# Patient Record
Sex: FEMALE | Race: OTHER | HISPANIC OR LATINO | Employment: FULL TIME | URBAN - METROPOLITAN AREA
[De-identification: names, ages, dates, MRNs, and addresses within clinical notes are randomized per-mention and may not be internally consistent; named-entity substitution may affect disease eponyms.]

---

## 2017-06-05 ENCOUNTER — ALLSCRIPTS OFFICE VISIT (OUTPATIENT)
Dept: OTHER | Facility: OTHER | Age: 37
End: 2017-06-05

## 2017-06-05 ENCOUNTER — LAB REQUISITION (OUTPATIENT)
Dept: LAB | Facility: HOSPITAL | Age: 37
End: 2017-06-05
Payer: COMMERCIAL

## 2017-06-05 DIAGNOSIS — Z01.419 ENCOUNTER FOR GYNECOLOGICAL EXAMINATION WITHOUT ABNORMAL FINDING: ICD-10-CM

## 2017-06-05 DIAGNOSIS — N94.9 UNSPECIFIED CONDITION ASSOCIATED WITH FEMALE GENITAL ORGANS AND MENSTRUAL CYCLE: ICD-10-CM

## 2017-06-05 PROCEDURE — 87255 GENET VIRUS ISOLATE HSV: CPT | Performed by: NURSE PRACTITIONER

## 2017-06-08 LAB — HSV SPEC CULT: NORMAL

## 2018-01-03 ENCOUNTER — ALLSCRIPTS OFFICE VISIT (OUTPATIENT)
Dept: OTHER | Facility: OTHER | Age: 38
End: 2018-01-03

## 2018-01-13 VITALS
HEART RATE: 89 BPM | DIASTOLIC BLOOD PRESSURE: 77 MMHG | WEIGHT: 130 LBS | HEIGHT: 59 IN | BODY MASS INDEX: 26.21 KG/M2 | SYSTOLIC BLOOD PRESSURE: 119 MMHG

## 2018-01-23 NOTE — MISCELLANEOUS
Reason For Visit  Reason For Visit Free Text Note Form: SW MET WITH PATIENT AND PROVIDER TO ASSIST WITH INTERPRETATION DURING PROBLEM VISIT ,      Active Problems    1  Encounter for routine gynecological examination (V72 31) (Z01 419)   2  Fatty liver (571 8) (K76 0)   3  Genital lesion, female (629 89) (N94 9)   4  PCOS (polycystic ovarian syndrome) (256 4) (E28 2)    Current Meds   1  Sprintec 28 0 25-35 MG-MCG Oral Tablet; TAKE 1 TABLET DAILY AS DIRECTED; Therapy: 05YCZ4159 to (Last [id number])  Requested for: [id number] Ordered    Allergies    1   No Known Drug Allergies    Signatures   Electronically signed by : ESTRELLITA Barry; Joaquín  3 2018  4:53PM EST                       (Author)

## 2018-01-24 VITALS
SYSTOLIC BLOOD PRESSURE: 131 MMHG | WEIGHT: 126 LBS | HEIGHT: 59 IN | BODY MASS INDEX: 25.4 KG/M2 | DIASTOLIC BLOOD PRESSURE: 85 MMHG

## 2018-04-23 LAB
ALBUMIN SERPL BCP-MCNC: 4.7 G/DL (ref 3–5.2)
ALP SERPL-CCNC: 63 U/L (ref 43–122)
ALT SERPL W P-5'-P-CCNC: 32 U/L (ref 9–52)
ANION GAP SERPL CALCULATED.3IONS-SCNC: 14 MMOL/L (ref 5–14)
AST SERPL W P-5'-P-CCNC: 27 U/L (ref 14–36)
BILIRUB SERPL-MCNC: 0.3 MG/DL
BUN SERPL-MCNC: 9 MG/DL (ref 5–25)
CALCIUM SERPL-MCNC: 9.9 MG/DL (ref 8.4–10.2)
CHLORIDE SERPL-SCNC: 103 MEQ/L (ref 97–108)
CHOLEST SERPL-MCNC: 183 MG/DL
CHOLEST/HDLC SERPL: 5.7 {RATIO}
CO2 SERPL-SCNC: 24 MMOL/L (ref 22–30)
CREATINE, SERUM (HISTORICAL): 0.6 MG/DL (ref 0.6–1.2)
DEPRECATED RDW RBC AUTO: 13.7 %
EGFR (HISTORICAL): >60 ML/MIN/1.73 M2
GLUCOSE SERPL-MCNC: 79 MG/DL (ref 70–99)
HCT VFR BLD AUTO: 37.2 % (ref 36–46)
HDLC SERPL-MCNC: 32 MG/DL
HEPATITIS A IGM ANTIBODY (HISTORICAL): NORMAL
HEPATITIS B CORE IGM ANTIBODY (HISTORICAL): NORMAL
HEPATITIS B SURFACE AG CONFIRMATION (HISTORICAL): NORMAL
HEPATITIS C ANTIBODY (HISTORICAL): NORMAL
HGB BLD-MCNC: 12.1 G/DL (ref 12–16)
LDL/HDL RATIO (HISTORICAL): 2.3
LDLC SERPL CALC-MCNC: 73 MG/DL
MCH RBC QN AUTO: 26.7 PG (ref 26–34)
MCHC RBC AUTO-ENTMCNC: 32.4 % (ref 31–36)
MCV RBC AUTO: 82 FL (ref 80–100)
PLATELET # BLD AUTO: 252 K/MCL (ref 150–450)
POTASSIUM SERPL-SCNC: 4.3 MEQ/L (ref 3.6–5)
RBC # BLD AUTO: 4.51 M/MCL (ref 4–5.2)
SODIUM SERPL-SCNC: 140 MEQ/L (ref 137–147)
TOTAL PROTEIN (HISTORICAL): 8.1 G/DL (ref 5.9–8.4)
TRIGL SERPL-MCNC: 392 MG/DL
TSH SERPL DL<=0.05 MIU/L-ACNC: 1.3 UIU/ML (ref 0.47–4.68)
VITAMIN D25-HYDROXY (HISTORICAL): 21.2 NG/ML (ref 30–100)
VLDLC SERPL CALC-MCNC: 78 MG/DL (ref 0–40)
WBC # BLD AUTO: 3.5 K/MCL (ref 4.5–11)

## 2018-04-24 LAB
EST. AVERAGE GLUCOSE BLD GHB EST-MCNC: 111 MG/DL
HBA1C MFR BLD HPLC: 5.5 %

## 2018-06-16 ENCOUNTER — APPOINTMENT (OUTPATIENT)
Dept: LAB | Facility: HOSPITAL | Age: 38
End: 2018-06-16
Payer: COMMERCIAL

## 2018-06-16 ENCOUNTER — TRANSCRIBE ORDERS (OUTPATIENT)
Dept: LAB | Facility: HOSPITAL | Age: 38
End: 2018-06-16

## 2018-06-16 DIAGNOSIS — E04.9 ENLARGEMENT OF THYROID: Primary | ICD-10-CM

## 2018-06-16 DIAGNOSIS — E04.9 ENLARGEMENT OF THYROID: ICD-10-CM

## 2018-06-16 LAB
T3FREE SERPL-MCNC: 2.41 PG/ML (ref 2.3–4.2)
T4 FREE SERPL-MCNC: 1 NG/DL (ref 0.76–1.46)
TSH SERPL DL<=0.05 MIU/L-ACNC: 1.17 UIU/ML (ref 0.36–3.74)

## 2018-06-16 PROCEDURE — 84479 ASSAY OF THYROID (T3 OR T4): CPT

## 2018-06-16 PROCEDURE — 84481 FREE ASSAY (FT-3): CPT

## 2018-06-16 PROCEDURE — 36415 COLL VENOUS BLD VENIPUNCTURE: CPT

## 2018-06-16 PROCEDURE — 84432 ASSAY OF THYROGLOBULIN: CPT

## 2018-06-16 PROCEDURE — 84439 ASSAY OF FREE THYROXINE: CPT

## 2018-06-16 PROCEDURE — 84443 ASSAY THYROID STIM HORMONE: CPT

## 2018-06-16 PROCEDURE — 86800 THYROGLOBULIN ANTIBODY: CPT

## 2018-06-17 LAB — T3RU NFR SERPL: 28 % (ref 24–39)

## 2018-06-18 ENCOUNTER — TRANSCRIBE ORDERS (OUTPATIENT)
Dept: ADMINISTRATIVE | Facility: HOSPITAL | Age: 38
End: 2018-06-18

## 2018-06-18 DIAGNOSIS — K46.9 ABDOMINAL HERNIA WITHOUT OBSTRUCTION AND WITHOUT GANGRENE, RECURRENCE NOT SPECIFIED, UNSPECIFIED HERNIA TYPE: Primary | ICD-10-CM

## 2018-06-19 LAB
THYROGLOB AB SERPL-ACNC: <1 IU/ML (ref 0–0.9)
THYROGLOB SERPL-MCNC: 5 NG/ML (ref 1.5–38.5)

## 2018-06-24 ENCOUNTER — HOSPITAL ENCOUNTER (OUTPATIENT)
Dept: RADIOLOGY | Facility: HOSPITAL | Age: 38
Discharge: HOME | End: 2018-06-24
Payer: COMMERCIAL

## 2018-06-24 DIAGNOSIS — K46.9 ABDOMINAL HERNIA WITHOUT OBSTRUCTION AND WITHOUT GANGRENE, RECURRENCE NOT SPECIFIED, UNSPECIFIED HERNIA TYPE: ICD-10-CM

## 2018-06-24 PROCEDURE — 76705 ECHO EXAM OF ABDOMEN: CPT

## 2018-06-28 ENCOUNTER — DOCUMENTATION (OUTPATIENT)
Dept: FAMILY MEDICINE CLINIC | Facility: CLINIC | Age: 38
End: 2018-06-28

## 2018-07-09 DIAGNOSIS — B00.9 HERPES: Primary | ICD-10-CM

## 2018-07-09 RX ORDER — VALACYCLOVIR HYDROCHLORIDE 1 G/1
1000 TABLET, FILM COATED ORAL EVERY 24 HOURS
Qty: 30 TABLET | Refills: 2 | Status: SHIPPED | OUTPATIENT
Start: 2018-07-09 | End: 2018-11-06 | Stop reason: SDUPTHER

## 2018-07-09 RX ORDER — VALACYCLOVIR HYDROCHLORIDE 1 G/1
TABLET, FILM COATED ORAL EVERY 24 HOURS
COMMUNITY
Start: 2017-09-05 | End: 2018-07-09 | Stop reason: SDUPTHER

## 2018-08-17 ENCOUNTER — OFFICE VISIT (OUTPATIENT)
Dept: FAMILY MEDICINE CLINIC | Facility: CLINIC | Age: 38
End: 2018-08-17
Payer: COMMERCIAL

## 2018-08-17 VITALS
SYSTOLIC BLOOD PRESSURE: 138 MMHG | HEIGHT: 59 IN | OXYGEN SATURATION: 99 % | BODY MASS INDEX: 24.39 KG/M2 | TEMPERATURE: 98 F | HEART RATE: 91 BPM | WEIGHT: 121 LBS | DIASTOLIC BLOOD PRESSURE: 82 MMHG

## 2018-08-17 DIAGNOSIS — R10.84 GENERALIZED ABDOMINAL PAIN: ICD-10-CM

## 2018-08-17 DIAGNOSIS — E78.5 HYPERLIPIDEMIA, UNSPECIFIED HYPERLIPIDEMIA TYPE: Primary | ICD-10-CM

## 2018-08-17 DIAGNOSIS — R19.7 DIARRHEA, UNSPECIFIED TYPE: ICD-10-CM

## 2018-08-17 DIAGNOSIS — E28.2 PCOS (POLYCYSTIC OVARIAN SYNDROME): ICD-10-CM

## 2018-08-17 DIAGNOSIS — D64.9 ANEMIA, UNSPECIFIED TYPE: ICD-10-CM

## 2018-08-17 DIAGNOSIS — K21.9 GASTROESOPHAGEAL REFLUX DISEASE, ESOPHAGITIS PRESENCE NOT SPECIFIED: ICD-10-CM

## 2018-08-17 DIAGNOSIS — K76.0 FATTY LIVER: ICD-10-CM

## 2018-08-17 PROBLEM — N94.9 GENITAL LESION, FEMALE: Status: ACTIVE | Noted: 2017-06-05

## 2018-08-17 PROCEDURE — 99204 OFFICE O/P NEW MOD 45 MIN: CPT | Performed by: FAMILY MEDICINE

## 2018-08-17 RX ORDER — NIACIN 1000 MG/1
1 TABLET, EXTENDED RELEASE ORAL EVERY 24 HOURS
COMMUNITY
Start: 2018-04-23 | End: 2019-02-06

## 2018-08-17 RX ORDER — ERGOCALCIFEROL 1.25 MG/1
50000 CAPSULE ORAL WEEKLY
Refills: 5 | COMMUNITY
Start: 2018-06-05 | End: 2018-09-10 | Stop reason: SDUPTHER

## 2018-08-17 NOTE — PROGRESS NOTES
Assessment/Plan:    No problem-specific Assessment & Plan notes found for this encounter  Diagnoses and all orders for this visit:    Hyperlipidemia, unspecified hyperlipidemia type  -     Lipid Panel with Direct LDL reflex; Future  -     Comprehensive metabolic panel; Future  -     HEMOGLOBIN A1C W/ EAG ESTIMATION; Future  -     CBC and differential; Future    Anemia, unspecified type  -     Lipid Panel with Direct LDL reflex; Future  -     Comprehensive metabolic panel; Future  -     HEMOGLOBIN A1C W/ EAG ESTIMATION; Future  -     CBC and differential; Future    PCOS (polycystic ovarian syndrome)  -     Lipid Panel with Direct LDL reflex; Future  -     Comprehensive metabolic panel; Future  -     HEMOGLOBIN A1C W/ EAG ESTIMATION; Future  -     CBC and differential; Future    Fatty liver  -     Lipid Panel with Direct LDL reflex; Future  -     Comprehensive metabolic panel; Future  -     HEMOGLOBIN A1C W/ EAG ESTIMATION; Future  -     CBC and differential; Future    Generalized abdominal pain  -     Ambulatory referral to Gastroenterology; Future  -     lansoprazole (PREVACID) 15 mg capsule; Take 1 capsule (15 mg total) by mouth daily    Diarrhea, unspecified type  -     Ambulatory referral to Gastroenterology; Future    Gastroesophageal reflux disease, esophagitis presence not specified  -     lansoprazole (PREVACID) 15 mg capsule; Take 1 capsule (15 mg total) by mouth daily    Other orders  -     ergocalciferol (VITAMIN D2) 50,000 units; Take 50,000 Units by mouth once a week  -     niacin (NIASPAN) 1000 MG CR tablet; Take 1 tablet by mouth every 24 hours      fu 3 months    Subjective:   Pt here for an acute visit  Pt having an allergic reaction to something at work  Pt complaining of abdominal pain for about a month very often  Pt complaining of irregular menses, more than her normal       Patient ID: Juan Scott is a 30 y o  female  HPI  New pt, presents to establish care       Pt has history of irregular menses she is benign followed by GYN for this, she was started on OCP which was helping regulate her menses but per pt she was put on it only for 6 months so she finished 6 months and stopped them, now menses irregular again  She also has not made follow up apt with her GYN about this  She is Wolof speaking  She also states occassions at work where she gets a rash on her chest and throat, with burning  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  She denies respiratory sxs  She also is requesting refill of reflux medicine  She has been getting increase reflux sxs and abd pain for a bout a month  Previous PCP had her on "something for this"  Reviewed recent thyroid labs in June, normal     The following portions of the patient's history were reviewed and updated as appropriate: allergies, current medications, past family history, past medical history, past social history, past surgical history and problem list     Review of Systems   Constitutional: Negative for activity change and appetite change  HENT: Negative  Respiratory: Negative  Cardiovascular: Negative  Gastrointestinal: Positive for abdominal pain  Genitourinary: Negative  Musculoskeletal: Negative for arthralgias  Skin: Positive for rash  Psychiatric/Behavioral: Negative  Objective:      /82   Pulse 91   Temp 98 °F (36 7 °C)   Ht 4' 11 06" (1 5 m)   Wt 54 9 kg (121 lb)   LMP 08/08/2018   SpO2 99%   BMI 24 39 kg/m²          Physical Exam   Constitutional: She is oriented to person, place, and time  She appears well-developed and well-nourished  No distress  HENT:   Head: Normocephalic  Eyes: Conjunctivae are normal    Cardiovascular: Normal rate, regular rhythm and normal heart sounds  Pulmonary/Chest: Effort normal and breath sounds normal  No respiratory distress  She has no wheezes  She has no rales  Abdominal: Soft  Bowel sounds are normal  She exhibits no distension   There is no tenderness  Neurological: She is alert and oriented to person, place, and time  Psychiatric: She has a normal mood and affect   Her behavior is normal

## 2018-08-20 RX ORDER — LANSOPRAZOLE 15 MG/1
15 CAPSULE, DELAYED RELEASE ORAL DAILY
Qty: 30 CAPSULE | Refills: 0 | Status: SHIPPED | OUTPATIENT
Start: 2018-08-20 | End: 2018-09-21 | Stop reason: SDUPTHER

## 2018-08-27 ENCOUNTER — OFFICE VISIT (OUTPATIENT)
Dept: GASTROENTEROLOGY | Facility: CLINIC | Age: 38
End: 2018-08-27
Payer: COMMERCIAL

## 2018-08-27 VITALS
DIASTOLIC BLOOD PRESSURE: 87 MMHG | HEIGHT: 59 IN | WEIGHT: 121.2 LBS | SYSTOLIC BLOOD PRESSURE: 138 MMHG | TEMPERATURE: 98.3 F | BODY MASS INDEX: 24.44 KG/M2 | HEART RATE: 90 BPM

## 2018-08-27 DIAGNOSIS — K21.9 GASTROESOPHAGEAL REFLUX DISEASE WITHOUT ESOPHAGITIS: ICD-10-CM

## 2018-08-27 DIAGNOSIS — R19.7 DIARRHEA, UNSPECIFIED TYPE: ICD-10-CM

## 2018-08-27 DIAGNOSIS — R10.84 GENERALIZED ABDOMINAL PAIN: Primary | ICD-10-CM

## 2018-08-27 PROCEDURE — 99244 OFF/OP CNSLTJ NEW/EST MOD 40: CPT | Performed by: INTERNAL MEDICINE

## 2018-08-27 NOTE — LETTER
August 27, 2018     Elizabeth High MD  [address]  [address]  [address] [address]    Patient: Suzan Spencer   YOB: 1980   Date of Visit: 8/27/2018       Dear Dr Desmond Patterson: Thank you for referring Suzan Spencer to me for evaluation  Below are my notes for this consultation  If you have questions, please do not hesitate to call me  I look forward to following your patient along with you  Sincerely,        Lottie Riddle MD        CC: No Recipients  Lottie Riddle MD  8/27/2018  2:07 PM  Sign at close encounter  [facility] - Outpatient Consultation  Suzan Spencer 30 y o  female MRN: [id number]  Encounter: [id number]          ASSESSMENT AND PLAN:      1  Generalized abdominal pain  2  Diarrhea, unspecified type  Given the acute onset of symptoms, she most likely has an acute infectious gastroenteritis  I will check her stool studies and have her follow up in the office in about one month  If her symptoms persist and her stool studies are negative then we will proceed to a colonoscopy and possibly also an upper endoscopy  - Ambulatory referral to Gastroenterology  - White Blood Cells, Stool by Gram Stain; Future  - Stool Enteric Bacterial Panel by PCR; Future  - Clostridium difficile toxin by PCR; Future  - Ova and parasite examination; Future    3  Gastroesophageal reflux disease without esophagitis  Her reflux symptoms are not fully controlled on the lansoprazole  I have asked her to continue the lansoprazole and if her symptoms persist we may consider an upper endoscopy as the next step     ______________________________________________________________________    HPI:  She presents for evaluation because of acute onset of generalized abdominal pain and diarrhea  She has been having about four soft to loose bowel movements per day for the past few days associated with this generalized abdominal pain  She has not had any bleeding or weight loss  She also denies nausea, vomiting, and dysphagia  She denies any sick contacts, recent travel history, and recent antibiotic use  She has a history of reflux and has been taking lansoprazole for this but feels it is not completely taking care of her symptoms  REVIEW OF SYSTEMS:    CONSTITUTIONAL: Denies any fever, chills, rigors, and weight loss  HEENT: No earache or tinnitus  Denies hearing loss or visual disturbances  CARDIOVASCULAR: No chest pain or palpitations  RESPIRATORY: Denies any cough, hemoptysis, shortness of breath or dyspnea on exertion  GASTROINTESTINAL: As noted in the History of Present Illness  GENITOURINARY: No problems with urination  Denies any hematuria or dysuria  NEUROLOGIC: No dizziness or vertigo, denies headaches  MUSCULOSKELETAL: Denies any muscle pain but has joint pains  SKIN: Denies skin rashes or itching  ENDOCRINE: Denies excessive thirst  Denies intolerance to heat or cold  PSYCHOSOCIAL: Denies depression or anxiety  Denies any recent memory loss         Historical Information   Past Medical History:   Diagnosis Date    Hyperlipidemia      Past Surgical History:   Procedure Laterality Date     SECTION      3X--2003,2005, 2008    TUBAL LIGATION       Social History   History   Alcohol Use No     History   Drug Use No     History   Smoking Status    Never Smoker   Smokeless Tobacco    Never Used     Family History   Problem Relation Age of Onset    Uterine cancer Mother     Heart disease Mother    Jackie Benton Hypertension Mother     Stomach cancer Father     Diabetes Sister     Hyperlipidemia Sister     Diabetes Brother     Hyperlipidemia Brother     Asthma Son     Asthma Daughter        Meds/Allergies       Current Outpatient Prescriptions:     ergocalciferol (VITAMIN D2) 50,000 units    lansoprazole (PREVACID) 15 mg capsule    niacin (NIASPAN) 1000 MG CR tablet    valACYclovir (VALTREX) 1,000 mg tablet    No Known Allergies        Objective Blood pressure 138/87, pulse 90, temperature 98 3 °F (36 8 °C), temperature source Tympanic, height 4' 11 06" (1 5 m), weight 55 kg (121 lb 3 2 oz), last menstrual period 08/08/2018  Body mass index is 24 43 kg/m²  PHYSICAL EXAM:      General Appearance:   Alert, cooperative, no distress   HEENT:   Normocephalic, atraumatic, anicteric      Neck:  Supple, symmetrical, trachea midline   Lungs:   Clear to auscultation bilaterally; no rales, rhonchi or wheezing; respirations unlabored    Heart[de-identified]   Regular rate and rhythm; no murmur, rub, or gallop  Abdomen:   Soft, periumbilical tenderness, non-distended; normal bowel sounds; no masses, no organomegaly    Genitalia:   Deferred    Rectal:   Deferred    Extremities:  No cyanosis, clubbing or edema    Pulses:  2+ and symmetric    Skin:  No jaundice, rashes, or lesions    Lymph nodes:  No palpable cervical lymphadenopathy        Lab Results:   No visits with results within 1 Day(s) from this visit  Latest known visit with results is:   Appointment on 06/16/2018   Component Date Value    T3 Uptake Ratio 06/16/2018 28     T3, Free 06/16/2018 2 41     Free T4 06/16/2018 1 00     TSH 3RD GENERATON 06/16/2018 1 170     Thyroglobulin Ab 06/16/2018 <1 0     Thyroglobulin-ИВАН 06/16/2018 5 0          Radiology Results:   No results found

## 2018-08-27 NOTE — PROGRESS NOTES
[facility] - Outpatient Consultation  Suzan Spencer 30 y o  female MRN: [id number]  Encounter: [id number]          ASSESSMENT AND PLAN:      1  Generalized abdominal pain  2  Diarrhea, unspecified type  Given the acute onset of symptoms, she most likely has an acute infectious gastroenteritis  I will check her stool studies and have her follow up in the office in about one month  If her symptoms persist and her stool studies are negative then we will proceed to a colonoscopy and possibly also an upper endoscopy  - Ambulatory referral to Gastroenterology  - White Blood Cells, Stool by Gram Stain; Future  - Stool Enteric Bacterial Panel by PCR; Future  - Clostridium difficile toxin by PCR; Future  - Ova and parasite examination; Future    3  Gastroesophageal reflux disease without esophagitis  Her reflux symptoms are not fully controlled on the lansoprazole  I have asked her to continue the lansoprazole and if her symptoms persist we may consider an upper endoscopy as the next step     ______________________________________________________________________    HPI:  She presents for evaluation because of acute onset of generalized abdominal pain and diarrhea  She has been having about four soft to loose bowel movements per day for the past few days associated with this generalized abdominal pain  She has not had any bleeding or weight loss  She also denies nausea, vomiting, and dysphagia  She denies any sick contacts, recent travel history, and recent antibiotic use  She has a history of reflux and has been taking lansoprazole for this but feels it is not completely taking care of her symptoms  REVIEW OF SYSTEMS:    CONSTITUTIONAL: Denies any fever, chills, rigors, and weight loss  HEENT: No earache or tinnitus  Denies hearing loss or visual disturbances  CARDIOVASCULAR: No chest pain or palpitations     RESPIRATORY: Denies any cough, hemoptysis, shortness of breath or dyspnea on exertion  GASTROINTESTINAL: As noted in the History of Present Illness  GENITOURINARY: No problems with urination  Denies any hematuria or dysuria  NEUROLOGIC: No dizziness or vertigo, denies headaches  MUSCULOSKELETAL: Denies any muscle pain but has joint pains  SKIN: Denies skin rashes or itching  ENDOCRINE: Denies excessive thirst  Denies intolerance to heat or cold  PSYCHOSOCIAL: Denies depression or anxiety  Denies any recent memory loss  Historical Information   Past Medical History:   Diagnosis Date    Hyperlipidemia      Past Surgical History:   Procedure Laterality Date     SECTION      3X--2003,2005, 2008    TUBAL LIGATION       Social History   History   Alcohol Use No     History   Drug Use No     History   Smoking Status    Never Smoker   Smokeless Tobacco    Never Used     Family History   Problem Relation Age of Onset    Uterine cancer Mother     Heart disease Mother     Hypertension Mother     Stomach cancer Father     Diabetes Sister     Hyperlipidemia Sister     Diabetes Brother     Hyperlipidemia Brother     Asthma Son     Asthma Daughter        Meds/Allergies       Current Outpatient Prescriptions:     ergocalciferol (VITAMIN D2) 50,000 units    lansoprazole (PREVACID) 15 mg capsule    niacin (NIASPAN) 1000 MG CR tablet    valACYclovir (VALTREX) 1,000 mg tablet    No Known Allergies        Objective     Blood pressure 138/87, pulse 90, temperature 98 3 °F (36 8 °C), temperature source Tympanic, height 4' 11 06" (1 5 m), weight 55 kg (121 lb 3 2 oz), last menstrual period 2018  Body mass index is 24 43 kg/m²          PHYSICAL EXAM:      General Appearance:   Alert, cooperative, no distress   HEENT:   Normocephalic, atraumatic, anicteric      Neck:  Supple, symmetrical, trachea midline   Lungs:   Clear to auscultation bilaterally; no rales, rhonchi or wheezing; respirations unlabored    Heart[de-identified]   Regular rate and rhythm; no murmur, rub, or gallop  Abdomen:   Soft, periumbilical tenderness, non-distended; normal bowel sounds; no masses, no organomegaly    Genitalia:   Deferred    Rectal:   Deferred    Extremities:  No cyanosis, clubbing or edema    Pulses:  2+ and symmetric    Skin:  No jaundice, rashes, or lesions    Lymph nodes:  No palpable cervical lymphadenopathy        Lab Results:   No visits with results within 1 Day(s) from this visit  Latest known visit with results is:   Appointment on 06/16/2018   Component Date Value    T3 Uptake Ratio 06/16/2018 28     T3, Free 06/16/2018 2 41     Free T4 06/16/2018 1 00     TSH 3RD GENERATON 06/16/2018 1 170     Thyroglobulin Ab 06/16/2018 <1 0     Thyroglobulin-ИВАН 06/16/2018 5 0          Radiology Results:   No results found

## 2018-08-31 ENCOUNTER — OFFICE VISIT (OUTPATIENT)
Dept: OBGYN CLINIC | Facility: HOSPITAL | Age: 38
End: 2018-08-31
Payer: COMMERCIAL

## 2018-08-31 VITALS
HEIGHT: 61 IN | HEART RATE: 80 BPM | WEIGHT: 122.2 LBS | DIASTOLIC BLOOD PRESSURE: 79 MMHG | BODY MASS INDEX: 23.07 KG/M2 | SYSTOLIC BLOOD PRESSURE: 128 MMHG

## 2018-08-31 DIAGNOSIS — E28.2 PCOS (POLYCYSTIC OVARIAN SYNDROME): ICD-10-CM

## 2018-08-31 DIAGNOSIS — Z01.419 WOMEN'S ANNUAL ROUTINE GYNECOLOGICAL EXAMINATION: Primary | ICD-10-CM

## 2018-08-31 DIAGNOSIS — B37.3 VAGINAL YEAST INFECTION: ICD-10-CM

## 2018-08-31 PROBLEM — N94.9 GENITAL LESION, FEMALE: Status: RESOLVED | Noted: 2017-06-05 | Resolved: 2018-08-31

## 2018-08-31 PROCEDURE — 99395 PREV VISIT EST AGE 18-39: CPT | Performed by: NURSE PRACTITIONER

## 2018-08-31 PROCEDURE — 87210 SMEAR WET MOUNT SALINE/INK: CPT | Performed by: NURSE PRACTITIONER

## 2018-08-31 RX ORDER — NORGESTIMATE AND ETHINYL ESTRADIOL 0.25-0.035
1 KIT ORAL DAILY
Qty: 28 TABLET | Refills: 12 | Status: SHIPPED | OUTPATIENT
Start: 2018-08-31 | End: 2019-05-03 | Stop reason: ALTCHOICE

## 2018-08-31 NOTE — PATIENT INSTRUCTIONS
Hemorragia uterina disfuncional   LO QUE NECESITA SABER:   ¿Qué es la hemorragia uterina disfuncional?  La hemorragia uterina disfuncional es un sangrado uterino anormal causado por un problema hormonal  Es posible que dunaway útero marc en un momento que no sea dunaway período menstrual regular  Colton menstruaciones pueden durar más o ser mas cortos, y podría sangrar dawson mayor o daria cantidad que de costumbre  ¿Cuáles son las causas de la hemorragia uterina disfuncional?  La hemorragia puede ser el resultado de dawson cantidad demasiado leilani o demasiado baja de estrógeno  Es posible que marc más o menos de lo acostumbrado si kelvin de colton ovarios no libera un óvulo giuliano la ovulación  Algunas condiciones de [place], alberto el síndrome del ovario Arnel, pueden aumentar dunaway riesgo de tener hemorragia uterina disfuncional   ¿Cuáles son los signos y síntomas de la hemorragia uterina disfuncional?   · Sangrado o manchas de marc entre los períodos menstruales    · Hemorragia que comienza 12 meses o más tarde después de rajeev pasado la menopausia    · Sangrado más o menos abundante que de costumbre giuliano colton períodos menstruales    · Sangra más de lo usual o más de 7 días giuliano dunaway período menstrual regular    · Sangra menos que de costumbre o menos de 2 días    · Intervalo de tiempo más corto o más alicia que de costumbre entre colton períodos menstruales  ¿Cómo se diagnostica la hemorragia uterina disfuncional (HUD)? · Los análisis de marc:  se pueden hacer para encontrar la causa de dunaway hemorragia uterina disfuncional y los problemas que causa, alberto la anemia  · Un examen pélvico  para encontrar la causa de dunaway hemorragia  · Dawson histeroscopia  es un procedimiento para examinar dunaway endometrio  El endometrio es el revestimiento por dentro del [place]  Dunaway médico insertará en dunaway útero un tubo pequeño con dawson cámara en la punta  · Dawson biopsia  es un procedimiento para extraer Mardene Phy pequeña muestra de tejido del endometrio  El tejido se envía a un laboratorio para hacerle exámenes  · Dawson ecografía  [place] ondas sonoras para mostrar imágenes de dunaway [place], ovarios, trompas de Falopio y vagina en dawson pantalla  · Rufus Alex prueba de [place]  podría ser Wolfe Pinks  Dunaway médico tomará [place] de tejido de dunaway deacon uterino y la enviará a un laboratorio para analizarla  ¿Cómo se trata la hemorragia uterina disfuncional?   · Medicamentos:      ¨ Hormonas  hacen que colton períodos menstruales bora más regulares y contribuyen a disminuir la hemorragia  A veces meme medicamento se administra en forma de pastillas anticonceptivas  ¨ AINEs (Analgésicos antiinflamatorios no esteroides)  pueden disminuir la inflamación y el dolor o la fiebre  Mmee medicamento esta disponible con o sin dawson receta médica  Los AINEs pueden causar sangrado estomacal o problemas renales en ciertas personas  Si usted selwyn un medicamento anticoagulante, siempre pregúntele a dunaway médico si los ERROL son seguros para usted  Siempre amalia la etiqueta de meme medicamento y [place] instrucciones  ¨ Pueden administrarle suplementos de jo  podrían administrarse si dunaway nivel de jo en la marc disminuye debido a la hemorragia  El jo puede causar estreñimiento  Consulte con dunaway médico de qué manera puede evitar o tratar el estreñimiento  El jo Yanira Cheng hacer que colton evacuaciones intestinales bora de color oscuro o negras  · Eileen Slocumb y los procedimientos  pueden ser necesarios si no se pueden usar medicamentos o los medicamentos no dan resultado  Podría ser necesario hacerle dawson ablación endometrial o dawson dilatación y curetaje para controlar dunaway hemorragia  Es posible que necesite dawson histerectomía abdominal o vaginal  Rufus Alex histerectomía es dawson cirugía para extraer el [place]  ¿Cómo me cuido en mi hogar? · La aplicación de calor  en la parte inferior del abdomen de 20 a 30 minutos cada 2 horas giuliano los días que le indiquen   El calor ayuda a disminuir el dolor y los espasmos musculares  · Consuma alimentos con un alto contenido de jo  si lo necesita  Las verduras de [employer], la carne de res, BOONOO BOONOO, [place], los huevos y los panes y cereales integrales son algunos ejemplos de alimentos con un alto contenido de jo  · Lleve un diario de colton períodos menstruales  Anote la cantidad de tampones o toallas higiénicas que [place] a diario  · Consulte con ly médico antes de comenzar un programa para bajar de [place]  Es posible que deba esperar a que la hemorragia anormal se haya detenido para tratar de bajar de [place]  La cantidad de jo en ly marc debería ser normal antes de [employer]  ¿Cuándo gamaliel comunicarme con mi médico?   · Necesita cambiar ly toalla higiénica o tampón más de dawson vez por hora  · Ly medicamento le da náuseas, vómitos o diarrea  · Usted tiene preguntas o inquietudes acerca de ly condición o cuidado  ¿Cuándo gamaliel buscar atención inmediata o llamar al 911? · Continúa sangrando mucho o se siente mareado  ACUERDOS SOBRE LY CUIDADO:   Usted tiene el derecho de ayudar a planear ly cuidado  Aprenda todo lo que pueda sobre ly condición y alberto darle tratamiento  Discuta colton opciones de tratamiento con colton médicos para decidir el cuidado que usted desea recibir  Usted siempre tiene el derecho de rechazar el tratamiento  Esta información es sólo para uso en educación  Ly intención no es darle un consejo médico sobre enfermedades o tratamientos  Colsulte con ly Mardell Oliver farmacéutico antes de seguir cualquier régimen médico para saber si es seguro y efectivo para usted  © 2017 [address] Information is for End User's use only and may not be sold, redistributed or otherwise used for commercial purposes  All illustrations and images included in CareNotes® are the copyrighted property of A D A M , Inc  or Randy Joshi    Etinilestradiol/norgestimato (Por la boca)   Se [place] para evitar el embarazo y tratar el acné  Meme medicamento comúnmente se conoce alberto dawson píldora anticonceptiva  Sarah(s) : Christopher Yanira, Femynor, Mono-Linyah, MonoNessa, Ortho Tri-Cyclen, Ortho Tri-Cyclen Lo, Ortho-Cyclen, Previfem, Sprintec, Tri-Estarylla, Tri-Linyah, Tri-Lo-Estarylla, Tri-Lo-Azucena, Tri-Lo-Sprintec, Tri-Previfem   Existen muchas otras marcas de Mark  Meme medicamento no debe ser usado cuando:   Meme medicamento no es adecuado para todas las personas  No lo use si usted tuvo dawson reacción alérgica al etinil estradiol o al norgestimato, o si usted está embarazada o tiene sangrado vaginal inusual que no ha sido revisado por dunaway médico  No lo use si usted tiene enfermedad del hígado, cáncer del seno, o problemas con coágulos de Jenny  No lo use si usted tiene presión arterial leilani, ciertos problemas del corazón, o diabetes con daños en el riñón, los ojos, nervios o vasos sanguíneos  Forma de usar meme medicamento:   [place]ak Bar  · Dunaway médico le indicara cuanto medicamento necesita usar  No use más medicamento de lo indicado  · Cada sarah de anticonceptivos orales tiene instrucciones específicas  Tiffanie y siga las instrucciones para el paciente que vienen con dunaway sarah prescrita  Hable con dunaway médico o farmacéutico si tiene alguna pregunta  · Pregunte a dunaway médico si es necesario que use un gretta método anticonceptivo [employer] primeros 7 días del primer ciclo de píldoras  · [place] dunaway píldora a la misma hora cada día  Las píldoras anticonceptivas funcionan mejor cuando no hay más de 24 horas entre dosis  Mantenga las píldoras en el envase original  [address] en el orden en que aparecen en el empaque  · Siga las instrucciones que vienen en el panfleto para el paciente o llame a dunaway médico, si usted vomita o si en lapso de 3 a 4 horas usted tiene diarrea después de lanny Mark  · Si olvida dawson dosis: Tiffanie y siga las instrucciones del paciente cuidadosamente si Orlie Likens dosis   Es posible que necesite usar un gretta método anticonceptivo para varios días  Consulte con dunaway médico o farmacéutico si tiene cualquier pregunta  · Guarde el medicamento en el envase original a temperatura ambiente, alejado del calor, la humedad, y la chayito directa  Medicamentos y [employer] que debe evitar:   Consulte con dunaway médico o farmacéutico antes de usar cualquier medicamento, incluyendo los que compra sin receta médica, las vitaminas y los productos herbales  · Algunos alimentos y [employer] pueden afectar el funcionamiento de las píldoras anticonceptivas  Informe a dunaway médico si usted [employer] siguientes medicamentos:   ¨ Acetaminofén, aprepitant, ácido ascórbico, ácido acetilsalicílico, atorvastatina, boceprevir, bosentan, clofibrato, colesevelam, ciclosporinamorfina, morfina, prednisolona, rifabutina, rifampicina, rosuvastatina, hierba de [address], telaprevir, temazepam, teofilina, tizanidina  ¨ Medicamento para las convulsiones, incluyendo Cegdel, De queen, lamotrigina, oxcarbazepina, [place], topiramato  ¨ Medicamento de reemplazo de la tiroides  ¨ Medicamento para tratar dawson infección, incluyendo fluconazol, ketoconazol, voriconazol  ¨ Un inhibidor de la proteasa para tratar el VIH/SIDA  Precauciones giuliano el uso de leland medicamento:   · Informe a dunaway médico de inmediato si usted tevin que quedó [place]  Si pasan 2 meses seguidos sin que usted tenga un período menstrual, llame a dunaway médico para que le realicen dawson prueba de embarazo antes de lanny más píldoras  · Infórmele a dunaway médico si está dando de lactar, o si wayne a chayito en las últimas 4 semanas antes de rajeev empezado a usar [place]  Informe a dunaway médico si usted tiene cáncer cervical, diabetes, epilepsia, problemas con la vesícula biliar, migrañas, enfermedad cardíaca o vascular, colesterol alto o un historial familiar de cáncer de seno o depresión   Informe a dunaway médico si usted fuma o si existen antecedentes de cloasma (decoloración de la piel de la shakir), especialmente giuliano el embarazo  · Meme medicamento puede causar los siguientes problemas:  ¨ Coágulos de Vern ibarra, los cuáles pueden llevar a un derrame cerebral o un ataque al corazón (el riesgo aumenta debido al hábito de fumar cigarros)  ¨ Un posible aumento en el riesgo de cáncer de seno o cervical  ¨ Cáncer de hígado o tumores  ¨ Enfermedad de la vesícula  ¨ Presión arterial leilani  · Es posible que usted presente manchas o sangrado irregular cuando empiece a usar meme medicamento  Usted puede tener sangrado imprevisto si olvida lanny dawson dosis o si selwyn dawson dosis tarde  Llame a dunaway médico si tevin que hay un problema, por ejemplo, si tiene sangrado abundante  · Es posible que usted necesite dejar de usar meme medicamento giuliano unas semanas antes y después de Krunal Johnston cirugía debido al riesgo de desarrollar coágulos de Vern ibarra  · Meme medicamento no la protegerá contra el VIH / [place] u otras enfermedades de transmisión sexual   · Dígale a todo médico o dentista encargado de atenderle que usted está usando meme medicamento  Puede que meme medicamento afecte algunos resultados de [employer]  · Guarde todos los medicamentos fuera del alcance de los niños  Nunca comparta colton medicamentos con [employer]    Efectos secundarios que pueden presentarse giuliano el uso de meme medicamento:   Consulte inmediatamente con el médico si nota cualquiera de estos efectos secundarios:  · Reacción alérgica: Comezón o ronchas, hinchazón del corky o las epifanio, hinchazón u hormigueo en la boca o garganta, opresión en el pecho, dificultad para respirar  · Dolor en el pecho, dificultad para respirar, o toser marc  · Náusea, sudoración inusual, W  KATHRYN Shukla  · Adormecimiento o debilidad en un lado del cuerpo, dolor de je repentino o severo, problemas con el habla, la visión o para caminar  · Dolor en la parte inferior de la pierna (pantorrilla)  · Sangrado vaginal o sangrado profuso inusual e inesperado  · Piel u ojos amarillos  · Pérdida de la visión, visión doble  Consulte con el médico si nota los siguientes efectos secundarios menos graves:   · Depresión, cambios de humor  · Shaniqua de je  · Sangrado ligero o sangrado entre períodos  Consulte con el médico si nota otros efectos secundarios que tevin son causados por leland medicamento  Llame a carlson médico para consultarle Lexie Luevano puede notificar colton efectos secundarios al FDA al [id number]  © 2017 [address] Information is for End User's use only and may not be sold, redistributed or otherwise used for commercial purposes  Esta información es sólo para uso en educación  Carlson intención no es darle un consejo médico sobre enfermedades o tratamientos  Colsulte con carlson Benuel Matos farmacéutico antes de seguir cualquier régimen médico para saber si es seguro y efectivo para usted  Miconazol (Dentro de la vagina)   Trata las infecciones vaginales fúngicas (causadas por hongos)  [place] medicamento pertenece a la clase de antifúngicos  Sarah(s) : Equate Miconazole 1, Equate Miconazole 7, [facility] Miconazole 1, [facility] Miconazole 3, [facility] Miconazole 7, Good Sense Miconazole 3, Good Sense Miconazole 7, Leader Miconazole 3 Combination Pack, Miconazole 3 Combination Pack, Miconazole 7, Quality Choice 3 Day Vaginal, Quality Choice Miconazole 7, Rite Aid Miconazole 3, Sunmark Miconazole 7, Top Care Miconazole 3   Existen muchas otras marcas de [place]  [place] medicamento no debe ser usado cuando:   No use leland medicamento si alguna vez ha tenido [facility] reacción alérgica al [employer] u otros antifúngicos, tales alberto Gyne-Lotrimin®, Mycelex®, Micatin®, Spectazole®, [place], o Femstat®  Forma de usar leland medicamento:   Crema, Supositorio, Tampón  · Carlson médico le dirá la cantidad de medicamento que usted debe lanny y la frecuencia con que debe hacerlo   No use más cantidad de medicamento ni lo use con más frecuencia de la indicada por el médico   · Siga las indicaciones de la etiqueta del medicamento si usted está usando meme medicamento sin prescripción médica  · Meme medicamento viene con instrucciones para el paciente  [place] y siga las instrucciones cuidadosamente  Consulte con dunaway médico o farmacéutico si tiene cualquier pregunta  · [employer] las epifanio con agua y jabón antes y después de usar meme medicamento  · Meme medicamento es para uso en la vagina únicamente  Úsela a la hora de The   S[employer] a no ser [employer] doctor diga otra cosa  · Use solamente ropa interior limpia de algodón (calzones) en vez de ropa interior de nylon o rayón  · Para facilitar la aplicación del supositorio vaginal, humedézcalo con un poco de agua tibia  [address] usar un lubricante alberto el gel K-Y®, silverio no use vaselina (Vaseline®)  · Inserte el tampón medicado en dunaway vagina al acostarse  Déjelo insertado toda la noche  Karen Citizen se levanta por la mañana  · Lave el aplicador con agua tibia y [place] después de haberlo usado  Si dawson dosis es [place]:   · Si olvida aplicar dawson dosis de dunaway medicamento, aplíquelo lo más pronto posible  Si es deepa hora de dunaway próxima dosis, omita la [place] y espere hasta entonces para aplicar el medicamento  · No use medicina adicional para compensar dawson dosis [place]  Mylinda Steph guardar y botar meme medicamento:   · Si el medicamento viene con un aplicador desechable, use cada aplicador solo dawson vez y después deséchelo  · Guarde el medicamento a temperatura ambiente, alejado del calor, la humedad y la chayito directa  No lo congele  · Guarde todas las medicinas fuera del alcance de los niños y no las comparta con otra persona  Medicamentos y alimentos que debe evitar:   Consulte con dunaway médico o farmacéutico antes de usar cualquier medicamento, incluyendo los que compra sin receta médica, las vitaminas y los productos herbales    · Antes de ASHELY Huang medicamento infórmele al médico si también está usando un anticoagulante alberto Coumadin®  · Evite las duchas vaginales o usar otros productos vaginales a menos que carlson médico se lo indique  Precauciones giuliano el uso de leland medicamento:   · Consulte con el médico antes de usar leland medicamento si usted está embarazada o dando de lactar [place])  · [place] puede salir de carlson vagina giuliano el día  Usted puede usar toallas higiénicas para proteger carlson ropa, silverio no use tampones  · Continúe usando leland medicamento por la totalidad del tratamiento aunque colton síntomas mejoren después de rajeev usado las primeras dosis  No suspenda el uso del medicamento si carlson menstruación comienza giuliano el tiempo del [address]  Use toallas higiénicas en lugar de tampones  · Si colton síntomas no mejoran, o si por el contrario empeoran, llame al médico   Efectos secundarios que pueden presentarse giuliano el uso de leland medicamento:   Consulte inmediatamente con el médico si nota cualquiera de estos efectos secundarios:  · Sangrado o moretones inusuales  Consulte con el médico si nota los siguientes efectos secundarios menos graves:   · [place]  · Dolor de je  · Aumento en el flujo vaginal  · Sarpullido en la piel  · Malestar estomacal  · Janet Iniguez o ardor vaginal  Consulte con el médico si nota otros efectos secundarios que tevin son causados por leland medicamento  Llame a carlson médico para consultarle Lexie Salmeronted puede notificar colton efectos secundarios al FDA al [id number]  © 2017 [address] Information is for End User's use only and may not be sold, redistributed or otherwise used for commercial purposes  Esta información es sólo para uso en educación  Carlson intención no es darle un consejo médico sobre enfermedades o tratamientos  Colsulte con carlson Benuel Matos farmacéutico antes de seguir cualquier régimen médico para saber si es seguro y efectivo para usted    Ethinyl Estradiol/Norgestimate (By mouth)   Ethinyl Estradiol (ETH-i-nil es-tra-DYE-ol), Norgestimate (bgr-FXR-fc-mate)  Prevents pregnancy and treats acne  This medicine is commonly called a birth control pill  Brand Name(s): Estarylla, Femynor, Mono-Linyah, MonoNessa, Ortho Tri-Cyclen, Ortho Tri-Cyclen Lo, Ortho-Cyclen, Previfem, Sprintec, Tri-Estarylla, Tri-Linyah, Tri-Lo-Estarylla, Tri-Lo-Azucena, Tri-Lo-Sprintec, Tri-Previfem   There may be other brand names for this medicine  When This Medicine Should Not Be Used: This medicine is not right for everyone  Do not use it if you had an allergic reaction to ethinyl estradiol or norgestimate, or if you are pregnant or have unusual vaginal bleeding that has not been checked by your doctor  Do not use it if you have liver disease, breast cancer, or problems with blood clots  Do not use it if you have high blood pressure, certain heart problems, or diabetes with kidney, eye, nerve, or blood vessel damage  How to Use This Medicine:   Tablet  · Your doctor will tell you how much medicine to use  Do not use more than directed  · Each brand of birth control pills has specific directions  Read and follow the patient instructions for your prescribed brand  Talk to your doctor or pharmacist if you have any questions  · Ask your doctor if you should use a second form of birth control for the first 7 days of your first cycle of pills  · Take your pill at the same time every day  Birth control pills work best when there is no more than 24 hours between doses  Keep the pills in the original container  Take the pills in the order they appear in the container  · Follow the instructions in the patient leaflet or call your doctor if you vomit or have diarrhea within 3 to 4 hours of taking this medicine  · Missed dose: Read and carefully follow the patient instructions if you miss a dose  You may need to use a second form of birth control for several days   Ask your doctor or pharmacist if you have any questions  · Store the medicine in the original package at room temperature, away from heat, moisture, and direct light  Drugs and Foods to Avoid:   Ask your doctor or pharmacist before using any other medicine, including over-the-counter medicines, vitamins, and herbal products  · Some foods and medicines can affect how birth control pills work  Tell your doctor if you are also using the following:   ¨ Acetaminophen, aprepitant, ascorbic acid, aspirin, atorvastatin, boceprevir, bosentan, clofibrate, colesevelam, cyclosporine, morphine, prednisolone, rifabutin, rifampicin, rosuvastatin, Marlin's wort, telaprevir, temazepam, theophylline, tizanidine  ¨ Seizure medicine, including carbamazepine, felbamate, lamotrigine, oxcarbazepine, phenytoin, topiramate  ¨ Thyroid replacement medicine  ¨ Medicine to treat an infection, including fluconazole, itraconazole, ketoconazole, voriconazole  ¨ Protease inhibitor to treat HIV/AIDS  Warnings While Using This Medicine:   · Tell your doctor right away if you think you have become pregnant  If you miss 2 monthly periods in a row, call your doctor for a pregnancy test before you take any more pills  · Tell your doctor if you are breastfeeding, or if you had a baby within 4 weeks before you start using this medicine  Tell your doctor if you have cervical cancer, diabetes, epilepsy, gallbladder problems, migraine headaches, heart or blood vessel disease, high cholesterol, or a family history of breast cancer or depression  Tell your doctor if you smoke or have a history of chloasma (skin discoloration of the face), especially during pregnancy    · This medicine may cause the following problems:  ¨ Blood clots, which may lead to stroke or heart attack (risk is increased by cigarette smoking)  ¨ Possible increased risk of breast or cervical cancer  ¨ Liver cancer or tumors  ¨ Gallbladder disease  ¨ High blood pressure  · You might have spotting or irregular bleeding when you first start to use this medicine  You might have unplanned bleeding if you miss a dose or are late taking it  Call your doctor if you think there is a problem, such as if you have heavy bleeding  · You may need to stop using this medicine for a few weeks before and after you have surgery because of the risk of blood clots  · This medicine will not protect you from HIV/AIDS or other sexually transmitted diseases  · Tell any doctor or dentist who treats you that you are using this medicine  This medicine may affect certain medical test results  · Keep all medicine out of the reach of children  Never share your medicine with anyone  Possible Side Effects While Using This Medicine:   Call your doctor right away if you notice any of these side effects:  · Allergic reaction: Itching or hives, swelling in your face or hands, swelling or tingling in your mouth or throat, chest tightness, trouble breathing  · Chest pain, trouble breathing, or coughing up blood  · Nausea, unusual sweating, fainting  · Numbness or weakness on one side of your body, sudden or severe headache, problems with vision, speech, or walking  · Pain in your lower leg (calf)  · Unusual or unexpected vaginal bleeding or heavy bleeding  · Yellow skin or eyes  · Vision loss, double vision  If you notice these less serious side effects, talk with your doctor:   · Depression, mood changes  · Headaches  · Light spotting or bleeding between periods  If you notice other side effects that you think are caused by this medicine, tell your doctor  Call your doctor for medical advice about side effects  You may report side effects to FDA at 6-537-FDA-1099  © 2017 [facility] Information is for End User's use only and may not be sold, redistributed or otherwise used for commercial purposes  The above information is an  only  It is not intended as medical advice for individual conditions or treatments  Talk to your doctor, nurse or pharmacist before following any medical regimen to see if it is safe and effective for you

## 2018-09-01 ENCOUNTER — APPOINTMENT (OUTPATIENT)
Dept: LAB | Facility: HOSPITAL | Age: 38
End: 2018-09-01
Attending: INTERNAL MEDICINE
Payer: COMMERCIAL

## 2018-09-01 DIAGNOSIS — K76.0 FATTY LIVER: ICD-10-CM

## 2018-09-01 DIAGNOSIS — E28.2 PCOS (POLYCYSTIC OVARIAN SYNDROME): ICD-10-CM

## 2018-09-01 DIAGNOSIS — E78.5 HYPERLIPIDEMIA, UNSPECIFIED HYPERLIPIDEMIA TYPE: ICD-10-CM

## 2018-09-01 DIAGNOSIS — D64.9 ANEMIA, UNSPECIFIED TYPE: ICD-10-CM

## 2018-09-01 DIAGNOSIS — R10.84 GENERALIZED ABDOMINAL PAIN: ICD-10-CM

## 2018-09-01 DIAGNOSIS — R19.7 DIARRHEA, UNSPECIFIED TYPE: ICD-10-CM

## 2018-09-01 LAB
ALBUMIN SERPL BCP-MCNC: 3.8 G/DL (ref 3.5–5)
ALP SERPL-CCNC: 63 U/L (ref 46–116)
ALT SERPL W P-5'-P-CCNC: 21 U/L (ref 12–78)
ANION GAP SERPL CALCULATED.3IONS-SCNC: 6 MMOL/L (ref 4–13)
AST SERPL W P-5'-P-CCNC: 13 U/L (ref 5–45)
BASOPHILS # BLD AUTO: 0.02 THOUSANDS/ΜL (ref 0–0.1)
BASOPHILS NFR BLD AUTO: 1 % (ref 0–1)
BILIRUB SERPL-MCNC: 0.3 MG/DL (ref 0.2–1)
BUN SERPL-MCNC: 5 MG/DL (ref 5–25)
CALCIUM SERPL-MCNC: 9 MG/DL (ref 8.3–10.1)
CHLORIDE SERPL-SCNC: 103 MMOL/L (ref 100–108)
CHOLEST SERPL-MCNC: 176 MG/DL (ref 50–200)
CO2 SERPL-SCNC: 27 MMOL/L (ref 21–32)
CREAT SERPL-MCNC: 0.52 MG/DL (ref 0.6–1.3)
EOSINOPHIL # BLD AUTO: 0.03 THOUSAND/ΜL (ref 0–0.61)
EOSINOPHIL NFR BLD AUTO: 1 % (ref 0–6)
ERYTHROCYTE [DISTWIDTH] IN BLOOD BY AUTOMATED COUNT: 13.6 % (ref 11.6–15.1)
EST. AVERAGE GLUCOSE BLD GHB EST-MCNC: 97 MG/DL
GFR SERPL CREATININE-BSD FRML MDRD: 124 ML/MIN/1.73SQ M
GLUCOSE SERPL-MCNC: 86 MG/DL (ref 65–140)
HBA1C MFR BLD: 5 % (ref 4.2–6.3)
HCT VFR BLD AUTO: 35.5 % (ref 34.8–46.1)
HDLC SERPL-MCNC: 28 MG/DL (ref 40–60)
HGB BLD-MCNC: 11.1 G/DL (ref 11.5–15.4)
IMM GRANULOCYTES # BLD AUTO: 0.01 THOUSAND/UL (ref 0–0.2)
IMM GRANULOCYTES NFR BLD AUTO: 0 % (ref 0–2)
LDLC SERPL DIRECT ASSAY-MCNC: 46 MG/DL (ref 0–100)
LYMPHOCYTES # BLD AUTO: 1.09 THOUSANDS/ΜL (ref 0.6–4.47)
LYMPHOCYTES NFR BLD AUTO: 30 % (ref 14–44)
MCH RBC QN AUTO: 25.5 PG (ref 26.8–34.3)
MCHC RBC AUTO-ENTMCNC: 31.3 G/DL (ref 31.4–37.4)
MCV RBC AUTO: 81 FL (ref 82–98)
MONOCYTES # BLD AUTO: 0.38 THOUSAND/ΜL (ref 0.17–1.22)
MONOCYTES NFR BLD AUTO: 10 % (ref 4–12)
NEUTROPHILS # BLD AUTO: 2.14 THOUSANDS/ΜL (ref 1.85–7.62)
NEUTS SEG NFR BLD AUTO: 58 % (ref 43–75)
NRBC BLD AUTO-RTO: 0 /100 WBCS
PLATELET # BLD AUTO: 213 THOUSANDS/UL (ref 149–390)
PMV BLD AUTO: 11 FL (ref 8.9–12.7)
POTASSIUM SERPL-SCNC: 4.1 MMOL/L (ref 3.5–5.3)
PROT SERPL-MCNC: 8.2 G/DL (ref 6.4–8.2)
RBC # BLD AUTO: 4.36 MILLION/UL (ref 3.81–5.12)
SODIUM SERPL-SCNC: 136 MMOL/L (ref 136–145)
TRIGL SERPL-MCNC: 488 MG/DL
WBC # BLD AUTO: 3.67 THOUSAND/UL (ref 4.31–10.16)
WBC STL QL MICRO: NORMAL

## 2018-09-01 PROCEDURE — 87177 OVA AND PARASITES SMEARS: CPT

## 2018-09-01 PROCEDURE — 80061 LIPID PANEL: CPT

## 2018-09-01 PROCEDURE — 80053 COMPREHEN METABOLIC PANEL: CPT

## 2018-09-01 PROCEDURE — 36415 COLL VENOUS BLD VENIPUNCTURE: CPT

## 2018-09-01 PROCEDURE — 87209 SMEAR COMPLEX STAIN: CPT

## 2018-09-01 PROCEDURE — 87205 SMEAR GRAM STAIN: CPT

## 2018-09-01 PROCEDURE — 87505 NFCT AGENT DETECTION GI: CPT

## 2018-09-01 PROCEDURE — 85025 COMPLETE CBC W/AUTO DIFF WBC: CPT

## 2018-09-01 PROCEDURE — 83036 HEMOGLOBIN GLYCOSYLATED A1C: CPT

## 2018-09-01 PROCEDURE — 83721 ASSAY OF BLOOD LIPOPROTEIN: CPT

## 2018-09-02 ENCOUNTER — TRANSCRIBE ORDERS (OUTPATIENT)
Dept: LAB | Facility: HOSPITAL | Age: 38
End: 2018-09-02

## 2018-09-02 ENCOUNTER — LAB (OUTPATIENT)
Dept: LAB | Facility: HOSPITAL | Age: 38
End: 2018-09-02
Attending: INTERNAL MEDICINE
Payer: COMMERCIAL

## 2018-09-02 DIAGNOSIS — R10.84 ABDOMINAL PAIN, GENERALIZED: Primary | ICD-10-CM

## 2018-09-02 DIAGNOSIS — R10.84 ABDOMINAL PAIN, GENERALIZED: ICD-10-CM

## 2018-09-02 LAB
C DIFF TOX GENS STL QL NAA+PROBE: NORMAL
CAMPYLOBACTER DNA SPEC NAA+PROBE: NORMAL
SALMONELLA DNA SPEC QL NAA+PROBE: NORMAL
SHIGA TOXIN STX GENE SPEC NAA+PROBE: NORMAL
SHIGELLA DNA SPEC QL NAA+PROBE: NORMAL

## 2018-09-02 PROCEDURE — 87493 C DIFF AMPLIFIED PROBE: CPT

## 2018-09-04 LAB — O+P STL CONC: NORMAL

## 2018-09-07 ENCOUNTER — TELEPHONE (OUTPATIENT)
Dept: GASTROENTEROLOGY | Facility: CLINIC | Age: 38
End: 2018-09-07

## 2018-09-10 DIAGNOSIS — E55.9 VITAMIN D DEFICIENCY: Primary | ICD-10-CM

## 2018-09-10 RX ORDER — ERGOCALCIFEROL 1.25 MG/1
50000 CAPSULE ORAL WEEKLY
Qty: 8 CAPSULE | Refills: 0 | Status: SHIPPED | OUTPATIENT
Start: 2018-09-10 | End: 2019-01-30

## 2018-09-21 DIAGNOSIS — R10.84 GENERALIZED ABDOMINAL PAIN: ICD-10-CM

## 2018-09-21 DIAGNOSIS — K21.9 GASTROESOPHAGEAL REFLUX DISEASE, ESOPHAGITIS PRESENCE NOT SPECIFIED: ICD-10-CM

## 2018-09-21 RX ORDER — LANSOPRAZOLE 15 MG/1
15 CAPSULE, DELAYED RELEASE ORAL DAILY
Qty: 30 CAPSULE | Refills: 0 | Status: SHIPPED | OUTPATIENT
Start: 2018-09-21 | End: 2018-10-22 | Stop reason: SDUPTHER

## 2018-10-22 DIAGNOSIS — K21.9 GASTROESOPHAGEAL REFLUX DISEASE, ESOPHAGITIS PRESENCE NOT SPECIFIED: ICD-10-CM

## 2018-10-22 DIAGNOSIS — R10.84 GENERALIZED ABDOMINAL PAIN: ICD-10-CM

## 2018-10-22 RX ORDER — LANSOPRAZOLE 15 MG/1
15 CAPSULE, DELAYED RELEASE ORAL DAILY
Qty: 30 CAPSULE | Refills: 3 | Status: SHIPPED | OUTPATIENT
Start: 2018-10-22 | End: 2019-02-01 | Stop reason: ALTCHOICE

## 2018-10-23 DIAGNOSIS — B00.9 HERPES: ICD-10-CM

## 2018-10-23 RX ORDER — VALACYCLOVIR HYDROCHLORIDE 1 G/1
1000 TABLET, FILM COATED ORAL EVERY 24 HOURS
Qty: 30 TABLET | Refills: 2 | Status: CANCELLED | OUTPATIENT
Start: 2018-10-23 | End: 2018-11-22

## 2018-11-06 DIAGNOSIS — B00.9 HERPES: ICD-10-CM

## 2018-11-06 RX ORDER — VALACYCLOVIR HYDROCHLORIDE 1 G/1
1000 TABLET, FILM COATED ORAL DAILY
Qty: 30 TABLET | Refills: 5 | Status: SHIPPED | OUTPATIENT
Start: 2018-11-06 | End: 2019-01-30

## 2018-11-09 ENCOUNTER — OFFICE VISIT (OUTPATIENT)
Dept: GASTROENTEROLOGY | Facility: CLINIC | Age: 38
End: 2018-11-09
Payer: COMMERCIAL

## 2018-11-09 VITALS
WEIGHT: 126 LBS | TEMPERATURE: 98.7 F | DIASTOLIC BLOOD PRESSURE: 95 MMHG | SYSTOLIC BLOOD PRESSURE: 153 MMHG | BODY MASS INDEX: 23.79 KG/M2 | HEIGHT: 61 IN | HEART RATE: 80 BPM

## 2018-11-09 DIAGNOSIS — K21.9 GASTROESOPHAGEAL REFLUX DISEASE WITHOUT ESOPHAGITIS: ICD-10-CM

## 2018-11-09 DIAGNOSIS — R19.7 DIARRHEA, UNSPECIFIED TYPE: ICD-10-CM

## 2018-11-09 DIAGNOSIS — R10.13 EPIGASTRIC PAIN: Primary | ICD-10-CM

## 2018-11-09 PROCEDURE — 99214 OFFICE O/P EST MOD 30 MIN: CPT | Performed by: PHYSICIAN ASSISTANT

## 2018-11-09 RX ORDER — DICYCLOMINE HYDROCHLORIDE 10 MG/1
10 CAPSULE ORAL
Qty: 60 CAPSULE | Refills: 2 | Status: SHIPPED | OUTPATIENT
Start: 2018-11-09 | End: 2019-02-06

## 2018-11-09 NOTE — PATIENT INSTRUCTIONS
Colon/EGD scheduled 12/31/18 at [address] with Abilio Turcios instructions given in office

## 2018-11-09 NOTE — LETTER
November 9, 2018     Leonid Tolliver MD  [address]    Patient: Ally Vallejo   YOB: 1980   Date of Visit: 11/9/2018       Dear Dr Regis Bennett: Thank you for referring Ally Vallejo to me for evaluation  Below are my notes for this consultation  If you have questions, please do not hesitate to call me  I look forward to following your patient along with you  Sincerely,        Sulaiman Lopez PA-C        CC: No Recipients  Sulaiman Lopez PA-C  11/9/2018  3:33 PM  Sign at close encounter  [facility] - Outpatient Follow-up Note  Ally Vallejo 30 y o  female MRN: [id number]  Encounter: [id number]          ASSESSMENT AND PLAN:      1  Epigastric pain  2  Gastroesophageal reflux disease without esophagitis  She has persistent epigastric pain, nausea, and heartburn symptoms  Her symptoms may be secondary to GERD, esophagitis, gastritis, peptic ulcer disease, H pylori infection, irritable bowel syndrome, celiac disease  Recommend EGD for further evaluation  Check celiac serologies  She may continue lansoprazole daily  Recommend Bentyl 4 times daily as needed for abdominal pain  Discussed potential side effect of dizziness     - Case request operating room: EGD AND COLONOSCOPY; Standing  - Case request operating room: EGD AND COLONOSCOPY  - dicyclomine (BENTYL) 10 mg capsule; Take 1 capsule (10 mg total) by mouth 4 (four) times a day (before meals and at bedtime)  Dispense: 60 capsule; Refill: 2  - Tissue transglutaminase, IgA; Future  - IgA; Future    3  Diarrhea, unspecified type  She reports persistent watery diarrhea and abdominal pain for the past 2 months  Stool studies including C diff have been negative  She may have postinfectious irritable bowel syndrome however cannot rule out inflammatory bowel disease, microscopic colitis, celiac disease, etc  Check inflammatory markers and celiac serologies   Recommend colonoscopy with random colon biopsies  Discussed risks and benefits of endoscopic evaluation  Risks include but are not limited to infection, bleeding, perforation  Gave instructions for Suprep  She may continue Pepto-Bismol and take Bentyl as needed for abdominal pain  - Case request operating room: EGD AND COLONOSCOPY; Standing  - Case request operating room: EGD AND COLONOSCOPY  - Tissue transglutaminase, IgA; Future  - C-reactive protein; Future  - Calprotectin,Fecal  - IgA; Future  - Na Sulfate-K Sulfate-Mg Sulf (SUPREP BOWEL PREP KIT) 17 5-3 13-1 6 GM/180ML SOLN; Take as directed by the office  Dispense: 2 Bottle; Refill: 0    Follow-up after EGD/colonoscopy  _____________________________________________________________________    SUBJECTIVE:  47-year-old female presenting for follow-up of abdominal pain and diarrhea  Patient is Pakistani-speaking only so  # [id number] was used to communicate  She was last seen in August 2018 for acute onset abdominal pain and diarrhea felt secondary to acute gastroenteritis  Stool studies were negative including C diff toxin  She complains of persistent, worsening epigastric pain  The pain is constant but does wax and wane in intensity  The pain is aggravated by eating  The pain starts within 30 minutes of eating and can last several hours  She has associated heartburn and nausea  She does state that lansoprazole helps her symptoms somewhat  She denies NSAID use  She denies dysphagia, odynophagia, abnormal weight loss  She has gained about 5 pounds since August 2018  She has frequent diarrhea up to 7-8 times per day  The stool is watery in consistency and sometimes contains mucous  She denies any red blood in the stool  Occasionally the stool is black in color, but she attributes this to Pepto-Bismol  She does occasionally wake up in the middle of the night to have a bowel movement  She denies past EGD and colonoscopy      REVIEW OF SYSTEMS IS OTHERWISE NEGATIVE  Historical Information   Past Medical History:   Diagnosis Date    High triglycerides     Hyperlipidemia      Past Surgical History:   Procedure Laterality Date     SECTION      3X--2003,2005, 2008    TUBAL LIGATION       Social History   History   Alcohol Use No     History   Drug Use No     History   Smoking Status    Never Smoker   Smokeless Tobacco    Never Used     Family History   Problem Relation Age of Onset    Uterine cancer Mother     Heart disease Mother     Hypertension Mother     Cervical cancer Mother     Stomach cancer Father     Diabetes Brother     Hyperlipidemia Brother     Asthma Son        Meds/Allergies       Current Outpatient Prescriptions:     ergocalciferol (VITAMIN D2) 50,000 units    lansoprazole (PREVACID) 15 mg capsule    miconazole (MONISTAT 7) 100 mg vaginal suppository    niacin (NIASPAN) 1000 MG CR tablet    norgestimate-ethinyl estradiol (ORTHO-CYCLEN) 0 25-35 MG-MCG per tablet    valACYclovir (VALTREX) 1,000 mg tablet    dicyclomine (BENTYL) 10 mg capsule    Na Sulfate-K Sulfate-Mg Sulf (SUPREP BOWEL PREP KIT) 17 5-3 13-1 6 GM/180ML SOLN    No Known Allergies        Objective     Blood pressure 153/95, pulse 80, temperature 98 7 °F (37 1 °C), temperature source Tympanic, height 5' 1" (1 549 m), weight 57 2 kg (126 lb)  Body mass index is 23 81 kg/m²  PHYSICAL EXAM:      General Appearance:   Alert, cooperative, no distress   HEENT:   Normocephalic, atraumatic, anicteric      Neck:  Supple, symmetrical, trachea midline   Lungs:   Clear to auscultation bilaterally; no rales, rhonchi or wheezing; respirations unlabored    Heart[de-identified]   Regular rate and rhythm; no murmur, rub, or gallop  Abdomen:   Non-distended  Normal bowel sounds  Soft  Mild left lower quadrant tenderness to palpation   No rebound or guarding       Genitalia:   Deferred    Rectal:   Deferred    Extremities:  No cyanosis, clubbing or edema    Pulses:  2+ and symmetric  Skin:  No jaundice, rashes, or lesions    Lymph nodes:  No palpable cervical lymphadenopathy        Lab Results:   No visits with results within 1 Day(s) from this visit  Latest known visit with results is:   Lab on 09/02/2018   Component Date Value    C difficile toxin by PCR 09/02/2018 NEGATIVE for C difficle toxin by PCR  Radiology Results:   No results found

## 2018-11-09 NOTE — PROGRESS NOTES
[facility] - Outpatient Follow-up Note  Vicki Shin 30 y o  female MRN: [id number]  Encounter: [id number]          ASSESSMENT AND PLAN:      1  Epigastric pain  2  Gastroesophageal reflux disease without esophagitis  She has persistent epigastric pain, nausea, and heartburn symptoms  Her symptoms may be secondary to GERD, esophagitis, gastritis, peptic ulcer disease, H pylori infection, irritable bowel syndrome, celiac disease  Recommend EGD for further evaluation  Check celiac serologies  She may continue lansoprazole daily  Recommend Bentyl 4 times daily as needed for abdominal pain  Discussed potential side effect of dizziness     - Case request operating room: EGD AND COLONOSCOPY; Standing  - Case request operating room: EGD AND COLONOSCOPY  - dicyclomine (BENTYL) 10 mg capsule; Take 1 capsule (10 mg total) by mouth 4 (four) times a day (before meals and at bedtime)  Dispense: 60 capsule; Refill: 2  - Tissue transglutaminase, IgA; Future  - IgA; Future    3  Diarrhea, unspecified type  She reports persistent watery diarrhea and abdominal pain for the past 2 months  Stool studies including C diff have been negative  She may have postinfectious irritable bowel syndrome however cannot rule out inflammatory bowel disease, microscopic colitis, celiac disease, etc  Check inflammatory markers and celiac serologies  Recommend colonoscopy with random colon biopsies  Discussed risks and benefits of endoscopic evaluation  Risks include but are not limited to infection, bleeding, perforation  Gave instructions for Suprep  She may continue Pepto-Bismol and take Bentyl as needed for abdominal pain  - Case request operating room: EGD AND COLONOSCOPY; Standing  - Case request operating room: EGD AND COLONOSCOPY  - Tissue transglutaminase, IgA; Future  - C-reactive protein; Future  - Calprotectin,Fecal  - IgA;  Future  - Na Sulfate-K Sulfate-Mg Sulf (SUPREP BOWEL PREP KIT) 17 5-3 13-1 6 GM/180ML SOLN; Take as directed by the office  Dispense: 2 Bottle; Refill: 0    Follow-up after EGD/colonoscopy  _____________________________________________________________________    SUBJECTIVE:  43-year-old female presenting for follow-up of abdominal pain and diarrhea  Patient is Syrian-speaking only so  # [id number] was used to communicate  She was last seen in 2018 for acute onset abdominal pain and diarrhea felt secondary to acute gastroenteritis  Stool studies were negative including C diff toxin  She complains of persistent, worsening epigastric pain  The pain is constant but does wax and wane in intensity  The pain is aggravated by eating  The pain starts within 30 minutes of eating and can last several hours  She has associated heartburn and nausea  She does state that lansoprazole helps her symptoms somewhat  She denies NSAID use  She denies dysphagia, odynophagia, abnormal weight loss  She has gained about 5 pounds since 2018  She has frequent diarrhea up to 7-8 times per day  The stool is watery in consistency and sometimes contains mucous  She denies any red blood in the stool  Occasionally the stool is black in color, but she attributes this to Pepto-Bismol  She does occasionally wake up in the middle of the night to have a bowel movement  She denies past EGD and colonoscopy  REVIEW OF SYSTEMS IS OTHERWISE NEGATIVE        Historical Information   Past Medical History:   Diagnosis Date    High triglycerides     Hyperlipidemia      Past Surgical History:   Procedure Laterality Date     SECTION      3X--2003,, 2008    TUBAL LIGATION       Social History   History   Alcohol Use No     History   Drug Use No     History   Smoking Status    Never Smoker   Smokeless Tobacco    Never Used     Family History   Problem Relation Age of Onset    Uterine cancer Mother     Heart disease Mother     Hypertension Mother     Cervical cancer Mother    [facility] Stomach cancer Father     Diabetes Brother     Hyperlipidemia Brother     Asthma Son        Meds/Allergies       Current Outpatient Prescriptions:     ergocalciferol (VITAMIN D2) 50,000 units    lansoprazole (PREVACID) 15 mg capsule    miconazole (MONISTAT 7) 100 mg vaginal suppository    niacin (NIASPAN) 1000 MG CR tablet    norgestimate-ethinyl estradiol (ORTHO-CYCLEN) 0 25-35 MG-MCG per tablet    valACYclovir (VALTREX) 1,000 mg tablet    dicyclomine (BENTYL) 10 mg capsule    Na Sulfate-K Sulfate-Mg Sulf (SUPREP BOWEL PREP KIT) 17 5-3 13-1 6 GM/180ML SOLN    No Known Allergies        Objective     Blood pressure 153/95, pulse 80, temperature 98 7 °F (37 1 °C), temperature source Tympanic, height 5' 1" (1 549 m), weight 57 2 kg (126 lb)  Body mass index is 23 81 kg/m²  PHYSICAL EXAM:      General Appearance:   Alert, cooperative, no distress   HEENT:   Normocephalic, atraumatic, anicteric      Neck:  Supple, symmetrical, trachea midline   Lungs:   Clear to auscultation bilaterally; no rales, rhonchi or wheezing; respirations unlabored    Heart[de-identified]   Regular rate and rhythm; no murmur, rub, or gallop  Abdomen:   Non-distended  Normal bowel sounds  Soft  Mild left lower quadrant tenderness to palpation  No rebound or guarding       Genitalia:   Deferred    Rectal:   Deferred    Extremities:  No cyanosis, clubbing or edema    Pulses:  2+ and symmetric    Skin:  No jaundice, rashes, or lesions    Lymph nodes:  No palpable cervical lymphadenopathy        Lab Results:   No visits with results within 1 Day(s) from this visit  Latest known visit with results is:   Lab on 09/02/2018   Component Date Value    C difficile toxin by PCR 09/02/2018 NEGATIVE for C difficle toxin by PCR  Radiology Results:   No results found

## 2018-11-10 ENCOUNTER — APPOINTMENT (OUTPATIENT)
Dept: LAB | Facility: HOSPITAL | Age: 38
End: 2018-11-10
Payer: COMMERCIAL

## 2018-11-10 DIAGNOSIS — R10.13 EPIGASTRIC PAIN: ICD-10-CM

## 2018-11-10 DIAGNOSIS — R19.7 DIARRHEA, UNSPECIFIED TYPE: ICD-10-CM

## 2018-11-10 LAB
CRP SERPL QL: 3.9 MG/L
IGA SERPL-MCNC: 323 MG/DL (ref 70–400)

## 2018-11-10 PROCEDURE — 82784 ASSAY IGA/IGD/IGG/IGM EACH: CPT

## 2018-11-10 PROCEDURE — 83516 IMMUNOASSAY NONANTIBODY: CPT

## 2018-11-10 PROCEDURE — 86140 C-REACTIVE PROTEIN: CPT

## 2018-11-10 PROCEDURE — 36415 COLL VENOUS BLD VENIPUNCTURE: CPT

## 2018-11-11 ENCOUNTER — APPOINTMENT (OUTPATIENT)
Dept: LAB | Facility: HOSPITAL | Age: 38
End: 2018-11-11
Payer: COMMERCIAL

## 2018-11-11 PROCEDURE — 83993 ASSAY FOR CALPROTECTIN FECAL: CPT | Performed by: PHYSICIAN ASSISTANT

## 2018-11-12 LAB — TTG IGA SER-ACNC: <2 U/ML (ref 0–3)

## 2018-11-20 LAB — CALPROTECTIN STL-MCNT: 75 UG/G (ref 0–120)

## 2018-11-21 ENCOUNTER — TELEPHONE (OUTPATIENT)
Dept: GASTROENTEROLOGY | Facility: AMBULARY SURGERY CENTER | Age: 38
End: 2018-11-21

## 2018-12-21 ENCOUNTER — OFFICE VISIT (OUTPATIENT)
Dept: OBGYN CLINIC | Facility: HOSPITAL | Age: 38
End: 2018-12-21
Payer: COMMERCIAL

## 2018-12-21 VITALS
HEIGHT: 60 IN | SYSTOLIC BLOOD PRESSURE: 123 MMHG | WEIGHT: 124 LBS | DIASTOLIC BLOOD PRESSURE: 86 MMHG | BODY MASS INDEX: 24.35 KG/M2 | HEART RATE: 94 BPM

## 2018-12-21 DIAGNOSIS — N64.4 BREAST PAIN, RIGHT: Primary | ICD-10-CM

## 2018-12-21 PROCEDURE — 99213 OFFICE O/P EST LOW 20 MIN: CPT | Performed by: OBSTETRICS & GYNECOLOGY

## 2018-12-21 NOTE — PROGRESS NOTES
Assessment/Plan:    No problem-specific Assessment & Plan notes found for this encounter   # [id number]     Diagnoses and all orders for this visit:    Breast pain, right  - US breast right limited (diagnostic); Future  - Breast exam within normal limits  No evidence of a palpable mass or cords, erythema, discharge, or dimpling of the skin  No appreciable differences noted between right and left breast         - Encouraged the use of warm compresses to the affected area as well as NSAIDs as needed for pain and inflammation  Subjective: Mathew Bucio is a 40 yo P3 with LMP 12/18/18 here with c/o pain in her right breast that started last Monday  She is unable to characterize the pain but states that it is "strong"  She states that the pain is there all the time and is worsened by movement of her arm  She has taken Aleve to help with her symptoms with minimal improvement  She also notes that the area is itchy  She denies any trauma to the breast   She states that she went to bed fine on Sunday and woke up on Monday with this breast pain  She states that she has had pain in her right breast before and had an US performed, however she doesn't remember when this was  She states that the pain went away and she did not require any further treatment following the US  She reports that she breastfeed her first child and had pain similar to what she is experiencing now  She denies any nipple discharge  Patient ID: Mathew Bucio is a 41 y o  female  HPI    The following portions of the patient's history were reviewed and updated as appropriate: allergies, current medications, past family history, past medical history, past social history, past surgical history and problem list     Review of Systems   Constitutional: Negative  Cardiovascular: Negative  Gastrointestinal: Negative  Genitourinary: Negative            Objective:      /86 (BP Location: Left arm, Patient Position: Sitting, Cuff Size: Standard)   Pulse 94   Ht 5' (1 524 m)   Wt 56 2 kg (124 lb)   BMI 24 22 kg/m²          Physical Exam   Constitutional: She is oriented to person, place, and time  She appears well-developed and well-nourished  No distress  HENT:   Head: Normocephalic and atraumatic  Cardiovascular: Normal rate  Pulmonary/Chest:       Abdominal: Soft  She exhibits no distension  There is no tenderness  There is no rebound and no guarding  Musculoskeletal:        Right shoulder: She exhibits no effusion and no crepitus  Arms:  Neurological: She is alert and oriented to person, place, and time  Skin: She is not diaphoretic  Vitals reviewed

## 2018-12-21 NOTE — PATIENT INSTRUCTIONS
Please complete your breast ultrasound and follow up in 3 weeks  Apply warm compresses to the breast and take NSAIDs like Motrin, Ibuprophen or Aleve as needed for discomfort

## 2018-12-28 ENCOUNTER — ANESTHESIA EVENT (OUTPATIENT)
Dept: GASTROENTEROLOGY | Facility: MEDICAL CENTER | Age: 38
End: 2018-12-28
Payer: COMMERCIAL

## 2018-12-31 ENCOUNTER — ANESTHESIA (OUTPATIENT)
Dept: GASTROENTEROLOGY | Facility: MEDICAL CENTER | Age: 38
End: 2018-12-31
Payer: COMMERCIAL

## 2018-12-31 ENCOUNTER — HOSPITAL ENCOUNTER (OUTPATIENT)
Facility: MEDICAL CENTER | Age: 38
Setting detail: OUTPATIENT SURGERY
Discharge: HOME | End: 2018-12-31
Attending: INTERNAL MEDICINE | Admitting: INTERNAL MEDICINE
Payer: COMMERCIAL

## 2018-12-31 VITALS
HEIGHT: 60 IN | HEART RATE: 87 BPM | DIASTOLIC BLOOD PRESSURE: 77 MMHG | RESPIRATION RATE: 16 BRPM | SYSTOLIC BLOOD PRESSURE: 128 MMHG | BODY MASS INDEX: 24.35 KG/M2 | WEIGHT: 124 LBS | TEMPERATURE: 99.3 F | OXYGEN SATURATION: 100 %

## 2018-12-31 DIAGNOSIS — R10.13 EPIGASTRIC PAIN: ICD-10-CM

## 2018-12-31 DIAGNOSIS — R19.7 DIARRHEA, UNSPECIFIED TYPE: ICD-10-CM

## 2018-12-31 LAB — EXT PREGNANCY TEST URINE: NEGATIVE

## 2018-12-31 PROCEDURE — 88305 TISSUE EXAM BY PATHOLOGIST: CPT | Performed by: PATHOLOGY

## 2018-12-31 PROCEDURE — 43239 EGD BIOPSY SINGLE/MULTIPLE: CPT | Performed by: INTERNAL MEDICINE

## 2018-12-31 PROCEDURE — 88342 IMHCHEM/IMCYTCHM 1ST ANTB: CPT | Performed by: PATHOLOGY

## 2018-12-31 PROCEDURE — 45380 COLONOSCOPY AND BIOPSY: CPT | Performed by: INTERNAL MEDICINE

## 2018-12-31 PROCEDURE — 81025 URINE PREGNANCY TEST: CPT | Performed by: ANESTHESIOLOGY

## 2018-12-31 RX ORDER — PROPOFOL 10 MG/ML
INJECTION, EMULSION INTRAVENOUS AS NEEDED
Status: DISCONTINUED | OUTPATIENT
Start: 2018-12-31 | End: 2018-12-31 | Stop reason: SURG

## 2018-12-31 RX ORDER — SODIUM CHLORIDE 9 MG/ML
125 INJECTION, SOLUTION INTRAVENOUS CONTINUOUS
Status: DISCONTINUED | OUTPATIENT
Start: 2018-12-31 | End: 2018-12-31 | Stop reason: HOSPADM

## 2018-12-31 RX ADMIN — PROPOFOL 100 MG: 10 INJECTION, EMULSION INTRAVENOUS at 10:36

## 2018-12-31 RX ADMIN — PROPOFOL 50 MG: 10 INJECTION, EMULSION INTRAVENOUS at 10:50

## 2018-12-31 RX ADMIN — PROPOFOL 50 MG: 10 INJECTION, EMULSION INTRAVENOUS at 10:45

## 2018-12-31 RX ADMIN — SODIUM CHLORIDE 125 ML/HR: 0.9 INJECTION, SOLUTION INTRAVENOUS at 10:21

## 2018-12-31 RX ADMIN — PROPOFOL 50 MG: 10 INJECTION, EMULSION INTRAVENOUS at 10:41

## 2018-12-31 RX ADMIN — PROPOFOL 50 MG: 10 INJECTION, EMULSION INTRAVENOUS at 10:38

## 2018-12-31 NOTE — H&P
History and Physical -  [facility]  Vicki Shin 41 y o  female MRN: [id number]                  HPI: Vicki Shin is a 41y o  year old female who presents for reflux, epigastric pain, and diarrhea  REVIEW OF SYSTEMS: Per the HPI, and otherwise unremarkable  Historical Information   Past Medical History:   Diagnosis Date    High triglycerides     Hyperlipidemia      Past Surgical History:   Procedure Laterality Date     SECTION      3X--2003,2005, 2008    TUBAL LIGATION       Social History   History   Alcohol Use No     History   Drug Use No     History   Smoking Status    Never Smoker   Smokeless Tobacco    Never Used     Family History   Problem Relation Age of Onset    Uterine cancer Mother     Heart disease Mother     Hypertension Mother     Cervical cancer Mother     Stomach cancer Father     Diabetes Brother     Hyperlipidemia Brother     Asthma Son        Meds/Allergies     Prescriptions Prior to Admission   Medication    dicyclomine (BENTYL) 10 mg capsule    ergocalciferol (VITAMIN D2) 50,000 units    lansoprazole (PREVACID) 15 mg capsule    miconazole (MONISTAT 7) 100 mg vaginal suppository    Na Sulfate-K Sulfate-Mg Sulf (SUPREP BOWEL PREP KIT) 17 5-3 13-1 6 GM/180ML SOLN    niacin (NIASPAN) 1000 MG CR tablet    norgestimate-ethinyl estradiol (ORTHO-CYCLEN) 0 25-35 MG-MCG per tablet    valACYclovir (VALTREX) 1,000 mg tablet       No Known Allergies    Objective     There were no vitals taken for this visit  PHYSICAL EXAM    Gen: NAD  CV: RRR  CHEST: Clear  ABD: soft, NT/ND  EXT: no edema      ASSESSMENT/PLAN:  This is a 41y o  year old female here for upper endoscopy and colonoscopy, and she is stable and optimized for her procedure

## 2018-12-31 NOTE — OP NOTE
ESOPHAGOGASTRODUODENOSCOPY(EGD) and COLONOSCOPY    SEDATION: Monitored anesthesia care, check anesthesia records    ASA Class: 2    INDICATIONS:  Reflux, abdominal pain, and diarrhea    CONSENT:  Informed consent was obtained for the procedure, including sedation after explaining the risks and benefits of the procedure  Risks including but not limited to bleeding, perforation, infection, and missed lesion  PREPARATION:   Telemetry, pulse oximetry, blood pressure were monitored throughout the procedure  Patient was identified by myself both verbally and by visual inspection of ID band  PROCEDURE: EGD    DESCRIPTION:   Patient was placed in the left lateral decubitus position and was sedated with the above medication  The gastroscope was introduced in to the oropharynx and the esophagus was intubated under direct visualization  Scope was passed down the esophagus up to 2nd part of the duodenum  A careful inspection was made as the gastroscope was withdrawn, including a retroflexed view of the stomach; findings and interventions are described below  FINDINGS:    #1  Esophagus- LA class A erosive reflux esophagitis  Subtle rings were noted in the esophagus  Random biopsies were taken of proximal esophagus to rule out eosinophilic esophagitis  #2  Stomach- medium-sized hiatal hernia  Random biopsies were taken of the antrum and body to rule out Helicobacter pylori infection  #3  Duodenum- normal   Random biopsies were taken to rule out celiac sprue  PROCEDURE: COLONOSCOPY    DESCRIPTION:     Prior colonoscopy: No prior colonoscopy  Informed consent was obtained for the procedure, including sedation  Risks of perforation, hemorrhage, adverse drug reaction and aspiration were discussed  The patient was placed in the left lateral decubitus position    Based on the pre-procedure assessment, including review of the patient's medical history, medications, allergies, and review of systems, she had been deemed to be an appropriate candidate for conscious sedation; she was therefore sedated with the medications listed below  The patient was monitored continuously with telemetry, pulse oximetry, blood pressure monitoring, and direct observations  A rectal examination was performed  The colonoscope was inserted into the rectum and advanced under direct vision to the cecum, which was identified by the ileocecal valve and appendiceal orifice  The quality of the colonic preparation was good  A careful inspection was made as the colonoscope was withdrawn, including a retroflexed view of the rectum; findings and interventions are described below  FINDINGS:  Internal hemorrhoids were seen on the retroflexed view of the rectum  The colon was otherwise unremarkable  Random biopsies were taken of the colon to rule out microscopic colitis  COMPLICATIONS:   None; patient tolerated the procedure well      IMPRESSION:    LA class A erosive reflux esophagitis  Medium-sized hiatal hernia  Internal hemorrhoids    RECOMMENDATIONS:  Await pathology results  Repeat colonoscopy at age 50 or sooner if clinically indicated  Follow-up with endoscopist    SPECIMENS:    ID Type Source Tests Collected by Time Destination   1 : duodenum biopsy r/o celiac  Tissue Duodenum TISSUE EXAM Marcelino Anderson MD 12/31/2018 1040    2 : gastric biopsy r/o hpylori  Tissue Stomach TISSUE EXAM Marcelino Anderson MD 12/31/2018 1042    3 : proximal esophagus r/o EOE Tissue Esophagus TISSUE EXAM Marcelino Anderson MD 12/31/2018 1042    4 : Random colon biopsy r/o microscopic colitis  Tissue Colon TISSUE EXAM Marcelino Anderson MD 12/31/2018 1058        ESTIMATED BLOOD LOSS:  Minimal

## 2018-12-31 NOTE — NURSING NOTE
All pre procedure instructions intrepretted in [place]ic by family member, [place]  All verbalize understanding

## 2018-12-31 NOTE — DISCHARGE INSTRUCTIONS
Endoscopia superior   LO QUE NECESITA SABER:   Luiza endoscopia superior también se conoce alberto endoscopia gastrointestinal (GI) superior o esofagogastroduodenoscopia (EGD)  Usted podría sentirse inflamado, con gases o sentir molestia abdominal después de dunaway procedimiento  Dunaway garganta podría estar adolorida por 24 a 36 horas  Usted podría eructar o expulsar gas debido al aire que todavía está en dunaway cuerpo  INSTRUCCIONES SOBRE EL SUSANNE HOSPITALARIA:   Llame al 911 en tarik de presentar lo siguiente:   · Tiene dolor en el pecho o dificultad para respirar de forma repentina  Busque atención médica de inmediato si:   · Está mareado o siente que se va a desmayar  · Usted tiene dificultad para tragar  · [place] evacuaciones intestinales son Hildegarde Stagers o negras  · Dunaway abdomen está kenia y firme y usted siente dolor intenso  · Usted vomita marc  Pregúntele a dunaway Jovany Katayama vitaminas y minerales son adecuados para usted  · Usted se siente lleno o hinchado y no puede eructar o expulsar gas  · Usted no ha tenido uliza evacuación intestinal después de 3 días de dunaway procedimiento  · Usted tiene dolor de deacon  · Usted tiene fiebre o escalofríos  · Usted tiene náuseas o está vomitando  · Usted tiene salpullido o urticaria  · Usted tiene preguntas o inquietudes acerca de dunaway endoscopia  Alivie el dolor de garganta:  Chupe pastillas para la garganta o hielo triturado  Nik gárgaras con luiza pequeña cantidad de agua tibia con sal  Mezcle 1 cucharadita de sal y 1 taza de agua tibia para hacer agua salada  Alivie el gas y la molestia de la inflamación:  Acuéstese sobre dunaway costado derecho con luiza almohada térmica sobre dunaway abdomen  Camine un poco para ayudar a expulsar el gas  Coma comidas pequeñas hasta que se alivie de la inflamación  Descanse después de dunaway procedimiento:  A usted le childers administrado medicamento para relajarse   No  maneje o tome decisiones importantes hasta el día siguiente de dunaway procedimiento  Regrese a colton actividades normales según le indiquen  Usted generalmente puede regresar al [place] al día siguiente de dunaway procedimiento  Acuda a colton consultas de control con dunaway médico según le indicaron  Anote colton preguntas para que se acuerde de hacerlas giuliano colton visitas  © 2017 marisa Weathers  Information is for End User's use only and may not be sold, redistributed or otherwise used for commercial purposes  All illustrations and images included in CareNotes® are the copyrighted property of A D A M , Inc  or Randy Joshi  Esta información es sólo para uso en educación  Dunaway intención no es darle un consejo médico sobre enfermedades o tratamientos  Colsulte con dunaway Gina Seals farmacéutico antes de seguir cualquier régimen médico para saber si es seguro y efectivo para usted  Esofagitis   LO QUE NECESITA SABER:   La esofagitis es la inflamación o irritación del revestimiento del esófago  INSTRUCCIONES SOBRE EL SUSANNE HOSPITALARIA:   Llame al 911 en tarik de presentar lo siguiente:   · Usted tiene dolor de pecho que no se juarez en cuestión de unos minutos o incluso empeora  Busque atención médica de inmediato si:   · Usted siente alberto si tuviera comida atorada en la garganta y no puede expulsarla cuando tose  Pregúntele a dunaway Jane Gault vitaminas y minerales son adecuados para usted  · Usted tiene síntomas nuevos o estos empeoran, aun después del [place]  · Usted tiene preguntas o inquietudes acerca de dunaway condición o cuidado  Medicamentos:   · Medicamentos,  se podrían administrar para combatir dawson infección o controlar el ácido estomacal     · [place] colton medicamentos alberto se le haya indicado  Consulte con dunaway médico si usted tevin que dunaway medicamento no le está ayudando o si presenta efectos secundarios  Infórmele si es alérgico a cualquier medicamento   Mantenga dawson lista actualizada de los [place], las vitaminas y METHLICK productos herbales que [place]  Incluya los siguientes datos de los medicamentos: cantidad, frecuencia y motivo de administración  Traiga con usted la lista o los envases de la píldoras a colton citas de seguimiento  Lleve la lista de los medicamentos con usted en tarik de dawson emergencia  Acuda a colton consultas de control con dunaway médico según le indicaron  Usted puede necesitar exámenes o tratamientos de forma continua  Anote colton preguntas para que se acuerde de hacerlas giuliano colton visitas  No fume:  La nicotina y otros químicos contenidos en los cigarrillos y puros pueden causar daños en los vasos sanguíneos y pulmones  Pida información a dunaway médico si usted actualmente fuma y necesita ayuda para dejar de fumar  Los cigarrillos electrónicos o tabaco sin humo todavía contienen nicotina  Consulte con dunaway médico antes de [employer]  No tome alcohol:  El alcohol puede irritar dunaway esófago  Consulte con dunaway médico si usted necesita ayuda para dejar de lanny alcohol  [address] baterías y objetos similares fuera del alcance de los niños:  Los bebés por lo general se llevan todo a la boca para explorar  Glenice Speller botón son fácil de tragar y pueden causar daños graves  Cierre con [employer] las tapas de los compartimientos de las baterías de aparatos electrónicos alberto controles remoto  [address] baterías y los materiales tóxicos fuera del alcance de los niños  Use chapas o cierres de seguridad para que los niños no puedan [employer]  Controle o evite la esofagitis:   · Limite o no consuma alimentos que pueden causar esofagitis  [address] naranjas y la salsa pueden irritar dunaway esófago  Stephanie Kiang y el chocolate pueden provocar reflujo ácido  Los alimentos altos en grasas y fritos hacen que dunaway estómago digiera los alimentos más lentamente  Cando aumenta la cantidad de ácido estomacal para dunaway esófago  Coma comidas pequeñas y tome agua junto con dunaway comida   Los alimentos blandos alberto el yogur y el puré de [place] debra podrían ayudarlo a aliviar carlson garganta  No coma nada al menos por 3 horas antes de acostarse  · Evite el reflujo ácido  No se incline a menos que sea necesario  Puede que el ácido suba hacia carlson esófago cuando usted se inclina  Si es posible, eleve la cabecera de carlson cama mientras duerme  [place] ayudará a evitar que el ácido suba  Controle el estrés  El estrés puede empeorar colton síntomas o provocar que el reflujo ácido suba  · [employer] líquido cuando se tome colton píldoras  Tómese un vaso lleno de agua cuando se tome colton píldoras  Pregúntele a carlson médico si usted puede tomarse colton píldoras por lo menos 1 hora antes de irse a dormir  © 2017 [facility] Information is for End User's use only and may not be sold, redistributed or otherwise used for commercial purposes  All illustrations and images included in CareNotes® are the copyrighted property of A D A M , Inc  or Randy Joshi  Esta información es sólo para uso en educación  Carlson intención no es darle un consejo médico sobre enfermedades o tratamientos  Colsulte con carlson Gaylyn Harsh farmacéutico antes de seguir cualquier régimen médico para saber si es seguro y efectivo para usted  Hernia hiatal   LO QUE NECESITA SABER:   ¿Qué es dawson hernia hiatal?  Dawson hernia hiatal es dawson condición que provoca que parte de carlson estómago se abulte a través del hiato (apertura pequeña) en carlson diafragma  La parte del estómago podría moverse hacia arriba y [place], o podría quedarse atrapado en el diafragma  ¿Qué aumenta mi riesgo de tener dawson hernia hiatal?  La causa exacta de dawson hernia hiatal es desconocida  Usted pudo rajeev nacido con un hiato crow   Lo siguiente podría aumentar carlson riesgo de dawson hernia hiatal:  · Obesidad    · Edad avanzada    · Condiciones médicas alberto diverticulosis o esofagitis    · Cirugías del esófago o del estómago previas o trauma alberto de un accidente automovilístico  ¿Cuáles son los tipos de hernia hiatal?   · Tipo I (hernia hiatal por deslizamiento): Luiza porción del estómago se desliza dentro y fuera del hiato  Meme tipo es el más común y generalmente provoca la enfermedad por reflujo gastroesofágico (Doloris Mendez)  El ERGE ocurre cuando el esfínter esofágico no claritza apropiadamente y provoca reflujo de ácido  El esfínter esofágico es el músculo más bajo del esófago  · Tipo II (hernia hiatal paraesofágica):  El tipo II de hernia hiatal se forma cuando luiza parte del estómago pasa por el hiato y se coloca junto al esófago  · Tipo III (combinada):  El tipo III de hernia hiatal es luiza combinación de luiza hernia hiatal por deslizamiento y [place] paraesofágica  · Tipo IV (hernia hiatal compleja paraesofágica):  El [address], los intestinos santoro y grueso, el bazo, el páncreas o el hígado son empujados hacia arriba dentro de dunaway pecho  ¿Cuáles son los signos y síntomas de luiza hernia hiatal?  La acidez es el síntoma más común de luiza hernia hiatal  [place] ocurre generalmente después de las comidas y se extiende al deacon, a la mandíbula o al hombro  Es posible que no presente ningún signo ni síntoma o que tenga alguno de los siguientes:  · Dolor abdominal, especialmente en el área zoë arriba del ombligo    · Sabor amargo o ácido en dunaway boca    · Dificultad para tragar    · Davin Hero o ronquera    · Dolor en el pecho o falta de aliento que ocurre después de comer    · Eructar o tener hipo con frecuencia    · Sensación de incomodidad o de estar lleno después de comer  ¿Cómo se diagnostica luiza hernia hiatal?   · Examen de las series gastrointestinales (GI) superiores  incluye radiografías de dunaway esófago, estómago y de colton intestinos delgados  También se conoce alberto examen de deglución de bario  A usted le darán bario (líquido calcáreo) para lanny antes de que le tomen las imágenes  Meme líquido [place] a que dunaway estómago e intestinos se vean mejor en las radiografías   Un examen de las series GI superiores puede mostrar si usted tiene Romie, obstrucción en un intestinos u otros problemas  · [place] endoscopia  o endoscopia esofagogastroduodenal utiliza un endoscopio para observar en el interior de dunaway tracto digestivo  Un endoscopio es un tubo alicia y flexible con dawson chayito en el extremo  Es posible que se conecte dawson cámara al endoscopio para lanny imágenes  ¿Cómo se trata dawson hernia hiatal?  El tratamiento depende del tipo de hernia hiatal que usted tenga y de colton síntomas  Es posible que no necesite ningún tratamiento  Es posible que usted necesite alguno de los siguientes:  · Medicamentos,  podrían administrase para aliviar los síntomas de la Monia Kimble  Estos medicamentos ayudan a disminuir u obstruir el ácido estomacal  Es posible que también le den medicamentos ayudan a hacer más estrecho el esfínter esofágico     · Cirugía  podría realizarse cuando los medicamentos no puedan controlar colton síntomas u otros problemas están presentes  Dunaway médico también podría sugerir la cirugía dependiendo del tipo de hernia que usted tenga  Dunaway médico puede poner dunaway estómago en la posición normal  Él podría hacer el hiato (agujero) más pequeño y atar dunaway estómago a dunaway abdomen  La funduplicatura es dawson cirugía en donde la parte superior del estómago se enrolla alrededor del esfínter esofágico para reforzarlo  ¿Cómo puedo controlar mis síntomas? La siguiente nutrición y cambios en el estilo de alden podrían se recomendados para aliviar colton síntomas de Monia Kimble  · Evite los alimentos que empeoran colton síntomas  Estos podrían [employer], jugos de fruta, alcohol, cafeína, chocolate y [place]  · Coma porciones pequeñas giuliano el día  Las porciones [employer] dan a dunaway estómago menos alimentos que digerir  · Evite acostarse e inclinarse después de comer  No consuma alimentos entre 2 y 3 horas antes de [facility]  [place] disminuye dunaway riesgo de reflujo      · Genie Xenia un peso saludable  Si usted tiene sobrepeso, la pérdida de peso podría ayudarle a [employer]  · Duerma con ly je elevada  al menos 6 pulgadas  · No fume  El fumar puede aumentar [place] síntomas de [place]  ¿Cuándo gamaliel buscar atención inmediata? · Usted tiene dolor abdominal intenso  · Usted trata de vomitar silverio no sale nada  · Usted siente dolor brent en el pecho y dificultad repentina para respirar  · [place] heces son negras o tienen marc  · Ly vómito parece alberto café molido o contiene marc  ¿Cuándo gamaliel comunicarme con mi médico?   · [place] síntomas están empeorando  · Usted tiene náusea y está vomitando  · Usted pierde peso sin proponérselo  · Usted tiene preguntas o inquietudes acerca de ly condición o cuidado  ACUERDOS SOBRE LY CUIDADO:   Usted tiene el derecho de ayudar a planear ly cuidado  Aprenda todo lo que pueda sobre ly condición y alberto darle tratamiento  Discuta [place] opciones de tratamiento con [place] médicos para decidir el cuidado que usted desea recibir  Usted siempre tiene el derecho de rechazar el tratamiento  Esta información es sólo para uso en educación  Ly intención no es darle un consejo médico sobre enfermedades o tratamientos  Colsulte con ly [place] farmacéutico antes de seguir cualquier régimen médico para saber si es seguro y efectivo para usted  © 2017 [facility] Information is for End User's use only and may not be sold, redistributed or otherwise used for commercial purposes  All illustrations and images included in CareNotes® are the copyrighted property of A D A M , Inc  or Randy Joshi  Las hemorroides   LO QUE NECESITA SABER:   ¿Qué son las hemorroides? Las hemorroides son vasos sanguíneos inflamados dentro del recto (hemorroides internas) o en el ano (hemorroides externas)  A veces, dawson hemorroide puede hacer un prolapso   [place] significa que se extiende fuera del ano         ¿Qué aumenta mi riesgo de tener hemorroides? · Embarazo u obesidad    · Hacer fuerza o estar sentado mucho tiempo giuliano la evacuación intestinal    · Enfermedad hepática    · Debilidad de los músculos alrededor del ano causada por edad avanzada, cirugía rectal o coito anal    · Falta de actividad física    · Diarrea o estreñimiento crónicos    · Natha Dingwall baja en fibra  ¿Cuáles son los signos y síntomas de las hemorroides? · Dolor o picor alrededor del ano o dentro del recto    · Cyndra Rode o protuberancias alrededor del ano    · Haris de color coles brillante en las deposiciones, en el papel higiénico o en el inodoro    · Tejido que sobresale del ano (prolapso de hemorroides)    · Incontinencia (dificultad para controlar el flujo de orina o las evacuaciones intestinales)  ¿Cómo se diagnostican las hemorroides? Dunaway médico le preguntará sobre colton síntomas, los alimentos que come y las evacuaciones intestinales  Nuzhat Gambler el ano para miki si tiene hemorroides externas  Usted podría necesitar lo siguiente:  · Un tacto rectal  es un examen para determinar si tiene hemorroides  Dunaway médico se colocará un guante para insertar el dedo dentro de dunaway ano  · Dawson anoscopia  es un examen que [place] un endoscopio (dawson sonda plástica con Hattie Sai chayito y cámara en el extremo) para observar las hemorroides  ¿Cuál es el tratamiento para las hemorroides? El tratamiento dependerá de los síntomas que tenga  Es posible que usted necesite alguno de los siguientes:  · Medicamentos,  pueden aliviar el dolor y la inflamación, y ayudar a ablandar las deposiciones intestinales  El medicamento viene en forma de pastillas, toallitas, cremas o ungüentos    · Procedimientos  podrían ser necesarios para reducir el tamaño o para extirpar la hemorroide  Los ejemplos incluyen fotocoagulación, escleroterapia y ligadura con bernstein elástica  Estos procedimientos pueden realizarse en el consultorio del Sunita Junes a dunaway médico más información sobre estos procedimientos  · Cirugía  podría ser necesaria para reducir el [address] hemorroides  ¿Cómo puedo controlar los síntomas? · Aplíquese hielo en el ano de 15 a 20 minutos [address] indicaciones  Use un paquete de hielo o ponga hielo molido dentro de [employer]  Cúbralo con dawson toalla antes de aplicar sobre el ano  El hielo ayuda a evitar daño al tejido y a disminuir la inflamación y el dolor  · [place] un baño de asiento  Llene dawson nicki de baño con 4 a 6 pulgadas de [place]  [address] usar un recipiente para baño de asiento que quepa en el inodoro  Siéntese en el baño de asiento giuliano 15 minutos  Nik esto 3 veces al día y después de cada evacuación intestinal  El agua cálida va a ayudara reducir el dolor y la inflamación  · Mantenga limpia el área del ano  Lávese el área con agua tibia todos los días  El jabón podría causar irritación  Límpiese con [employer] o papel higiénico húmedo después de tener dawson deposición intestinal  El papel higiénico seco podría irritar el área  ¿Cómo puedo ayudar a evitar las hemorroides? · No se force para tener un movimiento intestinal   No se siente en el inodoro giuliano mucho tiempo  Estas acciones pueden aumentar la presión [employer] tejidos del recto y el ano  · [place] suficiente líquidos  Los líquidos pueden ayudar a prevenir el estreñimiento  Pregunte cuánto líquido debe lanny cada día y cuáles líquidos son los más adecuados para usted  · Consuma dawson variedad de alimentos ricos en fibra  One [facility], se incluyen frutas, vegetales y granos enteros  Pregunte a dunaway médico cuál es la cantidad de fibra que necesita al día  Es posible que deba lanny un suplemento de Bivalve  · Ejercítese según indicaciones  El hacer ejercicio, alberto caminar, puede servir para que tenga dawson evacuación intestinal  Pida a dunaway médico que lo ayude a crear un programa de ejercicio       · No tenga contacto sexual anal   El contacto sexual anal podría debilitar la piel alrededor del recto y el ano  · Evite levantar cosas pesadas  [place] puede causar esfuerzo y aumentar el riesgo de tener otra hemorroide  ¿Cuándo gamaliel buscar atención inmediata? · Usted siente mucho dolor en el recto o alrededor del ano  · Tiene dolor intenso en el abdomen y está vomitando  · Tiene sangrado por el ano que le empapa la ropa interior  ¿Cuándo gamaliel comunicarme con mi médico?   · Usted tiene deposiciones intestinales frecuentes y dolorosas  · La hemorroide se ve o se siente más hinchada que de costumbre  · Usted no tiene evacuaciones intestinales por 2 días o más  · Ve o siente que Jazmín Tire un tejido del ano  · Usted tiene preguntas o inquietudes acerca de ly condición o cuidado  ACUERDOS SOBRE LY CUIDADO:   Usted tiene el derecho de ayudar a planear ly cuidado  Aprenda todo lo que pueda sobre ly condición y alberto darle tratamiento  Discuta colton opciones de tratamiento con colton médicos para decidir el cuidado que usted desea recibir  Usted siempre tiene el derecho de rechazar el tratamiento  Esta información es sólo para uso en educación  Ly intención no es darle un consejo médico sobre enfermedades o tratamientos  Colsulte con ly Gala Primer farmacéutico antes de seguir cualquier régimen médico para saber si es seguro y efectivo para usted  © 2017 [address] Information is for End User's use only and may not be sold, redistributed or otherwise used for commercial purposes  All illustrations and images included in CareNotes® are the copyrighted property of A D A M , Inc  or Randy Joshi

## 2018-12-31 NOTE — ANESTHESIA PREPROCEDURE EVALUATION
Review of Systems/Medical History          Cardiovascular  Hyperlipidemia,    Pulmonary  Negative pulmonary ROS        GI/Hepatic    GERD ,        Negative  ROS        Endo/Other  Negative endo/other ROS      GYN  , Prior pregnancy/OB history : 3 Parity: 3,          Hematology  Negative hematology ROS      Musculoskeletal  Negative musculoskeletal ROS        Neurology  Negative neurology ROS      Psychology   Negative psychology ROS              Physical Exam    Airway    Mallampati score: II  TM Distance: <3 FB  Neck ROM: full     Dental   No notable dental hx     Cardiovascular  Cardiovascular exam normal    Pulmonary  Pulmonary exam normal     Other Findings        Anesthesia Plan  ASA Score- 2     Anesthesia Type- IV sedation with anesthesia with ASA Monitors  Additional Monitors:   Airway Plan:         Plan Factors-    Induction- intravenous  Postoperative Plan-     Informed Consent- Anesthetic plan and risks discussed with patient

## 2018-12-31 NOTE — DISCHARGE INSTR - AVS FIRST PAGE
ESOPHAGOGASTRODUODENOSCOPY(EGD) and COLONOSCOPY    SEDATION: Monitored anesthesia care, check anesthesia records    ASA Class: 2    INDICATIONS:  Reflux, abdominal pain, and diarrhea    CONSENT:  Informed consent was obtained for the procedure, including sedation after explaining the risks and benefits of the procedure  Risks including but not limited to bleeding, perforation, infection, and missed lesion  PREPARATION:   Telemetry, pulse oximetry, blood pressure were monitored throughout the procedure  Patient was identified by myself both verbally and by visual inspection of ID band  PROCEDURE: EGD    DESCRIPTION:   Patient was placed in the left lateral decubitus position and was sedated with the above medication  The gastroscope was introduced in to the oropharynx and the esophagus was intubated under direct visualization  Scope was passed down the esophagus up to 2nd part of the duodenum  A careful inspection was made as the gastroscope was withdrawn, including a retroflexed view of the stomach; findings and interventions are described below  FINDINGS:    #1  Esophagus- LA class A erosive reflux esophagitis  Subtle rings were noted in the esophagus  Random biopsies were taken of proximal esophagus to rule out eosinophilic esophagitis  #2  Stomach- medium-sized hiatal hernia  Random biopsies were taken of the antrum and body to rule out Helicobacter pylori infection  #3  Duodenum- normal   Random biopsies were taken to rule out celiac sprue  PROCEDURE: COLONOSCOPY    DESCRIPTION:     Prior colonoscopy: No prior colonoscopy  Informed consent was obtained for the procedure, including sedation  Risks of perforation, hemorrhage, adverse drug reaction and aspiration were discussed  The patient was placed in the left lateral decubitus position    Based on the pre-procedure assessment, including review of the patient's medical history, medications, allergies, and review of systems, she had been deemed to be an appropriate candidate for conscious sedation; she was therefore sedated with the medications listed below  The patient was monitored continuously with telemetry, pulse oximetry, blood pressure monitoring, and direct observations  A rectal examination was performed  The colonoscope was inserted into the rectum and advanced under direct vision to the cecum, which was identified by the ileocecal valve and appendiceal orifice  The quality of the colonic preparation was good  A careful inspection was made as the colonoscope was withdrawn, including a retroflexed view of the rectum; findings and interventions are described below  FINDINGS:  Internal hemorrhoids were seen on the retroflexed view of the rectum  The colon was otherwise unremarkable  Random biopsies were taken of the colon to rule out microscopic colitis  COMPLICATIONS:   None; patient tolerated the procedure well      IMPRESSION:    LA class A erosive reflux esophagitis  Medium-sized hiatal hernia  Internal hemorrhoids    RECOMMENDATIONS:  Await pathology results  Repeat colonoscopy at age 50 or sooner if clinically indicated  Follow-up with endoscopist    SPECIMENS:    ID Type Source Tests Collected by Time Destination   1 : duodenum biopsy r/o celiac  Tissue Duodenum TISSUE EXAM Oz Alba MD 12/31/2018 1040    2 : gastric biopsy r/o hpylori  Tissue Stomach TISSUE EXAM Oz Alba MD 12/31/2018 1042    3 : proximal esophagus r/o EOE Tissue Esophagus TISSUE EXAM Oz Alba MD 12/31/2018 1042    4 : Random colon biopsy r/o microscopic colitis  Tissue Colon TISSUE EXAM Oz Alba MD 12/31/2018 1058        ESTIMATED BLOOD LOSS:  Minimal

## 2019-01-04 ENCOUNTER — HOSPITAL ENCOUNTER (OUTPATIENT)
Dept: ULTRASOUND IMAGING | Facility: CLINIC | Age: 39
Discharge: HOME | End: 2019-01-04
Payer: COMMERCIAL

## 2019-01-04 ENCOUNTER — HOSPITAL ENCOUNTER (OUTPATIENT)
Dept: MAMMOGRAPHY | Facility: CLINIC | Age: 39
Discharge: HOME | End: 2019-01-04
Payer: COMMERCIAL

## 2019-01-04 VITALS — BODY MASS INDEX: 24.35 KG/M2 | HEIGHT: 60 IN | WEIGHT: 124 LBS

## 2019-01-04 DIAGNOSIS — N64.4 MASTODYNIA: ICD-10-CM

## 2019-01-04 DIAGNOSIS — N64.4 BREAST PAIN, RIGHT: ICD-10-CM

## 2019-01-04 PROCEDURE — 76642 ULTRASOUND BREAST LIMITED: CPT

## 2019-01-04 PROCEDURE — 77066 DX MAMMO INCL CAD BI: CPT

## 2019-01-04 PROCEDURE — G0279 TOMOSYNTHESIS, MAMMO: HCPCS

## 2019-01-11 ENCOUNTER — OFFICE VISIT (OUTPATIENT)
Dept: GASTROENTEROLOGY | Facility: CLINIC | Age: 39
End: 2019-01-11
Payer: COMMERCIAL

## 2019-01-11 VITALS
HEART RATE: 85 BPM | BODY MASS INDEX: 24.77 KG/M2 | WEIGHT: 126.2 LBS | HEIGHT: 60 IN | TEMPERATURE: 98 F | SYSTOLIC BLOOD PRESSURE: 137 MMHG | DIASTOLIC BLOOD PRESSURE: 83 MMHG

## 2019-01-11 DIAGNOSIS — A04.8 H. PYLORI INFECTION: Primary | ICD-10-CM

## 2019-01-11 DIAGNOSIS — K21.9 GASTROESOPHAGEAL REFLUX DISEASE WITHOUT ESOPHAGITIS: ICD-10-CM

## 2019-01-11 DIAGNOSIS — R10.13 EPIGASTRIC PAIN: ICD-10-CM

## 2019-01-11 DIAGNOSIS — R19.7 DIARRHEA OF PRESUMED INFECTIOUS ORIGIN: ICD-10-CM

## 2019-01-11 PROCEDURE — 99214 OFFICE O/P EST MOD 30 MIN: CPT | Performed by: PHYSICIAN ASSISTANT

## 2019-01-11 RX ORDER — VALACYCLOVIR HYDROCHLORIDE 1 G/1
1000 TABLET, FILM COATED ORAL DAILY
COMMUNITY
End: 2019-02-06

## 2019-01-11 RX ORDER — CLARITHROMYCIN 500 MG/1
500 TABLET, COATED ORAL EVERY 12 HOURS SCHEDULED
Qty: 28 TABLET | Refills: 0 | Status: SHIPPED | OUTPATIENT
Start: 2019-01-11 | End: 2019-01-25

## 2019-01-11 RX ORDER — OMEPRAZOLE 40 MG/1
40 CAPSULE, DELAYED RELEASE ORAL 2 TIMES DAILY
Qty: 28 CAPSULE | Refills: 0 | Status: SHIPPED | OUTPATIENT
Start: 2019-01-11 | End: 2019-01-30

## 2019-01-11 RX ORDER — AMOXICILLIN 500 MG/1
1000 CAPSULE ORAL EVERY 12 HOURS SCHEDULED
Qty: 56 CAPSULE | Refills: 0 | Status: SHIPPED | OUTPATIENT
Start: 2019-01-11 | End: 2019-01-25

## 2019-01-11 NOTE — PROGRESS NOTES
[facility] - Outpatient Follow-up Note  Aniket Florez 41 y o  female MRN: [id number]  Encounter: [id number]          ASSESSMENT AND PLAN:      1  Epigastric pain  2  Gastroesophageal reflux disease without esophagitis   -She has persistent epigastric pain, nausea, and heartburn symptoms     -Recent stomach biopsy showed h  Pylori which may be the cause of her symptoms    -Recommend triple therapy, she was switched from Prevacid to omeprazole for this for BID dosing    -Stool test for h  Pylori 4 weeks after completing antibiotics  Hold PPI two weeks prior to stool test   -Follow up in the office after treatment      3  Diarrhea, unspecified type   -She reports persistent watery diarrhea and abdominal pain for the past 2 months     -Random colon biopsies were negative for microscopic colitis   -Duodenal biopsies negative for celiac disease   -C diff and stool enteric bacterial panel were negative, ova and parasite test negative  -CRP mildly elevated but fecal calprotectin negative   -colonoscopy showed internal hemorrhoids was otherwise unremarkable, random colon biopsies were negative for microscopic colitis   -her diarrhea may be secondary to H pylori, treatment for this as above   -if her symptoms persist after treatment, consider further workup  ____________________________________________________________    SUBJECTIVE:    61-year-old female past medical history of PCOS, psoriasis and leukemia presents to the office for follow-up  She reports that she does continue to have epigastric pain and diarrhea  She states that her symptoms are unchanged from previously  She does continue to have heartburn and nausea  She denies any vomiting, change in appetite, unintended weight loss, hematochezia, melena or jaundice  She is Zambian-speaking only and an MA in the office, Jina Johns, provided translation      She had a recent EGD and colonoscopy, internal hemorrhoids were seen but the colon was otherwise unremarkable, LA class a erosive esophagitis and a medium sized hiatal hernia were noted  Duodenal biopsies were negative for celiac disease, stomach biopsies were positive for H pylori  Esophageal biopsies were negative for eosinophilic esophagitis  Random colon biopsies were negative for microscopic colitis  REVIEW OF SYSTEMS IS OTHERWISE NEGATIVE  Historical Information   Past Medical History:   Diagnosis Date    High triglycerides     Hyperlipidemia      Past Surgical History:   Procedure Laterality Date     SECTION      3X--2003,, 2008    COLONOSCOPY N/A 2018    Procedure: COLONOSCOPY;  Surgeon: Oz Alba MD;  Location: [facility]; Service: Gastroenterology    ESOPHAGOGASTRODUODENOSCOPY N/A 2018    Procedure: ESOPHAGOGASTRODUODENOSCOPY (EGD); Surgeon: Oz Alba MD;  Location: [facility];   Service: Gastroenterology    TUBAL LIGATION       Social History   History   Alcohol Use No     History   Drug Use No     History   Smoking Status    Never Smoker   Smokeless Tobacco    Never Used     Family History   Problem Relation Age of Onset    Uterine cancer Mother     Heart disease Mother     Hypertension Mother     Cervical cancer Mother     Stomach cancer Father     Diabetes Brother     Hyperlipidemia Brother     Asthma Son        Meds/Allergies       Current Outpatient Prescriptions:     dicyclomine (BENTYL) 10 mg capsule    lansoprazole (PREVACID) 15 mg capsule    niacin (NIASPAN) 1000 MG CR tablet    norgestimate-ethinyl estradiol (ORTHO-CYCLEN) 0 25-35 MG-MCG per tablet    valACYclovir (VALTREX) 1,000 mg tablet    ergocalciferol (VITAMIN D2) 50,000 units    miconazole (MONISTAT 7) 100 mg vaginal suppository    valACYclovir (VALTREX) 1,000 mg tablet    No Known Allergies        Objective     Blood pressure 137/83, pulse 85, temperature 98 °F (36 7 °C), temperature source Tympanic, height 5' (1 524 m), weight 57 2 kg (126 lb 3 2 oz)       PHYSICAL EXAM:      Physical Exam   Constitutional: She is oriented to person, place, and time  She appears well-developed and well-nourished  No distress  HENT:   Head: Normocephalic and atraumatic  Eyes: Right eye exhibits no discharge  Left eye exhibits no discharge  No scleral icterus  Neck: Neck supple  No tracheal deviation present  Cardiovascular: Normal rate, regular rhythm and normal heart sounds  Exam reveals no gallop and no friction rub  No murmur heard  Pulmonary/Chest: Effort normal  No respiratory distress  She has no wheezes  She has no rales  Abdominal: Soft  Bowel sounds are normal  She exhibits no distension  There is tenderness (Epigastric)  There is no rebound and no guarding  Neurological: She is alert and oriented to person, place, and time  Skin: Skin is warm and dry  Psychiatric: She has a normal mood and affect  Lab Results:   No visits with results within 1 Day(s) from this visit     Latest known visit with results is:   Admission on 12/31/2018, Discharged on 12/31/2018   Component Date Value    EXT Preg Test, Ur 12/31/2018 Negative     Case Report 12/31/2018                      Value:Surgical Pathology Report                         Case: [id number]                                   Authorizing Provider:  Esme You MD           Collected:           12/31/2018 1040              Ordering Location:     [address] [place] [address] End        Received:            12/31/2018 [facility]                                                     Pathologist:           Jania Mccormick MD                                                                 Specimens:   A) - Duodenum, duodenum biopsy r/o celiac                                                           B) - Stomach, gastric biopsy r/o hpylori                                                            C) - Esophagus, proximal esophagus r/o EOE D) - Colon, Random colon biopsy r/o microscopic colitis                                    Final Diagnosis 12/31/2018                      Value: This result contains rich text formatting which cannot be displayed here   Additional Information 12/31/2018                      Value: This result contains rich text formatting which cannot be displayed here  Wash Caller Gross Description 12/31/2018                      Value: This result contains rich text formatting which cannot be displayed here  Radiology Results:   Us Breast Right Limited (diagnostic), Mammo Diagnostic Bilateral W 3d & Cad    Result Date: 1/4/2019  Narrative: DIAGNOSIS: Mastodynia RELEVANT HISTORY: Family Breast Cancer History: No known family history of breast cancer  Family Medical history: No relevant family history has been documented for this patient  Personal History: Hormone history includes birth control  No relevant surgical history has been documented for this patient  No relevant medical history has been documented for this patient  COMPARISONS: N/A INDICATION: Adam Valencia is a 41 y o  female presenting for breast pain right side  TECHNOLOGIST: Evette Bruce VIEWS: 2D views: CC, XCCL views of left, right breast(s) were taken  2D synth views: MLO views of left, right breast(s) were taken  3D views: MLO views of left, right breast(s) were taken  TECHNIQUE: The current study was evaluated with Computer Aided Detection  TISSUE DENSITY: The breasts are extremely dense, which lowers the sensitivity of mammography  RISK ASSESSMENT: 5 Year Tyrer-Cuzick: 0 27 % 10 Year Tyrer-Cuzick: 0 77 % Lifetime Tyrer-Cuzick: 9 39 % FINDINGS: Bilateral Prior to imaging, a radiopaque marker was applied to the skin of the outer right breast in the area of pain, as indicated by the patient  There are no suspicious masses, grouped microcalcifications or areas of architectural distortion   The skin and nipple areolar complex are unremarkable  Following diagnostic mammography, the patient was taken to the ultrasound suite  Right breast ultrasound: Grayscale sonography of the outer right breast in the area of pain reveals no focal cystic or solid nodules  Impression: 1  Negative bilateral mammogram  2  Unremarkable right breast ultrasound in the area of pain  3  Management of any clinical symptoms and findings must be based on clinical grounds  ASSESSMENT/BI-RADS CATEGORY: Right: 1 - Negative Overall: 1 - Negative RECOMMENDATION:      - Routine Screening Mammogram at Age 38 for both breasts        - Clinical Management for the right breast  Workstation ID: [id number]

## 2019-01-15 ENCOUNTER — TELEPHONE (OUTPATIENT)
Dept: GASTROENTEROLOGY | Facility: CLINIC | Age: 39
End: 2019-01-15

## 2019-01-30 ENCOUNTER — APPOINTMENT (EMERGENCY)
Dept: RADIOLOGY | Facility: HOSPITAL | Age: 39
End: 2019-01-30
Payer: COMMERCIAL

## 2019-01-30 ENCOUNTER — HOSPITAL ENCOUNTER (EMERGENCY)
Facility: HOSPITAL | Age: 39
Discharge: HOME | End: 2019-01-30
Attending: EMERGENCY MEDICINE | Admitting: EMERGENCY MEDICINE
Payer: COMMERCIAL

## 2019-01-30 VITALS
DIASTOLIC BLOOD PRESSURE: 99 MMHG | RESPIRATION RATE: 18 BRPM | TEMPERATURE: 98.4 F | HEART RATE: 111 BPM | SYSTOLIC BLOOD PRESSURE: 147 MMHG | OXYGEN SATURATION: 98 %

## 2019-01-30 DIAGNOSIS — R00.0 TACHYCARDIA: ICD-10-CM

## 2019-01-30 DIAGNOSIS — R79.89 ELEVATED D-DIMER: ICD-10-CM

## 2019-01-30 DIAGNOSIS — R07.9 CHEST PAIN WITH LOW RISK FOR CARDIAC ETIOLOGY: Primary | ICD-10-CM

## 2019-01-30 DIAGNOSIS — R20.0 PERIORAL NUMBNESS: ICD-10-CM

## 2019-01-30 DIAGNOSIS — R06.02 SHORTNESS OF BREATH: ICD-10-CM

## 2019-01-30 DIAGNOSIS — R06.4 HYPERVENTILATION: ICD-10-CM

## 2019-01-30 LAB
ALBUMIN SERPL BCP-MCNC: 4 G/DL (ref 3.5–5)
ALP SERPL-CCNC: 58 U/L (ref 46–116)
ALT SERPL W P-5'-P-CCNC: 25 U/L (ref 12–78)
ANION GAP SERPL CALCULATED.3IONS-SCNC: 6 MMOL/L (ref 4–13)
AST SERPL W P-5'-P-CCNC: 16 U/L (ref 5–45)
ATRIAL RATE: 116 BPM
BASOPHILS # BLD AUTO: 0.04 THOUSANDS/ΜL (ref 0–0.1)
BASOPHILS NFR BLD AUTO: 1 % (ref 0–1)
BILIRUB SERPL-MCNC: 0.57 MG/DL (ref 0.2–1)
BUN SERPL-MCNC: 5 MG/DL (ref 5–25)
CALCIUM SERPL-MCNC: 9 MG/DL (ref 8.3–10.1)
CHLORIDE SERPL-SCNC: 106 MMOL/L (ref 100–108)
CO2 SERPL-SCNC: 23 MMOL/L (ref 21–32)
CREAT SERPL-MCNC: 0.61 MG/DL (ref 0.6–1.3)
DEPRECATED D DIMER PPP: 568 NG/ML (FEU)
EOSINOPHIL # BLD AUTO: 0.01 THOUSAND/ΜL (ref 0–0.61)
EOSINOPHIL NFR BLD AUTO: 0 % (ref 0–6)
ERYTHROCYTE [DISTWIDTH] IN BLOOD BY AUTOMATED COUNT: 13.2 % (ref 11.6–15.1)
EXT PREG TEST URINE: NEGATIVE
GFR SERPL CREATININE-BSD FRML MDRD: 117 ML/MIN/1.73SQ M
GLUCOSE SERPL-MCNC: 96 MG/DL (ref 65–140)
HCT VFR BLD AUTO: 34.8 % (ref 34.8–46.1)
HGB BLD-MCNC: 11 G/DL (ref 11.5–15.4)
IMM GRANULOCYTES # BLD AUTO: 0 THOUSAND/UL (ref 0–0.2)
IMM GRANULOCYTES NFR BLD AUTO: 0 % (ref 0–2)
LYMPHOCYTES # BLD AUTO: 0.87 THOUSANDS/ΜL (ref 0.6–4.47)
LYMPHOCYTES NFR BLD AUTO: 22 % (ref 14–44)
MCH RBC QN AUTO: 24.8 PG (ref 26.8–34.3)
MCHC RBC AUTO-ENTMCNC: 31.6 G/DL (ref 31.4–37.4)
MCV RBC AUTO: 78 FL (ref 82–98)
MONOCYTES # BLD AUTO: 0.24 THOUSAND/ΜL (ref 0.17–1.22)
MONOCYTES NFR BLD AUTO: 6 % (ref 4–12)
NEUTROPHILS # BLD AUTO: 2.73 THOUSANDS/ΜL (ref 1.85–7.62)
NEUTS SEG NFR BLD AUTO: 71 % (ref 43–75)
NRBC BLD AUTO-RTO: 0 /100 WBCS
P AXIS: 51 DEGREES
PLATELET # BLD AUTO: 247 THOUSANDS/UL (ref 149–390)
PMV BLD AUTO: 10.9 FL (ref 8.9–12.7)
POTASSIUM SERPL-SCNC: 3.8 MMOL/L (ref 3.5–5.3)
PR INTERVAL: 128 MS
PROT SERPL-MCNC: 8.5 G/DL (ref 6.4–8.2)
QRS AXIS: 22 DEGREES
QRSD INTERVAL: 74 MS
QT INTERVAL: 334 MS
QTC INTERVAL: 464 MS
RBC # BLD AUTO: 4.44 MILLION/UL (ref 3.81–5.12)
SODIUM SERPL-SCNC: 135 MMOL/L (ref 136–145)
T WAVE AXIS: 38 DEGREES
TROPONIN I SERPL-MCNC: <0.02 NG/ML
TROPONIN I SERPL-MCNC: <0.02 NG/ML
TSH SERPL DL<=0.05 MIU/L-ACNC: 1.1 UIU/ML (ref 0.36–3.74)
VENTRICULAR RATE: 116 BPM
WBC # BLD AUTO: 3.89 THOUSAND/UL (ref 4.31–10.16)

## 2019-01-30 PROCEDURE — 85025 COMPLETE CBC W/AUTO DIFF WBC: CPT | Performed by: EMERGENCY MEDICINE

## 2019-01-30 PROCEDURE — 84484 ASSAY OF TROPONIN QUANT: CPT | Performed by: EMERGENCY MEDICINE

## 2019-01-30 PROCEDURE — 93005 ELECTROCARDIOGRAM TRACING: CPT

## 2019-01-30 PROCEDURE — 36415 COLL VENOUS BLD VENIPUNCTURE: CPT | Performed by: EMERGENCY MEDICINE

## 2019-01-30 PROCEDURE — 84443 ASSAY THYROID STIM HORMONE: CPT

## 2019-01-30 PROCEDURE — 81025 URINE PREGNANCY TEST: CPT | Performed by: EMERGENCY MEDICINE

## 2019-01-30 PROCEDURE — 96361 HYDRATE IV INFUSION ADD-ON: CPT

## 2019-01-30 PROCEDURE — 85379 FIBRIN DEGRADATION QUANT: CPT | Performed by: EMERGENCY MEDICINE

## 2019-01-30 PROCEDURE — 71046 X-RAY EXAM CHEST 2 VIEWS: CPT

## 2019-01-30 PROCEDURE — 96374 THER/PROPH/DIAG INJ IV PUSH: CPT

## 2019-01-30 PROCEDURE — 93010 ELECTROCARDIOGRAM REPORT: CPT | Performed by: INTERNAL MEDICINE

## 2019-01-30 PROCEDURE — 99284 EMERGENCY DEPT VISIT MOD MDM: CPT

## 2019-01-30 PROCEDURE — 71275 CT ANGIOGRAPHY CHEST: CPT

## 2019-01-30 PROCEDURE — 80053 COMPREHEN METABOLIC PANEL: CPT | Performed by: EMERGENCY MEDICINE

## 2019-01-30 RX ORDER — LORAZEPAM 2 MG/ML
1 INJECTION INTRAMUSCULAR ONCE
Status: COMPLETED | OUTPATIENT
Start: 2019-01-30 | End: 2019-01-30

## 2019-01-30 RX ORDER — LORAZEPAM 0.5 MG/1
1 TABLET ORAL ONCE
Status: DISCONTINUED | OUTPATIENT
Start: 2019-01-30 | End: 2019-01-30

## 2019-01-30 RX ADMIN — SODIUM CHLORIDE 1000 ML: 0.9 INJECTION, SOLUTION INTRAVENOUS at 16:19

## 2019-01-30 RX ADMIN — SODIUM CHLORIDE 1000 ML: 0.9 INJECTION, SOLUTION INTRAVENOUS at 12:20

## 2019-01-30 RX ADMIN — IOHEXOL 65 ML: 350 INJECTION, SOLUTION INTRAVENOUS at 16:44

## 2019-01-30 RX ADMIN — LORAZEPAM 1 MG: 2 INJECTION, SOLUTION INTRAMUSCULAR; INTRAVENOUS at 12:20

## 2019-01-30 NOTE — ED ATTENDING ATTESTATION
Rupinder Coe MD, saw and evaluated the patient  I have discussed the patient with the resident/non-physician practitioner and agree with the resident's/non-physician practitioner's findings, Plan of Care, and MDM as documented in the resident's/non-physician practitioner's note, except where noted  All available labs and Radiology studies were reviewed  At this point I agree with the current assessment done in the Emergency Department    I have conducted an independent evaluation of this patient a history and physical is as follows:    Here with cp and anxiety     No fever no chills    No cough  No leg pain or swelling    Now the CP is better    Crying and dizzy festus oral numbness and hand tingling    Anxious  hyperventilaitng   Exam hyperventilating    Neck no jvd  Lungs clear heart tachy no m abd soft nt  nd pos bs  extremities normal      IMP  Hyperventilation  anixety   ekg  Ativan     Critical Care Time  Procedures

## 2019-01-30 NOTE — DISCHARGE INSTRUCTIONS
Dolor de pecho   LO QUE NECESITA SABER:   El dolor en el pecho puede ser provocado por dawson variedad de condiciones, algunas no tan serias y otras que son de peligro mortal  Sherel Gallon ser un síntoma de un problema digestivo, alberto la acidez o dawson úlcera estomacal  Un ataque de ansiedad o dawson emoción brent, alberto el enojo, también pueden provocar dolor de [place]  Dawson infección, inflamación o fractura en un hueso o cartílago en el pecho podría provocar dolor o molestia  En ocasiones el dolor torácico o la presión en el pecho pueden ser el resultado de [place] circulación de la marc al corazón (angina)  El dolor de pecho también puede ser causado por trastornos potencialmente mortales alberto un ataque al corazón o un coágulo de [employer]  INSTRUCCIONES SOBRE EL [facility]AN[facility] HOSPITALARIA:   Llame al 911 si presenta:   · Usted tiene alguno de los siguientes signos de un ataque cardíaco:      ¨ Estornudos, presión, o dolor en dunaway pecho que dura mas de 5 minutos o regresa  ¨ Malestar o dolor en dunaway espalda, deacon, mandíbula, abdomen, o brazo     ¨ Dificultad para respirar    ¨ Náuseas o vómito    ¨ Siente un desvanecimiento o tiene sudores fríos especialmente en el pecho o dificultad para respirar  Regrese a la kelley de emergencias si:   · La inflamación en dunaway pecho empeora, aun con tratamiento  · Usted tose o vomita marc  · [place] heces son negras o tienen marc  · Usted no puede dejar de vomitar o le duele al tragar  Pregúntele a dunaway Elsa Miller vitaminas y minerales son adecuados para usted  · Usted tiene preguntas o inquietudes acerca de dunaway condición o cuidado  Medicamentos:   · Medicamentos,  pueden administrarse para tratar la causa del dolor de pecho  Por ejemplo, analgésicos, medicamentos para la ansiedad o medicamentos para aumentar el flujo de marc al corazón       · No tome ciertos medicamentos sin antes preguntarle a dunaway médico   Estos incluyen ERROL, suplementos vitamínicos o a base de hierbas u hormonas (estrógeno o progestágeno)  · Hunt colton medicamentos alberto se le haya indicado  Consulte con dunaway médico si usted tevin que dunaway medicamento no le está ayudando o si presenta efectos secundarios  Infórmele si es alérgico a cualquier medicamento  Mantenga dawson lista actualizada de los [place], las vitaminas y los productos herbales que selwyn  Incluya los siguientes datos de los medicamentos: cantidad, frecuencia y motivo de administración  Traiga con usted la lista o los envases de la píldoras a colton citas de seguimiento  Lleve la lista de los medicamentos con usted en tarik de dawson emergencia  Programe dawson mehnaz con dunaway médico dentro de 67 horas o alberto se le indique:  Es posible que deba regresar para hacerse más pruebas para encontrar la causa del dolor de [place]  Es probable que lo refieran a un especialista, alberto un cardiólogo o un gastroenterólogo  Anote colton preguntas para que se acuerde de hacerlas giuliano colton visitas  Consejos para vivir saludable:  Los siguientes son consejos generales de ginny  Si dunaway dolor de pecho es causado por un problema cardíaco, dunaway médico le dará consejos específicos a seguir  · No fume  La nicotina y otros químicos contenidos en los cigarrillos y cigarros pueden causar daño a colton pulmones y el corazón  Pida información a dunaway médico si usted actualmente fuma y necesita ayuda para dejar de fumar  Los cigarrillos electrónicos o tabaco sin humo todavía contienen nicotina  Consulte con dunaway médico antes de [employer]  · Consuma dawson variedad de alimentos saludables y bajos en grasas  Los alimentos saludables incluyen frutas, verduras, pan integral, productos lácteos bajos en grasa, frijoles, carmela magras y pescado  Pida más información acerca de dawson dieta saludable para el corazón  · Pregunte acerca de la [place]  Dunaway médico le dirá cuáles actividades limitar y cuáles evitar   Pregunte cuándo [employer], regresar a dunaway trabajo y [employer]  Pida más información acerca de un plan de ejercicio adecuado para usted  · Mantenga un peso saludable  Consulte con carlson médico cuánto debería pesar  [place] que lo ayude a crear un plan para bajar de peso si tiene sobrepeso  · Póngase la vacuna de la gripe y la neumonía  Todos los adultos deberían recibir la vacuna de la influenza (gripe)  Vacúnese cada año scott pronto alberto la vacuna esté disponible  La vacuna neumocócica se le aplica a adultos de 72 o más años de Eloncio  La vacuna se aplica cada 5 años para prevenir enfermedades neumocócicas, alberto la neumonía  © 2017 [address] Information is for End User's use only and may not be sold, redistributed or otherwise used for commercial purposes  All illustrations and images included in CareNotes® are the copyrighted property of A D A M , Inc  or Randy Joshi  Esta información es sólo para uso en educación  Carlson intención no es darle un consejo médico sobre enfermedades o tratamientos  Colsulte con carlson Dimple Bourdon farmacéutico antes de seguir cualquier régimen médico para saber si es seguro y efectivo para usted  Hiperventilación   LO QUE NECESITA SABER:   La hiperventilación es cuando la respiración es mas rápida de lo normal  Esta respiración rápida también podría provocarle otros síntomas  La hiperventilación podría deberse a la ansiedad, estrés o pánico  Otras causas incluyen medicamentos, desequilibrio en los químicos corporales y consumir demasiada cafeína  INSTRUCCIONES SOBRE EL SUSANNE HOSPITALARIA:   Medicamentos:   · Medicamento contra la ansiedad:  Estos medicamentos pueden ser administrados para reducir ansiedad y ayudar a que usted se sienta más calmado y relajado  · [place] colton medicamentos alberto se le haya indicado  Consulte con carlson médico si usted tevin que carlson medicamento no le está ayudando o si presenta efectos secundarios  Infórmele si es alérgico a algún medicamento  Mantenga dawson lista actualizada de los [place], las vitaminas y los productos herbales que selwyn  Incluya los siguientes datos de los medicamentos: cantidad, frecuencia y motivo de administración  Traiga con usted la lista o los envases de la píldoras a [place] citas de seguimiento  Lleve la lista de los medicamentos con usted en tarik de dawson emergencia  Acuda a [place] consultas de control con dunaway médico según le indicaron  Anote [place] preguntas para que se acuerde de hacerlas giuliano [place] visitas  Controle la hiperventilación:   · Control del estrés:  Aprenda acerca de los factores de estrés que le provocan la hiperventilación  Busque otras maneras de [employer] respuesta al estrés  La respiración profunda, relajación muscular o la meditación podrían ayudarlo  No  use el remedio casero de [employer] bolsa de papel  [place] puede ser peligroso, porque podría provocar la falta de oxígeno  · Asesoramiento psicológico:  Es posible que usted necesite ayuda para controlar el estrés o la ansiedad que le está provocando la hiperventilación  Dunaway médico podría recomendarle que ray a un consejero para hablar acerca de cómo se siente  Pregúntele a dunaway Jane Gault vitaminas y minerales son adecuados para usted  · [place] síntomas no mejoran o [place]  · Usted tiene preguntas o inquietudes acerca de dunaway condición o cuidado  Regrese a la kelley de emergencias si:   · Usted sufre dawson convulsión  · Usted siente que se va a desmayar  · Usted tiene dolor en el pecho  © 2017 [address] Information is for End User's use only and may not be sold, redistributed or otherwise used for commercial purposes  All illustrations and images included in CareNotes® are the copyrighted property of A D A M , Inc  or Randy Joshi  Esta información es sólo para uso en educación  Dunaway intención no es darle un consejo médico sobre enfermedades o tratamientos   Colsulte con dunaway médico, enfermera o farmacéutico antes de seguir cualquier régimen médico para saber si es seguro y efectivo para usted

## 2019-01-30 NOTE — ED PROVIDER NOTES
History  Chief Complaint   Patient presents with    Anxiety     per EMS, patient at work felt "heat in her head and face" and was sent to [facility] where she was given claritin and cortisone cream  per  at [facility] patient had chest pain, given 324 asa and called EMS  HPI   Patient is a 61-year-old woman with a history of hyperlipidemia who presents to the emergency department with chest pain  Patient was at work around 21  in the morning moving boxes when she started having pain in her chest   She was evaluated in the [facility] of her work place, where she was complaining that her face felt warm and flushed  She was apparently given some Claritin and hydrocortisone cream   [facility] nurse then gave aspirin and called EMS because patient was continuing to complain of chest pain  Pain is currently improved from earlier but is still present  She feels like her heart is racing  She is hyperventilating and feels she is getting short of breath  She also feels dizzy and has perioral tingling  No history of similar symptoms in the past   She is a nonsmoker  No recent travel or immobilization  No calf swelling  No other major medical problems  Prior to Admission Medications   Prescriptions Last Dose Informant Patient Reported?  Taking?   dicyclomine (BENTYL) 10 mg capsule  Self No Yes   Sig: Take 1 capsule (10 mg total) by mouth 4 (four) times a day (before meals and at bedtime)   lansoprazole (PREVACID) 15 mg capsule  Self No Yes   Sig: TAKE 1 CAPSULE (15 MG TOTAL) BY MOUTH DAILY   niacin (NIASPAN) 1000 MG CR tablet  Self Yes Yes   Sig: Take 1 tablet by mouth every 24 hours   norgestimate-ethinyl estradiol (ORTHO-CYCLEN) 0 25-35 MG-MCG per tablet  Self No Yes   Sig: Take 1 tablet by mouth daily   valACYclovir (VALTREX) 1,000 mg tablet  Self Yes No   Sig: Take 1,000 mg by mouth daily      Facility-Administered Medications: None       Past Medical History:   Diagnosis Date    High triglycerides  Hyperlipidemia        Past Surgical History:   Procedure Laterality Date     SECTION      3X--,, 2008    COLONOSCOPY N/A 2018    Procedure: COLONOSCOPY;  Surgeon: Ally Cardona MD;  Location: [facility] GI LAB; Service: Gastroenterology    ESOPHAGOGASTRODUODENOSCOPY N/A 2018    Procedure: ESOPHAGOGASTRODUODENOSCOPY (EGD); Surgeon: Ally Cardona MD;  Location: [facility] GI LAB; Service: Gastroenterology    TUBAL LIGATION         Family History   Problem Relation Age of Onset    Uterine cancer Mother     Heart disease Mother     Hypertension Mother     Cervical cancer Mother     Stomach cancer Father     Diabetes Brother     Hyperlipidemia Brother     Asthma Son      I have reviewed and agree with the history as documented  Social History   Substance Use Topics    Smoking status: Never Smoker    Smokeless tobacco: Never Used    Alcohol use No        Review of Systems   Constitutional: Negative for chills, diaphoresis, fatigue and fever  HENT: Negative for hearing loss, nosebleeds, sinus pain and sore throat  Eyes: Negative for photophobia, pain, redness and visual disturbance  Respiratory: Negative for cough, chest tightness, shortness of breath, wheezing and stridor  Cardiovascular: Positive for chest pain  Negative for palpitations and leg swelling  Gastrointestinal: Negative for abdominal distention, abdominal pain, constipation, diarrhea, nausea and vomiting  Genitourinary: Negative for dysuria, flank pain and hematuria  Musculoskeletal: Negative for back pain, neck pain and neck stiffness  Skin: Negative for pallor and rash  Allergic/Immunologic: Negative for immunocompromised state  Neurological: Negative for syncope, weakness, numbness and headaches  Hematological: Negative for adenopathy  Psychiatric/Behavioral: Negative for agitation and confusion  All other systems reviewed and are negative        Physical Exam  ED Triage Vitals [01/30/19 1029]   Temperature Pulse Respirations Blood Pressure SpO2   98 4 °F (36 9 °C) (!) 119 18 149/90 98 %      Temp Source Heart Rate Source Patient Position - Orthostatic VS BP Location FiO2 (%)   Oral Monitor Sitting Right arm --      Pain Score       --           Orthostatic Vital Signs  Vitals:    01/30/19 1534 01/30/19 1633 01/30/19 1733 01/30/19 1757   BP: 138/95 134/98 147/99    Pulse: (!) 121 (!) 118 (!) 120 (!) 111   Patient Position - Orthostatic VS: Lying Lying Lying        Physical Exam   Constitutional: She is oriented to person, place, and time  She appears well-developed and well-nourished  No distress  She is tearful, hyperventilating  HENT:   Head: Normocephalic and atraumatic  Right Ear: External ear normal    Left Ear: External ear normal    Nose: Nose normal    Mouth/Throat: Oropharynx is clear and moist    Eyes: Pupils are equal, round, and reactive to light  Conjunctivae and EOM are normal  No scleral icterus  Neck: Normal range of motion  Neck supple  No JVD present  No tracheal deviation present  Cardiovascular: Regular rhythm  Exam reveals no gallop and no friction rub  No murmur heard  Tachycardic  Pulmonary/Chest: Effort normal and breath sounds normal  No stridor  No respiratory distress  She has no wheezes  She has no rales  She exhibits no tenderness  She is hyperventilating but lungs are clear  Abdominal: Soft  She exhibits no distension  There is no tenderness  There is no rebound and no guarding  She has very poorly localized abdominal discomfort to palpation without any focal tenderness  Musculoskeletal: Normal range of motion  She exhibits no edema or tenderness  Lymphadenopathy:     She has no cervical adenopathy  Neurological: She is alert and oriented to person, place, and time  No cranial nerve deficit or sensory deficit  She exhibits normal muscle tone  Skin: Skin is warm and dry  She is not diaphoretic  No erythema  No pallor  Psychiatric: She has a normal mood and affect  Her behavior is normal    Nursing note and vitals reviewed  ED Medications  Medications   sodium chloride 0 9 % bolus 1,000 mL (0 mL Intravenous Stopped 1/30/19 1546)   LORazepam (ATIVAN) 2 mg/mL injection 1 mg (1 mg Intravenous Given 1/30/19 1220)   sodium chloride 0 9 % bolus 1,000 mL (0 mL Intravenous Stopped 1/30/19 1734)   iohexol (OMNIPAQUE) 350 MG/ML injection (MULTI-DOSE) 65 mL (65 mL Intravenous Given 1/30/19 1644)       Diagnostic Studies  Results Reviewed     Procedure Component Value Units Date/Time    Troponin I [[id number]]  (Normal) Collected:  01/30/19 1617    Lab Status:  Final result Specimen:  Blood from Arm, Left Updated:  01/30/19 1647     Troponin I <0 02 ng/mL     D-dimer, quantitative [[id number]]  (Abnormal) Collected:  01/30/19 1546    Lab Status:  Final result Specimen:  Blood from Arm, Left Updated:  01/30/19 1624     D-Dimer, Quant 568 (H) ng/ml (FEU)     POCT pregnancy, urine [[id number]]  (Normal) Resulted:  01/30/19 1602    Lab Status:  Final result Updated:  01/30/19 1602     EXT PREG TEST UR (Ref: Negative) negative    TSH, 3rd generation with Free T4 reflex [[id number]]  (Normal) Collected:  01/30/19 1322    Lab Status:  Final result Specimen:  Blood from Arm, Left Updated:  01/30/19 1541     TSH 3RD GENERATON 1 100 uIU/mL     Narrative:         Patients undergoing fluorescein dye angiography may retain small amounts of fluorescein in the body for 48-72 hours post procedure  Samples containing fluorescein can produce falsely depressed TSH values  If the patient had this procedure,a specimen should be resubmitted post fluorescein clearance            The recommended reference ranges for TSH during pregnancy are as follows:  First trimester 0 1 to 2 5 uIU/mL  Second trimester  0 2 to 3 0 uIU/mL  Third trimester 0 3 to 3 0 uIU/m      Comprehensive metabolic panel [[id number]]  (Abnormal) Collected:  01/30/19 1322    Lab Status:  Final result Specimen:  Blood from Arm, Left Updated:  01/30/19 1348     Sodium 135 (L) mmol/L      Potassium 3 8 mmol/L      Chloride 106 mmol/L      CO2 23 mmol/L      ANION GAP 6 mmol/L      BUN 5 mg/dL      Creatinine 0 61 mg/dL      Glucose 96 mg/dL      Calcium 9 0 mg/dL      AST 16 U/L      ALT 25 U/L      Alkaline Phosphatase 58 U/L      Total Protein 8 5 (H) g/dL      Albumin 4 0 g/dL      Total Bilirubin 0 57 mg/dL      eGFR 117 ml/min/1 73sq m     Narrative:         National Kidney Disease Education Program recommendations are as follows:  GFR calculation is accurate only with a steady state creatinine  Chronic Kidney disease less than 60 ml/min/1 73 sq  meters  Kidney failure less than 15 ml/min/1 73 sq  meters  Troponin I [[id number]]  (Normal) Collected:  01/30/19 1322    Lab Status:  Final result Specimen:  Blood from Arm, Left Updated:  01/30/19 1348     Troponin I <0 02 ng/mL     CBC and differential [[id number]]  (Abnormal) Collected:  01/30/19 1322    Lab Status:  Final result Specimen:  Blood from Arm, Left Updated:  01/30/19 1330     WBC 3 89 (L) Thousand/uL      RBC 4 44 Million/uL      Hemoglobin 11 0 (L) g/dL      Hematocrit 34 8 %      MCV 78 (L) fL      MCH 24 8 (L) pg      MCHC 31 6 g/dL      RDW 13 2 %      MPV 10 9 fL      Platelets 387 Thousands/uL      nRBC 0 /100 WBCs      Neutrophils Relative 71 %      Immat GRANS % 0 %      Lymphocytes Relative 22 %      Monocytes Relative 6 %      Eosinophils Relative 0 %      Basophils Relative 1 %      Neutrophils Absolute 2 73 Thousands/µL      Immature Grans Absolute 0 00 Thousand/uL      Lymphocytes Absolute 0 87 Thousands/µL      Monocytes Absolute 0 24 Thousand/µL      Eosinophils Absolute 0 01 Thousand/µL      Basophils Absolute 0 04 Thousands/µL                  CTA ED chest PE study   Final Result by Thomas Hernández MD (01/30 1655)      No acute pathology  No pulmonary embolism  Small hiatal hernia              Workstation performed: [id number]         XR chest 2 views   ED Interpretation by Brittni Ohara MD (01/30 1331)   ED wet read:  No acute cardiopulmonary disease  Final Result by David Rodriges MD (01/30 1331)      No acute cardiopulmonary disease  Workstation performed: [id number]               Procedures  ECG 12 Lead Documentation  Date/Time: 1/30/2019 11:04 AM  Performed by: Sherley Duke by: Torsten Ambrosio     Indications / Diagnosis:  Chest pain  ECG reviewed by me, the ED Provider: yes    Patient location:  ED  Previous ECG:     Previous ECG:  Compared to current    Comparison ECG info:  Normal sinus rhythm    Similarity:  Changes noted    Comparison to cardiac monitor: Yes    Interpretation:     Interpretation: abnormal    Rate:     ECG rate:  116    ECG rate assessment: tachycardic    Rhythm:     Rhythm: sinus tachycardia    Ectopy:     Ectopy: none    QRS:     QRS axis:  Normal    QRS intervals:  Normal  ST segments:     ST segments:  Normal  T waves:     T waves: normal            Phone Consults  ED Phone Contact    ED Course  ED Course as of Jan 30 1801 Wed Jan 30, 2019   1358 Troponin I: <0 02   1622 D-DIMER QUANTITATIVE: (!) 568   1628 Repeat troponin and CT PE study pending      1700 Troponin I: <0 02       MDM  Number of Diagnoses or Management Options  Chest pain with low risk for cardiac etiology: new and requires workup  Elevated d-dimer: new and requires workup  Hyperventilation: new and requires workup  Perioral numbness: new and requires workup  Shortness of breath: new and requires workup  Tachycardia: new and requires workup     Amount and/or Complexity of Data Reviewed  Clinical lab tests: ordered and reviewed  Tests in the radiology section of CPT®: ordered and reviewed  Tests in the medicine section of CPT®: ordered and reviewed  Decide to obtain previous medical records or to obtain history from someone other than the patient: yes  Obtain history from someone other than the patient: yes  Review and summarize past medical records: yes  Independent visualization of images, tracings, or specimens: yes    Patient Progress  Patient progress: improved     53-year-old woman presented to the emergency department for chest pain, shortness of breath, sensation of rapid heart rate  Her presentation is not entirely consistent with cardiac chest pain  She is hyperventilating and complaining of poorly localized abdominal / extremity pain in addition to perioral numbness and tingling, which lead me to suspect that there is at least some component of anxiety contributing to her presentation  Patient is relatively young and has no significant cardiac risk factors besides hyperlipidemia  Will check cardiac labs, EKG, chest x-ray  Will give 1 mg IV Ativan and L of fluids for her tachycardia and reassess  EKG is sinus tachycardia  No acute findings on chest x-ray  Troponin is negative  Patient subjectively feels better but is still tachycardic to 109 after 1 L fluids  Will give a 2nd L  Given sensation breathlessness earlier and persistent tachycardia in concerned about the possibility of pulmonary embolism  Will check D-dimer and delta troponin  Delta troponin remains negative  D-dimer is very mildly elevated above upper limit of normal   CT PE study was obtained, which was negative for PE or any other acute abnormality in the chest   Patient was reassessed and tells me that her chest pain is better but she is worried she was "dying" during the episode this morning  Patient was reassured that her laboratory and imaging workup today does not reveal any obvious emergent cause for her chest pain  Patient tells me that she will calling her PCP tomorrow to schedule follow-up, which I encouraged her to do  Return to the emergency department for any repeat or worsening chest pain, shortness of breath, dizziness, syncope        Disposition  Final diagnoses:   Perioral numbness Hyperventilation   Tachycardia   Chest pain with low risk for cardiac etiology   Shortness of breath   Elevated d-dimer     Time reflects when diagnosis was documented in both MDM as applicable and the Disposition within this note     Time User Action Codes Description Comment    1/30/2019 11:07 AM Catarina Camper Add [R20 0] Perioral numbness     1/30/2019 11:07 AM Catarina Camper Add [R06 4] Hyperventilation     1/30/2019 11:08 AM Catarina Camper Add [R00 0] Tachycardia     1/30/2019  5:32 PM Catarina Camper Add [R07 9] Chest pain with low risk for cardiac etiology     1/30/2019  5:32 PM Catarina Camper Modify [R20 0] Perioral numbness     1/30/2019  5:32 PM Catarina Camper Modify [R07 9] Chest pain with low risk for cardiac etiology     1/30/2019  5:32 PM Catarina Camper Add [R06 02] Shortness of breath     1/30/2019  5:33 PM Catarina Camper Add [R79 89] Elevated d-dimer       ED Disposition     ED Disposition Condition Date/Time Comment    Discharge  Wed Jan 30, 2019  5:48 PM Savanna Mckeon discharge to home/self care  Condition at discharge: Good        Follow-up Information     Follow up With Specialties Details Why [address], MD Bariatrics Schedule an appointment as soon as possible for a visit  [phone]   [address] [place] [address] Emergency Department Emergency Medicine Go to If symptoms worsen [address]  [phone]  ED, [address] [place] [address], [id number]          Discharge Medication List as of 1/30/2019  5:49 PM      CONTINUE these medications which have NOT CHANGED    Details   dicyclomine (BENTYL) 10 mg capsule Take 1 capsule (10 mg total) by mouth 4 (four) times a day (before meals and at bedtime), Starting Fri 11/9/2018, Normal      lansoprazole (PREVACID) 15 mg capsule TAKE 1 CAPSULE (15 MG TOTAL) BY MOUTH DAILY, Starting Mon 10/22/2018, Normal      niacin (NIASPAN) 1000 MG CR tablet Take 1 tablet by mouth every 24 hours, Starting Mon 4/23/2018, Historical Med      norgestimate-ethinyl estradiol (ORTHO-CYCLEN) 0 25-35 MG-MCG per tablet Take 1 tablet by mouth daily, Starting Fri 8/31/2018, Normal      valACYclovir (VALTREX) 1,000 mg tablet Take 1,000 mg by mouth daily, Historical Med           No discharge procedures on file  ED Provider  Attending physically available and evaluated Xavi Mcadams I managed the patient along with the ED Attending      Electronically Signed by         Ro Moy MD  01/30/19 2000 [facility] Kali Esteves MD  01/30/19 0540

## 2019-01-31 LAB
ATRIAL RATE: 119 BPM
P AXIS: 79 DEGREES
PR INTERVAL: 132 MS
QRS AXIS: 38 DEGREES
QRSD INTERVAL: 70 MS
QT INTERVAL: 316 MS
QTC INTERVAL: 444 MS
T WAVE AXIS: 48 DEGREES
VENTRICULAR RATE: 119 BPM

## 2019-01-31 PROCEDURE — 93010 ELECTROCARDIOGRAM REPORT: CPT | Performed by: INTERNAL MEDICINE

## 2019-01-31 RX ORDER — OMEPRAZOLE 40 MG/1
CAPSULE, DELAYED RELEASE ORAL
Refills: 0 | COMMUNITY
Start: 2019-01-12 | End: 2019-02-06

## 2019-02-01 ENCOUNTER — OFFICE VISIT (OUTPATIENT)
Dept: FAMILY MEDICINE CLINIC | Facility: CLINIC | Age: 39
End: 2019-02-01
Payer: COMMERCIAL

## 2019-02-01 VITALS
TEMPERATURE: 98.6 F | RESPIRATION RATE: 18 BRPM | DIASTOLIC BLOOD PRESSURE: 86 MMHG | BODY MASS INDEX: 23.75 KG/M2 | SYSTOLIC BLOOD PRESSURE: 128 MMHG | OXYGEN SATURATION: 99 % | HEART RATE: 98 BPM | HEIGHT: 60 IN | WEIGHT: 121 LBS

## 2019-02-01 DIAGNOSIS — R07.9 CHEST PAIN, UNSPECIFIED TYPE: ICD-10-CM

## 2019-02-01 DIAGNOSIS — D64.9 ANEMIA, UNSPECIFIED TYPE: ICD-10-CM

## 2019-02-01 DIAGNOSIS — K21.9 GASTROESOPHAGEAL REFLUX DISEASE WITHOUT ESOPHAGITIS: ICD-10-CM

## 2019-02-01 DIAGNOSIS — E78.5 HYPERLIPIDEMIA, UNSPECIFIED HYPERLIPIDEMIA TYPE: ICD-10-CM

## 2019-02-01 DIAGNOSIS — R00.2 HEART PALPITATIONS: Primary | ICD-10-CM

## 2019-02-01 PROCEDURE — 3008F BODY MASS INDEX DOCD: CPT | Performed by: NURSE PRACTITIONER

## 2019-02-01 PROCEDURE — 99214 OFFICE O/P EST MOD 30 MIN: CPT | Performed by: NURSE PRACTITIONER

## 2019-02-01 RX ORDER — AMOXICILLIN 500 MG/1
500 CAPSULE ORAL EVERY 12 HOURS SCHEDULED
COMMUNITY
End: 2019-02-06

## 2019-02-01 RX ORDER — CLARITHROMYCIN 500 MG/1
500 TABLET, COATED ORAL EVERY 12 HOURS SCHEDULED
COMMUNITY
End: 2019-02-06

## 2019-02-01 NOTE — ASSESSMENT & PLAN NOTE
We will recheck a 24 hour Holter Monitor   Encouraged to stay well hydrated  Avoid caffeine or alcohol   ED records reviewed today and cardiac workup was negative   She adamantly denies feeling anxious although she appears mildly anxious today

## 2019-02-01 NOTE — ASSESSMENT & PLAN NOTE
Stable  Being managed by GI  Taking Prevacid and being treated for H Pylori currently   Diet modifications reviewed today

## 2019-02-01 NOTE — PROGRESS NOTES
Assessment/Plan:    No problem-specific Assessment & Plan notes found for this encounter  {Assess/PlanSmartLinks:87139}      Subjective: Patient is her for a ER follow up   Patient went to the ER complaining of chest pain   Patient says she's feeling better but feel like her heart is racing      Patient ID: Luke Reddy is a 41 y o  female  HPI    {Common ambulatory SmartLinks:52783}    Review of Systems      Objective: There were no vitals taken for this visit           Physical Exam  /

## 2019-02-01 NOTE — ASSESSMENT & PLAN NOTE
Recent RBC 4 44, Hgb 11 0  Asymptomatic   Denies heavy menstrual cycles, blood in stool  Discussed starting an OTC ferrous sulfate 325 tab daily but she declined

## 2019-02-01 NOTE — PROGRESS NOTES
Assessment/Plan:    Gastroesophageal reflux disease without esophagitis  Stable  Being managed by GI  Taking Prevacid and being treated for H Pylori currently   Diet modifications reviewed today    Anemia  Recent RBC 4 44, Hgb 11 0  Asymptomatic   Denies heavy menstrual cycles, blood in stool  Discussed starting an OTC ferrous sulfate 325 tab daily but she declined     Heart palpitations  We will recheck a 24 hour Holter Monitor   Encouraged to stay well hydrated  Avoid caffeine or alcohol   ED records reviewed today and cardiac workup was negative   She adamantly denies feeling anxious although she appears mildly anxious today    Hyperlipidemia  Lipid panel ordered  Diet modifications reviewed  Encouraged regular exercise 30 min 5 x per week    Chest pain- currently resolved  ED records reviewed today with a negative workup      Diagnoses and all orders for this visit:    Heart palpitations  -     Holter monitor - 24 hour; Future    Chest pain, unspecified type    Gastroesophageal reflux disease without esophagitis    Anemia, unspecified type    Hyperlipidemia, unspecified hyperlipidemia type  -     Lipid panel; Future    Other orders  -     clarithromycin (BIAXIN) 500 mg tablet; Take 500 mg by mouth every 12 (twelve) hours  -     amoxicillin (AMOXIL) 500 mg capsule; Take 500 mg by mouth every 12 (twelve) hours          Subjective:      Patient ID: Trini Cha is a 41 y o  female      HPI     Pt presents by herself today for an ED follow up   Evaluated at [facility] earlier this week on 1/30/19 with chest pain  Apparently the patient was at work and reported her face felt warm and flushed- was given Claritin and Cortisone cream   Symptoms persisted and EMS were called  BP upon ED arrival at 149/90,   Troponin negative  D dimer 568  HCG negative  TSH 1 100  Na 135, K 3 8  Bun 5, Cr 0 61  WBC 3 89  RBC 4 44, hgb 11 0 (known H/O anemia)  CT of the chest negative for PE or acute abnormalities   CXR negative  EKG with ST, rate 116bpm    Felt she was having an panic attack- administered 1 mg of Ativan   Had symptom improvement and patient was discharged home    Today, the patient reports having palpitations on and off   Denies having chest pain, SOB, dizziness, edema, facial flushing   Palpitations are not associated with any activity  She drinks 5-7 bottles of water per day   Has not had caffeine in several weeks  No regular alcohol use    She denies feeling anxious or having panic attacks  No increased stress in her life currently     Patient denies SOB at rest and with exertion     Looking back in the patient's chart, there is a note from 8/17/18 that the patient had a negative 48 hour Holter Monitor performed  The patient does not recall this     Pt with Hyperlipidemia  Lipid panel from 9/2018 with / / HDL 28  She is not currently taking anything for her cholesterol     Pt does follow with GI, last seen 1/11/19 for epigastric pain, reflux and diarrhea  Taking Prevacid and Bentyl prn   Currently being treated for an H pylori infection via GI    The following portions of the patient's history were reviewed and updated as appropriate: allergies, current medications, past family history, past medical history, past social history and problem list     Review of Systems   Constitutional: Negative for chills, fatigue and fever  Eyes: Negative for visual disturbance  Respiratory: Negative for cough, chest tightness, shortness of breath and wheezing  Cardiovascular: Positive for palpitations  Negative for chest pain and leg swelling  Gastrointestinal: Negative for abdominal pain, blood in stool, constipation, diarrhea and nausea  Genitourinary: Negative for dysuria  Musculoskeletal: Negative for arthralgias and myalgias  Skin: Negative for color change, pallor, rash and wound  Neurological: Negative for dizziness, weakness, numbness and headaches  Hematological: Negative for adenopathy   Does not bruise/bleed easily  Psychiatric/Behavioral: Negative for sleep disturbance and suicidal ideas  Objective:      /86 (BP Location: Left arm)   Pulse 98   Temp 98 6 °F (37 °C) (Oral)   Resp 18   Ht 5' (1 524 m)   Wt 54 9 kg (121 lb)   SpO2 99%   BMI 23 63 kg/m²          Physical Exam   Constitutional: She is oriented to person, place, and time  She appears well-developed and well-nourished  No distress  HENT:   Head: Normocephalic and atraumatic  Eyes: Pupils are equal, round, and reactive to light  Conjunctivae are normal    Neck: Normal range of motion  Neck supple  No thyromegaly present  Cardiovascular: Normal rate, regular rhythm and normal heart sounds  No murmur heard  No LE edema  No carotid bruits   Pulmonary/Chest: Effort normal and breath sounds normal  No respiratory distress  She has no wheezes  She has no rales  She exhibits no tenderness  Abdominal: Soft  Bowel sounds are normal  She exhibits no distension and no mass  There is no tenderness  There is no rebound and no guarding  Musculoskeletal: Normal range of motion  Lymphadenopathy:     She has no cervical adenopathy  Neurological: She is alert and oriented to person, place, and time  Skin: Skin is warm and dry  She is not diaphoretic  Psychiatric: She has a normal mood and affect     She appears mildly anxious to me but denies feeling anxious

## 2019-02-01 NOTE — LETTER
February 1, 2019     Patient: Ally Vallejo   YOB: 1980   Date of Visit: 2/1/2019       To Whom it May Concern:    Ally Vallejo is under my professional care  She was seen in my office on 2/1/2019  She may return to work on 2/2/19  If you have any questions or concerns, please don't hesitate to call           Sincerely,          ADAN Harris        CC: No Recipients

## 2019-02-06 ENCOUNTER — OFFICE VISIT (OUTPATIENT)
Dept: OBGYN CLINIC | Facility: CLINIC | Age: 39
End: 2019-02-06

## 2019-02-06 VITALS
BODY MASS INDEX: 23.6 KG/M2 | HEART RATE: 102 BPM | HEIGHT: 61 IN | SYSTOLIC BLOOD PRESSURE: 131 MMHG | WEIGHT: 125 LBS | DIASTOLIC BLOOD PRESSURE: 78 MMHG

## 2019-02-06 DIAGNOSIS — R92.2 DENSE BREAST TISSUE: ICD-10-CM

## 2019-02-06 DIAGNOSIS — N64.4 BREAST PAIN, RIGHT: Primary | ICD-10-CM

## 2019-02-06 PROCEDURE — 99213 OFFICE O/P EST LOW 20 MIN: CPT | Performed by: OBSTETRICS & GYNECOLOGY

## 2019-02-06 NOTE — PROGRESS NOTES
Assessment:  41 y o  [id number] who presents as a follow up for breast pain  BIRADs-1 imaging reviewed  Dense breast tissue noted; reviewed relation to breast cancer/cancer detection and how this may impact her pain  Plan:  Diagnoses and all orders for this visit:    Breast pain, right  - Negative breast imaging, no recommendation for further imaging at present  - Reviewed methods to reduce pain: NSAIDs, warm compresses, well fitted/supportive bra, limiting caffeine intake, limit salty food intake  - Reviewed breast self-awareness and encouraged to call for clinician exam with any changes   - Return in 6mo for annual exam and to reassess interval changes (or sooner if indicated)    Dense breast tissue  - Reviewed risks in regard to breast cancer detection  - When screening appropriate, recommend patient be screened with 3D mammography to increase detection rate of breast cancer        CCB#[id number]    Total time of 20min were spent with the patient, >50% of which was spent on patient counseling   __________________________________________________________________    Subjective   Laura Gutierrez is a 41 y o  [id number] who presents as a follow up for breast pain  Patient reports that the pain she had previously was not associated with her cycles, though she does get breast discomfort cyclically  She notes pain was very intense, but has since completely resolved  She has no current concerns with her breasts  Counseled on normal breast imaging  Counseled on dense reast tissue noted; reviewed that this is an independent risk factor for breast cancer, and makes detection of breast cancers more difficult  Also reviewed higher proportion of glandular tissue, which is hormonally active and can be contributing to her cyclic pain  No evidence of current breast pathology to explain pain apart from cyclic pain       Reviewed  The following portions of the patient's history were reviewed and updated as appropriate: allergies, current medications, past family history, past medical history, past social history, past surgical history and problem list           Objective  /78 (BP Location: Left arm)   Pulse 102   Ht 5' 1" (1 549 m)   Wt 56 7 kg (125 lb)   LMP 01/22/2019 (Approximate)   BMI 23 62 kg/m²      Physical Exam:  Physical Exam   Constitutional: She is oriented to person, place, and time  She appears well-developed and well-nourished  No distress  Pulmonary/Chest: Effort normal  No respiratory distress  Neurological: She is alert and oriented to person, place, and time  Right 3D diagnostic mammogram and right breast ultrasound  "IMPRESSION:  1  Negative bilateral mammogram   2  Unremarkable right breast ultrasound in the area of pain  3  Management of any clinical symptoms and findings must be based on clinical grounds        ASSESSMENT/BI-RADS CATEGORY:  Right: 1 - Negative  Overall: 1 - Negative     RECOMMENDATION:       - Routine Screening Mammogram at Age 38 for both breasts         - Clinical Management for the right breast "

## 2019-02-11 DIAGNOSIS — K21.9 GASTROESOPHAGEAL REFLUX DISEASE WITHOUT ESOPHAGITIS: Primary | ICD-10-CM

## 2019-02-11 RX ORDER — OMEPRAZOLE 40 MG/1
CAPSULE, DELAYED RELEASE ORAL
Qty: 28 CAPSULE | Refills: 0 | Status: SHIPPED | OUTPATIENT
Start: 2019-02-11 | End: 2019-03-14 | Stop reason: SDUPTHER

## 2019-02-13 ENCOUNTER — HOSPITAL ENCOUNTER (OUTPATIENT)
Dept: NON INVASIVE DIAGNOSTICS | Facility: CLINIC | Age: 39
Discharge: HOME | End: 2019-02-13
Payer: COMMERCIAL

## 2019-02-13 DIAGNOSIS — R00.2 HEART PALPITATIONS: ICD-10-CM

## 2019-02-13 PROCEDURE — 93225 XTRNL ECG REC<48 HRS REC: CPT

## 2019-02-13 PROCEDURE — 93226 XTRNL ECG REC<48 HR SCAN A/R: CPT

## 2019-02-15 PROCEDURE — 93227 XTRNL ECG REC<48 HR R&I: CPT | Performed by: INTERNAL MEDICINE

## 2019-03-12 ENCOUNTER — TELEPHONE (OUTPATIENT)
Dept: GASTROENTEROLOGY | Facility: CLINIC | Age: 39
End: 2019-03-12

## 2019-03-12 NOTE — TELEPHONE ENCOUNTER
Called patient jose about the active lab work in her chart  Reminded patient to get them done before her appointment on Thursday March 14, 2019

## 2019-03-14 DIAGNOSIS — K21.9 GASTROESOPHAGEAL REFLUX DISEASE WITHOUT ESOPHAGITIS: ICD-10-CM

## 2019-03-17 RX ORDER — OMEPRAZOLE 40 MG/1
CAPSULE, DELAYED RELEASE ORAL
Qty: 28 CAPSULE | Refills: 0 | Status: SHIPPED | OUTPATIENT
Start: 2019-03-17 | End: 2019-05-03 | Stop reason: ALTCHOICE

## 2019-03-29 ENCOUNTER — OFFICE VISIT (OUTPATIENT)
Dept: FAMILY MEDICINE CLINIC | Facility: CLINIC | Age: 39
End: 2019-03-29
Payer: COMMERCIAL

## 2019-03-29 VITALS
TEMPERATURE: 98.1 F | HEART RATE: 77 BPM | RESPIRATION RATE: 20 BRPM | SYSTOLIC BLOOD PRESSURE: 132 MMHG | DIASTOLIC BLOOD PRESSURE: 82 MMHG | HEIGHT: 61 IN | WEIGHT: 124.6 LBS | BODY MASS INDEX: 23.53 KG/M2 | OXYGEN SATURATION: 100 %

## 2019-03-29 DIAGNOSIS — G44.89 OTHER HEADACHE SYNDROME: ICD-10-CM

## 2019-03-29 DIAGNOSIS — R07.89 OTHER CHEST PAIN: Primary | ICD-10-CM

## 2019-03-29 DIAGNOSIS — R89.9 ABNORMAL LABORATORY TEST RESULT: ICD-10-CM

## 2019-03-29 DIAGNOSIS — Z13.220 SCREENING FOR CHOLESTEROL LEVEL: ICD-10-CM

## 2019-03-29 PROCEDURE — 1036F TOBACCO NON-USER: CPT | Performed by: NURSE PRACTITIONER

## 2019-03-29 PROCEDURE — 3008F BODY MASS INDEX DOCD: CPT | Performed by: NURSE PRACTITIONER

## 2019-03-29 PROCEDURE — 99214 OFFICE O/P EST MOD 30 MIN: CPT | Performed by: NURSE PRACTITIONER

## 2019-03-29 RX ORDER — GABAPENTIN 100 MG/1
100 CAPSULE ORAL
Qty: 30 CAPSULE | Refills: 0 | Status: SHIPPED | OUTPATIENT
Start: 2019-03-29 | End: 2019-08-16 | Stop reason: ALTCHOICE

## 2019-03-30 ENCOUNTER — APPOINTMENT (OUTPATIENT)
Dept: LAB | Facility: HOSPITAL | Age: 39
End: 2019-03-30
Payer: COMMERCIAL

## 2019-03-30 DIAGNOSIS — Z13.220 SCREENING FOR CHOLESTEROL LEVEL: ICD-10-CM

## 2019-03-30 DIAGNOSIS — R89.9 ABNORMAL LABORATORY TEST RESULT: ICD-10-CM

## 2019-03-30 LAB
ALBUMIN SERPL BCP-MCNC: 3.9 G/DL (ref 3.5–5)
ALP SERPL-CCNC: 68 U/L (ref 46–116)
ALT SERPL W P-5'-P-CCNC: 51 U/L (ref 12–78)
ANION GAP SERPL CALCULATED.3IONS-SCNC: 8 MMOL/L (ref 4–13)
AST SERPL W P-5'-P-CCNC: 21 U/L (ref 5–45)
BASOPHILS # BLD AUTO: 0.04 THOUSANDS/ΜL (ref 0–0.1)
BASOPHILS NFR BLD AUTO: 1 % (ref 0–1)
BILIRUB SERPL-MCNC: 0.35 MG/DL (ref 0.2–1)
BUN SERPL-MCNC: 8 MG/DL (ref 5–25)
CALCIUM SERPL-MCNC: 8.8 MG/DL (ref 8.3–10.1)
CHLORIDE SERPL-SCNC: 104 MMOL/L (ref 100–108)
CHOLEST SERPL-MCNC: 217 MG/DL (ref 50–200)
CO2 SERPL-SCNC: 23 MMOL/L (ref 21–32)
CREAT SERPL-MCNC: 0.54 MG/DL (ref 0.6–1.3)
EOSINOPHIL # BLD AUTO: 0.06 THOUSAND/ΜL (ref 0–0.61)
EOSINOPHIL NFR BLD AUTO: 2 % (ref 0–6)
ERYTHROCYTE [DISTWIDTH] IN BLOOD BY AUTOMATED COUNT: 15.5 % (ref 11.6–15.1)
GFR SERPL CREATININE-BSD FRML MDRD: 122 ML/MIN/1.73SQ M
GLUCOSE P FAST SERPL-MCNC: 101 MG/DL (ref 65–99)
HCT VFR BLD AUTO: 35.8 % (ref 34.8–46.1)
HDLC SERPL-MCNC: 22 MG/DL (ref 40–60)
HGB BLD-MCNC: 11.3 G/DL (ref 11.5–15.4)
IMM GRANULOCYTES # BLD AUTO: 0.01 THOUSAND/UL (ref 0–0.2)
IMM GRANULOCYTES NFR BLD AUTO: 0 % (ref 0–2)
LYMPHOCYTES # BLD AUTO: 1.37 THOUSANDS/ΜL (ref 0.6–4.47)
LYMPHOCYTES NFR BLD AUTO: 47 % (ref 14–44)
MCH RBC QN AUTO: 25.1 PG (ref 26.8–34.3)
MCHC RBC AUTO-ENTMCNC: 31.6 G/DL (ref 31.4–37.4)
MCV RBC AUTO: 80 FL (ref 82–98)
MONOCYTES # BLD AUTO: 0.28 THOUSAND/ΜL (ref 0.17–1.22)
MONOCYTES NFR BLD AUTO: 10 % (ref 4–12)
NEUTROPHILS # BLD AUTO: 1.19 THOUSANDS/ΜL (ref 1.85–7.62)
NEUTS SEG NFR BLD AUTO: 40 % (ref 43–75)
NONHDLC SERPL-MCNC: 195 MG/DL
NRBC BLD AUTO-RTO: 0 /100 WBCS
PLATELET # BLD AUTO: 220 THOUSANDS/UL (ref 149–390)
PMV BLD AUTO: 11.8 FL (ref 8.9–12.7)
POTASSIUM SERPL-SCNC: 3.8 MMOL/L (ref 3.5–5.3)
PROT SERPL-MCNC: 7.7 G/DL (ref 6.4–8.2)
RBC # BLD AUTO: 4.5 MILLION/UL (ref 3.81–5.12)
SODIUM SERPL-SCNC: 135 MMOL/L (ref 136–145)
TRIGL SERPL-MCNC: 1357 MG/DL
WBC # BLD AUTO: 2.95 THOUSAND/UL (ref 4.31–10.16)

## 2019-03-30 PROCEDURE — 80061 LIPID PANEL: CPT

## 2019-03-30 PROCEDURE — 85025 COMPLETE CBC W/AUTO DIFF WBC: CPT

## 2019-03-30 PROCEDURE — 80053 COMPREHEN METABOLIC PANEL: CPT

## 2019-03-30 PROCEDURE — 36415 COLL VENOUS BLD VENIPUNCTURE: CPT

## 2019-04-01 PROBLEM — Z13.220 SCREENING FOR CHOLESTEROL LEVEL: Status: ACTIVE | Noted: 2019-04-01

## 2019-04-01 PROBLEM — G44.89 OTHER HEADACHE SYNDROME: Status: ACTIVE | Noted: 2019-04-01

## 2019-04-05 ENCOUNTER — APPOINTMENT (OUTPATIENT)
Dept: LAB | Facility: HOSPITAL | Age: 39
End: 2019-04-05
Payer: COMMERCIAL

## 2019-04-05 DIAGNOSIS — A04.8 H. PYLORI INFECTION: ICD-10-CM

## 2019-04-05 PROCEDURE — 87338 HPYLORI STOOL AG IA: CPT

## 2019-04-06 LAB — H PYLORI AG STL QL IA: NEGATIVE

## 2019-04-24 ENCOUNTER — TELEPHONE (OUTPATIENT)
Dept: FAMILY MEDICINE CLINIC | Facility: CLINIC | Age: 39
End: 2019-04-24

## 2019-04-29 RX ORDER — OMEPRAZOLE 40 MG/1
CAPSULE, DELAYED RELEASE ORAL
Refills: 0 | COMMUNITY
Start: 2019-02-11 | End: 2019-05-03 | Stop reason: ALTCHOICE

## 2019-05-03 ENCOUNTER — OFFICE VISIT (OUTPATIENT)
Dept: FAMILY MEDICINE CLINIC | Facility: CLINIC | Age: 39
End: 2019-05-03
Payer: COMMERCIAL

## 2019-05-03 VITALS
BODY MASS INDEX: 24.74 KG/M2 | OXYGEN SATURATION: 98 % | TEMPERATURE: 98.1 F | RESPIRATION RATE: 16 BRPM | HEIGHT: 60 IN | SYSTOLIC BLOOD PRESSURE: 110 MMHG | HEART RATE: 72 BPM | DIASTOLIC BLOOD PRESSURE: 70 MMHG | WEIGHT: 126 LBS

## 2019-05-03 DIAGNOSIS — R10.31 RIGHT LOWER QUADRANT ABDOMINAL PAIN: ICD-10-CM

## 2019-05-03 DIAGNOSIS — L30.9 ECZEMA, UNSPECIFIED TYPE: ICD-10-CM

## 2019-05-03 DIAGNOSIS — E55.9 VITAMIN D DEFICIENCY: ICD-10-CM

## 2019-05-03 DIAGNOSIS — D50.8 OTHER IRON DEFICIENCY ANEMIA: ICD-10-CM

## 2019-05-03 DIAGNOSIS — E78.1 HYPERTRIGLYCERIDEMIA: Primary | ICD-10-CM

## 2019-05-03 DIAGNOSIS — R19.7 DIARRHEA, UNSPECIFIED TYPE: ICD-10-CM

## 2019-05-03 PROCEDURE — 99214 OFFICE O/P EST MOD 30 MIN: CPT | Performed by: FAMILY MEDICINE

## 2019-05-03 RX ORDER — TRIAMCINOLONE ACETONIDE 0.25 MG/G
CREAM TOPICAL 2 TIMES DAILY
Qty: 30 G | Refills: 5 | Status: SHIPPED | OUTPATIENT
Start: 2019-05-03 | End: 2019-08-16 | Stop reason: ALTCHOICE

## 2019-05-03 RX ORDER — NIACIN 1000 MG/1
1000 TABLET, EXTENDED RELEASE ORAL
Qty: 30 TABLET | Refills: 5 | Status: SHIPPED | OUTPATIENT
Start: 2019-05-03 | End: 2019-08-16 | Stop reason: SDUPTHER

## 2019-05-03 RX ORDER — ERGOCALCIFEROL 1.25 MG/1
CAPSULE ORAL
Qty: 4 CAPSULE | Refills: 5 | Status: SHIPPED | OUTPATIENT
Start: 2019-05-03 | End: 2019-08-16 | Stop reason: SDUPTHER

## 2019-05-03 RX ORDER — FERROUS SULFATE TAB EC 324 MG (65 MG FE EQUIVALENT) 324 (65 FE) MG
324 TABLET DELAYED RESPONSE ORAL
Qty: 60 TABLET | Refills: 5 | Status: SHIPPED | OUTPATIENT
Start: 2019-05-03 | End: 2019-08-16 | Stop reason: ALTCHOICE

## 2019-05-05 DIAGNOSIS — R10.31 RIGHT LOWER QUADRANT ABDOMINAL PAIN: Primary | ICD-10-CM

## 2019-05-05 RX ORDER — OXYCODONE HYDROCHLORIDE AND ACETAMINOPHEN 5; 325 MG/1; MG/1
1 TABLET ORAL EVERY 6 HOURS PRN
Qty: 10 TABLET | Refills: 0 | Status: SHIPPED | OUTPATIENT
Start: 2019-05-05 | End: 2019-08-16 | Stop reason: ALTCHOICE

## 2019-05-06 ENCOUNTER — HOSPITAL ENCOUNTER (OUTPATIENT)
Facility: HOSPITAL | Age: 39
Setting detail: OUTPATIENT SURGERY
Discharge: HOME | End: 2019-05-06
Attending: SURGERY | Admitting: SURGERY
Payer: COMMERCIAL

## 2019-05-06 ENCOUNTER — ANESTHESIA EVENT (OUTPATIENT)
Dept: PERIOP | Facility: HOSPITAL | Age: 39
End: 2019-05-06
Payer: COMMERCIAL

## 2019-05-06 ENCOUNTER — ANESTHESIA (OUTPATIENT)
Dept: PERIOP | Facility: HOSPITAL | Age: 39
End: 2019-05-06
Payer: COMMERCIAL

## 2019-05-06 ENCOUNTER — OFFICE VISIT (OUTPATIENT)
Dept: FAMILY MEDICINE CLINIC | Facility: CLINIC | Age: 39
End: 2019-05-06
Payer: COMMERCIAL

## 2019-05-06 VITALS
OXYGEN SATURATION: 95 % | TEMPERATURE: 97.3 F | DIASTOLIC BLOOD PRESSURE: 77 MMHG | SYSTOLIC BLOOD PRESSURE: 133 MMHG | RESPIRATION RATE: 16 BRPM | HEART RATE: 89 BPM

## 2019-05-06 VITALS
HEART RATE: 100 BPM | BODY MASS INDEX: 24.15 KG/M2 | SYSTOLIC BLOOD PRESSURE: 126 MMHG | DIASTOLIC BLOOD PRESSURE: 80 MMHG | WEIGHT: 123 LBS | HEIGHT: 60 IN | TEMPERATURE: 98.5 F | RESPIRATION RATE: 16 BRPM | OXYGEN SATURATION: 98 %

## 2019-05-06 DIAGNOSIS — K43.6: Primary | ICD-10-CM

## 2019-05-06 PROBLEM — R10.31 RIGHT LOWER QUADRANT ABDOMINAL PAIN: Status: ACTIVE | Noted: 2019-05-06

## 2019-05-06 PROBLEM — K43.9 VENTRAL HERNIA WITHOUT OBSTRUCTION OR GANGRENE: Status: ACTIVE | Noted: 2019-05-06

## 2019-05-06 PROCEDURE — 99213 OFFICE O/P EST LOW 20 MIN: CPT | Performed by: FAMILY MEDICINE

## 2019-05-06 PROCEDURE — 3008F BODY MASS INDEX DOCD: CPT | Performed by: FAMILY MEDICINE

## 2019-05-06 RX ORDER — MIDAZOLAM HYDROCHLORIDE 1 MG/ML
INJECTION INTRAMUSCULAR; INTRAVENOUS AS NEEDED
Status: DISCONTINUED | OUTPATIENT
Start: 2019-05-06 | End: 2019-05-06 | Stop reason: SURG

## 2019-05-06 RX ORDER — PROPOFOL 10 MG/ML
INJECTION, EMULSION INTRAVENOUS AS NEEDED
Status: DISCONTINUED | OUTPATIENT
Start: 2019-05-06 | End: 2019-05-06 | Stop reason: SURG

## 2019-05-06 RX ORDER — MAGNESIUM HYDROXIDE 1200 MG/15ML
LIQUID ORAL AS NEEDED
Status: DISCONTINUED | OUTPATIENT
Start: 2019-05-06 | End: 2019-05-06 | Stop reason: HOSPADM

## 2019-05-06 RX ORDER — ONDANSETRON 2 MG/ML
INJECTION INTRAMUSCULAR; INTRAVENOUS AS NEEDED
Status: DISCONTINUED | OUTPATIENT
Start: 2019-05-06 | End: 2019-05-06 | Stop reason: SURG

## 2019-05-06 RX ORDER — SODIUM CHLORIDE, SODIUM LACTATE, POTASSIUM CHLORIDE, CALCIUM CHLORIDE 600; 310; 30; 20 MG/100ML; MG/100ML; MG/100ML; MG/100ML
50 INJECTION, SOLUTION INTRAVENOUS CONTINUOUS
Status: DISCONTINUED | OUTPATIENT
Start: 2019-05-06 | End: 2019-05-06 | Stop reason: HOSPADM

## 2019-05-06 RX ORDER — PROMETHAZINE HYDROCHLORIDE 25 MG/ML
12.5 INJECTION, SOLUTION INTRAMUSCULAR; INTRAVENOUS ONCE AS NEEDED
Status: DISCONTINUED | OUTPATIENT
Start: 2019-05-06 | End: 2019-05-06 | Stop reason: HOSPADM

## 2019-05-06 RX ORDER — HYDROMORPHONE HCL/PF 1 MG/ML
0.5 SYRINGE (ML) INJECTION
Status: DISCONTINUED | OUTPATIENT
Start: 2019-05-06 | End: 2019-05-06 | Stop reason: HOSPADM

## 2019-05-06 RX ORDER — OXYCODONE HYDROCHLORIDE AND ACETAMINOPHEN 5; 325 MG/1; MG/1
1 TABLET ORAL EVERY 4 HOURS PRN
Status: DISCONTINUED | OUTPATIENT
Start: 2019-05-06 | End: 2019-05-06 | Stop reason: HOSPADM

## 2019-05-06 RX ORDER — SUCCINYLCHOLINE/SOD CL,ISO/PF 100 MG/5ML
SYRINGE (ML) INTRAVENOUS AS NEEDED
Status: DISCONTINUED | OUTPATIENT
Start: 2019-05-06 | End: 2019-05-06 | Stop reason: SURG

## 2019-05-06 RX ORDER — NEOSTIGMINE METHYLSULFATE 1 MG/ML
INJECTION INTRAVENOUS AS NEEDED
Status: DISCONTINUED | OUTPATIENT
Start: 2019-05-06 | End: 2019-05-06 | Stop reason: SURG

## 2019-05-06 RX ORDER — SODIUM CHLORIDE, SODIUM LACTATE, POTASSIUM CHLORIDE, CALCIUM CHLORIDE 600; 310; 30; 20 MG/100ML; MG/100ML; MG/100ML; MG/100ML
INJECTION, SOLUTION INTRAVENOUS CONTINUOUS PRN
Status: DISCONTINUED | OUTPATIENT
Start: 2019-05-06 | End: 2019-05-06 | Stop reason: SURG

## 2019-05-06 RX ORDER — MEPERIDINE HYDROCHLORIDE 25 MG/ML
12.5 INJECTION INTRAMUSCULAR; INTRAVENOUS; SUBCUTANEOUS
Status: DISCONTINUED | OUTPATIENT
Start: 2019-05-06 | End: 2019-05-06 | Stop reason: HOSPADM

## 2019-05-06 RX ORDER — LIDOCAINE HYDROCHLORIDE 10 MG/ML
INJECTION, SOLUTION INFILTRATION; PERINEURAL AS NEEDED
Status: DISCONTINUED | OUTPATIENT
Start: 2019-05-06 | End: 2019-05-06 | Stop reason: SURG

## 2019-05-06 RX ORDER — CEFAZOLIN SODIUM 2 G/50ML
2000 SOLUTION INTRAVENOUS EVERY 8 HOURS
Status: DISCONTINUED | OUTPATIENT
Start: 2019-05-06 | End: 2019-05-06 | Stop reason: HOSPADM

## 2019-05-06 RX ORDER — ROCURONIUM BROMIDE 10 MG/ML
INJECTION, SOLUTION INTRAVENOUS AS NEEDED
Status: DISCONTINUED | OUTPATIENT
Start: 2019-05-06 | End: 2019-05-06 | Stop reason: SURG

## 2019-05-06 RX ORDER — SODIUM CHLORIDE 9 MG/ML
INJECTION, SOLUTION INTRAVENOUS CONTINUOUS PRN
Status: DISCONTINUED | OUTPATIENT
Start: 2019-05-06 | End: 2019-05-06 | Stop reason: SURG

## 2019-05-06 RX ORDER — BUPIVACAINE HYDROCHLORIDE AND EPINEPHRINE 5; 5 MG/ML; UG/ML
INJECTION, SOLUTION PERINEURAL AS NEEDED
Status: DISCONTINUED | OUTPATIENT
Start: 2019-05-06 | End: 2019-05-06 | Stop reason: HOSPADM

## 2019-05-06 RX ORDER — CEFAZOLIN SODIUM 2 G/50ML
2000 SOLUTION INTRAVENOUS ONCE
Status: DISCONTINUED | OUTPATIENT
Start: 2019-05-06 | End: 2019-05-06 | Stop reason: HOSPADM

## 2019-05-06 RX ORDER — FENTANYL CITRATE 50 UG/ML
INJECTION, SOLUTION INTRAMUSCULAR; INTRAVENOUS AS NEEDED
Status: DISCONTINUED | OUTPATIENT
Start: 2019-05-06 | End: 2019-05-06 | Stop reason: SURG

## 2019-05-06 RX ORDER — DEXAMETHASONE SODIUM PHOSPHATE 10 MG/ML
INJECTION, SOLUTION INTRAMUSCULAR; INTRAVENOUS AS NEEDED
Status: DISCONTINUED | OUTPATIENT
Start: 2019-05-06 | End: 2019-05-06 | Stop reason: SURG

## 2019-05-06 RX ORDER — GLYCOPYRROLATE 0.2 MG/ML
INJECTION INTRAMUSCULAR; INTRAVENOUS AS NEEDED
Status: DISCONTINUED | OUTPATIENT
Start: 2019-05-06 | End: 2019-05-06 | Stop reason: SURG

## 2019-05-06 RX ORDER — ONDANSETRON 2 MG/ML
4 INJECTION INTRAMUSCULAR; INTRAVENOUS EVERY 8 HOURS PRN
Status: DISCONTINUED | OUTPATIENT
Start: 2019-05-06 | End: 2019-05-06 | Stop reason: HOSPADM

## 2019-05-06 RX ORDER — ONDANSETRON 2 MG/ML
4 INJECTION INTRAMUSCULAR; INTRAVENOUS ONCE AS NEEDED
Status: COMPLETED | OUTPATIENT
Start: 2019-05-06 | End: 2019-05-06

## 2019-05-06 RX ADMIN — SODIUM CHLORIDE: 0.9 INJECTION, SOLUTION INTRAVENOUS at 13:55

## 2019-05-06 RX ADMIN — CEFAZOLIN SODIUM 2000 MG: 2 SOLUTION INTRAVENOUS at 14:15

## 2019-05-06 RX ADMIN — HYDROMORPHONE HYDROCHLORIDE 0.5 MG: 1 INJECTION, SOLUTION INTRAMUSCULAR; INTRAVENOUS; SUBCUTANEOUS at 15:35

## 2019-05-06 RX ADMIN — FENTANYL CITRATE 100 MCG: 50 INJECTION INTRAMUSCULAR; INTRAVENOUS at 14:21

## 2019-05-06 RX ADMIN — ONDANSETRON 4 MG: 2 INJECTION, SOLUTION INTRAMUSCULAR; INTRAVENOUS at 15:27

## 2019-05-06 RX ADMIN — FENTANYL CITRATE 100 MCG: 50 INJECTION INTRAMUSCULAR; INTRAVENOUS at 14:07

## 2019-05-06 RX ADMIN — HYDROMORPHONE HYDROCHLORIDE 0.5 MG: 1 INJECTION, SOLUTION INTRAMUSCULAR; INTRAVENOUS; SUBCUTANEOUS at 15:11

## 2019-05-06 RX ADMIN — PROPOFOL 150 MG: 10 INJECTION, EMULSION INTRAVENOUS at 14:09

## 2019-05-06 RX ADMIN — ONDANSETRON HYDROCHLORIDE 4 MG: 2 INJECTION, SOLUTION INTRAMUSCULAR; INTRAVENOUS at 14:14

## 2019-05-06 RX ADMIN — SODIUM CHLORIDE, SODIUM LACTATE, POTASSIUM CHLORIDE, AND CALCIUM CHLORIDE: .6; .31; .03; .02 INJECTION, SOLUTION INTRAVENOUS at 14:30

## 2019-05-06 RX ADMIN — ROCURONIUM BROMIDE 30 MG: 10 INJECTION, SOLUTION INTRAVENOUS at 14:19

## 2019-05-06 RX ADMIN — MIDAZOLAM HYDROCHLORIDE 2 MG: 1 INJECTION, SOLUTION INTRAMUSCULAR; INTRAVENOUS at 14:07

## 2019-05-06 RX ADMIN — HYDROMORPHONE HYDROCHLORIDE 0.5 MG: 1 INJECTION, SOLUTION INTRAMUSCULAR; INTRAVENOUS; SUBCUTANEOUS at 15:22

## 2019-05-06 RX ADMIN — NEOSTIGMINE METHYLSULFATE 3 MG: 1 INJECTION INTRAVENOUS at 14:45

## 2019-05-06 RX ADMIN — Medication 100 MG: at 14:09

## 2019-05-06 RX ADMIN — GLYCOPYRROLATE 0.2 MG: 0.2 INJECTION, SOLUTION INTRAMUSCULAR; INTRAVENOUS at 14:45

## 2019-05-06 RX ADMIN — DEXAMETHASONE SODIUM PHOSPHATE 4 MG: 10 INJECTION, SOLUTION INTRAMUSCULAR; INTRAVENOUS at 14:14

## 2019-05-06 RX ADMIN — LIDOCAINE HYDROCHLORIDE 50 MG: 10 INJECTION, SOLUTION INFILTRATION; PERINEURAL at 14:09

## 2019-07-12 ENCOUNTER — TRANSCRIBE ORDERS (OUTPATIENT)
Dept: ADMINISTRATIVE | Facility: HOSPITAL | Age: 39
End: 2019-07-12

## 2019-07-12 DIAGNOSIS — R10.33 UMBILICAL PAIN: Primary | ICD-10-CM

## 2019-08-16 ENCOUNTER — OFFICE VISIT (OUTPATIENT)
Dept: FAMILY MEDICINE CLINIC | Facility: CLINIC | Age: 39
End: 2019-08-16
Payer: COMMERCIAL

## 2019-08-16 VITALS
WEIGHT: 128 LBS | SYSTOLIC BLOOD PRESSURE: 110 MMHG | RESPIRATION RATE: 16 BRPM | HEIGHT: 60 IN | HEART RATE: 82 BPM | OXYGEN SATURATION: 98 % | DIASTOLIC BLOOD PRESSURE: 80 MMHG | BODY MASS INDEX: 25.13 KG/M2 | TEMPERATURE: 98 F

## 2019-08-16 DIAGNOSIS — L30.9 ECZEMA, UNSPECIFIED TYPE: ICD-10-CM

## 2019-08-16 DIAGNOSIS — E78.1 HYPERTRIGLYCERIDEMIA: Primary | ICD-10-CM

## 2019-08-16 DIAGNOSIS — R73.9 HYPERGLYCEMIA: ICD-10-CM

## 2019-08-16 DIAGNOSIS — R00.2 PALPITATIONS: ICD-10-CM

## 2019-08-16 DIAGNOSIS — E55.9 VITAMIN D DEFICIENCY: ICD-10-CM

## 2019-08-16 PROCEDURE — 3008F BODY MASS INDEX DOCD: CPT | Performed by: FAMILY MEDICINE

## 2019-08-16 PROCEDURE — 1036F TOBACCO NON-USER: CPT | Performed by: FAMILY MEDICINE

## 2019-08-16 PROCEDURE — 99214 OFFICE O/P EST MOD 30 MIN: CPT | Performed by: FAMILY MEDICINE

## 2019-08-16 RX ORDER — CLOTRIMAZOLE AND BETAMETHASONE DIPROPIONATE 10; .64 MG/G; MG/G
CREAM TOPICAL
Qty: 30 G | Refills: 1 | Status: SHIPPED | OUTPATIENT
Start: 2019-08-16 | End: 2020-01-31 | Stop reason: ALTCHOICE

## 2019-08-16 RX ORDER — NIACIN 1000 MG/1
1000 TABLET, EXTENDED RELEASE ORAL
Qty: 30 TABLET | Refills: 5 | Status: SHIPPED | OUTPATIENT
Start: 2019-08-16 | End: 2019-10-18 | Stop reason: ALTCHOICE

## 2019-08-16 RX ORDER — ERGOCALCIFEROL 1.25 MG/1
CAPSULE ORAL
Qty: 4 CAPSULE | Refills: 5 | Status: SHIPPED | OUTPATIENT
Start: 2019-08-16 | End: 2020-06-24 | Stop reason: SDUPTHER

## 2019-08-16 NOTE — PATIENT INSTRUCTIONS
Niacina (Por la boca)   Sirve para tratar los altos niveles de colesterol y triglicéridos en la marc al igual que la deficiencia de niacina  También reduce los riesgos de un ataque cardíaco y retarda el estrechamiento de las arteria  Sarah(s) : [employer] Niacin 250, [employer] Niacinamide 100, [employer] Bl[employer] Niacin, Niacor, Niaspan, PharmAssure Niacin, [employer] Niacin, [employer] Niacin 500, Slo-Niacin   Existen muchas otras marcas de Mark  Meme medicamento no debe ser usado cuando:   Meme medicamento no es adecuado para todas las personas  No lo use si ha tenido dawson reacción alérgica a la niacina  Forma de usar meme medicamento:   [facility] de liberación prolongada, [place], [place], [place] de liberación prolongada  · [place] colton medicamentos alberto se le haya indicado  Es probable que sea necesario cambiar dunaway dosis varias veces hasta encontrar la que funciona mejor para usted  · [facility] medicamento a la hora de [place] y con un refrigerio bajo en grasa  [place] ayudará a disminuir el malestar estomacal   · Siga las indicaciones de la etiqueta del medicamento si usted está usando meme medicamento sin prescripción médica  · Mida el líquido oral con Verlinda Buddle, [place] para uso oral o taza especialmente marcadas para medir medicamentos  · La tableta o cápsula de liberación prolongada:  Pásesela entera  No triture, rompa o mastique  · [place] y siga las instrucciones para el paciente que vienen con el medicamento  Hable con dunaway médico o farmacéutico si tiene alguna pregunta  · Si olvida dawson dosis: Si olvida dawson dosis de dunaway medicamento, tómelo lo más pronto posible  Si es deepa la hora para dunaway próxima dosis, espere hasta entonces para lanny dunaway dosis regular  No use medicamento adicional para reponer la dosis olvidada  · Guarde el medicamento en un recipiente cerrado a temperatura ambiente y alejado del calor, la humedad y la chayito directa    Medicamentos y [employer] que debe evitar:   Consulte con dunaway médico o farmacéutico antes de usar [place], incluyendo los que compra sin receta médica, las vitaminas y los productos herbales  · Algunos medicamentos y alimentos pueden afectar la eficacia con que actúa la niacina  Informe a dunaway médico si está usando cualquiera de los siguientes:  ¨ Medicamentos para la presión arterial  ¨ Medicamentos para la presión arterial  ¨ Medicamento con estatinas  ¨ Vitaminas u otros suplementos que contienen niacina  · Si también está usando colestiramina, colesevelam o colestipol, tome la niacina por lo menos 4 a 6 horas después de lanny los [place]  · Evite consumir bebidas calientes, alcohólicas y alimentos picantes al mismo tiempo que se selwyn la niacina  [place] disminuirá el enrojecimiento o rubor  Precauciones giuliano el uso de meme medicamento:   · Informe a dunaway médico si está embarazada o dando de lactar, o si tiene enfermedad en el hígado, enfermedad en el riñón, diabetes, gota, cardiopatía, angina, presión arterial baja, problemas de la tiroides, problemas de sangrados o úlceras en el estómago  · Meme medicamento puede provocarle los siguientes problemas:  ¨ Problemas hepáticos  ¨ Rabdomiólisis (problemas musculares graves al usarlo con medicamentos a base de estatinas)  ¨ Niveles altos de azúcar en la marc  · [employer] medicamento puede hacer que usted sienta mareos  No maneje un vehículo ni shelley ninguna tarea que pueda ser peligrosa hasta que usted sepa cómo lo afecta meme medicamento  Póngase de pie o siéntese despacio  · Si usted necesita dejar de lanny la niacina de liberación prolongada, aun por un corto tiempo, consulte con dunaway médico antes de comenzar a tomarlo nuevamente  Es posible que usted necesite regresar de nuevo con dawson dosis más baja  · Dígale a todo médico o dentista encargado de atenderle que usted está usando [employer]  Puede que meme medicamento afecte algunos resultados de [employer]    · El médico solicitará exámenes de laboratorio giuliano las citas de rutina para revisar los efectos de Mark  Asista a todas colton citas  · Guarde todos los medicamentos fuera del alcance de los niños  Nunca comparta colton medicamentos con [employer]  Efectos secundarios que pueden presentarse giuliano el uso de [place] medicamento:   Consulte inmediatamente con el médico si nota cualquiera de estos efectos secundarios:  · Reacción alérgica: Comezón o ronchas, hinchazón del corky o las epifanio, hinchazón u hormigueo en la boca o garganta, opresión en el pecho, dificultad para respirar  · [employer] o heces pálidas, náuseas, vómitos, falta de apetito, dolor estomacal, coloración amarillenta en la piel u ojos  · Dolor, sensibilidad o debilidad muscular  · Sangrado o moretones inusuales  Consulte con el médico si nota los siguientes efectos secundarios menos graves:   · Náuseas o vómito leves, diarrea  · Enrojecimiento (rubor) o calor en el corky, [place]acon, brazos o parte superior del pecho  Consulte con el médico si nota otros efectos secundarios que tevin son causados por [place] medicamento  Llame a carlson médico para consultarle Lexie Luevano puede notificar colton efectos secundarios al [facility] al [phone]  © 2017 [address] Information is for End User's use only and may not be sold, redistributed or otherwise used for commercial purposes  Esta información es sólo para uso en educación  [place] intención no es darle un consejo médico sobre enfermedades o tratamientos  Colsulte con carlson [employer] farmacéutico antes de seguir cualquier régimen médico para saber si es seguro y efectivo para usted

## 2019-08-16 NOTE — PROGRESS NOTES
Assessment/Plan:  1  Hypertriglyceridemia  Patient has hyperlipidemia , current recommendations are exercise and decrease  saturated fat in his diet  Will repeat that this labs and make further decisions in base non-HDL numbers and the vascular disease risks       - Comprehensive metabolic panel; Future  - Lipid panel; Future  - niacin (NIASPAN) 1000 MG CR tablet; Take 1 tablet (1,000 mg total) by mouth daily at bedtime  Dispense: 30 tablet; Refill: 5    2  Hyperglycemia  Patient has elevation of blood sugar without diagnosis of diabetes  Are going to check hemoglobin A1c today     - HEMOGLOBIN A1C W/ EAG ESTIMATION; Future    3  Palpitations  Palpitations: We talked about palpitations can be evaluated with testing, such as blood tests, echocardiogram, EKG, Holter monitor, treadmill testing, and tests of the coronary arteries  Palpitations can be relieved in many patients by stress reduction, quitting smoking, and reduction of caffeine and alcohol  Some patients have palpitations associated with abnormal heartbeats that can require medications or other medical treatments  - CBC and differential; Future    4  Eczema, unspecified type, Localized in arms and chest from [facility]  To have a trial of:  - clotrimazole-betamethasone (LOTRISONE) 1-0 05 % cream; Apply topical over affected areas twice a day  Dispense: 30 g; Refill: 1    5  Vitamin D deficiency  I explained to patient that Vitamin-D deficiency can affect their bones  For most people vitamin D is not of concern  However people with obesity or with darker skin might have more impact  Vitamin D active is obtain from diet and exposure sunlight  Replacing vitamin-D is appropriate, but having high levels does not improve benefits  - ergocalciferol (VITAMIN D2) 50,000 units; Take one capsule weekly by mouth  Dispense: 4 capsule; Refill: 5    No problem-specific Assessment & Plan notes found for this encounter         Diagnoses and all orders for this visit:    Hypertriglyceridemia  -     Comprehensive metabolic panel; Future  -     Lipid panel; Future  -     niacin (NIASPAN) 1000 MG CR tablet; Take 1 tablet (1,000 mg total) by mouth daily at bedtime    Hyperglycemia  -     HEMOGLOBIN A1C W/ EAG ESTIMATION; Future    Palpitations  -     CBC and differential; Future    Eczema, unspecified type  -     clotrimazole-betamethasone (LOTRISONE) 1-0 05 % cream; Apply topical over affected areas twice a day    Vitamin D deficiency  -     ergocalciferol (VITAMIN D2) 50,000 units; Take one capsule weekly by mouth          Subjective:      Patient ID: Mayi Mays is a 41 y o  female  Mayi Mays, 41 y o  is complaining of rash eczematous, pink, erythematous, brown, anterior chest and extremities  Started about 1 week ago  The following portions of the patient's history were reviewed and updated as appropriate: allergies, current medications, past family history, past medical history, past social history, past surgical history and problem list     Review of Systems   Constitutional: Negative for diaphoresis, fatigue, fever and unexpected weight change  Respiratory: Negative for cough, chest tightness, shortness of breath and wheezing  Cardiovascular: Negative for chest pain, palpitations and leg swelling  Gastrointestinal: Negative for abdominal pain, blood in stool, nausea and vomiting  Endocrine:        Hyperlipidemia, compliant w treatment  Skin: Positive for pallor and rash (in arms and chest)  Neurological: Negative for dizziness, syncope, light-headedness and headaches  Hematological: Does not bruise/bleed easily  Psychiatric/Behavioral: Negative for behavioral problems, self-injury and sleep disturbance  The patient is not nervous/anxious            Objective:      /80 (BP Location: Left arm, Patient Position: Sitting, Cuff Size: Standard)   Pulse 82   Temp 98 °F (36 7 °C) (Oral)   Resp 16   Ht 5' (1 524 m)   Wt 58 1 kg (128 lb)   SpO2 98%   Breastfeeding? No   BMI 25 00 kg/m²          Physical Exam   HENT:   Nose: Nose normal    Mouth/Throat: Oropharynx is clear and moist  No oropharyngeal exudate  Eyes: Pupils are equal, round, and reactive to light  EOM are normal  Right eye exhibits no discharge  Left eye exhibits no discharge  No scleral icterus  Cardiovascular: Normal rate, regular rhythm, normal heart sounds and intact distal pulses  Exam reveals no friction rub  No murmur heard  Pulmonary/Chest: Effort normal  No respiratory distress  She has no wheezes  She has no rales  She exhibits no tenderness  Musculoskeletal: Normal range of motion  Neurological: She is alert  Skin: Skin is warm and dry  Rash (in chest and arms papulary erythematous ) noted  No erythema  There is pallor  Psychiatric: She has a normal mood and affect  Her behavior is normal    Nursing note and vitals reviewed

## 2019-09-01 ENCOUNTER — APPOINTMENT (OUTPATIENT)
Dept: LAB | Facility: HOSPITAL | Age: 39
End: 2019-09-01
Payer: COMMERCIAL

## 2019-09-01 DIAGNOSIS — R00.2 PALPITATIONS: ICD-10-CM

## 2019-09-01 DIAGNOSIS — E78.1 HYPERTRIGLYCERIDEMIA: ICD-10-CM

## 2019-09-01 DIAGNOSIS — R73.9 HYPERGLYCEMIA: ICD-10-CM

## 2019-09-01 LAB
ALBUMIN SERPL BCP-MCNC: 4.3 G/DL (ref 3.5–5)
ALP SERPL-CCNC: 73 U/L (ref 46–116)
ALT SERPL W P-5'-P-CCNC: 29 U/L (ref 12–78)
ANION GAP SERPL CALCULATED.3IONS-SCNC: 6 MMOL/L (ref 4–13)
AST SERPL W P-5'-P-CCNC: 6 U/L (ref 5–45)
BILIRUB SERPL-MCNC: 0.36 MG/DL (ref 0.2–1)
BUN SERPL-MCNC: 12 MG/DL (ref 5–25)
CALCIUM SERPL-MCNC: 9.3 MG/DL (ref 8.3–10.1)
CHLORIDE SERPL-SCNC: 104 MMOL/L (ref 100–108)
CHOLEST SERPL-MCNC: 195 MG/DL (ref 50–200)
CO2 SERPL-SCNC: 27 MMOL/L (ref 21–32)
CREAT SERPL-MCNC: 0.59 MG/DL (ref 0.6–1.3)
EST. AVERAGE GLUCOSE BLD GHB EST-MCNC: 91 MG/DL
GFR SERPL CREATININE-BSD FRML MDRD: 118 ML/MIN/1.73SQ M
GLUCOSE P FAST SERPL-MCNC: 99 MG/DL (ref 65–99)
HBA1C MFR BLD: 4.8 % (ref 4.2–6.3)
HDLC SERPL-MCNC: 25 MG/DL (ref 40–60)
NONHDLC SERPL-MCNC: 170 MG/DL
POTASSIUM SERPL-SCNC: 3.9 MMOL/L (ref 3.5–5.3)
PROT SERPL-MCNC: 8.3 G/DL (ref 6.4–8.2)
SODIUM SERPL-SCNC: 137 MMOL/L (ref 136–145)
TRIGL SERPL-MCNC: 704 MG/DL

## 2019-09-01 PROCEDURE — 80053 COMPREHEN METABOLIC PANEL: CPT

## 2019-09-01 PROCEDURE — 36415 COLL VENOUS BLD VENIPUNCTURE: CPT

## 2019-09-01 PROCEDURE — 80061 LIPID PANEL: CPT

## 2019-09-01 PROCEDURE — 83036 HEMOGLOBIN GLYCOSYLATED A1C: CPT

## 2019-09-05 DIAGNOSIS — E78.1 HYPERTRIGLYCERIDEMIA: Primary | ICD-10-CM

## 2019-09-05 RX ORDER — FENOFIBRATE 145 MG/1
145 TABLET, COATED ORAL DAILY
Qty: 30 TABLET | Refills: 2 | Status: SHIPPED | OUTPATIENT
Start: 2019-09-05 | End: 2019-10-18 | Stop reason: ALTCHOICE

## 2019-09-17 ENCOUNTER — OFFICE VISIT (OUTPATIENT)
Dept: FAMILY MEDICINE CLINIC | Facility: CLINIC | Age: 39
End: 2019-09-17
Payer: COMMERCIAL

## 2019-09-17 VITALS
SYSTOLIC BLOOD PRESSURE: 140 MMHG | WEIGHT: 128.8 LBS | TEMPERATURE: 97.2 F | HEART RATE: 92 BPM | HEIGHT: 60 IN | BODY MASS INDEX: 25.29 KG/M2 | OXYGEN SATURATION: 98 % | DIASTOLIC BLOOD PRESSURE: 100 MMHG

## 2019-09-17 DIAGNOSIS — R03.0 BLOOD PRESSURE ELEVATED WITHOUT HISTORY OF HTN: ICD-10-CM

## 2019-09-17 DIAGNOSIS — R23.2 FACIAL FLUSHING: Primary | ICD-10-CM

## 2019-09-17 PROCEDURE — 99214 OFFICE O/P EST MOD 30 MIN: CPT | Performed by: NURSE PRACTITIONER

## 2019-09-17 RX ORDER — FERROUS SULFATE TAB EC 324 MG (65 MG FE EQUIVALENT) 324 (65 FE) MG
TABLET DELAYED RESPONSE ORAL
Refills: 5 | COMMUNITY
Start: 2019-09-07 | End: 2020-01-31 | Stop reason: ALTCHOICE

## 2019-09-17 NOTE — PROGRESS NOTES
Assessment/Plan:    Facial flushing  -Discussed with patient that facial flushing is one of the common reactions while being on Niacin 1000mg for her elevated triglycerides  I advised to continue with 325mg ASA 30 mins before taking Niacin will help reduce flushing  Today we recommended she stop Niacin x 1 week and then resume again  Letter for work given today to specify that Niacin is cause of facial flushing and not any chemicals given  Blood pressure elevated without history of HTN  Reviewed with patient blood pressure target goal   Continue to maintain healthy balanced diet with focus on low-salt intake and limit alcohol intake  To follow DASH diet ( 3 servings of fruit/vegetables) daily  Weight loss to BMI less than 30 along with increasing physical activity to 3- 5 times/week for 30 minutes a day or as tolerated  -Advised to follow up in 1 month for BP recheck and follow recommendations discussed above  Pt in agreement with plan  Diagnoses and all orders for this visit:    Facial flushing    Blood pressure elevated without history of HTN    Other orders  -     ferrous sulfate 324 (65 Fe) mg; TAKE 1 TABLET (324 MG TOTAL) BY MOUTH 2 (TWO) TIMES A DAY BEFORE MEALS          Subjective:      Patient ID: Coreen Prado is a 41 y o  female  41year old female patient presents today for a facial flushing today while at work  States this occurs often but not all the time  Feels intense heat in her face and then it goes away on its own  Patient is currently on Niacin medication 1000mg daily with ASA  States she has been taking this medication for years and this has been occurring a lot at work  States her job is concerned it is related to a chemical  Would like a letter to explain this  Also has elevated blood pressure today  Denies any chest pain/fatgue  But is concerned about her facial flushing  Rash   This is a recurrent problem  The current episode started today   The problem has been waxing and waning since onset  The affected locations include the face and neck  The rash is characterized by redness and itchiness  It is unknown if there was an exposure to a precipitant  Pertinent negatives include no congestion, cough, eye pain, fatigue, fever, shortness of breath or sore throat  (Facial flushing ) Past treatments include antihistamine  The treatment provided mild relief  There is no history of allergies, eczema or varicella  The following portions of the patient's history were reviewed and updated as appropriate: allergies, current medications, past family history, past medical history, past social history, past surgical history and problem list     Review of Systems   Constitutional: Negative  Negative for appetite change, fatigue and fever  HENT: Negative  Negative for congestion and sore throat  Eyes: Negative  Negative for pain  Respiratory: Negative  Negative for cough, chest tightness, shortness of breath, wheezing and stridor  Cardiovascular: Negative  Negative for chest pain, palpitations and leg swelling  Gastrointestinal: Negative  Endocrine: Negative  Genitourinary: Negative  Musculoskeletal: Negative  Skin: Positive for rash (facial rash)  Allergic/Immunologic: Negative  Neurological: Negative  Hematological: Negative  Psychiatric/Behavioral: Negative  Objective:      /100 (BP Location: Left arm, Patient Position: Sitting, Cuff Size: Adult)   Pulse 92   Temp (!) 97 2 °F (36 2 °C) (Temporal)   Ht 5' (1 524 m)   Wt 58 4 kg (128 lb 12 8 oz)   LMP 08/27/2019 (Approximate)   SpO2 98%   BMI 25 15 kg/m²          Physical Exam   Constitutional: She is oriented to person, place, and time  She appears well-developed and well-nourished  No distress  HENT:   Head: Normocephalic and atraumatic  Right Ear: External ear normal    Left Ear: External ear normal    Eyes: Pupils are equal, round, and reactive to light   EOM are normal  Neck: Normal range of motion  Neck supple  Cardiovascular: Normal rate, regular rhythm, normal heart sounds and intact distal pulses  Pulmonary/Chest: Effort normal and breath sounds normal  No stridor  No respiratory distress  Abdominal: Soft  Bowel sounds are normal    Musculoskeletal: Normal range of motion  Neurological: She is alert and oriented to person, place, and time  Skin: Skin is warm and dry  Capillary refill takes less than 2 seconds  No rash noted  She is not diaphoretic  No erythema  No pallor  Psychiatric: She has a normal mood and affect  Her behavior is normal  Judgment and thought content normal    Nursing note and vitals reviewed

## 2019-09-17 NOTE — ASSESSMENT & PLAN NOTE
-Discussed with patient that facial flushing is one of the common reactions while being on Niacin 1000mg for her elevated triglycerides  I advised to continue with 325mg ASA 30 mins before taking Niacin will help reduce flushing  Today we recommended she stop Niacin x 1 week and then resume again  Letter for work given today to specify that Niacin is cause of facial flushing and not any chemicals given

## 2019-09-17 NOTE — LETTER
September 17, 2019     Patient: Kandice Singh   YOB: 1980   Date of Visit: 9/17/2019       To Whom it May Concern:    Kandice Singh is under my professional care  She was seen in my office on 9/17/2019  She may return to work on 9/18/19  Patient is currently on a specific medication that causes facial flushing and this is not related to any chemicals at work  She is cleared to return to work on full duty  If you have any questions or concerns, please don't hesitate to call           Sincerely,          ADAN Pickrad        CC: No Recipients

## 2019-09-17 NOTE — ASSESSMENT & PLAN NOTE
Reviewed with patient blood pressure target goal   Continue to maintain healthy balanced diet with focus on low-salt intake and limit alcohol intake  To follow DASH diet ( 3 servings of fruit/vegetables) daily  Weight loss to BMI less than 30 along with increasing physical activity to 3- 5 times/week for 30 minutes a day or as tolerated  -Advised to follow up in 1 month for BP recheck and follow recommendations discussed above  Pt in agreement with plan

## 2019-09-17 NOTE — LETTER
September 17, 2019     Patient: Tosha Lrener   YOB: 1980   Date of Visit: 9/17/2019       To Whom it May Concern:    Tosha Lerner is under my professional care  She was seen in my office on 9/17/2019  She may return to work on 9/18/2019  If you have any questions or concerns, please don't hesitate to call           Sincerely,          DAAN Sanderson        CC: No Recipients

## 2019-10-17 RX ORDER — ROSUVASTATIN CALCIUM 20 MG/1
20 TABLET, COATED ORAL DAILY
COMMUNITY
Start: 2019-10-04 | End: 2020-05-26 | Stop reason: ALTCHOICE

## 2019-10-17 RX ORDER — ICOSAPENT ETHYL 1000 MG/1
2 CAPSULE ORAL 2 TIMES DAILY
COMMUNITY
Start: 2019-10-04 | End: 2020-01-31 | Stop reason: ALTCHOICE

## 2019-10-18 ENCOUNTER — OFFICE VISIT (OUTPATIENT)
Dept: FAMILY MEDICINE CLINIC | Facility: CLINIC | Age: 39
End: 2019-10-18
Payer: COMMERCIAL

## 2019-10-18 VITALS
SYSTOLIC BLOOD PRESSURE: 122 MMHG | HEART RATE: 74 BPM | TEMPERATURE: 98.2 F | DIASTOLIC BLOOD PRESSURE: 78 MMHG | BODY MASS INDEX: 25.56 KG/M2 | WEIGHT: 130.2 LBS | HEIGHT: 60 IN | OXYGEN SATURATION: 98 %

## 2019-10-18 DIAGNOSIS — Z23 NEED FOR INFLUENZA VACCINATION: Primary | ICD-10-CM

## 2019-10-18 DIAGNOSIS — R00.2 PALPITATIONS: ICD-10-CM

## 2019-10-18 DIAGNOSIS — R03.0 BLOOD PRESSURE ELEVATED WITHOUT HISTORY OF HTN: ICD-10-CM

## 2019-10-18 PROCEDURE — 99213 OFFICE O/P EST LOW 20 MIN: CPT | Performed by: NURSE PRACTITIONER

## 2019-10-18 PROCEDURE — 90686 IIV4 VACC NO PRSV 0.5 ML IM: CPT | Performed by: NURSE PRACTITIONER

## 2019-10-18 PROCEDURE — 90471 IMMUNIZATION ADMIN: CPT | Performed by: NURSE PRACTITIONER

## 2019-10-18 PROCEDURE — 3008F BODY MASS INDEX DOCD: CPT | Performed by: NURSE PRACTITIONER

## 2019-10-18 NOTE — PROGRESS NOTES
Assessment/Plan:    Blood pressure elevated without history of HTN  -Blood pressure today with good control  To continue to follow a low sodium diet and exercise  Patient in agreement with plan of care  If s/s worsen advised to follow up with office  Diagnoses and all orders for this visit:    Need for influenza vaccination  -     influenza vaccine, [id number], quadrivalent, 0 5 mL, preservative-free, for adult and pediatric patients 6 mos+ (AFLURIA, FLUARIX, FLULAVAL, FLUZONE)    Palpitations  -     Ambulatory referral to Cardiology; Future    Blood pressure elevated without history of HTN          Subjective:      Patient ID: Zayda Mobley is a 41 y o  female  Hypertension   This is a recurrent problem  The current episode started today  The problem has been gradually improving since onset  The problem is controlled  Pertinent negatives include no chest pain, headaches, palpitations, peripheral edema, shortness of breath or sweats  There are no associated agents to hypertension  Risk factors for coronary artery disease include family history, dyslipidemia, obesity and sedentary lifestyle  Past treatments include lifestyle changes  Compliance problems include diet and exercise  There is no history of angina, CAD/MI, CVA, left ventricular hypertrophy, PVD or retinopathy  There is no history of chronic renal disease, sleep apnea or a thyroid problem  The following portions of the patient's history were reviewed and updated as appropriate: allergies, current medications, past family history, past medical history, past social history, past surgical history and problem list     Review of Systems   Constitutional: Negative  Negative for appetite change, fatigue and fever  HENT: Negative  Eyes: Negative  Respiratory: Negative  Negative for cough, chest tightness, shortness of breath and wheezing  Cardiovascular: Negative  Negative for chest pain and palpitations  Gastrointestinal: Negative  Endocrine: Negative  Genitourinary: Negative  Musculoskeletal: Negative  Negative for arthralgias and gait problem  Skin: Negative  Negative for color change and rash  Allergic/Immunologic: Negative  Neurological: Negative  Negative for dizziness and headaches  Hematological: Negative  Negative for adenopathy  Does not bruise/bleed easily  Psychiatric/Behavioral: Negative  Objective:      /78 (BP Location: Left arm, Patient Position: Sitting, Cuff Size: Adult)   Pulse 74   Temp 98 2 °F (36 8 °C) (Oral)   Ht 5' (1 524 m)   Wt 59 1 kg (130 lb 3 2 oz)   LMP 09/30/2019   SpO2 98%   BMI 25 43 kg/m²          Physical Exam   Constitutional: She appears well-developed and well-nourished  No distress  HENT:   Head: Normocephalic and atraumatic  Right Ear: External ear normal    Left Ear: External ear normal    Eyes: Pupils are equal, round, and reactive to light  Conjunctivae and EOM are normal    Neck: Normal range of motion  Neck supple  Cardiovascular: Normal rate, regular rhythm, normal heart sounds and intact distal pulses  Pulmonary/Chest: Effort normal and breath sounds normal  No respiratory distress  She has no wheezes  Abdominal: Soft  Bowel sounds are normal    Musculoskeletal: Normal range of motion  Neurological: She is alert  Skin: Skin is warm  Capillary refill takes less than 2 seconds  She is not diaphoretic  Psychiatric: She has a normal mood and affect  Thought content normal    Nursing note and vitals reviewed

## 2019-10-21 NOTE — ASSESSMENT & PLAN NOTE
-Blood pressure today with good control  To continue to follow a low sodium diet and exercise  Patient in agreement with plan of care  If s/s worsen advised to follow up with office

## 2019-10-25 ENCOUNTER — PATIENT OUTREACH (OUTPATIENT)
Dept: OBGYN CLINIC | Facility: CLINIC | Age: 39
End: 2019-10-25

## 2019-10-25 ENCOUNTER — ANNUAL EXAM (OUTPATIENT)
Dept: OBGYN CLINIC | Facility: CLINIC | Age: 39
End: 2019-10-25

## 2019-10-25 VITALS
BODY MASS INDEX: 24.73 KG/M2 | DIASTOLIC BLOOD PRESSURE: 83 MMHG | HEART RATE: 84 BPM | WEIGHT: 131 LBS | SYSTOLIC BLOOD PRESSURE: 146 MMHG | HEIGHT: 61 IN

## 2019-10-25 DIAGNOSIS — Z00.00 ANNUAL VISIT FOR GENERAL ADULT MEDICAL EXAMINATION WITHOUT ABNORMAL FINDINGS: ICD-10-CM

## 2019-10-25 DIAGNOSIS — Z78.9 LANGUAGE BARRIER TO COMMUNICATION: Primary | ICD-10-CM

## 2019-10-25 DIAGNOSIS — Z01.419 WELL FEMALE EXAM WITH ROUTINE GYNECOLOGICAL EXAM: Primary | ICD-10-CM

## 2019-10-25 DIAGNOSIS — Z72.51 HIGH RISK HETEROSEXUAL BEHAVIOR: ICD-10-CM

## 2019-10-25 PROCEDURE — 87591 N.GONORRHOEAE DNA AMP PROB: CPT | Performed by: STUDENT IN AN ORGANIZED HEALTH CARE EDUCATION/TRAINING PROGRAM

## 2019-10-25 PROCEDURE — 87491 CHLMYD TRACH DNA AMP PROBE: CPT | Performed by: STUDENT IN AN ORGANIZED HEALTH CARE EDUCATION/TRAINING PROGRAM

## 2019-10-25 NOTE — PROGRESS NOTES
Subjective      Alberto Bedoya is a 41 y o  female who presents for annual well woman exam  Periods are regular every 28-30 days, lasting 3 days  No intermenstrual bleeding, spotting, or discharge  GYN:  · No vaginal discharge, labial erythema or lesions, dyspareunia  · Menarche at 17  · Menses are regular, q 28-30 days, lasting 3 days  · Contraception: Tubal ligation  · Patient is  sexually active with 1 male partner  · Last pap smear was in  with test for High risk HPV  Results were negative  Surgical hx: 3 C-sections, tubal ligation, hernia repair     OB:  · [id number] female  · First pregnancy patient reports she had  due to Preeclampsia  Gestational Diabetes with second pregnancy    :  · No dysuria, urinary frequency or urgency  · No hematuria, flank pain, incontinence  Breast:  · No breast mass, skin changes, dimpling, reddening, nipple retraction  · No breast discharge  · Last mammogram was in   Results were Negative  Patient does have a family history of Uterine cancer in her mother  General:  · Diet: Mixed with many fruits and vegetables along with fried foods  · Exercise: no  · 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  · ETOH use: no  · Tobacco use: no  · Recreational drug use: no    Screening:  · Cervical cancer: last pap smear in  with High risk HPV test  Results were Negative  · Breast cancer: last mammogram in 2019  Results were Negative    · Patient declines STD screening today    Review of Systems  Eyes: negative  Ears, nose, mouth, throat, and face: negative for nasal congestion and sore throat  Respiratory: negative  Cardiovascular: negative for chest pain  Gastrointestinal: negative for abdominal pain  Genitourinary:negative for dysuria, frequency and hematuria        Objective      /83   Pulse 84   Ht 5' 1" (1 549 m)   Wt 59 4 kg (131 lb)   LMP 2019   BMI 24 75 kg/m²     General:   alert and oriented, in no acute distress, appears stated age and cooperative   Heart: regular rate and rhythm, S1, S2 normal, no murmur, click, rub or gallop   Lungs: clear to auscultation bilaterally   Breast: breasts appear normal, no suspicious masses, no skin or nipple changes or axillary nodes   Abdomen: soft, non-tender, without masses or organomegaly, nondistended and nontender   Uterus: anteverted, non-tender   Adnexa: no mass, fullness, tenderness          Assessment/Plan:    Well female Exam with routine Gyn exam    - STD screening, RPR, Hep B, HIV blood work ordered, Vaginal GC/Chlamydia test  -Follow Up in 1 year for annyaul GYN exam with Pap Penelope Moreno MD  10/25/2019  9:31 AM

## 2019-10-25 NOTE — PATIENT INSTRUCTIONS
Return in 1 year for your annual GYN examination with pap smear, sooner for any questions or concerns  Thank you!

## 2019-10-25 NOTE — PROGRESS NOTES
SINTIA met with 40 y/o- M- P3-  Palauan speaking woman for assess  Pt resides with Spouse and 3 kids  Denies any usage of drug, alcohol or smoking  No mental health history or DV issues  Pt with concern over her sister's health  Pt states she has no insurance and was positive for HPV in her country and has not follow up on that due to lack of insurance  Pt was educated on Yanira Anand  Pt will bring sister today to meet with Evgeny Singleton to apply for FA and will schedule gyn appointment  Pt very appreciative with SW intervention  SINTIA assisted Pt and Provider with interpretation during GYN visit  Pt with no complains  Will return to clinic in a year or as needed

## 2019-10-29 LAB
C TRACH DNA SPEC QL NAA+PROBE: NEGATIVE
N GONORRHOEA DNA SPEC QL NAA+PROBE: NEGATIVE

## 2019-11-15 ENCOUNTER — CONSULT (OUTPATIENT)
Dept: CARDIOLOGY CLINIC | Facility: CLINIC | Age: 39
End: 2019-11-15
Payer: COMMERCIAL

## 2019-11-15 VITALS
HEART RATE: 84 BPM | SYSTOLIC BLOOD PRESSURE: 132 MMHG | BODY MASS INDEX: 24.88 KG/M2 | DIASTOLIC BLOOD PRESSURE: 80 MMHG | HEIGHT: 61 IN | WEIGHT: 131.8 LBS

## 2019-11-15 DIAGNOSIS — R00.2 PALPITATIONS: Primary | ICD-10-CM

## 2019-11-15 DIAGNOSIS — R07.89 CHEST DISCOMFORT: ICD-10-CM

## 2019-11-15 PROCEDURE — 99243 OFF/OP CNSLTJ NEW/EST LOW 30: CPT | Performed by: INTERNAL MEDICINE

## 2019-11-15 PROCEDURE — 93000 ELECTROCARDIOGRAM COMPLETE: CPT | Performed by: INTERNAL MEDICINE

## 2019-11-15 NOTE — PROGRESS NOTES
Cardiology Consultation     Caro Hernández  [id number]  1980  [id number] [id number]      HPI:  Patient is a 59-year-old Tongan-speaking female who has complaints of palpitations and chest discomfort  The palpitations occur at random  She feels like her heart is racing and pounding  Is not irregular  Sometimes she feels lightheaded  Sometimes she is short of breath  Sometimes she becomes nauseous and feels like she has to vomit  The episodes last for several minutes  They occur several times a week  Patient had a Holter monitor but states that she did not have any episodes of palpitations while she had the Holter monitor on  A Holter monitor was totally benign  Patient also has chest discomfort which is midsternal and radiates into her left arm  The chest discomfort is not related to the palpitations  Sometimes they occur together but most often they are separate problems  The chest pain last from 5-15 minutes  She usually is left feeling fatigued for about an hour after she has had the chest discomfort  Chest discomfort is also associated with shortness of breath, lightheadedness and sometimes nausea  The chest discomfort can occur from 1 to 3 times a week  The patient is a lifetime nonsmoker  There is a family history of hypertension  Patient also has hyperlipidemia with hypertriglyceridemia  Her last triglycerides on September 1, 2019 was 704  She is on Crestor  He was supposed to start try cor but it does not appear that she is taking  1  Palpitations  Ambulatory referral to Cardiology    POCT ECG    Echo complete with contrast if indicated   2   Chest discomfort  Stress test only, exercise     Patient Active Problem List   Diagnosis    Abnormal uterine bleeding (AUB)    Anemia    Dyshidrotic eczema    Fatty liver    Granuloma annulare    Heart palpitations    Hypertriglyceridemia    Hyperlipidemia    Left ovarian cyst    Leukemia (HCC)    Mastalgia    Mouth sores    PCOS (polycystic ovarian syndrome)    Psoriasis    Right ovarian cyst    Vitamin D deficiency    Diarrhea    Generalized abdominal pain    Gastroesophageal reflux disease without esophagitis    Epigastric pain    Breast pain, right    Dense breast tissue    Other chest pain    Other headache syndrome    Screening for cholesterol level    Right lower quadrant abdominal pain    Strangulated epigastric hernia    Ventral hernia without obstruction or gangrene    Facial flushing    Blood pressure elevated without history of HTN     Past Medical History:   Diagnosis Date    Esophagitis     H  pylori infection 12/2018    Hiatal hernia     High triglycerides     Hyperlipidemia     Ventral hernia      Social History     Socioeconomic History    Marital status: /Civil Union     Spouse name: Yessica Hanna Number of children: 3    Years of education: Not on file    Highest education level: Not on file   Occupational History    Not on file   Social Needs    Financial resource strain: Not very hard    Food insecurity:     Worry: Never true     Inability: Not on file    Transportation needs:     Medical: No     Non-medical: Not on file   Tobacco Use    Smoking status: Never Smoker    Smokeless tobacco: Never Used   Substance and Sexual Activity    Alcohol use: No    Drug use: No    Sexual activity: Yes     Partners: Male     Birth control/protection: Female Sterilization   Lifestyle    Physical activity:     Days per week: Not on file     Minutes per session: Not on file    Stress: Not on file   Relationships    Social connections:     Talks on phone: Not on file     Gets together: Not on file     Attends Adventist service: Not on file     Active member of club or organization: Not on file     Attends meetings of clubs or organizations: Not on file     Relationship status: Not on file    Intimate partner violence:     Fear of current or ex partner: Not on file     Emotionally abused: Not on file     Physically abused: Not on file     Forced sexual activity: Not on file   Other Topics Concern    Not on file   Social History Narrative    Non smoker        Lives with  and children    Caffeine use    No recreational drug use    Always wears seatbelts    Three children    Scientology      Family History   Problem Relation Age of Onset    Uterine cancer Mother     Heart disease Mother     Hypertension Mother     Cervical cancer Mother     Stomach cancer Father     Diabetes Brother     Hyperlipidemia Brother     Asthma Son      Past Surgical History:   Procedure Laterality Date     SECTION      3X--,,     COLONOSCOPY N/A 2018    Procedure: COLONOSCOPY;  Surgeon: Amy Her MD;  Location: [facility] GI LAB; Service: Gastroenterology    ESOPHAGOGASTRODUODENOSCOPY N/A 2018    Procedure: ESOPHAGOGASTRODUODENOSCOPY (EGD); Surgeon: Amy Her MD;  Location: [facility] GI LAB;   Service: Gastroenterology    HERNIA REPAIR      UT REPAIR INCISIONAL HERNIA,FLAVIO N/A 2019    Procedure: REPAIR HERNIA VENTRAL;  Surgeon: Avril Porras DO;  Location: [facility] MAIN OR;  Service: General    TUBAL LIGATION         Current Outpatient Medications:     clotrimazole-betamethasone (LOTRISONE) 1-0 05 % cream, Apply topical over affected areas twice a day, Disp: 30 g, Rfl: 1    ergocalciferol (VITAMIN D2) 50,000 units, Take one capsule weekly by mouth, Disp: 4 capsule, Rfl: 5    ferrous sulfate 324 (65 Fe) mg, TAKE 1 TABLET (324 MG TOTAL) BY MOUTH 2 (TWO) TIMES A DAY BEFORE MEALS, Disp: , Rfl: 5    Icosapent Ethyl 1 g CAPS, Take 2 g by mouth 2 (two) times a day, Disp: , Rfl:     rosuvastatin (CRESTOR) 20 MG tablet, Take 20 mg by mouth daily, Disp: , Rfl:   No Known Allergies  Vitals:    11/15/19 1359   BP: 132/80   Pulse: 84   Weight: 59 8 kg (131 lb 12 8 oz)   Height: 5' 1" (1 549 m)         Review of Systems:  Review of Systems   Constitutional: Negative  HENT: Negative  Respiratory: Positive for chest tightness and shortness of breath  Cardiovascular: Positive for chest pain and palpitations  Negative for leg swelling  Gastrointestinal: Negative  Endocrine: Negative  Genitourinary: Negative  Musculoskeletal: Negative  Skin: Negative  Allergic/Immunologic: Negative  Neurological: Positive for light-headedness  Hematological: Negative  Psychiatric/Behavioral: Negative  Physical Exam:  Physical Exam   Constitutional: She is oriented to person, place, and time  She appears well-developed and well-nourished  HENT:   Head: Normocephalic and atraumatic  Neck: Normal range of motion  Neck supple  No JVD present  No tracheal deviation present  No thyromegaly present  Cardiovascular: Normal rate, regular rhythm, normal heart sounds and intact distal pulses  Exam reveals no gallop and no friction rub  No murmur heard  Pulmonary/Chest: Effort normal  No respiratory distress  She has no rales  Abdominal: Soft  Bowel sounds are normal    Musculoskeletal: Normal range of motion  She exhibits no edema  Neurological: She is alert and oriented to person, place, and time  Skin: Skin is warm and dry  Psychiatric: She has a normal mood and affect  Her behavior is normal        Discussion/Summary:  1  Atypical chest discomfort   2  Palpitations without documentation of an arrhythmia  3  Obtain stress test   4  Obtain echocardiogram   5  After stress test and echocardiogram are completed, would schedule patient for a ZIO patch  The ZIO patch should be for 2 weeks  6  Following the above tests, patient should return to see me

## 2020-01-03 ENCOUNTER — TELEPHONE (OUTPATIENT)
Dept: CARDIOLOGY CLINIC | Facility: CLINIC | Age: 40
End: 2020-01-03

## 2020-01-03 ENCOUNTER — HOSPITAL ENCOUNTER (OUTPATIENT)
Dept: NON INVASIVE DIAGNOSTICS | Facility: CLINIC | Age: 40
Discharge: HOME | End: 2020-01-03
Payer: COMMERCIAL

## 2020-01-03 DIAGNOSIS — R07.89 CHEST DISCOMFORT: ICD-10-CM

## 2020-01-03 DIAGNOSIS — R00.2 HEART PALPITATIONS: Primary | ICD-10-CM

## 2020-01-03 DIAGNOSIS — R00.2 PALPITATIONS: ICD-10-CM

## 2020-01-03 LAB
ARRHY DURING EX: NORMAL
CHEST PAIN STATEMENT: NORMAL
MAX DIASTOLIC BP: 88 MMHG
MAX HEART RATE: 164 BPM
MAX PREDICTED HEART RATE: 183 BPM
MAX. SYSTOLIC BP: 168 MMHG
PROTOCOL NAME: NORMAL
REASON FOR TERMINATION: NORMAL
TARGET HR FORMULA: NORMAL
TEST INDICATION: NORMAL
TIME IN EXERCISE PHASE: NORMAL

## 2020-01-03 PROCEDURE — 93016 CV STRESS TEST SUPVJ ONLY: CPT | Performed by: INTERNAL MEDICINE

## 2020-01-03 PROCEDURE — 93018 CV STRESS TEST I&R ONLY: CPT | Performed by: INTERNAL MEDICINE

## 2020-01-03 PROCEDURE — 93017 CV STRESS TEST TRACING ONLY: CPT

## 2020-01-03 PROCEDURE — 93306 TTE W/DOPPLER COMPLETE: CPT | Performed by: INTERNAL MEDICINE

## 2020-01-03 PROCEDURE — 93306 TTE W/DOPPLER COMPLETE: CPT

## 2020-01-03 NOTE — TELEPHONE ENCOUNTER
Pt had stress ECHO completed today at [address]  Was confused on why she received the Zio patch and wasn't sure where it had come from  Looks like it was ordered by Dr Ashley Jarrell  After completion of the echo and stress, patch was applied with Rowdy Tapia in device to translate

## 2020-01-10 DIAGNOSIS — Z11.4 SCREENING FOR HUMAN IMMUNODEFICIENCY VIRUS: ICD-10-CM

## 2020-01-10 DIAGNOSIS — E78.1 HYPERTRIGLYCERIDEMIA: Primary | ICD-10-CM

## 2020-01-17 ENCOUNTER — OFFICE VISIT (OUTPATIENT)
Dept: CARDIOLOGY CLINIC | Facility: CLINIC | Age: 40
End: 2020-01-17
Payer: COMMERCIAL

## 2020-01-17 VITALS
WEIGHT: 127 LBS | BODY MASS INDEX: 23.98 KG/M2 | DIASTOLIC BLOOD PRESSURE: 80 MMHG | HEART RATE: 84 BPM | SYSTOLIC BLOOD PRESSURE: 100 MMHG | HEIGHT: 61 IN | OXYGEN SATURATION: 96 %

## 2020-01-17 DIAGNOSIS — R07.89 OTHER CHEST PAIN: ICD-10-CM

## 2020-01-17 DIAGNOSIS — E78.2 MIXED HYPERLIPIDEMIA: ICD-10-CM

## 2020-01-17 DIAGNOSIS — R00.2 HEART PALPITATIONS: Primary | ICD-10-CM

## 2020-01-17 PROCEDURE — 99214 OFFICE O/P EST MOD 30 MIN: CPT | Performed by: INTERNAL MEDICINE

## 2020-01-17 NOTE — PROGRESS NOTES
Cardiology Follow Up    Gege Gross  1980  [id number]  [facility] CARDIOLOGY [place]  [address]  [facility] [id number] [phone]    1  Heart palpitations     2  Other chest pain     3  Mixed hyperlipidemia         01/03/2020 stress test:  SUMMARY:  - Rest ECG: There was less than 1 mm, horizontal ST depression in leads II, III and aVF  - Stress results: Duration of exercise was 6 min and 30 sec  Maximal heart rate during stress was 164 bpm ( 90 % of maximal predicted heart rate)  Target heart rate was achieved  There was no chest pain during stress  The stress test was  terminated due to achievement of target heart rate and fatigue  - ECG conclusions: The stress ECG was negative for ischemia  Arrhythmia during stress: isolated premature ventricular beats  - Impressions and recommendations: Normal study  IMPRESSIONS: Normal study  01/03/2020 echocardiogram:   IMPRESSIONS:  The study was within normal limits   LEFT VENTRICLE:  Systolic function was normal  Ejection fraction was estimated in the range of 55 % to 65 % to be 60 %  Although no diagnostic regional wall motion abnormality was identified, this possibility cannot be completely excluded on the basis of this study  ATRIAL SEPTUM:  No defect or patent foramen ovale was identified  Mild IAS thickening   MITRAL VALVE:  Valve structure was normal    AORTIC VALVE:  The valve was trileaflet  Leaflets exhibited normal thickness and normal cuspal separation  Interval History:   Patient with a history of palpitations, chest pain and hyperlipidemia returns  Since her last visit she had an echocardiogram and a stress test which were unremarkable  Results are noted above  She states that she has had no further palpitations or chest discomfort since her last visit  She is feeling good      Patient Active Problem List   Diagnosis    Abnormal uterine bleeding (AUB)    Anemia    Dyshidrotic eczema    Fatty liver    Granuloma annulare    Heart palpitations    Hypertriglyceridemia    Hyperlipidemia    Left ovarian cyst    Leukemia (HCC)    Mastalgia    Mouth sores    PCOS (polycystic ovarian syndrome)    Psoriasis    Right ovarian cyst    Vitamin D deficiency    Diarrhea    Generalized abdominal pain    Gastroesophageal reflux disease without esophagitis    Epigastric pain    Breast pain, right    Dense breast tissue    Other chest pain    Other headache syndrome    Screening for cholesterol level    Right lower quadrant abdominal pain    Strangulated epigastric hernia    Ventral hernia without obstruction or gangrene    Facial flushing    Blood pressure elevated without history of HTN     Past Medical History:   Diagnosis Date    Esophagitis     H  pylori infection 12/2018    Hiatal hernia     High triglycerides     Hyperlipidemia     Ventral hernia      Social History     Socioeconomic History    Marital status: /Civil Union     Spouse name: Aakash Patel Number of children: 3    Years of education: Not on file    Highest education level: Not on file   Occupational History    Not on file   Social Needs    Financial resource strain: Not very hard    Food insecurity:     Worry: Never true     Inability: Not on file    Transportation needs:     Medical: No     Non-medical: Not on file   Tobacco Use    Smoking status: Never Smoker    Smokeless tobacco: Never Used   Substance and Sexual Activity    Alcohol use: No    Drug use: No    Sexual activity: Yes     Partners: Male     Birth control/protection: Female Sterilization   Lifestyle    Physical activity:     Days per week: Not on file     Minutes per session: Not on file    Stress: Not on file   Relationships    Social connections:     Talks on phone: Not on file     Gets together: Not on file     Attends Evangelical service: Not on file     Active member of club or organization: Not on file Attends meetings of clubs or organizations: Not on file     Relationship status: Not on file    Intimate partner violence:     Fear of current or ex partner: Not on file     Emotionally abused: Not on file     Physically abused: Not on file     Forced sexual activity: Not on file   Other Topics Concern    Not on file   Social History Narrative    Non smoker        Lives with  and children    Caffeine use    No recreational drug use    Always wears seatbelts    Three children    Rastafari      Family History   Problem Relation Age of Onset    Uterine cancer Mother     Heart disease Mother     Hypertension Mother     Cervical cancer Mother     Stomach cancer Father     Diabetes Brother     Hyperlipidemia Brother     Asthma Son      Past Surgical History:   Procedure Laterality Date     SECTION      3X--,,     COLONOSCOPY N/A 2018    Procedure: COLONOSCOPY;  Surgeon: Roxie Crocker MD;  Location: [facility]; Service: Gastroenterology    ESOPHAGOGASTRODUODENOSCOPY N/A 2018    Procedure: ESOPHAGOGASTRODUODENOSCOPY (EGD); Surgeon: Roxie Crocker MD;  Location: [facility] LAB;   Service: Gastroenterology    HERNIA REPAIR      GA REPAIR INCISIONAL HERNIA,FLAVIO N/A 2019    Procedure: REPAIR HERNIA VENTRAL;  Surgeon: Debi Chaney DO;  Location: [address] [place] [address] OR;  Service: General    TUBAL LIGATION         Current Outpatient Medications:     clotrimazole-betamethasone (LOTRISONE) 1-0 05 % cream, Apply topical over affected areas twice a day, Disp: 30 g, Rfl: 1    ergocalciferol (VITAMIN D2) 50,000 units, Take one capsule weekly by mouth, Disp: 4 capsule, Rfl: 5    ferrous sulfate 324 (65 Fe) mg, TAKE 1 TABLET (324 MG TOTAL) BY MOUTH 2 (TWO) TIMES A DAY BEFORE MEALS, Disp: , Rfl: 5    Icosapent Ethyl 1 g CAPS, Take 2 g by mouth 2 (two) times a day, Disp: , Rfl:     rosuvastatin (CRESTOR) 20 MG tablet, Take 20 mg by mouth daily, Disp: , Rfl:   No Known Allergies    No results found for this visit on 01/17/20  Lab Results   Component Value Date    CHOLESTEROL 195 09/01/2019    CHOLESTEROL 217 (H) 03/30/2019    CHOLESTEROL 176 09/01/2018     Lab Results   Component Value Date    HDL 25 (L) 09/01/2019    HDL 22 (L) 03/30/2019    HDL 28 (L) 09/01/2018     Lab Results   Component Value Date    TRIG 704 (H) 09/01/2019    TRIG 1,357 (H) 03/30/2019    TRIG 488 (H) 09/01/2018     Lab Results   Component Value Date    NONHDLC 170 09/01/2019    [place] 195 03/30/2019     Lab Results   Component Value Date    SODIUM 137 09/01/2019    K 3 9 09/01/2019     09/01/2019    CO2 27 09/01/2019    BUN 12 09/01/2019    CREATININE 0 59 (L) 09/01/2019    GLUC 96 01/30/2019    CALCIUM 9 3 09/01/2019     Lab Results   Component Value Date     04/23/2018    SODIUM 137 09/01/2019    K 3 9 09/01/2019     09/01/2019    CO2 27 09/01/2019    ANIONGAP 14 04/23/2018    AGAP 6 09/01/2019    BUN 12 09/01/2019    CREATININE 0 59 (L) 09/01/2019    GLUC 96 01/30/2019    GLUF 99 09/01/2019    CALCIUM 9 3 09/01/2019    AST 6 09/01/2019    ALT 29 09/01/2019    ALKPHOS 73 09/01/2019    PROT 8 1 04/23/2018    TP 8 3 (H) 09/01/2019    BILITOT 0 3 04/23/2018    TBILI 0 36 09/01/2019    EGFR 118 09/01/2019     Lab Results   Component Value Date    WBC 3 13 (L) 09/01/2019    HGB 12 6 09/01/2019    HCT 39 2 09/01/2019    MCV 86 09/01/2019     09/01/2019     Lab Results   Component Value Date    LHM4TTHWEPVU 1 100 01/30/2019         Review of Systems:  Review of Systems   Respiratory: Negative for cough, choking, chest tightness, shortness of breath and wheezing  Cardiovascular: Negative for chest pain, palpitations and leg swelling  Musculoskeletal: Negative for gait problem  Skin: Negative for rash  Neurological: Negative for dizziness, tremors, syncope, weakness, light-headedness, numbness and headaches     Psychiatric/Behavioral: Negative for agitation and behavioral problems  The patient is not hyperactive  Physical Exam:  /80   Pulse 84   Ht 5' 1" (1 549 m)   Wt 57 6 kg (127 lb)   SpO2 96%   BMI 24 00 kg/m²   Physical Exam   Constitutional: She is oriented to person, place, and time  She appears well-developed and well-nourished  No distress  HENT:   Head: Normocephalic and atraumatic  Neck: No JVD present  No thyromegaly present  Cardiovascular: Normal rate, regular rhythm, normal heart sounds and intact distal pulses  Exam reveals no gallop and no friction rub  No murmur heard  Pulmonary/Chest: No respiratory distress  She has no wheezes  She has no rales  Musculoskeletal: She exhibits no edema  Neurological: She is alert and oriented to person, place, and time  Skin: Skin is warm and dry  Psychiatric: She has a normal mood and affect  Her behavior is normal        Discussion/Summary:  1  Do not recommend further evaluation unless symptoms return at which time a prolonged Holter monitor may be indicated  2  Discussed avoidance of caffeine, chocolate, alcohol and to get enough sleep at night  These things will reduce palpitations  3  Return on a p r n  Basis

## 2020-01-28 ENCOUNTER — CLINICAL SUPPORT (OUTPATIENT)
Dept: CARDIOLOGY CLINIC | Facility: CLINIC | Age: 40
End: 2020-01-28
Payer: COMMERCIAL

## 2020-01-28 DIAGNOSIS — R00.2 PALPITATIONS: ICD-10-CM

## 2020-01-28 DIAGNOSIS — R11.14 BILIOUS VOMITING, PRESENCE OF NAUSEA NOT SPECIFIED: Primary | ICD-10-CM

## 2020-01-28 PROCEDURE — 0298T PR EXT ECG > 48HR TO 21 DAY REVIEW AND INTERPRETATN: CPT | Performed by: INTERNAL MEDICINE

## 2020-01-28 RX ORDER — ONDANSETRON 4 MG/1
4 TABLET, FILM COATED ORAL EVERY 8 HOURS PRN
Qty: 20 TABLET | Refills: 0 | Status: SHIPPED | OUTPATIENT
Start: 2020-01-28 | End: 2020-02-10

## 2020-01-28 NOTE — PROGRESS NOTES
I spoke with patient over the phone about an acute illness causing vomiting, Her meds and history were reviewed,

## 2020-01-29 ENCOUNTER — APPOINTMENT (OUTPATIENT)
Dept: LAB | Facility: HOSPITAL | Age: 40
End: 2020-01-29
Payer: COMMERCIAL

## 2020-01-29 DIAGNOSIS — Z72.51 HIGH RISK HETEROSEXUAL BEHAVIOR: ICD-10-CM

## 2020-01-29 DIAGNOSIS — R11.14 BILIOUS VOMITING, PRESENCE OF NAUSEA NOT SPECIFIED: ICD-10-CM

## 2020-01-29 DIAGNOSIS — Z11.4 SCREENING FOR HUMAN IMMUNODEFICIENCY VIRUS: ICD-10-CM

## 2020-01-29 DIAGNOSIS — E78.1 HYPERTRIGLYCERIDEMIA: ICD-10-CM

## 2020-01-29 LAB
ALBUMIN SERPL BCP-MCNC: 3.9 G/DL (ref 3.5–5)
ALP SERPL-CCNC: 78 U/L (ref 46–116)
ALT SERPL W P-5'-P-CCNC: 24 U/L (ref 12–78)
ANION GAP SERPL CALCULATED.3IONS-SCNC: 1 MMOL/L (ref 4–13)
AST SERPL W P-5'-P-CCNC: 8 U/L (ref 5–45)
BASOPHILS # BLD AUTO: 0.03 THOUSANDS/ΜL (ref 0–0.1)
BASOPHILS NFR BLD AUTO: 1 % (ref 0–1)
BILIRUB SERPL-MCNC: 0.38 MG/DL (ref 0.2–1)
BUN SERPL-MCNC: 8 MG/DL (ref 5–25)
CALCIUM SERPL-MCNC: 9.4 MG/DL (ref 8.3–10.1)
CHLORIDE SERPL-SCNC: 108 MMOL/L (ref 100–108)
CHOLEST SERPL-MCNC: 169 MG/DL (ref 50–200)
CO2 SERPL-SCNC: 28 MMOL/L (ref 21–32)
CREAT SERPL-MCNC: 0.58 MG/DL (ref 0.6–1.3)
EOSINOPHIL # BLD AUTO: 0.03 THOUSAND/ΜL (ref 0–0.61)
EOSINOPHIL NFR BLD AUTO: 1 % (ref 0–6)
ERYTHROCYTE [DISTWIDTH] IN BLOOD BY AUTOMATED COUNT: 13.1 % (ref 11.6–15.1)
GFR SERPL CREATININE-BSD FRML MDRD: 118 ML/MIN/1.73SQ M
GLUCOSE P FAST SERPL-MCNC: 90 MG/DL (ref 65–99)
HBV SURFACE AG SER QL: NORMAL
HCT VFR BLD AUTO: 36.3 % (ref 34.8–46.1)
HDLC SERPL-MCNC: 28 MG/DL
HGB BLD-MCNC: 11.9 G/DL (ref 11.5–15.4)
IMM GRANULOCYTES # BLD AUTO: 0.01 THOUSAND/UL (ref 0–0.2)
IMM GRANULOCYTES NFR BLD AUTO: 0 % (ref 0–2)
LDLC SERPL CALC-MCNC: 65 MG/DL (ref 0–100)
LYMPHOCYTES # BLD AUTO: 1.35 THOUSANDS/ΜL (ref 0.6–4.47)
LYMPHOCYTES NFR BLD AUTO: 42 % (ref 14–44)
MCH RBC QN AUTO: 27 PG (ref 26.8–34.3)
MCHC RBC AUTO-ENTMCNC: 32.8 G/DL (ref 31.4–37.4)
MCV RBC AUTO: 83 FL (ref 82–98)
MONOCYTES # BLD AUTO: 0.27 THOUSAND/ΜL (ref 0.17–1.22)
MONOCYTES NFR BLD AUTO: 8 % (ref 4–12)
NEUTROPHILS # BLD AUTO: 1.52 THOUSANDS/ΜL (ref 1.85–7.62)
NEUTS SEG NFR BLD AUTO: 48 % (ref 43–75)
NRBC BLD AUTO-RTO: 0 /100 WBCS
PLATELET # BLD AUTO: 246 THOUSANDS/UL (ref 149–390)
PMV BLD AUTO: 11 FL (ref 8.9–12.7)
POTASSIUM SERPL-SCNC: 4.1 MMOL/L (ref 3.5–5.3)
PROT SERPL-MCNC: 8.1 G/DL (ref 6.4–8.2)
RBC # BLD AUTO: 4.4 MILLION/UL (ref 3.81–5.12)
SODIUM SERPL-SCNC: 137 MMOL/L (ref 136–145)
TRIGL SERPL-MCNC: 381 MG/DL
WBC # BLD AUTO: 3.21 THOUSAND/UL (ref 4.31–10.16)

## 2020-01-29 PROCEDURE — 86592 SYPHILIS TEST NON-TREP QUAL: CPT

## 2020-01-29 PROCEDURE — 80061 LIPID PANEL: CPT

## 2020-01-29 PROCEDURE — 85025 COMPLETE CBC W/AUTO DIFF WBC: CPT

## 2020-01-29 PROCEDURE — 87389 HIV-1 AG W/HIV-1&-2 AB AG IA: CPT

## 2020-01-29 PROCEDURE — 36415 COLL VENOUS BLD VENIPUNCTURE: CPT

## 2020-01-29 PROCEDURE — 80053 COMPREHEN METABOLIC PANEL: CPT

## 2020-01-29 PROCEDURE — 87340 HEPATITIS B SURFACE AG IA: CPT

## 2020-01-30 LAB
HIV 1+2 AB+HIV1 P24 AG SERPL QL IA: NORMAL
RPR SER QL: NORMAL

## 2020-01-31 ENCOUNTER — HOSPITAL ENCOUNTER (OUTPATIENT)
Dept: ULTRASOUND IMAGING | Facility: HOSPITAL | Age: 40
Discharge: HOME | End: 2020-01-31
Payer: COMMERCIAL

## 2020-01-31 ENCOUNTER — OFFICE VISIT (OUTPATIENT)
Dept: FAMILY MEDICINE CLINIC | Facility: CLINIC | Age: 40
End: 2020-01-31
Payer: COMMERCIAL

## 2020-01-31 VITALS
TEMPERATURE: 98 F | RESPIRATION RATE: 16 BRPM | OXYGEN SATURATION: 99 % | HEART RATE: 78 BPM | BODY MASS INDEX: 23.6 KG/M2 | HEIGHT: 61 IN | WEIGHT: 125 LBS | SYSTOLIC BLOOD PRESSURE: 138 MMHG | DIASTOLIC BLOOD PRESSURE: 80 MMHG

## 2020-01-31 DIAGNOSIS — Z23 NEED FOR TDAP VACCINATION: Primary | ICD-10-CM

## 2020-01-31 DIAGNOSIS — R10.13 EPIGASTRIC PAIN: ICD-10-CM

## 2020-01-31 DIAGNOSIS — Z00.01 ENCOUNTER FOR GENERAL ADULT MEDICAL EXAMINATION WITH ABNORMAL FINDINGS: ICD-10-CM

## 2020-01-31 PROCEDURE — 90471 IMMUNIZATION ADMIN: CPT | Performed by: FAMILY MEDICINE

## 2020-01-31 PROCEDURE — 99213 OFFICE O/P EST LOW 20 MIN: CPT | Performed by: FAMILY MEDICINE

## 2020-01-31 PROCEDURE — 3008F BODY MASS INDEX DOCD: CPT | Performed by: FAMILY MEDICINE

## 2020-01-31 PROCEDURE — 76705 ECHO EXAM OF ABDOMEN: CPT

## 2020-01-31 PROCEDURE — 99395 PREV VISIT EST AGE 18-39: CPT | Performed by: FAMILY MEDICINE

## 2020-01-31 PROCEDURE — 90715 TDAP VACCINE 7 YRS/> IM: CPT | Performed by: FAMILY MEDICINE

## 2020-01-31 RX ORDER — METOCLOPRAMIDE 5 MG/1
5 TABLET ORAL 4 TIMES DAILY
Qty: 30 TABLET | Refills: 0 | Status: SHIPPED | OUTPATIENT
Start: 2020-01-31 | End: 2020-05-26 | Stop reason: ALTCHOICE

## 2020-01-31 RX ORDER — ROSUVASTATIN CALCIUM 20 MG/1
20 TABLET, COATED ORAL DAILY
Qty: 30 TABLET | Refills: 11 | Status: CANCELLED | OUTPATIENT
Start: 2020-01-31 | End: 2021-01-30

## 2020-02-03 NOTE — PROGRESS NOTES
Assessment/Plan:  1  Encounter for general adult medical examination with abnormal findings  Patient is here for physical exam I found patient without any distress  Patient has chronic conditions -Hyperlipidemia I  Recommended her to  exercise at least 3 1/2  hours per week and maintain a healthy diet with low carbohydrate and low saturated fat  Patient understands the need for an annual physical exam       No problem-specific Assessment & Plan notes found for this encounter  Diagnoses and all orders for this visit:    Need for Tdap vaccination  -     TDAP VACCINE GREATER THAN OR EQUAL TO 8YO IM    Encounter for general adult medical examination with abnormal findings    Epigastric pain  -     Cancel: US abdomen complete; Future  -     metoclopramide (REGLAN) 5 mg tablet; Take 1 tablet (5 mg total) by mouth 4 (four) times a day    Other orders  -     Cancel: rosuvastatin (CRESTOR) 20 MG tablet; Take 1 tablet (20 mg total) by mouth daily          Subjective:      Patient ID: Zayda Mobley is a 42 y o  female  Patient is here for PE, not having any acute distress  Having nauseas and abominal pain, mild for 1 week  The following portions of the patient's history were reviewed and updated as appropriate: allergies, current medications, past family history, past medical history, past social history, past surgical history and problem list     Review of Systems   Constitutional: Negative for diaphoresis, fatigue, fever and unexpected weight change  Respiratory: Negative for cough, chest tightness, shortness of breath and wheezing  Cardiovascular: Negative for chest pain, palpitations and leg swelling  Gastrointestinal: Positive for nausea  Negative for abdominal pain, blood in stool and constipation  Endocrine:        Poor complaint with therapy for hypertriglyceridemia  Neurological: Negative for dizziness, syncope, light-headedness and headaches  Hematological: Does not bruise/bleed easily  Psychiatric/Behavioral: Negative for behavioral problems, self-injury and sleep disturbance  The patient is not nervous/anxious  Objective:      /80 (BP Location: Left arm, Patient Position: Sitting, Cuff Size: Standard)   Pulse 78   Temp 98 °F (36 7 °C) (Oral)   Resp 16   Ht 5' 1" (1 549 m)   Wt 56 7 kg (125 lb)   SpO2 99%   BMI 23 62 kg/m²          Physical Exam   HENT:   Nose: Nose normal    Mouth/Throat: Oropharynx is clear and moist  No oropharyngeal exudate  Eyes: Pupils are equal, round, and reactive to light  EOM are normal  Right eye exhibits no discharge  Left eye exhibits no discharge  No scleral icterus  Cardiovascular: Normal rate, regular rhythm, normal heart sounds and intact distal pulses  Exam reveals no friction rub  No murmur heard  Pulmonary/Chest: Effort normal  No respiratory distress  She has no wheezes  She has no rales  She exhibits no tenderness  Abdominal: There is tenderness in the right upper quadrant  Musculoskeletal: Normal range of motion  Neurological: She is alert  Skin: Skin is warm and dry  No rash noted  No erythema  Psychiatric: She has a normal mood and affect  Her behavior is normal    Nursing note and vitals reviewed

## 2020-02-03 NOTE — PROGRESS NOTES
Assessment/Plan:     Epigastric pain  Nauseas as main complain  Proceed with US of Abdomen to r/o gallstones since she is in a higher risk due to hyperlipidemia  - metoclopramide (REGLAN) 5 mg tablet; Take 1 tablet (5 mg total) by mouth 4 (four) times a day  Dispense: 30 tablet; Refill: 0    No problem-specific Assessment & Plan notes found for this encounter  Diagnoses and all orders for this visit:    Need for Tdap vaccination  -     TDAP VACCINE GREATER THAN OR EQUAL TO 6YO IM    Encounter for general adult medical examination with abnormal findings    Epigastric pain  -     Cancel: US abdomen complete; Future  -     metoclopramide (REGLAN) 5 mg tablet; Take 1 tablet (5 mg total) by mouth 4 (four) times a day    Other orders  -     Cancel: rosuvastatin (CRESTOR) 20 MG tablet; Take 1 tablet (20 mg total) by mouth daily          Subjective:      Patient ID: Oz Limon is a 42 y o  female  This is a chronic problem  The current episode started with nauseas  1 week  ago  The onset quality is sudden  The problem occurs intermittently  The problem has been waxing and waning  The pain is located in the generalized abdominal region  The pain is at a severity of 5/10  The pain is moderate  The quality of the pain is colicky  Pertinent negatives include no arthralgias, dysuria, fever or headaches  Exacerbated by: stress  The pain is relieved by nothing  He has tried nothing for the symptoms  Prior diagnostic workup includes upper endoscopy  There is no history of abdominal surgery, colon cancer, Crohn's disease, gallstones, GERD, irritable bowel syndrome, pancreatitis, PUD or ulcerative colitis  The following portions of the patient's history were reviewed and updated as appropriate: allergies, current medications, past family history, past medical history, past social history, past surgical history and problem list     Review of Systems   Constitutional: Positive for fatigue   Negative for diaphoresis, fever and unexpected weight change  Respiratory: Negative for cough, chest tightness, shortness of breath and wheezing  Cardiovascular: Negative for chest pain, palpitations and leg swelling  Gastrointestinal: Positive for nausea  Negative for abdominal pain, blood in stool and constipation  Neurological: Negative for dizziness, syncope, light-headedness and headaches  Hematological: Does not bruise/bleed easily  Psychiatric/Behavioral: Negative for behavioral problems, self-injury and sleep disturbance  The patient is not nervous/anxious  Objective:      /80 (BP Location: Left arm, Patient Position: Sitting, Cuff Size: Standard)   Pulse 78   Temp 98 °F (36 7 °C) (Oral)   Resp 16   Ht 5' 1" (1 549 m)   Wt 56 7 kg (125 lb)   SpO2 99%   BMI 23 62 kg/m²          Physical Exam   HENT:   Head: Normocephalic  Eyes: Pupils are equal, round, and reactive to light  EOM are normal    Neck: Neck supple  Cardiovascular: Normal rate and regular rhythm  Pulmonary/Chest: Effort normal    Abdominal: Soft  There is tenderness in the right upper quadrant  There is guarding  There is negative Lock's sign  Musculoskeletal: Normal range of motion  Neurological: She is alert  Skin: Skin is warm and dry  Psychiatric: She has a normal mood and affect   Her behavior is normal

## 2020-02-10 ENCOUNTER — OFFICE VISIT (OUTPATIENT)
Dept: GASTROENTEROLOGY | Facility: CLINIC | Age: 40
End: 2020-02-10
Payer: COMMERCIAL

## 2020-02-10 VITALS
SYSTOLIC BLOOD PRESSURE: 133 MMHG | WEIGHT: 124 LBS | DIASTOLIC BLOOD PRESSURE: 92 MMHG | TEMPERATURE: 97.8 F | HEIGHT: 61 IN | BODY MASS INDEX: 23.41 KG/M2 | HEART RATE: 82 BPM

## 2020-02-10 DIAGNOSIS — R68.81 EARLY SATIETY: ICD-10-CM

## 2020-02-10 DIAGNOSIS — K21.9 GASTROESOPHAGEAL REFLUX DISEASE WITHOUT ESOPHAGITIS: Primary | ICD-10-CM

## 2020-02-10 PROCEDURE — 1036F TOBACCO NON-USER: CPT | Performed by: PHYSICIAN ASSISTANT

## 2020-02-10 PROCEDURE — 3008F BODY MASS INDEX DOCD: CPT | Performed by: PHYSICIAN ASSISTANT

## 2020-02-10 PROCEDURE — 99214 OFFICE O/P EST MOD 30 MIN: CPT | Performed by: PHYSICIAN ASSISTANT

## 2020-02-10 RX ORDER — PANTOPRAZOLE SODIUM 40 MG/1
40 TABLET, DELAYED RELEASE ORAL DAILY
Qty: 30 TABLET | Refills: 3 | Status: SHIPPED | OUTPATIENT
Start: 2020-02-10 | End: 2020-06-01

## 2020-02-10 NOTE — PROGRESS NOTES
[facility] - Outpatient Follow-up Note  Caro Hernández 42 y o  female MRN: [id number]  Encounter: [id number]          ASSESSMENT AND PLAN:      Diagnoses and all orders for this visit:    1  Gastroesophageal reflux disease without esophagitis  -     pantoprazole (PROTONIX) 40 mg tablet; Take 1 tablet (40 mg total) by mouth daily  2  Early satiety  -     NM gastric emptying; Future  3  Epigastric pain    Patient with acute on chronic epigastric pain with nausea and early satiety  She has had some improvement on Reglan and symptoms seem consistent with gastroparesis  Will schedule GES to confirm this  Discussed low fiber and low fat diet with small portions in the meantime  She has been taking Reglan 5mg q4 hours and is experiencing sleeplessness but no evidence of tardive diskinesia - advised her to decrease this dose to 5mg BID and monitor for ongoing side effects or new side effects of tics, tremors or other abnormal movements  Discussed that these side effects, if present, can persist after withdrawing medication and patient expresses understanding of this  Since she is also having symptoms of acid reflux and globus sensation, will add pantoprazole once daily to her regimen  We may consider additional workup if GES is negative - patient has a moderate HH which may be contributing to her reflux symptoms  If this is the case, could consider pH and manometry studies for consideration of surgical management of her symptoms  Will follow up after completion of the GES  As patient is primarily Armenian speaking, translation services were utilized for this visit   ______________________________________________________________________    SUBJECTIVE:  Adrian Castillo is a 79-year-old female with history of PCOS and psoriasis who presents to the GI office with acute on chronic symptoms of epigastric pain and nausea    She was seen 1 year ago with similar symptoms at which time and EGD showed a moderate hiatal hernia, class A esophagitis and bx of the duodenum were positive for H pylori  She was treated for this and stool studies resulted as negative for H pylori antigen in 2019  She was seen by her PCP last month with ongoing symptoms and an US showed no evidence of gallstones or biliary ductal dilation at 2mm  She was started on Reglan 5mg which she is taking every 4 hours  She states this is helping with her nausea symptoms but she has experienced sleeplessness/restlessness since starting the medication  She also states she is having epigastric pain/fullness and early satiety after even small meals  She primarily is eating soups and small portions of meats or rice  She has not had significant weight loss  No associated vomiting  She does experience heartburn and a globus sensation but no overt dysphagia or odynophagia  No reported fevers, chills, night sweats, or diarrhea  She has been on PPIs in the past but is not taking one now  She is not sure why she stopped  She has an Rx for Zofran but is not taking this so it was removed from her medication list       REVIEW OF SYSTEMS IS OTHERWISE NEGATIVE  Historical Information   Past Medical History:   Diagnosis Date    Esophagitis     H  pylori infection 2018    Hiatal hernia     High triglycerides     Hyperlipidemia     Ventral hernia      Past Surgical History:   Procedure Laterality Date     SECTION      3X--2003,2005, 2008    COLONOSCOPY N/A 2018    Procedure: COLONOSCOPY;  Surgeon: Lisa Ness MD;  Location: [facility]; Service: Gastroenterology    ESOPHAGOGASTRODUODENOSCOPY N/A 2018    Procedure: ESOPHAGOGASTRODUODENOSCOPY (EGD); Surgeon: Lisa Ness MD;  Location: [facility];   Service: Gastroenterology    HERNIA REPAIR      SC REPAIR INCISIONAL HERNIA,FLAVIO N/A 2019    Procedure: REPAIR HERNIA VENTRAL;  Surgeon: Aimee Leggett DO;  Location: [address] [place] [address];  Service: Valentino Code TUBAL LIGATION      UPPER GASTROINTESTINAL ENDOSCOPY       Social History   Social History     Substance and Sexual Activity   Alcohol Use No     Social History     Substance and Sexual Activity   Drug Use No     Social History     Tobacco Use   Smoking Status Never Smoker   Smokeless Tobacco Never Used     Family History   Problem Relation Age of Onset    Uterine cancer Mother     Heart disease Mother     Hypertension Mother     Cervical cancer Mother     Stomach cancer Father     Diabetes Brother     Hyperlipidemia Brother     Asthma Son        Meds/Allergies       Current Outpatient Medications:     ergocalciferol (VITAMIN D2) 50,000 units    metoclopramide (REGLAN) 5 mg tablet    pantoprazole (PROTONIX) 40 mg tablet    rosuvastatin (CRESTOR) 20 MG tablet    No Known Allergies        Objective     Blood pressure 133/92, pulse 82, temperature 97 8 °F (36 6 °C), temperature source Tympanic, height 5' 1" (1 549 m), weight 56 2 kg (124 lb), not currently breastfeeding  Body mass index is 23 43 kg/m²  PHYSICAL EXAM:      General Appearance:   Alert, cooperative, no distress   HEENT:   Normocephalic, atraumatic, sclera anicteric      Neck:  Supple, symmetrical, no lymphadenopathy, trachea midline   Lungs:   Clear to auscultation bilaterally; no rales, rhonchi or wheezing; respirations unlabored    Heart[de-identified]   Regular rate and rhythm; no murmur, rub, or gallop  Abdomen:   Soft, non-tender without rebound or guarding, non-distended; positive bowel sounds; no masses, no organomegaly    Genitalia:   Deferred    Rectal:   Deferred    Extremities:  No cyanosis, clubbing or edema    Pulses:  2+ and symmetric    Skin:  No jaundice, rashes, or lesions          Lab Results:       Radiology Results:   Us Right Upper Quadrant    Result Date: 1/31/2020  Narrative: RIGHT UPPER QUADRANT ULTRASOUND INDICATION:     R10 13: Epigastric pain   COMPARISON:  None TECHNIQUE:   Real-time ultrasound of the right upper quadrant was performed with a curvilinear transducer with both volumetric sweeps and still imaging techniques  FINDINGS: PANCREAS: Pancreatic tail partially obscured by overlying bowel gas  Visualized portions of the pancreas are within normal limits  AORTA AND IVC:  Visualized portions are normal for patient age  LIVER: Size:  Within normal range  The liver measures 16 1 cm in the midclavicular line  Contour:  Surface contour is smooth  Parenchyma:  Echogenicity and echotexture are within normal limits  No evidence of suspicious mass  Limited imaging of the main portal vein shows it to be patent and hepatopetal   BILIARY: The gallbladder is normal in caliber  No wall thickening or pericholecystic fluid  No stones or sludge identified  No sonographic Lock's sign  No intrahepatic biliary dilatation  CBD measures 2 mm  No choledocholithiasis  KIDNEY: Right kidney measures 10 5 x 3 9 cm  Within normal limits  ASCITES:   None  Impression: Pancreatic tail partially obscured by overlying bowel gas   Otherwise unremarkable exam  Workstation performed: [id number]           Arnoldo Galaviz PA-C

## 2020-03-19 ENCOUNTER — OFFICE VISIT (OUTPATIENT)
Dept: FAMILY MEDICINE CLINIC | Facility: CLINIC | Age: 40
End: 2020-03-19
Payer: COMMERCIAL

## 2020-03-19 VITALS
TEMPERATURE: 97.8 F | SYSTOLIC BLOOD PRESSURE: 130 MMHG | BODY MASS INDEX: 23.41 KG/M2 | OXYGEN SATURATION: 99 % | HEART RATE: 90 BPM | DIASTOLIC BLOOD PRESSURE: 90 MMHG | HEIGHT: 61 IN | WEIGHT: 124 LBS

## 2020-03-19 DIAGNOSIS — J01.00 ACUTE NON-RECURRENT MAXILLARY SINUSITIS: Primary | ICD-10-CM

## 2020-03-19 PROCEDURE — 99214 OFFICE O/P EST MOD 30 MIN: CPT | Performed by: NURSE PRACTITIONER

## 2020-03-19 PROCEDURE — 1036F TOBACCO NON-USER: CPT | Performed by: NURSE PRACTITIONER

## 2020-03-19 PROCEDURE — 3008F BODY MASS INDEX DOCD: CPT | Performed by: NURSE PRACTITIONER

## 2020-03-19 RX ORDER — AMOXICILLIN AND CLAVULANATE POTASSIUM 875; 125 MG/1; MG/1
1 TABLET, FILM COATED ORAL EVERY 12 HOURS SCHEDULED
Qty: 14 TABLET | Refills: 0 | Status: SHIPPED | OUTPATIENT
Start: 2020-03-19 | End: 2020-03-26

## 2020-03-19 RX ORDER — ICOSAPENT ETHYL 1000 MG/1
CAPSULE ORAL
COMMUNITY
Start: 2020-03-07 | End: 2020-05-26 | Stop reason: ALTCHOICE

## 2020-03-19 RX ORDER — FLUTICASONE PROPIONATE 50 MCG
2 SPRAY, SUSPENSION (ML) NASAL DAILY
Qty: 16 G | Refills: 0 | Status: SHIPPED | OUTPATIENT
Start: 2020-03-19 | End: 2020-05-26 | Stop reason: ALTCHOICE

## 2020-03-19 NOTE — PROGRESS NOTES
Assessment/Plan:    Acute non-recurrent maxillary sinusitis  I am prescribing an antibiotic along with Flonase nasal spray  Pt recommended to use neti pot to help with sinus pressure  Also recommended use of humidifier at night to ease with breathing  recommend analgesics, topical intranasal steroids, and/or nasal saline irrigation for symptomatic relief of viral rhinosinusitis  Patient to return to clinic if s/s do not improve  Diagnoses and all orders for this visit:    Acute non-recurrent maxillary sinusitis  -     fluticasone (FLONASE) 50 mcg/act nasal spray; 2 sprays into each nostril daily  -     amoxicillin-clavulanate (AUGMENTIN) 875-125 mg per tablet; Take 1 tablet by mouth every 12 (twelve) hours for 7 days    Other orders  -     VASCEPA 1 g CAPS          Subjective:      Patient ID: Oneyda Godinez is a 42 y o  female  42year old female patient here for sick visit  States she has nasal congestion and sinus pressure with sore throat  Had a temp max of 99 per patient on Saturday  This started on Friday  States she works at [employer] per patient  Denies any sick contacts per patient  No recent travel anywhere per patient  Patient has been taking Tylenol around the clock per patient  URI    This is a new problem  Episode onset: friday  The problem has been waxing and waning  Maximum temperature: on saturday to touch  The fever has been present for less than 1 day  Associated symptoms include congestion, coughing (mild cough), sinus pain and a sore throat  Pertinent negatives include no chest pain, ear pain, headaches, nausea, vomiting or wheezing  She has tried NSAIDs for the symptoms  The treatment provided mild relief  The following portions of the patient's history were reviewed and updated as appropriate: allergies, current medications, past family history, past medical history, past social history, past surgical history and problem list     Review of Systems   Constitutional: Negative  Negative for appetite change, fatigue and fever  HENT: Positive for congestion, sinus pressure, sinus pain and sore throat  Negative for ear pain  Eyes: Negative  Respiratory: Positive for cough (mild cough)  Negative for chest tightness, shortness of breath and wheezing  Cardiovascular: Negative  Negative for chest pain and palpitations  Gastrointestinal: Negative  Negative for abdominal distention, nausea and vomiting  Endocrine: Negative  Genitourinary: Negative  Negative for difficulty urinating  Musculoskeletal: Negative  Negative for arthralgias and gait problem  Skin: Negative  Allergic/Immunologic: Negative  Neurological: Negative  Negative for dizziness and headaches  Hematological: Negative  Negative for adenopathy  Does not bruise/bleed easily  Psychiatric/Behavioral: Negative  Objective:      /90 (BP Location: Left arm, Patient Position: Sitting, Cuff Size: Adult)   Pulse 90   Temp 97 8 °F (36 6 °C) (Oral)   Ht 5' 1" (1 549 m)   Wt 56 2 kg (124 lb)   SpO2 99%   BMI 23 43 kg/m²          Physical Exam   Constitutional: She is oriented to person, place, and time  She appears well-developed and well-nourished  No distress  HENT:   Head: Normocephalic and atraumatic  Right Ear: Hearing and external ear normal  A middle ear effusion is present  Left Ear: Hearing and external ear normal  A middle ear effusion is present  Nose: Rhinorrhea present  Right sinus exhibits maxillary sinus tenderness  Right sinus exhibits no frontal sinus tenderness  Left sinus exhibits maxillary sinus tenderness  Left sinus exhibits no frontal sinus tenderness  Mouth/Throat: Uvula is midline and mucous membranes are normal  Posterior oropharyngeal erythema present  Eyes: Pupils are equal, round, and reactive to light  EOM are normal    Neck: Normal range of motion  Neck supple  Cardiovascular: Normal rate, regular rhythm and normal heart sounds  Pulmonary/Chest: Effort normal and breath sounds normal  No respiratory distress  She has no wheezes  She has no rales  Abdominal: Soft  Bowel sounds are normal    Musculoskeletal: Normal range of motion  Lymphadenopathy:     She has no cervical adenopathy  Neurological: She is alert and oriented to person, place, and time  She has normal reflexes  Skin: Skin is warm and dry  Capillary refill takes less than 2 seconds  She is not diaphoretic  Psychiatric: She has a normal mood and affect  Her behavior is normal  Judgment and thought content normal    Nursing note and vitals reviewed

## 2020-03-20 PROBLEM — J01.00 ACUTE NON-RECURRENT MAXILLARY SINUSITIS: Status: ACTIVE | Noted: 2020-03-20

## 2020-05-08 ENCOUNTER — HOSPITAL ENCOUNTER (OUTPATIENT)
Dept: RADIOLOGY | Facility: HOSPITAL | Age: 40
Discharge: HOME | End: 2020-05-08
Payer: COMMERCIAL

## 2020-05-08 ENCOUNTER — OFFICE VISIT (OUTPATIENT)
Dept: FAMILY MEDICINE CLINIC | Facility: CLINIC | Age: 40
End: 2020-05-08
Payer: COMMERCIAL

## 2020-05-08 VITALS
SYSTOLIC BLOOD PRESSURE: 160 MMHG | WEIGHT: 130.6 LBS | DIASTOLIC BLOOD PRESSURE: 100 MMHG | BODY MASS INDEX: 24.66 KG/M2 | HEIGHT: 61 IN | OXYGEN SATURATION: 98 % | TEMPERATURE: 97.4 F | HEART RATE: 104 BPM

## 2020-05-08 DIAGNOSIS — R00.2 INTERMITTENT PALPITATIONS: ICD-10-CM

## 2020-05-08 DIAGNOSIS — R07.89 OTHER CHEST PAIN: Primary | ICD-10-CM

## 2020-05-08 DIAGNOSIS — R07.89 OTHER CHEST PAIN: ICD-10-CM

## 2020-05-08 DIAGNOSIS — E78.2 MIXED HYPERLIPIDEMIA: ICD-10-CM

## 2020-05-08 PROCEDURE — 3008F BODY MASS INDEX DOCD: CPT | Performed by: NURSE PRACTITIONER

## 2020-05-08 PROCEDURE — 1036F TOBACCO NON-USER: CPT | Performed by: NURSE PRACTITIONER

## 2020-05-08 PROCEDURE — 71046 X-RAY EXAM CHEST 2 VIEWS: CPT

## 2020-05-08 PROCEDURE — 93000 ELECTROCARDIOGRAM COMPLETE: CPT | Performed by: NURSE PRACTITIONER

## 2020-05-08 PROCEDURE — 99214 OFFICE O/P EST MOD 30 MIN: CPT | Performed by: NURSE PRACTITIONER

## 2020-05-08 RX ORDER — ATENOLOL 50 MG/1
50 TABLET ORAL DAILY
Qty: 30 TABLET | Refills: 5 | Status: SHIPPED | OUTPATIENT
Start: 2020-05-08 | End: 2020-11-02

## 2020-05-11 ENCOUNTER — TELEPHONE (OUTPATIENT)
Dept: FAMILY MEDICINE CLINIC | Facility: CLINIC | Age: 40
End: 2020-05-11

## 2020-05-15 ENCOUNTER — TELEPHONE (OUTPATIENT)
Dept: CARDIOLOGY CLINIC | Facility: CLINIC | Age: 40
End: 2020-05-15

## 2020-05-22 ENCOUNTER — APPOINTMENT (OUTPATIENT)
Dept: LAB | Facility: HOSPITAL | Age: 40
End: 2020-05-22
Payer: COMMERCIAL

## 2020-05-22 DIAGNOSIS — R00.2 INTERMITTENT PALPITATIONS: ICD-10-CM

## 2020-05-22 DIAGNOSIS — E78.2 MIXED HYPERLIPIDEMIA: ICD-10-CM

## 2020-05-22 LAB
BASOPHILS # BLD AUTO: 0.03 THOUSANDS/ΜL (ref 0–0.1)
BASOPHILS NFR BLD AUTO: 1 % (ref 0–1)
CHOLEST SERPL-MCNC: 131 MG/DL (ref 50–200)
EOSINOPHIL # BLD AUTO: 0.03 THOUSAND/ΜL (ref 0–0.61)
EOSINOPHIL NFR BLD AUTO: 1 % (ref 0–6)
ERYTHROCYTE [DISTWIDTH] IN BLOOD BY AUTOMATED COUNT: 13.6 % (ref 11.6–15.1)
HCT VFR BLD AUTO: 37 % (ref 34.8–46.1)
HDLC SERPL-MCNC: 30 MG/DL
HGB BLD-MCNC: 12.1 G/DL (ref 11.5–15.4)
IMM GRANULOCYTES # BLD AUTO: 0.01 THOUSAND/UL (ref 0–0.2)
IMM GRANULOCYTES NFR BLD AUTO: 0 % (ref 0–2)
LDLC SERPL CALC-MCNC: 39 MG/DL (ref 0–100)
LYMPHOCYTES # BLD AUTO: 1.5 THOUSANDS/ΜL (ref 0.6–4.47)
LYMPHOCYTES NFR BLD AUTO: 41 % (ref 14–44)
MCH RBC QN AUTO: 27.3 PG (ref 26.8–34.3)
MCHC RBC AUTO-ENTMCNC: 32.7 G/DL (ref 31.4–37.4)
MCV RBC AUTO: 84 FL (ref 82–98)
MONOCYTES # BLD AUTO: 0.32 THOUSAND/ΜL (ref 0.17–1.22)
MONOCYTES NFR BLD AUTO: 9 % (ref 4–12)
NEUTROPHILS # BLD AUTO: 1.73 THOUSANDS/ΜL (ref 1.85–7.62)
NEUTS SEG NFR BLD AUTO: 48 % (ref 43–75)
NONHDLC SERPL-MCNC: 101 MG/DL
NRBC BLD AUTO-RTO: 0 /100 WBCS
PLATELET # BLD AUTO: 231 THOUSANDS/UL (ref 149–390)
PMV BLD AUTO: 11.5 FL (ref 8.9–12.7)
RBC # BLD AUTO: 4.43 MILLION/UL (ref 3.81–5.12)
TRIGL SERPL-MCNC: 308 MG/DL
TSH SERPL DL<=0.05 MIU/L-ACNC: 0.49 UIU/ML (ref 0.36–3.74)
WBC # BLD AUTO: 3.62 THOUSAND/UL (ref 4.31–10.16)

## 2020-05-22 PROCEDURE — 80061 LIPID PANEL: CPT

## 2020-05-22 PROCEDURE — 84443 ASSAY THYROID STIM HORMONE: CPT

## 2020-05-22 PROCEDURE — 85025 COMPLETE CBC W/AUTO DIFF WBC: CPT

## 2020-05-22 PROCEDURE — 36415 COLL VENOUS BLD VENIPUNCTURE: CPT

## 2020-05-26 ENCOUNTER — OFFICE VISIT (OUTPATIENT)
Dept: FAMILY MEDICINE CLINIC | Facility: CLINIC | Age: 40
End: 2020-05-26
Payer: COMMERCIAL

## 2020-05-26 VITALS
SYSTOLIC BLOOD PRESSURE: 130 MMHG | TEMPERATURE: 98 F | WEIGHT: 129 LBS | DIASTOLIC BLOOD PRESSURE: 80 MMHG | HEART RATE: 92 BPM | BODY MASS INDEX: 24.35 KG/M2 | HEIGHT: 61 IN | RESPIRATION RATE: 16 BRPM | OXYGEN SATURATION: 98 %

## 2020-05-26 DIAGNOSIS — R52 BODY ACHES: ICD-10-CM

## 2020-05-26 DIAGNOSIS — Z20.828 EXPOSURE TO SARS-ASSOCIATED CORONAVIRUS: ICD-10-CM

## 2020-05-26 DIAGNOSIS — Z20.828 EXPOSURE TO SARS-ASSOCIATED CORONAVIRUS: Primary | ICD-10-CM

## 2020-05-26 PROCEDURE — U0003 INFECTIOUS AGENT DETECTION BY NUCLEIC ACID (DNA OR RNA); SEVERE ACUTE RESPIRATORY SYNDROME CORONAVIRUS 2 (SARS-COV-2) (CORONAVIRUS DISEASE [COVID-19]), AMPLIFIED PROBE TECHNIQUE, MAKING USE OF HIGH THROUGHPUT TECHNOLOGIES AS DESCRIBED BY CMS-2020-01-R: HCPCS

## 2020-05-26 PROCEDURE — 99213 OFFICE O/P EST LOW 20 MIN: CPT | Performed by: FAMILY MEDICINE

## 2020-05-26 PROCEDURE — 1036F TOBACCO NON-USER: CPT | Performed by: FAMILY MEDICINE

## 2020-05-26 RX ORDER — DICLOFENAC POTASSIUM 50 MG/1
50 TABLET, FILM COATED ORAL 3 TIMES DAILY
Qty: 30 TABLET | Refills: 0 | Status: SHIPPED | OUTPATIENT
Start: 2020-05-26 | End: 2020-07-24 | Stop reason: ALTCHOICE

## 2020-05-27 LAB — SARS-COV-2 RNA SPEC QL NAA+PROBE: DETECTED

## 2020-05-28 ENCOUNTER — TELEMEDICINE (OUTPATIENT)
Dept: FAMILY MEDICINE CLINIC | Facility: CLINIC | Age: 40
End: 2020-05-28
Payer: COMMERCIAL

## 2020-05-28 DIAGNOSIS — U07.1 COVID-19: Primary | ICD-10-CM

## 2020-05-28 PROCEDURE — 99213 OFFICE O/P EST LOW 20 MIN: CPT | Performed by: FAMILY MEDICINE

## 2020-05-31 DIAGNOSIS — K21.9 GASTROESOPHAGEAL REFLUX DISEASE WITHOUT ESOPHAGITIS: ICD-10-CM

## 2020-06-01 RX ORDER — PANTOPRAZOLE SODIUM 40 MG/1
TABLET, DELAYED RELEASE ORAL
Qty: 30 TABLET | Refills: 3 | Status: SHIPPED | OUTPATIENT
Start: 2020-06-01 | End: 2020-10-05

## 2020-06-02 ENCOUNTER — TELEPHONE (OUTPATIENT)
Dept: FAMILY MEDICINE CLINIC | Facility: CLINIC | Age: 40
End: 2020-06-02

## 2020-06-08 RX ORDER — ROSUVASTATIN CALCIUM 20 MG/1
TABLET, COATED ORAL
COMMUNITY
Start: 2020-05-29 | End: 2021-03-26 | Stop reason: DRUGHIGH

## 2020-06-10 ENCOUNTER — TELEMEDICINE (OUTPATIENT)
Dept: FAMILY MEDICINE CLINIC | Facility: CLINIC | Age: 40
End: 2020-06-10
Payer: COMMERCIAL

## 2020-06-10 DIAGNOSIS — R05.9 COUGH: Primary | ICD-10-CM

## 2020-06-10 DIAGNOSIS — U07.1 COVID-19: ICD-10-CM

## 2020-06-10 PROCEDURE — 99213 OFFICE O/P EST LOW 20 MIN: CPT | Performed by: FAMILY MEDICINE

## 2020-06-10 RX ORDER — DEXTROMETHORPHAN HYDROBROMIDE AND PROMETHAZINE HYDROCHLORIDE 15; 6.25 MG/5ML; MG/5ML
5 SOLUTION ORAL 4 TIMES DAILY PRN
Qty: 150 ML | Refills: 0 | Status: SHIPPED | OUTPATIENT
Start: 2020-06-10 | End: 2020-07-24 | Stop reason: ALTCHOICE

## 2020-06-10 RX ORDER — BENZONATATE 200 MG/1
200 CAPSULE ORAL 3 TIMES DAILY PRN
Qty: 20 CAPSULE | Refills: 0 | Status: SHIPPED | OUTPATIENT
Start: 2020-06-10 | End: 2021-05-03 | Stop reason: ALTCHOICE

## 2020-06-12 RX ORDER — ICOSAPENT ETHYL 1000 MG/1
CAPSULE ORAL
COMMUNITY
Start: 2020-06-03 | End: 2021-03-26 | Stop reason: ALTCHOICE

## 2020-06-18 ENCOUNTER — TELEMEDICINE (OUTPATIENT)
Dept: FAMILY MEDICINE CLINIC | Facility: CLINIC | Age: 40
End: 2020-06-18
Payer: COMMERCIAL

## 2020-06-18 DIAGNOSIS — R07.1 CHEST PAIN ON BREATHING: ICD-10-CM

## 2020-06-18 DIAGNOSIS — U07.1 COVID-19: ICD-10-CM

## 2020-06-18 DIAGNOSIS — Z20.828 EXPOSURE TO SARS-ASSOCIATED CORONAVIRUS: Primary | ICD-10-CM

## 2020-06-18 PROCEDURE — 99213 OFFICE O/P EST LOW 20 MIN: CPT | Performed by: FAMILY MEDICINE

## 2020-06-19 ENCOUNTER — HOSPITAL ENCOUNTER (OUTPATIENT)
Dept: RADIOLOGY | Facility: HOSPITAL | Age: 40
Discharge: HOME | End: 2020-06-19
Payer: COMMERCIAL

## 2020-06-19 DIAGNOSIS — Z20.828 EXPOSURE TO SARS-ASSOCIATED CORONAVIRUS: ICD-10-CM

## 2020-06-19 DIAGNOSIS — R07.1 CHEST PAIN ON BREATHING: ICD-10-CM

## 2020-06-19 PROCEDURE — U0003 INFECTIOUS AGENT DETECTION BY NUCLEIC ACID (DNA OR RNA); SEVERE ACUTE RESPIRATORY SYNDROME CORONAVIRUS 2 (SARS-COV-2) (CORONAVIRUS DISEASE [COVID-19]), AMPLIFIED PROBE TECHNIQUE, MAKING USE OF HIGH THROUGHPUT TECHNOLOGIES AS DESCRIBED BY CMS-2020-01-R: HCPCS

## 2020-06-19 PROCEDURE — 71046 X-RAY EXAM CHEST 2 VIEWS: CPT

## 2020-06-20 LAB — SARS-COV-2 RNA SPEC QL NAA+PROBE: NOT DETECTED

## 2020-06-22 NOTE — RESULT ENCOUNTER NOTE
Please call her, x ray is normal and Virus is not detected   Medically cleared to return to work on 6/25/2020

## 2020-06-24 ENCOUNTER — OFFICE VISIT (OUTPATIENT)
Dept: FAMILY MEDICINE CLINIC | Facility: CLINIC | Age: 40
End: 2020-06-24
Payer: COMMERCIAL

## 2020-06-24 VITALS
BODY MASS INDEX: 24.35 KG/M2 | SYSTOLIC BLOOD PRESSURE: 110 MMHG | RESPIRATION RATE: 16 BRPM | DIASTOLIC BLOOD PRESSURE: 86 MMHG | OXYGEN SATURATION: 96 % | TEMPERATURE: 97.9 F | HEART RATE: 96 BPM | WEIGHT: 129 LBS | HEIGHT: 61 IN

## 2020-06-24 DIAGNOSIS — U07.1 COVID-19: ICD-10-CM

## 2020-06-24 DIAGNOSIS — E55.9 VITAMIN D DEFICIENCY: ICD-10-CM

## 2020-06-24 DIAGNOSIS — L30.9 ECZEMA, UNSPECIFIED TYPE: Primary | ICD-10-CM

## 2020-06-24 PROCEDURE — 1036F TOBACCO NON-USER: CPT | Performed by: FAMILY MEDICINE

## 2020-06-24 PROCEDURE — 3008F BODY MASS INDEX DOCD: CPT | Performed by: FAMILY MEDICINE

## 2020-06-24 PROCEDURE — 3008F BODY MASS INDEX DOCD: CPT | Performed by: INTERNAL MEDICINE

## 2020-06-24 PROCEDURE — 99214 OFFICE O/P EST MOD 30 MIN: CPT | Performed by: FAMILY MEDICINE

## 2020-06-24 RX ORDER — ERGOCALCIFEROL 1.25 MG/1
CAPSULE ORAL
Qty: 4 CAPSULE | Refills: 5 | Status: SHIPPED | OUTPATIENT
Start: 2020-06-24 | End: 2021-01-02

## 2020-06-24 RX ORDER — CLOTRIMAZOLE AND BETAMETHASONE DIPROPIONATE 10; .64 MG/G; MG/G
CREAM TOPICAL
Qty: 30 G | Refills: 5 | Status: SHIPPED | OUTPATIENT
Start: 2020-06-24 | End: 2020-12-11 | Stop reason: SDUPTHER

## 2020-07-24 ENCOUNTER — OFFICE VISIT (OUTPATIENT)
Dept: CARDIOLOGY CLINIC | Facility: CLINIC | Age: 40
End: 2020-07-24

## 2020-07-24 VITALS
WEIGHT: 130 LBS | SYSTOLIC BLOOD PRESSURE: 100 MMHG | TEMPERATURE: 98.1 F | DIASTOLIC BLOOD PRESSURE: 80 MMHG | BODY MASS INDEX: 24.56 KG/M2

## 2020-07-24 DIAGNOSIS — E78.2 MIXED HYPERLIPIDEMIA: ICD-10-CM

## 2020-07-24 DIAGNOSIS — I10 HYPERTENSION, UNSPECIFIED TYPE: ICD-10-CM

## 2020-07-24 DIAGNOSIS — R00.2 PALPITATIONS: Primary | ICD-10-CM

## 2020-07-24 PROCEDURE — 99214 OFFICE O/P EST MOD 30 MIN: CPT | Performed by: INTERNAL MEDICINE

## 2020-07-24 PROCEDURE — 93000 ELECTROCARDIOGRAM COMPLETE: CPT | Performed by: INTERNAL MEDICINE

## 2020-07-24 PROCEDURE — 1036F TOBACCO NON-USER: CPT | Performed by: INTERNAL MEDICINE

## 2020-07-24 NOTE — PROGRESS NOTES
Cardiology Follow Up    Anna Freedman  1980  [id number]  [facility] CARDIOLOGY [place]  [address]  [facility] [address] [phone]    1  Palpitations  POCT ECG   2  Hypertension, unspecified type  POCT ECG   3  Mixed hyperlipidemia         01/03/2020 stress test:  SUMMARY:  - Rest ECG: There was less than 1 mm, horizontal ST depression in leads II, III and aVF  - Stress results: Duration of exercise was 6 min and 30 sec  Maximal heart rate during stress was 164 bpm ( 90 % of maximal predicted heart rate)  Target heart rate was achieved  There was no chest pain during stress  The stress test was  terminated due to achievement of target heart rate and fatigue  - ECG conclusions: The stress ECG was negative for ischemia  Arrhythmia during stress: isolated premature ventricular beats  - Impressions and recommendations: Normal study  IMPRESSIONS: Normal study  01/03/2020 echocardiogram:   IMPRESSIONS:  The study was within normal limits   LEFT VENTRICLE:  Systolic function was normal  Ejection fraction was estimated in the range of 55 % to 65 % to be 60 %  Although no diagnostic regional wall motion abnormality was identified, this possibility cannot be completely excluded on the basis of this study  ATRIAL SEPTUM:  No defect or patent foramen ovale was identified  Mild IAS thickening   MITRAL VALVE:  Valve structure was normal    AORTIC VALVE:  The valve was trileaflet  Leaflets exhibited normal thickness and normal cuspal separation  Interval History:  Patient was previously seen me for palpitations and hypertension  Her blood pressure had been well controlled and she was placed on a p r n  Basis  She also has hyperlipidemia with initially a very high triglycerides  Her primary care physician is doing an excellent job with managing this using both rosuvastatin and a Vascepa  Patient had the COVID virus    Approximately 2 weeks prior to becoming symptomatic, she was noted to be significantly hypertensive  However since she has recovered, she is normal tense of on her medication, it atenolol 50 mg daily  Presently her blood pressure is excellent  She still has some fatigue  She denies dizziness, lightheadedness or orthostatic symptoms  She had palpitations in the past but none recently  Discussion/Summary:  1  Continue atenolol and other medications  2  Return on a p r n  basis  3   Continue to follow-up with Dr Garland Estevez      Patient Active Problem List   Diagnosis    Abnormal uterine bleeding (AUB)    Anemia    Dyshidrotic eczema    Fatty liver    Granuloma annulare    Heart palpitations    Hypertriglyceridemia    Hyperlipidemia    Left ovarian cyst    Leukemia (HCC)    Mastalgia    Mouth sores    PCOS (polycystic ovarian syndrome)    Psoriasis    Right ovarian cyst    Vitamin D deficiency    Diarrhea    Generalized abdominal pain    Gastroesophageal reflux disease without esophagitis    Epigastric pain    Breast pain, right    Dense breast tissue    Other chest pain    Other headache syndrome    Screening for cholesterol level    Right lower quadrant abdominal pain    Strangulated epigastric hernia    Ventral hernia without obstruction or gangrene    Facial flushing    Blood pressure elevated without history of HTN    Acute non-recurrent maxillary sinusitis    COVID-19     Past Medical History:   Diagnosis Date    COVID-19 05/2020    Esophagitis     H  pylori infection 12/2018    Hiatal hernia     High triglycerides     Hyperlipidemia     Ventral hernia      Social History     Socioeconomic History    Marital status: /Civil Union     Spouse name: Acedeuce [facility] Number of children: 3    Years of education: Not on file    Highest education level: Not on file   Occupational History    Not on file   Social Needs    Financial resource strain: Not very hard    Food insecurity: Worry: Never true     Inability: Not on file    Transportation needs:     Medical: No     Non-medical: Not on file   Tobacco Use    Smoking status: Never Smoker    Smokeless tobacco: Never Used   Substance and Sexual Activity    Alcohol use: No    Drug use: No    Sexual activity: Yes     Partners: Male     Birth control/protection: Female Sterilization   Lifestyle    Physical activity:     Days per week: Not on file     Minutes per session: Not on file    Stress: Not on file   Relationships    Social connections:     Talks on phone: Not on file     Gets together: Not on file     Attends Anglican service: Not on file     Active member of club or organization: Not on file     Attends meetings of clubs or organizations: Not on file     Relationship status: Not on file    Intimate partner violence:     Fear of current or ex partner: Not on file     Emotionally abused: Not on file     Physically abused: Not on file     Forced sexual activity: Not on file   Other Topics Concern    Not on file   Social History Narrative    Non smoker        Lives with  and children    Caffeine use    No recreational drug use    Always wears seatbelts    Three children    [employer]      Family History   Problem Relation Age of Onset    Uterine cancer Mother     Heart disease Mother     Hypertension Mother     Cervical cancer Mother     Stomach cancer Father     Diabetes Brother     Hyperlipidemia Brother     Asthma Son      Past Surgical History:   Procedure Laterality Date     SECTION      3X--2003,2005, 2008    COLONOSCOPY N/A 2018    Procedure: COLONOSCOPY;  Surgeon: Erwin Carias MD;  Location: [facility] GI LAB; Service: Gastroenterology    ESOPHAGOGASTRODUODENOSCOPY N/A 2018    Procedure: ESOPHAGOGASTRODUODENOSCOPY (EGD); Surgeon: Erwin Carias MD;  Location: [facility] GI LAB;   Service: Gastroenterology    HERNIA REPAIR     [address] N/A 2019 Procedure: REPAIR HERNIA VENTRAL;  Surgeon: Renny Mcbride DO;  Location: [facility] MAIN OR;  Service: General    TUBAL LIGATION      UPPER GASTROINTESTINAL ENDOSCOPY         Current Outpatient Medications:     atenolol (TENORMIN) 50 mg tablet, Take 1 tablet (50 mg total) by mouth daily, Disp: 30 tablet, Rfl: 5    benzonatate (TESSALON) 200 MG capsule, Take 1 capsule (200 mg total) by mouth 3 (three) times a day as needed for cough, Disp: 20 capsule, Rfl: 0    clotrimazole-betamethasone (LOTRISONE) 1-0 05 % cream, Apply topical over affected areas twice a day, Disp: 30 g, Rfl: 5    ergocalciferol (VITAMIN D2) 50,000 units, Take one capsule weekly by mouth, Disp: 4 capsule, Rfl: 5    pantoprazole (PROTONIX) 40 mg tablet, TAKE 1 TABLET BY MOUTH EVERY DAY, Disp: 30 tablet, Rfl: 3    rosuvastatin (CRESTOR) 20 MG tablet, , Disp: , Rfl:     VASCEPA 1 g CAPS, TOME 2 C PSULAS POR V A ORAL DOS VECES AL D A, Disp: , Rfl:   No Known Allergies    Results for orders placed or performed in visit on 07/24/20   POCT ECG    Narrative    Normal sinus rhythm at a rate 94 beats per minute  Normal tracing  QT/QTC interval 356/445 milliseconds  Review of Systems:  Review of Systems   Respiratory: Negative for cough, choking, chest tightness, shortness of breath and wheezing  Cardiovascular: Negative for chest pain, palpitations and leg swelling  Musculoskeletal: Negative for gait problem  Skin: Negative for rash  Neurological: Negative for dizziness, tremors, syncope, weakness, light-headedness, numbness and headaches  Psychiatric/Behavioral: Negative for agitation and behavioral problems  The patient is not hyperactive  Physical Exam:  /80 (BP Location: Left arm, Patient Position: Sitting, Cuff Size: Adult)   Temp 98 1 °F (36 7 °C)   Wt 59 kg (130 lb)   BMI 24 56 kg/m²   Physical Exam   Constitutional: She is oriented to person, place, and time  She appears well-developed and well-nourished   No distress  HENT:   Head: Normocephalic and atraumatic  Neck: No JVD present  No thyromegaly present  Cardiovascular: Normal rate, regular rhythm, normal heart sounds and intact distal pulses  Exam reveals no gallop and no friction rub  No murmur heard  Pulmonary/Chest: No respiratory distress  She has no wheezes  She has no rales  Musculoskeletal: She exhibits no edema  Neurological: She is alert and oriented to person, place, and time  Skin: Skin is warm and dry  Psychiatric: She has a normal mood and affect  Her behavior is normal        -------------------------------------------------------------------------------  TREADMILL STRESS  Results for orders placed during the hospital encounter of 20   Stress test only, exercise    Narrative [facility] [address]  [address]  [phone]    Stress Electrocardiography during Exercise    Name: María Finn  MR #: [id number]  Account #: [de-identified]  Study date: 2020  : 1980  Age: 42 years  Gender: Female  Height: 61 in  Weight: 131 lb  BSA: 1 58 mï¾²    Allergies: NO KNOWN ALLERGIES    Diagnosis: R07 89 - Other chest pain    RN:  Brianna Dobbins RN  Primary Physician:  Rolanda Hodges MD  Referring Physician:  Delvis Carmen MD  Interpreting Physician:  Kiera Gilman MD  Group:  [facility]  Report Prepared By[de-identified]  Lalito Doe  Technician:  Lalito Doe    CLINICAL QUESTION: Detection of coronary artery disease  HISTORY: The patient is a 42year old  female  Chest pain status: recent onset chest pain  Coronary artery disease risk factors: dyslipidemia  Cardiovascular history: none significant  Medications: a lipid lowering agent  PHYSICAL EXAM: Baseline physical exam screening: no wheezes audible  REST ECG: Normal sinus rhythm  There was less than 1 mm, horizontal ST depression in leads II, III and aVF      PROCEDURE: Treadmill exercise testing was performed, using the Boyd protocol  Stress electrocardiographic evaluation was performed  BOYD PROTOCOL:  HR bpm SBP mmHg DBP mmHg Symptoms  Baseline 77 140 80 none  Stage 1 118 154 88 dyspnea, fatigue  Stage 2 153 168 88 same as above  Stage 3 162 -- -- same as above  Immediate 164 168 82 subsiding  Recovery 1 111 134 82 none  Recovery 2 103 124 74 none  pre 02 sat 100% peak 02 sat 99% No medications or fluids given  STRESS SUMMARY: Duration of exercise was 6 min and 30 sec  The patient exercised to protocol stage 3  Maximal work rate was 8 5 METs  Maximal heart rate during stress was 164 bpm ( 90 % of maximal predicted heart rate)  The rate-pressure  product for the peak heart rate and blood pressure was [id number]  There was no chest pain during stress  The stress test was terminated due to achievement of target heart rate and fatigue  The stress ECG was negative for ischemia  Arrhythmia  during stress: isolated premature ventricular beats  SUMMARY:  -  Rest ECG: There was less than 1 mm, horizontal ST depression in leads II, III and aVF  -  Stress results: Duration of exercise was 6 min and 30 sec  Maximal heart rate during stress was 164 bpm ( 90 % of maximal predicted heart rate)  Target heart rate was achieved  There was no chest pain during stress  The stress test was  terminated due to achievement of target heart rate and fatigue  -  ECG conclusions: The stress ECG was negative for ischemia  Arrhythmia during stress: isolated premature ventricular beats  -  Impressions and recommendations: Normal study  IMPRESSIONS: Normal study      Prepared and signed by    Felipa Mckeon MD  Signed 01/03/2020 15:36:31        ----------------------------------------------------------------------------------------------    --------------------------------------------------------------------------------  ECHO:   Results for orders placed during the hospital encounter of 01/03/20   Echo complete with contrast if indicated    N[facility]ative [facility]  [facility] [place] [address] [facility]  [phone]    Transthoracic Echocardiogram  2D, M-mode, Doppler, and Color Doppler    Study date:  2020    Patient: Ivan Mejia  MR number: [id number]  Account number: [de-identified]  : 1980  Age: 42 years  Gender: Female  Status: Outpatient  Location: [address] [place] [address] [facility] [place]  Height: 61 in  Weight: 131 lb  BP: 132/ 80 mmHg    Indications: Chest pain; Palpitations    Diagnoses: R00 2 - Palpitations, R07 9 - Chest pain, unspecified    Sonographer:  MARIAM Joseph, RDCS  Primary Physician:  Adriana Mistry MD  Referring Physician:  Siena Neri MD  Group:  [facility]  Interpreting Physician:  Tariq Garg MD    IMPRESSIONS:  The study was within normal limits  SUMMARY    IMPRESSIONS:  The study was within normal limits  LEFT VENTRICLE:  Systolic function was normal  Ejection fraction was estimated in the range of 55 % to 65 % to be 60 %  Although no diagnostic regional wall motion abnormality was identified, this possibility cannot be completely excluded on the basis of this study  ATRIAL SEPTUM:  No defect or patent foramen ovale was identified  Mild IAS thickening    MITRAL VALVE:  Valve structure was normal     AORTIC VALVE:  The valve was trileaflet  Leaflets exhibited normal thickness and normal cuspal separation  HISTORY: PRIOR HISTORY: Hyperlipidemia    PROCEDURE: The study was performed in the [address]  This was a routine study  The transthoracic approach was used  The study included complete 2D imaging, M-mode, complete spectral Doppler, and color Doppler  The  heart rate was 66 bpm, at the start of the study  Images were obtained from the parasternal, apical, subcostal, and suprasternal notch acoustic windows  Image quality was adequate      LEFT VENTRICLE: Size was normal  Systolic function was normal  Ejection fraction was estimated in the range of 55 % to 65 % to be 60 %  Although no diagnostic regional wall motion abnormality was identified, this possibility cannot be  completely excluded on the basis of this study  Wall thickness was normal  DOPPLER: Left ventricular diastolic function parameters were normal     RIGHT VENTRICLE: The size was normal  Systolic function was normal  Wall thickness was normal     LEFT ATRIUM: Size was normal     ATRIAL SEPTUM: No defect or patent foramen ovale was identified  Mild IAS thickening    RIGHT ATRIUM: Size was normal     MITRAL VALVE: Valve structure was normal  There was normal leaflet separation  DOPPLER: The transmitral velocity was within the normal range  There was no evidence for stenosis  There was no significant regurgitation  AORTIC VALVE: The valve was trileaflet  Leaflets exhibited normal thickness and normal cuspal separation  DOPPLER: Transaortic velocity was within the normal range  There was no evidence for stenosis  There was no significant  regurgitation  TRICUSPID VALVE: The valve structure was normal  There was normal leaflet separation  DOPPLER: The transtricuspid velocity was within the normal range  There was no evidence for stenosis  There was no significant regurgitation  PULMONIC VALVE: Leaflets exhibited normal thickness, no calcification, and normal cuspal separation  DOPPLER: The transpulmonic velocity was within the normal range  There was no significant regurgitation  PERICARDIUM: There was no pericardial effusion  The pericardium was normal in appearance  AORTA: The root exhibited normal size  SYSTEMIC VEINS: IVC: The inferior vena cava was normal in size      SYSTEM MEASUREMENT TABLES    2D  %FS: 28 61 %  Ao Diam: 2 81 cm  EDV(Teich): 58 32 ml  EF(Cube): 63 61 %  EF(Teich): 55 96 %  ESV(Cube): 18 51 ml  ESV(Teich): 25 68 ml  IVSd: 0 99 cm  LA Area: 12 65 cm2  LA Diam: 3 27 cm  LVEDV MOD A4C: 60 93 ml  LVEF MOD A4C: 70 03 %  LVESV MOD A4C: 18 26 ml  LVIDd: 3 71 cm  LVIDs: 2 65 cm  LVLd A4C: 7 2 cm  LVLs A4C: 5 33 cm  LVPWd: 0 79 cm  RA Area: 13 59 cm2  RV Diam : 3 54 cm  SV MOD A4C: 42 67 ml  SV(Cube): 32 35 ml  SV(Teich): 32 64 ml    CW  TR Vmax: 2 27 m/s  TR maxP 68 mmHg    MM  TAPSE: 1 7 cm    PW  E': 0 09 m/s  E/E': 9 64  MV A Baljinder: 0 63 m/s  MV Dec Eaton: 4 54 m/s2  MV DecT: 200 06 ms  MV E Baljinder: 0 91 m/s  MV E/A Ratio: 1 45    [facility]    Prepared and electronically signed by    Riana Haile MD  Signed 2020 15:44:59       No results found for this or any previous visit   --------------------------------------------------------------------------------  HOLTER  Results for orders placed during the hospital encounter of 19   Holter monitor - 24 hour    Narrative 24 HOUR HOLTER MONITOR    INDICATION: Palpitations    Average heart rate: 90 bpm   Lowest heart rate: 56 bpm  Highest heart rate: 138 bpm    VENTRICULAR ECTOPY - 0 0% of all monitored beats  Total number: 0   Runs: 0  Ventricular Couplets: 0   Ventricular Triplets: 0  Bigeminy: 0   Trigeminy: 0     SUPRAVENTRICULAR ECTOPY - 0 0% of all monitored beats  Total number: 31   Runs: 0  Supraventricular Couplets: 0   Bigeminy: 0   Trigeminy: 0     BRADYCARDIA  Slowest episode: 1:38 am, (minimum heart rate of 56 bpm)  This   corresponded with sinus bradycardia with no evidence of heart block  TACHYCARDIA  Fastest episode: occurred at 9:09 am, (maximum heart rate of 138 bpm)  This corresponded with sinus tachycardia  SYMPTOMS  The patient experienced symptoms of palpitations or dizziness during the   monitoring time period  These correlated with no significant arrhythmias  Impression 1) Normal Holter monitor of 24 hrs duration  2) Normal burden of ventricular and supraventricular ectopic beats  3) Symptoms correlated with NSR with no significant arrhythmias        Interpreting Physician: Arben Perkins MD, [facility] [place]       ======================================================    Lab Results   Component Value Date    WBC 3 62 (L) 05/22/2020    HGB 12 1 05/22/2020    HCT 37 0 05/22/2020    MCV 84 05/22/2020     05/22/2020      Lab Results   Component Value Date    SODIUM 137 01/29/2020    K 4 1 01/29/2020     01/29/2020    CO2 28 01/29/2020    BUN 8 01/29/2020    CREATININE 0 58 (L) 01/29/2020    GLUC 96 01/30/2019    CALCIUM 9 4 01/29/2020      Lab Results   Component Value Date    HGBA1C 4 8 09/01/2019      Lab Results   Component Value Date    CHOL 183 04/23/2018    CHOL 186 03/29/2014     Lab Results   Component Value Date    HDL 30 (L) 05/22/2020    HDL 28 (L) 01/29/2020    HDL 25 (L) 09/01/2019     Lab Results   Component Value Date    LDLCALC 39 05/22/2020    LDLCALC 65 01/29/2020    [address]  09/01/2019      Comment:      Calculated LDL invalid, triglycerides >400 mg/dl  This screening LDL is a calculated result  It does not have the accuracy of the Direct Measured LDL in the monitoring of patients with hyperlipidemia and/or statin therapy  Direct Measure LDL (UBC642) must be ordered separately in these patients  Lab Results   Component Value Date    TRIG 308 (H) 05/22/2020    TRIG 381 (H) 01/29/2020    TRIG 704 (H) 09/01/2019         Imaging:   I have personally reviewed pertinent reports  Portions of the record may have been created with voice recognition software  Occasional wrong word or "sound a like" substitutions may have occurred due to the inherent limitations of voice recognition software  Read the chart carefully and recognize, using context, where substitutions have occurred

## 2020-07-24 NOTE — LETTER
July 24, 2020     Brando Davenport MD  [address]  [address]  [address] [place] [address]    Patient: Adam Valencia   YOB: 1980   Date of Visit: 7/24/2020       Dear Dr Zeinab Felix: Thank you for referring Adam Valencia to me for evaluation  Below are my notes for this consultation  If you have questions, please do not hesitate to call me  I look forward to following your patient along with you  Sincerely,        Priyanka Mayer MD        CC: No Recipients  Priyanka Mayer MD  7/24/2020  1:49 PM  Sign at close encounter                                             Cardiology Follow Up    Adam Valencia  1980  [id number]  [address]  [address] [facility] [id number] [phone]    1  Palpitations  POCT ECG   2  Hypertension, unspecified type  POCT ECG   3  Mixed hyperlipidemia         01/03/2020 stress test:  SUMMARY:  - Rest ECG: There was less than 1 mm, horizontal ST depression in leads II, III and aVF  - Stress results: Duration of exercise was 6 min and 30 sec  Maximal heart rate during stress was 164 bpm ( 90 % of maximal predicted heart rate)  Target heart rate was achieved  There was no chest pain during stress  The stress test was  terminated due to achievement of target heart rate and fatigue  - ECG conclusions: The stress ECG was negative for ischemia  Arrhythmia during stress: isolated premature ventricular beats  - Impressions and recommendations: Normal study  IMPRESSIONS: Normal study  01/03/2020 echocardiogram:   IMPRESSIONS:  The study was within normal limits   LEFT VENTRICLE:  Systolic function was normal  Ejection fraction was estimated in the range of 55 % to 65 % to be 60 %  Although no diagnostic regional wall motion abnormality was identified, this possibility cannot be completely excluded on the basis of this study  ATRIAL SEPTUM:  No defect or patent foramen ovale was identified  Mild IAS thickening   MITRAL VALVE:  Valve structure was normal    AORTIC VALVE:  The valve was trileaflet  Leaflets exhibited normal thickness and normal cuspal separation  Interval History:  Patient was previously seen me for palpitations and hypertension  Her blood pressure had been well controlled and she was placed on a p r n  Basis  She also has hyperlipidemia with initially a very high triglycerides  Her primary care physician is doing an excellent job with managing this using both rosuvastatin and a Vascepa  Patient had the COVID virus  Approximately 2 weeks prior to becoming symptomatic, she was noted to be significantly hypertensive  However since she has recovered, she is normal tense of on her medication, it atenolol 50 mg daily  Presently her blood pressure is excellent  She still has some fatigue  She denies dizziness, lightheadedness or orthostatic symptoms  She had palpitations in the past but none recently  Discussion/Summary:  1  Continue atenolol and other medications  2  Return on a p r n  basis  3   Continue to follow-up with Dr David Bond      Patient Active Problem List   Diagnosis    Abnormal uterine bleeding (AUB)    Anemia    Dyshidrotic eczema    Fatty liver    Granuloma annulare    Heart palpitations    Hypertriglyceridemia    Hyperlipidemia    Left ovarian cyst    Leukemia (HCC)    Mastalgia    Mouth sores    PCOS (polycystic ovarian syndrome)    Psoriasis    Right ovarian cyst    Vitamin D deficiency    Diarrhea    Generalized abdominal pain    Gastroesophageal reflux disease without esophagitis    Epigastric pain    Breast pain, right    Dense breast tissue    Other chest pain    Other headache syndrome    Screening for cholesterol level    Right lower quadrant abdominal pain    Strangulated epigastric hernia    Ventral hernia without obstruction or gangrene    Facial flushing    Blood pressure elevated without history of HTN    Acute non-recurrent maxillary sinusitis    COVID-19     Past Medical History:   Diagnosis Date    COVID-19 05/2020    Esophagitis     H  pylori infection 12/2018    Hiatal hernia     High triglycerides     Hyperlipidemia     Ventral hernia      Social History     Socioeconomic History    Marital status: /Civil Union     Spouse name: Shaheed Vázquez Number of children: 3    Years of education: Not on file    Highest education level: Not on file   Occupational History    Not on file   Social Needs    Financial resource strain: Not very hard    Food insecurity:     Worry: Never true     Inability: Not on file   Lycera needs:     Medical: No     Non-medical: Not on file   Tobacco Use    Smoking status: Never Smoker    Smokeless tobacco: Never Used   Substance and Sexual Activity    Alcohol use: No    Drug use: No    Sexual activity: Yes     Partners: Male     Birth control/protection: Female Sterilization   Lifestyle    Physical activity:     Days per week: Not on file     Minutes per session: Not on file    Stress: Not on file   Relationships    Social connections:     Talks on phone: Not on file     Gets together: Not on file     Attends Mosque service: Not on file     Active member of club or organization: Not on file     Attends meetings of clubs or organizations: Not on file     Relationship status: Not on file    Intimate partner violence:     Fear of current or ex partner: Not on file     Emotionally abused: Not on file     Physically abused: Not on file     Forced sexual activity: Not on file   Other Topics Concern    Not on file   Social History Narrative    Non smoker        Lives with  and children    Caffeine use    No recreational drug use    Always wears seatbelts    Three children    Zoroastrianism      Family History   Problem Relation Age of Onset    Uterine cancer Mother     Heart disease Mother     Hypertension Mother     Cervical cancer Mother     Stomach cancer Father     Diabetes Brother     Hyperlipidemia Brother     Asthma Son      Past Surgical History:   Procedure Laterality Date     SECTION      3X--,, 2008    COLONOSCOPY N/A 2018    Procedure: COLONOSCOPY;  Surgeon: Pushpa Ibarra MD;  Location: [facility]; Service: Gastroenterology    ESOPHAGOGASTRODUODENOSCOPY N/A 2018    Procedure: ESOPHAGOGASTRODUODENOSCOPY (EGD); Surgeon: Pushpa Ibarra MD;  Location: [facility]; Service: Gastroenterology    HERNIA REPAIR      IL REPAIR INCISIONAL HERNIA,FLAVIO N/A 2019    Procedure: REPAIR HERNIA VENTRAL;  Surgeon: Glenis Martinez DO;  Location: [address] [place] [address] OR;  Service: General    TUBAL LIGATION      UPPER GASTROINTESTINAL ENDOSCOPY         Current Outpatient Medications:     atenolol (TENORMIN) 50 mg tablet, Take 1 tablet (50 mg total) by mouth daily, Disp: 30 tablet, Rfl: 5    benzonatate (TESSALON) 200 MG capsule, Take 1 capsule (200 mg total) by mouth 3 (three) times a day as needed for cough, Disp: 20 capsule, Rfl: 0    clotrimazole-betamethasone (LOTRISONE) 1-0 05 % cream, Apply topical over affected areas twice a day, Disp: 30 g, Rfl: 5    ergocalciferol (VITAMIN D2) 50,000 units, Take one capsule weekly by mouth, Disp: 4 capsule, Rfl: 5    pantoprazole (PROTONIX) 40 mg tablet, TAKE 1 TABLET BY MOUTH EVERY DAY, Disp: 30 tablet, Rfl: 3    rosuvastatin (CRESTOR) 20 MG tablet, , Disp: , Rfl:     VASCEPA 1 g CAPS, TOME 2 C PSULAS POR V A ORAL DOS VECES AL D A, Disp: , Rfl:   No Known Allergies    Results for orders placed or performed in visit on 20   POCT ECG    Narrative    Normal sinus rhythm at a rate 94 beats per minute  Normal tracing  QT/QTC interval 356/445 milliseconds  Review of Systems:  Review of Systems   Respiratory: Negative for cough, choking, chest tightness, shortness of breath and wheezing  Cardiovascular: Negative for chest pain, palpitations and leg swelling     Musculoskeletal: Negative for gait problem  Skin: Negative for rash  Neurological: Negative for dizziness, tremors, syncope, weakness, light-headedness, numbness and headaches  Psychiatric/Behavioral: Negative for agitation and behavioral problems  The patient is not hyperactive  Physical Exam:  /80 (BP Location: Left arm, Patient Position: Sitting, Cuff Size: Adult)   Temp 98 1 °F (36 7 °C)   Wt 59 kg (130 lb)   BMI 24 56 kg/m²    Physical Exam   Constitutional: She is oriented to person, place, and time  She appears well-developed and well-nourished  No distress  HENT:   Head: Normocephalic and atraumatic  Neck: No JVD present  No thyromegaly present  Cardiovascular: Normal rate, regular rhythm, normal heart sounds and intact distal pulses  Exam reveals no gallop and no friction rub  No murmur heard  Pulmonary/Chest: No respiratory distress  She has no wheezes  She has no rales  Musculoskeletal: She exhibits no edema  Neurological: She is alert and oriented to person, place, and time  Skin: Skin is warm and dry  Psychiatric: She has a normal mood and affect   Her behavior is normal        -------------------------------------------------------------------------------  TREADMILL STRESS  Results for orders placed during the hospital encounter of 20   Stress test only, exercise    Narrative [address]  [phone]    Stress Electrocardiography during Exercise    Name: Rohini Salazar  MR #: [id number]  Account #: [de-identified]  Study date: 2020  : 1980  Age: 42 years  Gender: Female  Height: 61 in  Weight: 131 lb  BSA: 1 58 mï¾²    Allergies: NO KNOWN ALLERGIES    Diagnosis: R07 89 - Other chest pain    RN:  Maxx Vasquez RN  Primary Physician:  Alejandro Osullivan MD  Referring Physician:  David Fuller MD  Interpreting Physician:  Tushar Chaudhari MD  Group:  [facility]  Report Prepared By[de-identified]  Ricardo Snow [facility]  Technician:  Donnie Gross    CLINICAL QUESTION: Detection of coronary artery disease  HISTORY: The patient is a 42year old  female  Chest pain status: recent onset chest pain  Coronary artery disease risk factors: dyslipidemia  Cardiovascular history: none significant  Medications: a lipid lowering agent  PHYSICAL EXAM: Baseline physical exam screening: no wheezes audible  REST ECG: Normal sinus rhythm  There was less than 1 mm, horizontal ST depression in leads II, III and aVF  PROCEDURE: Treadmill exercise testing was performed, using the Boyd protocol  Stress electrocardiographic evaluation was performed  BOYD PROTOCOL:  HR bpm SBP mmHg DBP mmHg Symptoms  Baseline 77 140 80 none  Stage 1 118 154 88 dyspnea, fatigue  Stage 2 153 168 88 same as above  Stage 3 162 -- -- same as above  Immediate 164 168 82 subsiding  Recovery 1 111 134 82 none  Recovery 2 103 124 74 none  pre 02 sat 100% peak 02 sat 99% No medications or fluids given  STRESS SUMMARY: Duration of exercise was 6 min and 30 sec  The patient exercised to protocol stage 3  Maximal work rate was 8 5 METs  Maximal heart rate during stress was 164 bpm ( 90 % of maximal predicted heart rate)  The rate-pressure  product for the peak heart rate and blood pressure was 18256  There was no chest pain during stress  The stress test was terminated due to achievement of target heart rate and fatigue  The stress ECG was negative for ischemia  Arrhythmia  during stress: isolated premature ventricular beats  SUMMARY:  -  Rest ECG: There was less than 1 mm, horizontal ST depression in leads II, III and aVF  -  Stress results: Duration of exercise was 6 min and 30 sec  Maximal heart rate during stress was 164 bpm ( 90 % of maximal predicted heart rate)  Target heart rate was achieved  There was no chest pain during stress  The stress test was  terminated due to achievement of target heart rate and fatigue  -  ECG conclusions:  The stress ECG was negative for ischemia  Arrhythmia during stress: isolated premature ventricular beats  -  Impressions and recommendations: Normal study  IMPRESSIONS: Normal study  Prepared and signed by    Karen Singleton MD  Signed 2020 15:36:31        ----------------------------------------------------------------------------------------------    --------------------------------------------------------------------------------  ECHO:   Results for orders placed during the hospital encounter of 20   Echo complete with contrast if indicated    [facility] [address]  [facility], [address] [facility]  [phone]    Transthoracic Echocardiogram  2D, M-mode, Doppler, and Color Doppler    Study date:  2020    Patient: Kathy Stinson  MR number: [id number]  Account number: [de-identified]  : 1980  Age: 42 years  Gender: Female  Status: Outpatient  Location: [address] [place] [address] and [facility] [place]  Height: 61 in  Weight: 131 lb  BP: 132/ 80 mmHg    Indications: Chest pain; Palpitations    Diagnoses: R00 2 - Palpitations, R07 9 - Chest pain, unspecified    Sonographer:  MARIAM Collier, RDCS  Primary Physician:  Taty Lin MD  Referring Physician:  Patricio Rao MD  Group:  [facility]  Interpreting Physician:  Karen Singleton MD    IMPRESSIONS:  The study was within normal limits  SUMMARY    IMPRESSIONS:  The study was within normal limits  LEFT VENTRICLE:  Systolic function was normal  Ejection fraction was estimated in the range of 55 % to 65 % to be 60 %  Although no diagnostic regional wall motion abnormality was identified, this possibility cannot be completely excluded on the basis of this study  ATRIAL SEPTUM:  No defect or patent foramen ovale was identified  Mild IAS thickening    MITRAL VALVE:  Valve structure was normal     AORTIC VALVE:  The valve was trileaflet   Leaflets exhibited normal thickness and normal cuspal separation  HISTORY: PRIOR HISTORY: Hyperlipidemia    PROCEDURE: The study was performed in the [address]  This was a routine study  The transthoracic approach was used  The study included complete 2D imaging, M-mode, complete spectral Doppler, and color Doppler  The  heart rate was 66 bpm, at the start of the study  Images were obtained from the parasternal, apical, subcostal, and suprasternal notch acoustic windows  Image quality was adequate  LEFT VENTRICLE: Size was normal  Systolic function was normal  Ejection fraction was estimated in the range of 55 % to 65 % to be 60 %  Although no diagnostic regional wall motion abnormality was identified, this possibility cannot be  completely excluded on the basis of this study  Wall thickness was normal  DOPPLER: Left ventricular diastolic function parameters were normal     RIGHT VENTRICLE: The size was normal  Systolic function was normal  Wall thickness was normal     LEFT ATRIUM: Size was normal     ATRIAL SEPTUM: No defect or patent foramen ovale was identified  Mild IAS thickening    RIGHT ATRIUM: Size was normal     MITRAL VALVE: Valve structure was normal  There was normal leaflet separation  DOPPLER: The transmitral velocity was within the normal range  There was no evidence for stenosis  There was no significant regurgitation  AORTIC VALVE: The valve was trileaflet  Leaflets exhibited normal thickness and normal cuspal separation  DOPPLER: Transaortic velocity was within the normal range  There was no evidence for stenosis  There was no significant  regurgitation  TRICUSPID VALVE: The valve structure was normal  There was normal leaflet separation  DOPPLER: The transtricuspid velocity was within the normal range  There was no evidence for stenosis  There was no significant regurgitation  PULMONIC VALVE: Leaflets exhibited normal thickness, no calcification, and normal cuspal separation   DOPPLER: The transpulmonic velocity was within the normal range  There was no significant regurgitation  PERICARDIUM: There was no pericardial effusion  The pericardium was normal in appearance  AORTA: The root exhibited normal size  SYSTEMIC VEINS: IVC: The inferior vena cava was normal in size  SYSTEM MEASUREMENT TABLES    2D  %FS: 28 61 %  Ao Diam: 2 81 cm  EDV(Teich): 58 32 ml  EF(Cube): 63 61 %  EF(Teich): 55 96 %  ESV(Cube): 18 51 ml  ESV(Teich): 25 68 ml  IVSd: 0 99 cm  LA Area: 12 65 cm2  LA Diam: 3 27 cm  LVEDV MOD A4C: 60 93 ml  LVEF MOD A4C: 70 03 %  LVESV MOD A4C: 18 26 ml  LVIDd: 3 71 cm  LVIDs: 2 65 cm  LVLd A4C: 7 2 cm  LVLs A4C: 5 33 cm  LVPWd: 0 79 cm  RA Area: 13 59 cm2  RV Diam : 3 54 cm  SV MOD A4C: 42 67 ml  SV(Cube): 32 35 ml  SV(Teich): 32 64 ml    CW  TR Vmax: 2 27 m/s  TR maxP 68 mmHg    MM  TAPSE: 1 7 cm    PW  E': 0 09 m/s  E/E': 9 64  MV A Baljinder: 0 63 m/s  MV Dec [facility]: 4 54 m/s2  MV DecT: 200 06 ms  MV E Baljinder: 0 91 m/s  MV E/A Ratio: 1 45    [facility]    Prepared and electronically signed by    Ramona Bhatt MD  Signed 2020 15:44:59       No results found for this or any previous visit   --------------------------------------------------------------------------------  HOLTER  Results for orders placed during the hospital encounter of 19   Holter monitor - 24 hour    Narrative 24 HOUR HOLTER MONITOR    INDICATION: Palpitations    Average heart rate: 90 bpm   Lowest heart rate: 56 bpm  Highest heart rate: 138 bpm    VENTRICULAR ECTOPY - 0 0% of all monitored beats  Total number: 0   Runs: 0  Ventricular Couplets: 0   Ventricular Triplets: 0  Bigeminy: 0   Trigeminy: 0     SUPRAVENTRICULAR ECTOPY - 0 0% of all monitored beats  Total number: 31   Runs: 0  Supraventricular Couplets: 0   Bigeminy: 0   Trigeminy: 0     BRADYCARDIA  Slowest episode: 1:38 am, (minimum heart rate of 56 bpm)    This   corresponded with sinus bradycardia with no evidence of heart block  TACHYCARDIA  Fastest episode: occurred at 9:09 am, (maximum heart rate of 138 bpm)  This corresponded with sinus tachycardia  SYMPTOMS  The patient experienced symptoms of palpitations or dizziness during the   monitoring time period  These correlated with no significant arrhythmias  Impression 1) Normal Holter monitor of 24 hrs duration  2) Normal burden of ventricular and supraventricular ectopic beats  3) Symptoms correlated with NSR with no significant arrhythmias  Interpreting Physician: Pranay Orellana MD, [facility]       ======================================================    Lab Results   Component Value Date    WBC 3 62 (L) 05/22/2020    HGB 12 1 05/22/2020    HCT 37 0 05/22/2020    MCV 84 05/22/2020     05/22/2020      Lab Results   Component Value Date    SODIUM 137 01/29/2020    K 4 1 01/29/2020     01/29/2020    CO2 28 01/29/2020    BUN 8 01/29/2020    CREATININE 0 58 (L) 01/29/2020    GLUC 96 01/30/2019    CALCIUM 9 4 01/29/2020      Lab Results   Component Value Date    HGBA1C 4 8 09/01/2019      Lab Results   Component Value Date    CHOL 183 04/23/2018    CHOL 186 03/29/2014     Lab Results   Component Value Date    HDL 30 (L) 05/22/2020    HDL 28 (L) 01/29/2020    HDL 25 (L) 09/01/2019     Lab Results   Component Value Date    LDLCALC 39 05/22/2020    LDLCALC 65 01/29/2020    [address]  09/01/2019      Comment:      Calculated LDL invalid, triglycerides >400 mg/dl  This screening LDL is a calculated result  It does not have the accuracy of the Direct Measured LDL in the monitoring of patients with hyperlipidemia and/or statin therapy  Direct Measure LDL (DTS809) must be ordered separately in these patients  Lab Results   Component Value Date    TRIG 308 (H) 05/22/2020    TRIG 381 (H) 01/29/2020    TRIG 704 (H) 09/01/2019         Imaging:   I have personally reviewed pertinent reports          Portions of the record may have been created with voice recognition software  Occasional wrong word or "sound a like" substitutions may have occurred due to the inherent limitations of voice recognition software  Read the chart carefully and recognize, using context, where substitutions have occurred

## 2020-08-21 DIAGNOSIS — E78.1 HYPERTRIGLYCERIDEMIA: ICD-10-CM

## 2020-08-21 RX ORDER — NIACIN 1000 MG/1
TABLET, EXTENDED RELEASE ORAL
Qty: 30 TABLET | Refills: 5 | Status: SHIPPED | OUTPATIENT
Start: 2020-08-21 | End: 2021-01-26

## 2020-08-27 ENCOUNTER — OFFICE VISIT (OUTPATIENT)
Dept: URGENT CARE | Age: 40
End: 2020-08-27
Payer: COMMERCIAL

## 2020-08-27 ENCOUNTER — HOSPITAL ENCOUNTER (EMERGENCY)
Facility: HOSPITAL | Age: 40
Discharge: HOME | End: 2020-08-27
Attending: EMERGENCY MEDICINE | Admitting: EMERGENCY MEDICINE
Payer: COMMERCIAL

## 2020-08-27 ENCOUNTER — APPOINTMENT (EMERGENCY)
Dept: RADIOLOGY | Facility: HOSPITAL | Age: 40
End: 2020-08-27
Payer: COMMERCIAL

## 2020-08-27 VITALS
OXYGEN SATURATION: 97 % | RESPIRATION RATE: 20 BRPM | BODY MASS INDEX: 24.55 KG/M2 | WEIGHT: 130 LBS | HEIGHT: 61 IN | TEMPERATURE: 97 F | SYSTOLIC BLOOD PRESSURE: 132 MMHG | DIASTOLIC BLOOD PRESSURE: 93 MMHG | HEART RATE: 109 BPM

## 2020-08-27 VITALS
TEMPERATURE: 98 F | RESPIRATION RATE: 18 BRPM | SYSTOLIC BLOOD PRESSURE: 111 MMHG | HEART RATE: 80 BPM | OXYGEN SATURATION: 98 % | DIASTOLIC BLOOD PRESSURE: 74 MMHG

## 2020-08-27 DIAGNOSIS — R10.33 PERIUMBILICAL ABDOMINAL PAIN: Primary | ICD-10-CM

## 2020-08-27 DIAGNOSIS — R10.9 ABDOMINAL PAIN, UNSPECIFIED ABDOMINAL LOCATION: Primary | ICD-10-CM

## 2020-08-27 DIAGNOSIS — M79.10 MUSCLE PAIN: ICD-10-CM

## 2020-08-27 LAB
ALBUMIN SERPL BCP-MCNC: 4.3 G/DL (ref 3.5–5)
ALP SERPL-CCNC: 64 U/L (ref 46–116)
ALT SERPL W P-5'-P-CCNC: 52 U/L (ref 12–78)
ANION GAP SERPL CALCULATED.3IONS-SCNC: 6 MMOL/L (ref 4–13)
AST SERPL W P-5'-P-CCNC: 19 U/L (ref 5–45)
BILIRUB SERPL-MCNC: 0.53 MG/DL (ref 0.2–1)
BILIRUB UR QL STRIP: NEGATIVE
BUN SERPL-MCNC: 6 MG/DL (ref 5–25)
CALCIUM SERPL-MCNC: 9.9 MG/DL (ref 8.3–10.1)
CHLORIDE SERPL-SCNC: 105 MMOL/L (ref 100–108)
CLARITY UR: CLEAR
CO2 SERPL-SCNC: 27 MMOL/L (ref 21–32)
COLOR UR: YELLOW
CREAT SERPL-MCNC: 0.58 MG/DL (ref 0.6–1.3)
EXT PREG TEST URINE: NEGATIVE
EXT. CONTROL ED NAV: NORMAL
GFR SERPL CREATININE-BSD FRML MDRD: 118 ML/MIN/1.73SQ M
GLUCOSE SERPL-MCNC: 89 MG/DL (ref 65–140)
GLUCOSE UR STRIP-MCNC: NEGATIVE MG/DL
HGB UR QL STRIP.AUTO: NEGATIVE
KETONES UR STRIP-MCNC: NEGATIVE MG/DL
LEUKOCYTE ESTERASE UR QL STRIP: NEGATIVE
NITRITE UR QL STRIP: NEGATIVE
PH UR STRIP.AUTO: 8 [PH]
POTASSIUM SERPL-SCNC: 3.8 MMOL/L (ref 3.5–5.3)
PROT SERPL-MCNC: 8.6 G/DL (ref 6.4–8.2)
PROT UR STRIP-MCNC: NEGATIVE MG/DL
SODIUM SERPL-SCNC: 138 MMOL/L (ref 136–145)
SP GR UR STRIP.AUTO: 1.01 (ref 1–1.03)
UROBILINOGEN UR QL STRIP.AUTO: 0.2 E.U./DL

## 2020-08-27 PROCEDURE — 81003 URINALYSIS AUTO W/O SCOPE: CPT | Performed by: INTERNAL MEDICINE

## 2020-08-27 PROCEDURE — G1004 CDSM NDSC: HCPCS

## 2020-08-27 PROCEDURE — 99284 EMERGENCY DEPT VISIT MOD MDM: CPT | Performed by: EMERGENCY MEDICINE

## 2020-08-27 PROCEDURE — 96374 THER/PROPH/DIAG INJ IV PUSH: CPT

## 2020-08-27 PROCEDURE — 81025 URINE PREGNANCY TEST: CPT | Performed by: INTERNAL MEDICINE

## 2020-08-27 PROCEDURE — 36415 COLL VENOUS BLD VENIPUNCTURE: CPT | Performed by: INTERNAL MEDICINE

## 2020-08-27 PROCEDURE — 99284 EMERGENCY DEPT VISIT MOD MDM: CPT

## 2020-08-27 PROCEDURE — 80053 COMPREHEN METABOLIC PANEL: CPT | Performed by: INTERNAL MEDICINE

## 2020-08-27 PROCEDURE — 99213 OFFICE O/P EST LOW 20 MIN: CPT | Performed by: PHYSICIAN ASSISTANT

## 2020-08-27 PROCEDURE — 74177 CT ABD & PELVIS W/CONTRAST: CPT

## 2020-08-27 RX ORDER — KETOROLAC TROMETHAMINE 30 MG/ML
15 INJECTION, SOLUTION INTRAMUSCULAR; INTRAVENOUS ONCE
Status: COMPLETED | OUTPATIENT
Start: 2020-08-27 | End: 2020-08-27

## 2020-08-27 RX ADMIN — KETOROLAC TROMETHAMINE 15 MG: 30 INJECTION, SOLUTION INTRAMUSCULAR at 14:58

## 2020-08-27 RX ADMIN — IOHEXOL 100 ML: 350 INJECTION, SOLUTION INTRAVENOUS at 16:28

## 2020-08-27 NOTE — Clinical Note
Freddy Waldron was seen and treated in our emergency department on 8/27/2020  Diagnosis:     Yola Juárez  may return to school on return date  She may return on this date: 08/28/2020         If you have any questions or concerns, please don't hesitate to call        Vickie Bumpers, MD    ______________________________           _______________          _______________  Hospital Representative                              Date                                Time

## 2020-08-27 NOTE — DISCHARGE INSTRUCTIONS
DISCHARGE INSTRUCTIONS FROM the ED  1  Follow up with your primary care physician in one week in regards to recent ED visit  2  Take medications regularly   No changes were made to your previously prescribed meds  3  Come back to the ER if symptoms recur or worsen   4  Activity as tolerated  5  Take Tylenol/ Ibuprofen as advised for pain  Apply  OTC lidocaine patch to the hip for pain  - Keep12 hours on, 12 hours off

## 2020-08-27 NOTE — ED PROVIDER NOTES
History  Chief Complaint   Patient presents with    Abdominal Pain     pt with mid abd  pain starting today  hx of hernia repair  denies fevers and vomiting     HPI     Pt is Ivorian speaking  Interpretor services were used  Interpretor ID # [id number]    42year old patient presents to the ED with cc of abdominal pain and bilateral hip pain with an onset of 1 day  Pt was seen at urgent care and  Discharged home with advise to  See PCP in 2-3 days or come to ED if symptoms worsen for possible need for CT  Abdominal pain is periumbilical with no [id number],'73/66 ' in intensity, with no known aggravating or relieving factors  No relation of pain to food intake  She has no nausea, vomiting, diarrhea, or constipation  She had a normal non bloody bowel movement this morning  Her abdominal pain is at the site of prior  Umbilical hernia repair  She also reports bilateral hip pain, worse with movements  She denies any rash, recent trauma or falls  She works in a warehouse and  Lifts heavy objects  She denies any radiating pain down the LE, numbness, tingling, or loss of sensation, back pain, bowel or bladder incontinence  No meds taken for pain PTA  She also c/o increased urinary frequency, dysuria of 2 days  Prior to Admission Medications   Prescriptions Last Dose Informant Patient Reported? Taking?    VASCEPA 1 g CAPS   Yes No   Sig: TOME 2 C PSULAS POR V A ORAL DOS VECES AL D A   atenolol (TENORMIN) 50 mg tablet   No No   Sig: Take 1 tablet (50 mg total) by mouth daily   benzonatate (TESSALON) 200 MG capsule   No No   Sig: Take 1 capsule (200 mg total) by mouth 3 (three) times a day as needed for cough   Patient not taking: Reported on 8/27/2020   clotrimazole-betamethasone (LOTRISONE) 1-0 05 % cream   No No   Sig: Apply topical over affected areas twice a day   Patient not taking: Reported on 8/27/2020   ergocalciferol (VITAMIN D2) 50,000 units   No No   Sig: Take one capsule weekly by mouth   niacin (NIASPAN) 1000 MG CR tablet   No No   Sig: TAKE 1 TABLET BY MOUTH DAILY AT BEDTIME   Patient not taking: Reported on 2020   pantoprazole (PROTONIX) 40 mg tablet 2020 at Unknown time  No Yes   Sig: TAKE 1 TABLET BY MOUTH EVERY DAY   rosuvastatin (CRESTOR) 20 MG tablet   Yes Yes      Facility-Administered Medications: None       Past Medical History:   Diagnosis Date    COVID-19 2020    Esophagitis     H  pylori infection 2018    Hiatal hernia     High triglycerides     Hyperlipidemia     Hypertension     Ventral hernia        Past Surgical History:   Procedure Laterality Date     SECTION      3X--,,     COLONOSCOPY N/A 2018    Procedure: COLONOSCOPY;  Surgeon: Lillie Younger MD;  Location: [facility] GI LAB; Service: Gastroenterology    ESOPHAGOGASTRODUODENOSCOPY N/A 2018    Procedure: ESOPHAGOGASTRODUODENOSCOPY (EGD); Surgeon: Lillie Younger MD;  Location: [facility] GI LAB; Service: Gastroenterology    HERNIA REPAIR      MN REPAIR INCISIONAL HERNIA,FLAVIO N/A 2019    Procedure: REPAIR HERNIA VENTRAL;  Surgeon: Sigifredo Bella DO;  Location:  MAIN OR;  Service: General    TUBAL LIGATION      UPPER GASTROINTESTINAL ENDOSCOPY         Family History   Problem Relation Age of Onset    Uterine cancer Mother     Heart disease Mother     Hypertension Mother     Cervical cancer Mother     Stomach cancer Father     Diabetes Brother     Hyperlipidemia Brother     Asthma Son      I have reviewed and agree with the history as documented  E-Cigarette/Vaping     E-Cigarette/Vaping Substances     Social History     Tobacco Use    Smoking status: Never Smoker    Smokeless tobacco: Never Used   Substance Use Topics    Alcohol use: No    Drug use: No        Review of Systems   Constitutional: Negative for chills, fatigue and fever  HENT: Negative for trouble swallowing  Respiratory: Negative for cough, chest tightness and shortness of breath      Cardiovascular: Negative for chest pain, palpitations and leg swelling  Gastrointestinal: Positive for abdominal pain  Negative for constipation, diarrhea, nausea and vomiting  Genitourinary: Positive for dysuria and frequency  Musculoskeletal:        Bilateral hip pain   Skin: Negative for rash and wound  Neurological: Negative for light-headedness and headaches  Physical Exam  ED Triage Vitals   Temperature Pulse Respirations Blood Pressure SpO2   08/27/20 1315 08/27/20 1315 08/27/20 1315 08/27/20 1315 08/27/20 1315   98 °F (36 7 °C) 89 20 138/82 97 %      Temp Source Heart Rate Source Patient Position - Orthostatic VS BP Location FiO2 (%)   08/27/20 1315 08/27/20 1315 08/27/20 1315 08/27/20 1315 --   Oral Monitor Lying Right arm       Pain Score       08/27/20 1458       Worst Possible Pain             Orthostatic Vital Signs  Vitals:    08/27/20 1500 08/27/20 1530 08/27/20 1700 08/27/20 1730   BP: 124/91 120/82 136/77 111/74   Pulse: 86 88 86 80   Patient Position - Orthostatic VS:   Lying Lying       Physical Exam     General: appears stated age, in no acute distress  HEENT: atraumatic, PERRLA, moist mucosa,  No scleral icterus  Respiratory: normal chest wall expansion, CTA B, no wheezes or crackles  Cardiovascular: RRR, no murmur, Normal S1 and S2, no pedal edema  Abdomen: Soft,  Mild tenderness to deep palpation in the periumbilical region, non-distended, normal bowel sounds in all quadrants, no guarding or rigidity  No CVA tenderness  Musculoskeletal: normal ROM in upper and lower extremities,  No tenderness to palpation of midline thoracic , lumbar spine  Tenderness to palpation  Of bilateral gluteal muscles  Pt ambulated to bathroom without difficulty    Pain in hip with leg elevation     Integumentary: warm, dry  Neurological: a/o x3  Psychiatric: cooperative     ED Medications  Medications   ketorolac (TORADOL) injection 15 mg (15 mg Intravenous Given 8/27/20 1458)   iohexol (OMNIPAQUE) 350 MG/ML injection (MULTI-DOSE) 100 mL (100 mL Intravenous Given 8/27/20 1628)       Diagnostic Studies  Results Reviewed     Procedure Component Value Units Date/Time    Comprehensive metabolic panel [[id number]]  (Abnormal) Collected:  08/27/20 1530    Lab Status:  Final result Specimen:  Blood from Arm, Right Updated:  08/27/20 1602     Sodium 138 mmol/L      Potassium 3 8 mmol/L      Chloride 105 mmol/L      CO2 27 mmol/L      ANION GAP 6 mmol/L      BUN 6 mg/dL      Creatinine 0 58 mg/dL      Glucose 89 mg/dL      Calcium 9 9 mg/dL      AST 19 U/L      ALT 52 U/L      Alkaline Phosphatase 64 U/L      Total Protein 8 6 g/dL      Albumin 4 3 g/dL      Total Bilirubin 0 53 mg/dL      eGFR 118 ml/min/1 73sq m     Narrative:       National Kidney Disease Foundation guidelines for Chronic Kidney Disease (CKD):     Stage 1 with normal or high GFR (GFR > 90 mL/min/1 73 square meters)    Stage 2 Mild CKD (GFR = 60-89 mL/min/1 73 square meters)    Stage 3A Moderate CKD (GFR = 45-59 mL/min/1 73 square meters)    Stage 3B Moderate CKD (GFR = 30-44 mL/min/1 73 square meters)    Stage 4 Severe CKD (GFR = 15-29 mL/min/1 73 square meters)    Stage 5 End Stage CKD (GFR <15 mL/min/1 73 square meters)  Note: GFR calculation is accurate only with a steady state creatinine    UA w Reflex to Microscopic w Reflex to Culture [[id number]] Collected:  08/27/20 1414    Lab Status:  Final result Specimen:  Urine, Clean Catch Updated:  08/27/20 1430     Color, UA Yellow     Clarity, UA Clear     Specific Gravity, UA 1 013     pH, UA 8 0     Leukocytes, UA Negative     Nitrite, UA Negative     Protein, UA Negative mg/dl      Glucose, UA Negative mg/dl      Ketones, UA Negative mg/dl      Urobilinogen, UA 0 2 E U /dl      Bilirubin, UA Negative     Blood, UA Negative    POCT pregnancy, urine [[id number]]  (Normal) Resulted:  08/27/20 1417    Lab Status:  Final result Updated:  08/27/20 1417     EXT PREG TEST UR (Ref: Negative) negative     Control valid                 CT abdomen pelvis with contrast   Final Result by Tyrel Warner MD (08/27 1706)   1  No acute intra-abdominal pathology  Workstation performed: [id number]             Reeval:   -  [id number] -  Pt reports no relief of pain with dose of Toradol  Will get basic labs, and CT abdomen pelvis  Pt is agreeable  - 1700  - Pt reevaluated, sitting comfortably talking to niece  With pt's permission, niece helped with translation  Pain has improved  Discussed with pt that if CT is negative she will mostly be discharged home  She is advised Tylenol/ Motrin as advised for pain, and lidoderm patch  All return precautions were discussed  Dispo pending CT results which were signed out  MDM    This 41 y/o F presents today with abdominal and bilateral lower hip pain, and dysuria  UA was negative for a UTI  Symptoms are thought to be 2/2 msk in origin, but since there was no significant relief with pain meds, CT abdomen and CMP were ordered  CMP  Is within normal limits  CT ordered and results pending  Case signed out pending CT with plans to discharge home if CT is negative  Disposition  Final diagnoses:   Periumbilical abdominal pain   Muscle pain     Time reflects when diagnosis was documented in both MDM as applicable and the Disposition within this note     Time User Action Codes Description Comment    8/27/2020  4:16 PM Assunta Louder Add [S49 06] Periumbilical abdominal pain     8/27/2020  4:18 PM Assunta Louder Add [M79 10] Muscle pain       ED Disposition     ED Disposition Condition Date/Time Comment    Discharge Stable Thu Aug 27, 2020  4:19 PM Modesto Thorpe discharge to home/self care              Follow-up Information     Follow up With Specialties Details Why Contact Info    Mayra Yu MD Family Medicine In 1 week  [address]  [address] [facility] [address]  [address] [address]            Discharge Medication List as of 8/27/2020  6:09 PM      CONTINUE these medications which have NOT CHANGED    Details   atenolol (TENORMIN) 50 mg tablet Take 1 tablet (50 mg total) by mouth daily, Starting Fri 5/8/2020, Normal      ergocalciferol (VITAMIN D2) 50,000 units Take one capsule weekly by mouth, Normal      rosuvastatin (CRESTOR) 20 MG tablet Starting Fri 5/29/2020, Historical Med      benzonatate (TESSALON) 200 MG capsule Take 1 capsule (200 mg total) by mouth 3 (three) times a day as needed for cough, Starting Wed 6/10/2020, Normal      clotrimazole-betamethasone (LOTRISONE) 1-0 05 % cream Apply topical over affected areas twice a day, Normal      niacin (NIASPAN) 1000 MG CR tablet TAKE 1 TABLET BY MOUTH DAILY AT BEDTIME, Normal      pantoprazole (PROTONIX) 40 mg tablet TAKE 1 TABLET BY MOUTH EVERY DAY, Normal      VASCEPA 1 g CAPS TOME 2 C PSULAS POR V A ORAL DOS VECES AL D A, Historical Med           No discharge procedures on file  PDMP Review     None           ED Provider  Attending physically available and evaluated Juan Miguel File  I managed the patient along with the ED Attending      Electronically Signed by         Savanna Carter MD  08/28/20 7450

## 2020-08-27 NOTE — PROGRESS NOTES
[address] [facility]        NAME: Marjan Bhandari is a 42 y o  female  : 1980    MRN: [id number]  DATE: 2020  TIME: 6:57 PM    /93   Pulse (!) 109   Temp (!) 97 °F (36 1 °C)   Resp 20   Ht 5' 1" (1 549 m)   Wt 59 kg (130 lb)   LMP 2020 (Approximate)   SpO2 97%   BMI 24 56 kg/m²     Assessment and Plan   Abdominal pain, unspecified abdominal location [R10 9]  1  Abdominal pain, unspecified abdominal location  POCT urine dip    POCT urine HCG         Patient Instructions       Follow up with PCP in 3-5 days  Proceed to  ER if symptoms worsen  Chief Complaint     Chief Complaint   Patient presents with    Flank Pain     Pt started today with bilateral flank pain and mild pain at site of umbilical hernia repair one year ago  Denies N, V or D  Pt boxes items at work which can be heavy  No OTC meds taken  History of Present Illness       Pt with abdomen pain and back pain since working this morning with lifting things  Pain at sight of prior hernia surgery      Abdominal Pain   This is a new problem  The current episode started today  The onset quality is gradual  The problem occurs constantly  The most recent episode lasted 1 day  The problem has been unchanged  The pain is located in the epigastric region  The pain is moderate  The quality of the pain is sharp and cramping  Nothing aggravates the pain  The pain is relieved by nothing  She has tried nothing for the symptoms  The treatment provided no relief  Her past medical history is significant for abdominal surgery  There is no history of colon cancer, Crohn's disease, gallstones, GERD, irritable bowel syndrome, pancreatitis, PUD or ulcerative colitis  Review of Systems   Review of Systems   Constitutional: Negative  HENT: Negative  Eyes: Negative  Respiratory: Negative  Cardiovascular: Negative  Gastrointestinal: Positive for abdominal pain  Endocrine: Negative  Genitourinary: Negative  Musculoskeletal: Negative  Skin: Negative  Allergic/Immunologic: Negative  Neurological: Negative  Hematological: Negative  Psychiatric/Behavioral: Negative  All other systems reviewed and are negative  Current Medications       Current Outpatient Medications:     atenolol (TENORMIN) 50 mg tablet, Take 1 tablet (50 mg total) by mouth daily, Disp: 30 tablet, Rfl: 5    ergocalciferol (VITAMIN D2) 50,000 units, Take one capsule weekly by mouth, Disp: 4 capsule, Rfl: 5    rosuvastatin (CRESTOR) 20 MG tablet, , Disp: , Rfl:     benzonatate (TESSALON) 200 MG capsule, Take 1 capsule (200 mg total) by mouth 3 (three) times a day as needed for cough (Patient not taking: Reported on 2020), Disp: 20 capsule, Rfl: 0    clotrimazole-betamethasone (LOTRISONE) 1-0 05 % cream, Apply topical over affected areas twice a day (Patient not taking: Reported on 2020), Disp: 30 g, Rfl: 5    niacin (NIASPAN) 1000 MG CR tablet, TAKE 1 TABLET BY MOUTH DAILY AT BEDTIME (Patient not taking: Reported on 2020), Disp: 30 tablet, Rfl: 5    pantoprazole (PROTONIX) 40 mg tablet, TAKE 1 TABLET BY MOUTH EVERY DAY, Disp: 30 tablet, Rfl: 3    VASCEPA 1 g CAPS, TOME 2 C PSULAS POR V A ORAL DOS VECES AL D A, Disp: , Rfl:   No current facility-administered medications for this visit       Current Allergies     Allergies as of 2020    (No Known Allergies)            The following portions of the patient's history were reviewed and updated as appropriate: allergies, current medications, past family history, past medical history, past social history, past surgical history and problem list      Past Medical History:   Diagnosis Date    COVID-19 2020    Esophagitis     H  pylori infection 2018    Hiatal hernia     High triglycerides     Hyperlipidemia     Hypertension     Ventral hernia        Past Surgical History:   Procedure Laterality Date     SECTION      3X--,,     COLONOSCOPY N/A 12/31/2018    Procedure: COLONOSCOPY;  Surgeon: Gary Houser MD;  Location: [facility] GI LAB; Service: Gastroenterology    ESOPHAGOGASTRODUODENOSCOPY N/A 12/31/2018    Procedure: ESOPHAGOGASTRODUODENOSCOPY (EGD); Surgeon: Gary Houser MD;  Location: [facility]; Service: Gastroenterology    HERNIA REPAIR      ME REPAIR INCISIONAL HERNIA,FLAVIO N/A 5/6/2019    Procedure: REPAIR HERNIA VENTRAL;  Surgeon: Bakari Diez DO;  Location: [facility] MAIN OR;  Service: General    TUBAL LIGATION      UPPER GASTROINTESTINAL ENDOSCOPY         Family History   Problem Relation Age of Onset    Uterine cancer Mother     Heart disease Mother     Hypertension Mother     Cervical cancer Mother     Stomach cancer Father     Diabetes Brother     Hyperlipidemia Brother     Asthma Son          Medications have been verified  Objective   /93   Pulse (!) 109   Temp (!) 97 °F (36 1 °C)   Resp 20   Ht 5' 1" (1 549 m)   Wt 59 kg (130 lb)   LMP 08/06/2020 (Approximate)   SpO2 97%   BMI 24 56 kg/m²        Physical Exam     Physical Exam  Vitals signs and nursing note reviewed  Constitutional:       Appearance: Normal appearance  She is normal weight  Comments: Discussed with pt about er eval for further testing  Possible ct scan  Pt will go to er    HENT:      Head: Normocephalic and atraumatic  Right Ear: Tympanic membrane, ear canal and external ear normal       Left Ear: Tympanic membrane, ear canal and external ear normal       Nose: Nose normal       Mouth/Throat:      Mouth: Mucous membranes are moist       Pharynx: Oropharynx is clear  Eyes:      Extraocular Movements: Extraocular movements intact  Conjunctiva/sclera: Conjunctivae normal       Pupils: Pupils are equal, round, and reactive to light  Neck:      Musculoskeletal: Normal range of motion and neck supple  Cardiovascular:      Rate and Rhythm: Normal rate and regular rhythm  Pulses: Normal pulses  Heart sounds: Normal heart sounds  Pulmonary:      Effort: Pulmonary effort is normal       Breath sounds: Normal breath sounds  Abdominal:      General: Abdomen is flat  Bowel sounds are normal       Comments: Mid epigastric tender at sight of hernia repair   +periumbilical tender        Musculoskeletal: Normal range of motion  Comments: bilat lumbar tenderness no sciatic notch pain dtr wnl great toe ext strong bilat  No paresthesia   Skin:     General: Skin is warm  Capillary Refill: Capillary refill takes less than 2 seconds  Neurological:      General: No focal deficit present  Mental Status: She is alert and oriented to person, place, and time  Psychiatric:         Mood and Affect: Mood normal          Thought Content:  Thought content normal

## 2020-08-27 NOTE — PATIENT INSTRUCTIONS
Abdominal Pain   WHAT YOU NEED TO KNOW:   Abdominal pain can be dull, achy, or sharp  You may have pain in one area of your abdomen, or in your entire abdomen  Your pain may be caused by a condition such as constipation, food sensitivity or poisoning, infection, or a blockage  Abdominal pain can also be from a hernia, appendicitis, or an ulcer  Liver, gallbladder, or kidney conditions can also cause abdominal pain  The cause of your abdominal pain may be unknown  DISCHARGE INSTRUCTIONS:   Return to the emergency department if:   · You have new chest pain or shortness of breath  · You have pulsing pain in your upper abdomen or lower back that suddenly becomes constant  · Your pain is in the right lower abdominal area and worsens with movement  · You have a fever over 100 4°F (38°C) or shaking chills  · You are vomiting and cannot keep food or liquids down  · Your pain does not improve or gets worse over the next 8 to 12 hours  · You see blood in your vomit or bowel movements, or they look black and tarry  · Your skin or the whites of your eyes turn yellow  · You are a woman and have a large amount of vaginal bleeding that is not your monthly period  Contact your healthcare provider if:   · You have pain in your lower back  · You are a man and have pain in your testicles  · You have pain when you urinate  · You have questions or concerns about your condition or care  Follow up with your healthcare provider within 24 hours or as directed:  Write down your questions so you remember to ask them during your visits  Medicines:   · Medicines  may be given to calm your stomach and prevent vomiting or to decrease pain  Ask how to take pain medicine safely  · Take your medicine as directed  Contact your healthcare provider if you think your medicine is not helping or if you have side effects  Tell him of her if you are allergic to any medicine   Keep a list of the medicines, vitamins, and herbs you take  Include the amounts, and when and why you take them  Bring the list or the pill bottles to follow-up visits  Carry your medicine list with you in case of an emergency  © 2017 [address] [employer] Information is for End User's use only and may not be sold, redistributed or otherwise used for commercial purposes  All illustrations and images included in CareNotes® are the copyrighted property of A [employer]  or Randy Joshi  The above information is an  only  It is not intended as medical advice for individual conditions or treatments  Talk to your doctor, nurse or pharmacist before following any medical regimen to see if it is safe and effective for you

## 2020-08-29 NOTE — ED ATTENDING ATTESTATION
8/27/2020  I, Chepe Dee MD, saw and evaluated the patient  I have discussed the patient with the resident/non-physician practitioner and agree with the resident's/non-physician practitioner's findings, Plan of Care, and MDM as documented in the resident's/non-physician practitioner's note, except where noted  All available labs and Radiology studies were reviewed  I was present for key portions of any procedure(s) performed by the resident/non-physician practitioner and I was immediately available to provide assistance  At this point I agree with the current assessment done in the Emergency Department  I have conducted an independent evaluation of this patient a history and physical is as follows:      Patient is a 45-year-old female who presents with lower abdominal pain referred from urgent care for evaluation and imaging  Vital signs reviewed    Abdomen soft nontender nondistended normal bowel sounds no signs of peritonitis      Impression nonspecific abdominal pain will check lab CTAP pain control reassess      ED Course         Critical Care Time  Procedures

## 2020-10-03 DIAGNOSIS — K21.9 GASTROESOPHAGEAL REFLUX DISEASE WITHOUT ESOPHAGITIS: ICD-10-CM

## 2020-10-05 RX ORDER — PANTOPRAZOLE SODIUM 40 MG/1
TABLET, DELAYED RELEASE ORAL
Qty: 30 TABLET | Refills: 3 | Status: SHIPPED | OUTPATIENT
Start: 2020-10-05 | End: 2021-02-08

## 2020-10-31 DIAGNOSIS — R00.2 INTERMITTENT PALPITATIONS: ICD-10-CM

## 2020-10-31 DIAGNOSIS — R07.89 OTHER CHEST PAIN: ICD-10-CM

## 2020-11-02 RX ORDER — ATENOLOL 50 MG/1
TABLET ORAL
Qty: 30 TABLET | Refills: 5 | Status: SHIPPED | OUTPATIENT
Start: 2020-11-02 | End: 2021-03-26 | Stop reason: ALTCHOICE

## 2020-11-24 ENCOUNTER — VBI (OUTPATIENT)
Dept: ADMINISTRATIVE | Facility: OTHER | Age: 40
End: 2020-11-24

## 2020-12-11 ENCOUNTER — OFFICE VISIT (OUTPATIENT)
Dept: FAMILY MEDICINE CLINIC | Facility: CLINIC | Age: 40
End: 2020-12-11
Payer: COMMERCIAL

## 2020-12-11 VITALS
HEART RATE: 91 BPM | SYSTOLIC BLOOD PRESSURE: 122 MMHG | TEMPERATURE: 98 F | BODY MASS INDEX: 24.17 KG/M2 | DIASTOLIC BLOOD PRESSURE: 80 MMHG | HEIGHT: 61 IN | RESPIRATION RATE: 18 BRPM | OXYGEN SATURATION: 98 % | WEIGHT: 128 LBS

## 2020-12-11 DIAGNOSIS — Z12.4 SCREENING FOR CERVICAL CANCER: ICD-10-CM

## 2020-12-11 DIAGNOSIS — Z23 NEEDS FLU SHOT: ICD-10-CM

## 2020-12-11 DIAGNOSIS — L30.9 ECZEMA, UNSPECIFIED TYPE: ICD-10-CM

## 2020-12-11 DIAGNOSIS — Z20.822 EXPOSURE TO COVID-19 VIRUS: ICD-10-CM

## 2020-12-11 DIAGNOSIS — L20.89 FLEXURAL ATOPIC DERMATITIS: Primary | ICD-10-CM

## 2020-12-11 PROCEDURE — 3079F DIAST BP 80-89 MM HG: CPT | Performed by: NURSE PRACTITIONER

## 2020-12-11 PROCEDURE — 3008F BODY MASS INDEX DOCD: CPT | Performed by: NURSE PRACTITIONER

## 2020-12-11 PROCEDURE — 3074F SYST BP LT 130 MM HG: CPT | Performed by: NURSE PRACTITIONER

## 2020-12-11 PROCEDURE — 1036F TOBACCO NON-USER: CPT | Performed by: NURSE PRACTITIONER

## 2020-12-11 PROCEDURE — 99214 OFFICE O/P EST MOD 30 MIN: CPT | Performed by: NURSE PRACTITIONER

## 2020-12-11 RX ORDER — CLOTRIMAZOLE AND BETAMETHASONE DIPROPIONATE 10; .64 MG/G; MG/G
CREAM TOPICAL
Qty: 30 G | Refills: 5 | Status: SHIPPED | OUTPATIENT
Start: 2020-12-11 | End: 2021-05-03 | Stop reason: ALTCHOICE

## 2020-12-14 PROCEDURE — 90471 IMMUNIZATION ADMIN: CPT | Performed by: NURSE PRACTITIONER

## 2020-12-14 PROCEDURE — 90686 IIV4 VACC NO PRSV 0.5 ML IM: CPT | Performed by: NURSE PRACTITIONER

## 2021-01-01 DIAGNOSIS — E55.9 VITAMIN D DEFICIENCY: ICD-10-CM

## 2021-01-02 ENCOUNTER — APPOINTMENT (OUTPATIENT)
Dept: LAB | Facility: HOSPITAL | Age: 41
End: 2021-01-02
Payer: COMMERCIAL

## 2021-01-02 DIAGNOSIS — Z20.822 EXPOSURE TO COVID-19 VIRUS: ICD-10-CM

## 2021-01-02 LAB
SARS-COV-2 IGG SERPL QL IA: REACTIVE
SARS-COV-2 IGG+IGM SERPL QL IA: REACTIVE

## 2021-01-02 PROCEDURE — 36415 COLL VENOUS BLD VENIPUNCTURE: CPT

## 2021-01-02 PROCEDURE — 86769 SARS-COV-2 COVID-19 ANTIBODY: CPT

## 2021-01-02 RX ORDER — ERGOCALCIFEROL 1.25 MG/1
CAPSULE ORAL
Qty: 4 CAPSULE | Refills: 5 | Status: SHIPPED | OUTPATIENT
Start: 2021-01-02 | End: 2021-02-19 | Stop reason: ALTCHOICE

## 2021-01-18 ENCOUNTER — OFFICE VISIT (OUTPATIENT)
Dept: GASTROENTEROLOGY | Facility: CLINIC | Age: 41
End: 2021-01-18
Payer: COMMERCIAL

## 2021-01-18 ENCOUNTER — LAB (OUTPATIENT)
Dept: LAB | Facility: HOSPITAL | Age: 41
End: 2021-01-18
Attending: INTERNAL MEDICINE
Payer: COMMERCIAL

## 2021-01-18 VITALS
SYSTOLIC BLOOD PRESSURE: 150 MMHG | TEMPERATURE: 95.7 F | HEART RATE: 88 BPM | WEIGHT: 127 LBS | BODY MASS INDEX: 24 KG/M2 | DIASTOLIC BLOOD PRESSURE: 96 MMHG

## 2021-01-18 DIAGNOSIS — R10.13 EPIGASTRIC PAIN: Primary | ICD-10-CM

## 2021-01-18 DIAGNOSIS — R10.13 EPIGASTRIC PAIN: ICD-10-CM

## 2021-01-18 LAB
ALBUMIN SERPL BCP-MCNC: 4.7 G/DL (ref 3.5–5)
ALP SERPL-CCNC: 69 U/L (ref 46–116)
ALT SERPL W P-5'-P-CCNC: 59 U/L (ref 12–78)
ANION GAP SERPL CALCULATED.3IONS-SCNC: 5 MMOL/L (ref 4–13)
AST SERPL W P-5'-P-CCNC: 30 U/L (ref 5–45)
BASOPHILS # BLD AUTO: 0.02 THOUSANDS/ΜL (ref 0–0.1)
BASOPHILS NFR BLD AUTO: 1 % (ref 0–1)
BILIRUB SERPL-MCNC: 0.54 MG/DL (ref 0.2–1)
BUN SERPL-MCNC: 9 MG/DL (ref 5–25)
CALCIUM SERPL-MCNC: 10.5 MG/DL (ref 8.3–10.1)
CHLORIDE SERPL-SCNC: 106 MMOL/L (ref 100–108)
CO2 SERPL-SCNC: 29 MMOL/L (ref 21–32)
CREAT SERPL-MCNC: 0.57 MG/DL (ref 0.6–1.3)
EOSINOPHIL # BLD AUTO: 0.02 THOUSAND/ΜL (ref 0–0.61)
EOSINOPHIL NFR BLD AUTO: 1 % (ref 0–6)
ERYTHROCYTE [DISTWIDTH] IN BLOOD BY AUTOMATED COUNT: 13.1 % (ref 11.6–15.1)
GFR SERPL CREATININE-BSD FRML MDRD: 118 ML/MIN/1.73SQ M
GLUCOSE P FAST SERPL-MCNC: 85 MG/DL (ref 65–99)
HCT VFR BLD AUTO: 38.6 % (ref 34.8–46.1)
HGB BLD-MCNC: 12.8 G/DL (ref 11.5–15.4)
IMM GRANULOCYTES # BLD AUTO: 0 THOUSAND/UL (ref 0–0.2)
IMM GRANULOCYTES NFR BLD AUTO: 0 % (ref 0–2)
LYMPHOCYTES # BLD AUTO: 1.48 THOUSANDS/ΜL (ref 0.6–4.47)
LYMPHOCYTES NFR BLD AUTO: 37 % (ref 14–44)
MCH RBC QN AUTO: 27.9 PG (ref 26.8–34.3)
MCHC RBC AUTO-ENTMCNC: 33.2 G/DL (ref 31.4–37.4)
MCV RBC AUTO: 84 FL (ref 82–98)
MONOCYTES # BLD AUTO: 0.36 THOUSAND/ΜL (ref 0.17–1.22)
MONOCYTES NFR BLD AUTO: 9 % (ref 4–12)
NEUTROPHILS # BLD AUTO: 2.18 THOUSANDS/ΜL (ref 1.85–7.62)
NEUTS SEG NFR BLD AUTO: 52 % (ref 43–75)
NRBC BLD AUTO-RTO: 0 /100 WBCS
PLATELET # BLD AUTO: 271 THOUSANDS/UL (ref 149–390)
PMV BLD AUTO: 11.2 FL (ref 8.9–12.7)
POTASSIUM SERPL-SCNC: 3.7 MMOL/L (ref 3.5–5.3)
PROT SERPL-MCNC: 8.9 G/DL (ref 6.4–8.2)
RBC # BLD AUTO: 4.59 MILLION/UL (ref 3.81–5.12)
SODIUM SERPL-SCNC: 140 MMOL/L (ref 136–145)
WBC # BLD AUTO: 4.06 THOUSAND/UL (ref 4.31–10.16)

## 2021-01-18 PROCEDURE — 85025 COMPLETE CBC W/AUTO DIFF WBC: CPT

## 2021-01-18 PROCEDURE — 99214 OFFICE O/P EST MOD 30 MIN: CPT | Performed by: INTERNAL MEDICINE

## 2021-01-18 PROCEDURE — 36415 COLL VENOUS BLD VENIPUNCTURE: CPT

## 2021-01-18 PROCEDURE — 80053 COMPREHEN METABOLIC PANEL: CPT

## 2021-01-18 NOTE — PROGRESS NOTES
[facility] - Outpatient Consultation  Alissa Pineda 47 y o  female MRN: [id number]  Encounter: [id number]          ASSESSMENT AND PLAN:      1  Epigastric pain    - CBC and differential; Future  - Comprehensive metabolic panel; Future  - CT abdomen pelvis w contrast; Future    If blood work and CT is within normal limits will consider upper endoscopy to rule out peptic ulcer disease and other causes of abdominal pain   ______________________________________________________________    HPI:  Alissa Pineda is a 47y o  year old female who presents to the office for evaluation of severe abdominal pain and associated nausea  She says pain started Saturday  She describes the pain as 10/10 severity and localized to the epigastric region  She went to the ED in August 2020 for similar reasons but pain was further up  CT was completed but normal   I have personally viewed the images in PACS  She was seen at our office   It is not possible she is pregnant per patient  She had last menstrual period 1 month ago  She was seen by our office in Feb 202 and was given pantoprazole and reglan  She was recommended to have gastric emptying study but this wasn't completed  She had had EGD with hiatus hernia, LA grade A esophagitis and biopsies were positive for h pylori, repeat stool antigen confirmed eradication after treatment  She is having significant reflux and feels like she has a ball in her stomach  REVIEW OF SYSTEMS:    CONSTITUTIONAL: Denies any fever, chills, rigors, and weight loss  HEENT: No earache or tinnitus  Denies hearing loss or visual disturbances  CARDIOVASCULAR: No chest pain or palpitations  RESPIRATORY: Denies any cough, hemoptysis, shortness of breath or dyspnea on exertion  GASTROINTESTINAL: As noted in the History of Present Illness  GENITOURINARY: No problems with urination  Denies any hematuria or dysuria  NEUROLOGIC: No dizziness or vertigo, denies headaches  MUSCULOSKELETAL: Denies any muscle or joint pain  SKIN: Denies skin rashes or itching  ENDOCRINE: Denies excessive thirst  Denies intolerance to heat or cold  PSYCHOSOCIAL: Denies depression or anxiety  Denies any recent memory loss  Historical Information   Past Medical History:   Diagnosis Date    COVID-19 2020    Esophagitis     H  pylori infection 2018    Hiatal hernia     High triglycerides     Hyperlipidemia     Hypertension     Ventral hernia      Past Surgical History:   Procedure Laterality Date     SECTION      3X--,,     COLONOSCOPY N/A 2018    Procedure: COLONOSCOPY;  Surgeon: Lisa Ness MD;  Location: [facility]; Service: Gastroenterology    ESOPHAGOGASTRODUODENOSCOPY N/A 2018    Procedure: ESOPHAGOGASTRODUODENOSCOPY (EGD); Surgeon: Lisa Ness MD;  Location: [facility];   Service: Gastroenterology    HERNIA REPAIR      [place] REPAIR INCISIONAL HERNIA,FLAVIO N/A 2019    Procedure: REPAIR HERNIA VENTRAL;  Surgeon: Aimee Leggett DO;  Location: [address] [place] [address];  Service: General    TUBAL LIGATION      UPPER GASTROINTESTINAL ENDOSCOPY       Social History   Social History     Substance and Sexual Activity   Alcohol Use No     Social History     Substance and Sexual Activity   Drug Use No     Social History     Tobacco Use   Smoking Status Never Smoker   Smokeless Tobacco Never Used     Family History   Problem Relation Age of Onset    Uterine cancer Mother     Heart disease Mother     Hypertension Mother     Cervical cancer Mother     Stomach cancer Father     Diabetes Brother     Hyperlipidemia Brother     Asthma Son        Meds/Allergies       Current Outpatient Medications:     ergocalciferol (VITAMIN D2) 50,000 units    pantoprazole (PROTONIX) 40 mg tablet    rosuvastatin (CRESTOR) 20 MG tablet    atenolol (TENORMIN) 50 mg tablet    benzonatate (TESSALON) 200 MG capsule    clotrimazole-betamethasone (Alvaro Reeks) 1-0 05 % cream    niacin (NIASPAN) 1000 MG CR tablet    VASCEPA 1 g CAPS    Allergies   Allergen Reactions    No Known Allergies            Objective     Blood pressure 150/96, pulse 88, temperature (!) 95 7 °F (35 4 °C), temperature source Tympanic, weight 57 6 kg (127 lb), not currently breastfeeding  Body mass index is 24 kg/m²  PHYSICAL EXAM:      General Appearance:   Alert, cooperative, no distress   HEENT:   Normocephalic, atraumatic, anicteric  Lungs:   Equal chest rise and unlabored breathing, normal cough   Heart:   No visualized JVD   Abdomen:   Soft, non-tender, non-distended; no masses, no organomegaly    Genitalia:   Deferred    Rectal:   Deferred    Extremities:  No cyanosis, clubbing or edema    Pulses:  Musculoskeletal:  2+ and symmetric  Normal range of motion visualized    Skin:  Neuro:  No jaundice, rashes, or lesions   Alert and appropriate       Lab Results:   No visits with results within 1 Day(s) from this visit  Latest known visit with results is:   Appointment on 01/02/2021   Component Date Value    SARS-CoV-2 Ab, Total (Ig* 01/02/2021 Reactive*    SARS-CoV-2 Ab, IgG 01/02/2021 Reactive*         Radiology Results:   No results found

## 2021-01-19 ENCOUNTER — TELEPHONE (OUTPATIENT)
Dept: GASTROENTEROLOGY | Facility: CLINIC | Age: 41
End: 2021-01-19

## 2021-01-19 NOTE — TELEPHONE ENCOUNTER
I called pt to review results, using  services  Patient called to schedule Ct, there are two orders with contrast and without, which ordered do you want patient to do?     Please advised and Thank You

## 2021-01-23 ENCOUNTER — HOSPITAL ENCOUNTER (OUTPATIENT)
Dept: RADIOLOGY | Facility: HOSPITAL | Age: 41
Discharge: HOME | End: 2021-01-23
Attending: INTERNAL MEDICINE
Payer: COMMERCIAL

## 2021-01-23 DIAGNOSIS — R10.13 EPIGASTRIC PAIN: ICD-10-CM

## 2021-01-23 PROCEDURE — 74177 CT ABD & PELVIS W/CONTRAST: CPT

## 2021-01-23 PROCEDURE — G1004 CDSM NDSC: HCPCS

## 2021-01-23 RX ADMIN — IOHEXOL 100 ML: 350 INJECTION, SOLUTION INTRAVENOUS at 10:01

## 2021-01-26 ENCOUNTER — OFFICE VISIT (OUTPATIENT)
Dept: FAMILY MEDICINE CLINIC | Facility: CLINIC | Age: 41
End: 2021-01-26
Payer: COMMERCIAL

## 2021-01-26 VITALS
BODY MASS INDEX: 24.55 KG/M2 | DIASTOLIC BLOOD PRESSURE: 81 MMHG | HEART RATE: 103 BPM | OXYGEN SATURATION: 98 % | RESPIRATION RATE: 18 BRPM | TEMPERATURE: 98 F | HEIGHT: 61 IN | WEIGHT: 130 LBS | SYSTOLIC BLOOD PRESSURE: 132 MMHG

## 2021-01-26 DIAGNOSIS — E78.2 MIXED HYPERLIPIDEMIA: ICD-10-CM

## 2021-01-26 DIAGNOSIS — J01.00 ACUTE NON-RECURRENT MAXILLARY SINUSITIS: ICD-10-CM

## 2021-01-26 DIAGNOSIS — Z12.4 SCREENING FOR CERVICAL CANCER: Primary | ICD-10-CM

## 2021-01-26 PROCEDURE — 3008F BODY MASS INDEX DOCD: CPT | Performed by: NURSE PRACTITIONER

## 2021-01-26 PROCEDURE — 99214 OFFICE O/P EST MOD 30 MIN: CPT | Performed by: NURSE PRACTITIONER

## 2021-01-26 PROCEDURE — 1036F TOBACCO NON-USER: CPT | Performed by: NURSE PRACTITIONER

## 2021-01-26 PROCEDURE — 3079F DIAST BP 80-89 MM HG: CPT | Performed by: NURSE PRACTITIONER

## 2021-01-26 PROCEDURE — 3075F SYST BP GE 130 - 139MM HG: CPT | Performed by: NURSE PRACTITIONER

## 2021-01-26 RX ORDER — FLUTICASONE PROPIONATE 50 MCG
2 SPRAY, SUSPENSION (ML) NASAL DAILY
Qty: 16 G | Refills: 0 | Status: SHIPPED | OUTPATIENT
Start: 2021-01-26 | End: 2021-05-03 | Stop reason: SDUPTHER

## 2021-01-26 RX ORDER — AMOXICILLIN AND CLAVULANATE POTASSIUM 875; 125 MG/1; MG/1
1 TABLET, FILM COATED ORAL EVERY 12 HOURS SCHEDULED
Qty: 14 TABLET | Refills: 0 | Status: SHIPPED | OUTPATIENT
Start: 2021-01-26 | End: 2021-02-02

## 2021-01-26 NOTE — ASSESSMENT & PLAN NOTE
-seems to occur once a year for patient  Starting on antibiotic therapy and nasal spray to help relieve symptoms  Pt to continue use of neti pot and hot steamy showers as ordered  If s/s do not resolve to call office for further evaluation

## 2021-01-26 NOTE — LETTER
January 26, 2021     Patient: Alberto Bedoya   YOB: 1980   Date of Visit: 1/26/2021       To Whom it May Concern:    Alberto Bedoya is under my professional care  She was seen in my office on 1/26/2021  She may return to work on 1/27/2021  If you have any questions or concerns, please don't hesitate to call           Sincerely,          ADAN Londono

## 2021-01-26 NOTE — PROGRESS NOTES
Assessment/Plan:    Acute non-recurrent maxillary sinusitis  -seems to occur once a year for patient  Starting on antibiotic therapy and nasal spray to help relieve symptoms  Pt to continue use of neti pot and hot steamy showers as ordered  If s/s do not resolve to call office for further evaluation  Diagnoses and all orders for this visit:    Screening for cervical cancer  -     Ambulatory referral to Gynecology; Future    Acute non-recurrent maxillary sinusitis  -     amoxicillin-clavulanate (AUGMENTIN) 875-125 mg per tablet; Take 1 tablet by mouth every 12 (twelve) hours for 7 days  -     fluticasone (FLONASE) 50 mcg/act nasal spray; 2 sprays into each nostril daily    Mixed hyperlipidemia  -     Lipid panel; Future          Subjective:      Patient ID: Mainor Bellamy is a 47 y o  female  47year old female patient here for nasal congestion that started on Friday  States it hurts at bridge of nose  Sinus Problem  This is a new problem  The current episode started in the past 7 days  The problem has been gradually worsening since onset  There has been no fever  The fever has been present for less than 1 day  Her pain is at a severity of 8/10  The pain is severe  Associated symptoms include congestion, sinus pressure and sneezing  Pertinent negatives include no coughing, ear pain, headaches, shortness of breath or sore throat  Past treatments include oral decongestants (claritin without relief)  The treatment provided no relief  The following portions of the patient's history were reviewed and updated as appropriate: allergies, current medications, past family history, past medical history, past social history, past surgical history and problem list     Review of Systems   Constitutional: Negative  Negative for fever  HENT: Positive for congestion, sinus pressure and sneezing  Negative for ear pain and sore throat  Eyes: Negative  Respiratory: Negative    Negative for cough, chest tightness and shortness of breath  Cardiovascular: Negative  Negative for chest pain and palpitations  Gastrointestinal: Negative  Endocrine: Negative  Genitourinary: Negative  Musculoskeletal: Negative  Skin: Negative  Allergic/Immunologic: Negative  Negative for environmental allergies and immunocompromised state  Neurological: Negative  Negative for headaches  Hematological: Negative  Psychiatric/Behavioral: Negative  Objective:      /81 (BP Location: Right arm, Patient Position: Sitting, Cuff Size: Standard)   Pulse 103   Temp 98 °F (36 7 °C) (Temporal)   Resp 18   Ht 5' 1" (1 549 m)   Wt 59 kg (130 lb)   LMP 11/03/2020   SpO2 98%   BMI 24 56 kg/m²          Physical Exam  Vitals signs and nursing note reviewed  Constitutional:       General: She is not in acute distress  Appearance: Normal appearance  She is not ill-appearing  HENT:      Head: Normocephalic and atraumatic  Right Ear: Hearing, tympanic membrane and external ear normal       Left Ear: Hearing, tympanic membrane and external ear normal       Nose: Septal deviation, congestion and rhinorrhea present  Right Sinus: Maxillary sinus tenderness and frontal sinus tenderness present  Left Sinus: Maxillary sinus tenderness and frontal sinus tenderness present  Mouth/Throat:      Mouth: Mucous membranes are moist       Pharynx: Oropharynx is clear  No oropharyngeal exudate  Eyes:      Extraocular Movements: Extraocular movements intact  Conjunctiva/sclera: Conjunctivae normal       Pupils: Pupils are equal, round, and reactive to light  Neck:      Musculoskeletal: Normal range of motion and neck supple  Cardiovascular:      Rate and Rhythm: Normal rate and regular rhythm  Pulses: Normal pulses  Heart sounds: Normal heart sounds  Pulmonary:      Effort: Pulmonary effort is normal  No respiratory distress  Breath sounds: Normal breath sounds  Abdominal:      General: Bowel sounds are normal       Palpations: Abdomen is soft  Musculoskeletal: Normal range of motion  Skin:     General: Skin is warm and dry  Capillary Refill: Capillary refill takes less than 2 seconds  Neurological:      General: No focal deficit present  Mental Status: She is alert and oriented to person, place, and time  Psychiatric:         Mood and Affect: Mood normal          Behavior: Behavior normal          Thought Content:  Thought content normal          Judgment: Judgment normal              Chief Complaint   Patient presents with    Nasal Congestion

## 2021-02-06 DIAGNOSIS — K21.9 GASTROESOPHAGEAL REFLUX DISEASE WITHOUT ESOPHAGITIS: ICD-10-CM

## 2021-02-08 RX ORDER — PANTOPRAZOLE SODIUM 40 MG/1
TABLET, DELAYED RELEASE ORAL
Qty: 30 TABLET | Refills: 3 | Status: SHIPPED | OUTPATIENT
Start: 2021-02-08 | End: 2021-05-03 | Stop reason: ALTCHOICE

## 2021-02-12 ENCOUNTER — LAB (OUTPATIENT)
Dept: LAB | Facility: HOSPITAL | Age: 41
End: 2021-02-12
Payer: COMMERCIAL

## 2021-02-12 DIAGNOSIS — E78.2 MIXED HYPERLIPIDEMIA: ICD-10-CM

## 2021-02-12 LAB
CHOLEST SERPL-MCNC: 160 MG/DL (ref 50–200)
HDLC SERPL-MCNC: 27 MG/DL
NONHDLC SERPL-MCNC: 133 MG/DL
TRIGL SERPL-MCNC: 670 MG/DL

## 2021-02-12 PROCEDURE — 36415 COLL VENOUS BLD VENIPUNCTURE: CPT

## 2021-02-12 PROCEDURE — 80061 LIPID PANEL: CPT

## 2021-02-19 ENCOUNTER — OFFICE VISIT (OUTPATIENT)
Dept: FAMILY MEDICINE CLINIC | Facility: CLINIC | Age: 41
End: 2021-02-19
Payer: COMMERCIAL

## 2021-02-19 VITALS
OXYGEN SATURATION: 98 % | TEMPERATURE: 97.9 F | SYSTOLIC BLOOD PRESSURE: 120 MMHG | BODY MASS INDEX: 24.73 KG/M2 | HEIGHT: 61 IN | RESPIRATION RATE: 16 BRPM | DIASTOLIC BLOOD PRESSURE: 70 MMHG | WEIGHT: 131 LBS | HEART RATE: 98 BPM

## 2021-02-19 DIAGNOSIS — R07.9 CHEST PAIN, UNSPECIFIED TYPE: ICD-10-CM

## 2021-02-19 DIAGNOSIS — E83.52 HYPERCALCEMIA: ICD-10-CM

## 2021-02-19 DIAGNOSIS — E87.6 HYPOKALEMIA: ICD-10-CM

## 2021-02-19 DIAGNOSIS — E78.1 HYPERTRIGLYCERIDEMIA: ICD-10-CM

## 2021-02-19 DIAGNOSIS — Z12.4 CERVICAL CANCER SCREENING: ICD-10-CM

## 2021-02-19 DIAGNOSIS — Z00.01 ENCOUNTER FOR GENERAL ADULT MEDICAL EXAMINATION WITH ABNORMAL FINDINGS: Primary | ICD-10-CM

## 2021-02-19 PROBLEM — Z13.220 SCREENING FOR CHOLESTEROL LEVEL: Status: RESOLVED | Noted: 2019-04-01 | Resolved: 2021-02-19

## 2021-02-19 PROBLEM — K43.6: Status: RESOLVED | Noted: 2019-05-06 | Resolved: 2021-02-19

## 2021-02-19 PROBLEM — R10.13 EPIGASTRIC PAIN: Status: RESOLVED | Noted: 2018-11-09 | Resolved: 2021-02-19

## 2021-02-19 PROBLEM — R23.2 FACIAL FLUSHING: Status: RESOLVED | Noted: 2019-09-17 | Resolved: 2021-02-19

## 2021-02-19 PROBLEM — R10.84 GENERALIZED ABDOMINAL PAIN: Status: RESOLVED | Noted: 2018-08-17 | Resolved: 2021-02-19

## 2021-02-19 PROBLEM — N64.4 BREAST PAIN, RIGHT: Status: RESOLVED | Noted: 2018-12-21 | Resolved: 2021-02-19

## 2021-02-19 PROBLEM — R19.7 DIARRHEA: Status: RESOLVED | Noted: 2018-08-17 | Resolved: 2021-02-19

## 2021-02-19 PROBLEM — G44.89 OTHER HEADACHE SYNDROME: Status: RESOLVED | Noted: 2019-04-01 | Resolved: 2021-02-19

## 2021-02-19 PROBLEM — K43.9 VENTRAL HERNIA WITHOUT OBSTRUCTION OR GANGRENE: Status: RESOLVED | Noted: 2019-05-06 | Resolved: 2021-02-19

## 2021-02-19 PROBLEM — R10.31 RIGHT LOWER QUADRANT ABDOMINAL PAIN: Status: RESOLVED | Noted: 2019-05-06 | Resolved: 2021-02-19

## 2021-02-19 PROBLEM — Z20.822 EXPOSURE TO COVID-19 VIRUS: Status: RESOLVED | Noted: 2020-12-11 | Resolved: 2021-02-19

## 2021-02-19 PROBLEM — R03.0 BLOOD PRESSURE ELEVATED WITHOUT HISTORY OF HTN: Status: RESOLVED | Noted: 2019-09-17 | Resolved: 2021-02-19

## 2021-02-19 PROBLEM — R92.2 DENSE BREAST TISSUE: Status: RESOLVED | Noted: 2019-02-06 | Resolved: 2021-02-19

## 2021-02-19 PROBLEM — J01.00 ACUTE NON-RECURRENT MAXILLARY SINUSITIS: Status: RESOLVED | Noted: 2020-03-20 | Resolved: 2021-02-19

## 2021-02-19 PROBLEM — Z23 NEEDS FLU SHOT: Status: RESOLVED | Noted: 2020-12-11 | Resolved: 2021-02-19

## 2021-02-19 PROBLEM — L20.89 FLEXURAL ATOPIC DERMATITIS: Status: RESOLVED | Noted: 2020-12-11 | Resolved: 2021-02-19

## 2021-02-19 PROCEDURE — 93000 ELECTROCARDIOGRAM COMPLETE: CPT | Performed by: FAMILY MEDICINE

## 2021-02-19 PROCEDURE — 99395 PREV VISIT EST AGE 18-39: CPT | Performed by: FAMILY MEDICINE

## 2021-02-19 RX ORDER — FENOFIBRATE 145 MG/1
145 TABLET, COATED ORAL DAILY
Qty: 30 TABLET | Refills: 5 | Status: SHIPPED | OUTPATIENT
Start: 2021-02-19 | End: 2021-03-26 | Stop reason: ALTCHOICE

## 2021-02-19 NOTE — ASSESSMENT & PLAN NOTE
Lab Results   Component Value Date/Time    CALCIUM 10 5 (H) 01/18/2021 11:07 AM    CALCIUM 9 9 04/23/2018 06:44 PM     Calcium elevated, was normal in the past  Protein was also elevated so result may be artificially elevated  Will discontinue Vitamin D for now  Check ionized calcium; if also elevated will order additional labs to work up hypercalcemia

## 2021-02-19 NOTE — ASSESSMENT & PLAN NOTE
patient reports chest pain  EKG in office was normal  Will refer to cardiology; possible need for stress test given chest pain and hypertriglyceridemia

## 2021-02-19 NOTE — PROGRESS NOTES
Assessment/Plan:    Chest pain  patient reports chest pain  EKG in office was normal  Will refer to cardiology; possible need for stress test given chest pain and hypertriglyceridemia     Hypertriglyceridemia  Lab Results   Component Value Date/Time    TRIG 670 (H) 02/12/2021 09:54 AM    TRIG 392 (H) 04/23/2018 06:44 PM     Triglycerides continue to be elevated despite statin therapy  Niacin was previously prescribed but has not been refilled, reason unclear  Encouraged to limit saturated fats in diet  Reviewed risks associated with such high triglyceride levels, including heart attack  Start Tricor 145mg daily  Hypercalcemia  Lab Results   Component Value Date/Time    CALCIUM 10 5 (H) 01/18/2021 11:07 AM    CALCIUM 9 9 04/23/2018 06:44 PM     Calcium elevated, was normal in the past  Protein was also elevated so result may be artificially elevated  Will discontinue Vitamin D for now  Check ionized calcium; if also elevated will order additional labs to work up hypercalcemia  Diagnoses and all orders for this visit:    Encounter for general adult medical examination with abnormal findings    Hypertriglyceridemia  -     fenofibrate (TRICOR) 145 mg tablet; Take 1 tablet (145 mg total) by mouth daily  -     Ambulatory referral to Cardiology; Future    Chest pain, unspecified type  -     POCT ECG  -     Ambulatory referral to Cardiology; Future    Cervical cancer screening  -     Ambulatory referral to Gynecology; Future    Hypokalemia    Hypercalcemia  -     Calcium, ionized; Future          Subjective:       Patient ID: Jose Ballesteros is a 47 y o  female  Patient here today to go over lab results and for physical exam  Significant labs include hypercalcemia and hypertriglyceridemia         The following portions of the patient's history were reviewed and updated as appropriate: allergies, current medications, past family history, past medical history, past social history, past surgical history and problem list     Review of Systems   Constitutional: Negative for diaphoresis, fatigue, fever and unexpected weight change  Respiratory: Negative for cough, chest tightness, shortness of breath and wheezing  Cardiovascular: Positive for chest pain  Negative for palpitations and leg swelling  Gastrointestinal: Negative for abdominal pain, blood in stool and constipation  Neurological: Positive for headaches  Negative for dizziness, syncope and light-headedness  Hematological: Does not bruise/bleed easily  Psychiatric/Behavioral: Negative for behavioral problems, self-injury and sleep disturbance  The patient is not nervous/anxious  Objective:      /70 (BP Location: Left arm, Patient Position: Sitting, Cuff Size: Standard)   Pulse 98   Temp 97 9 °F (36 6 °C) (Temporal)   Resp 16   Ht 5' 1" (1 549 m)   Wt 59 4 kg (131 lb)   SpO2 98%   Breastfeeding No   BMI 24 75 kg/m²          Physical Exam  HENT:      Head: Normocephalic  Eyes:      Pupils: Pupils are equal, round, and reactive to light  Neck:      Musculoskeletal: Neck supple  Cardiovascular:      Rate and Rhythm: Normal rate and regular rhythm  Pulmonary:      Effort: Pulmonary effort is normal    Abdominal:      Palpations: Abdomen is soft  Musculoskeletal: Normal range of motion  Skin:     General: Skin is warm and dry  Neurological:      Mental Status: She is alert     Psychiatric:         Behavior: Behavior normal

## 2021-02-19 NOTE — ASSESSMENT & PLAN NOTE
Lab Results   Component Value Date/Time    TRIG 670 (H) 02/12/2021 09:54 AM    TRIG 392 (H) 04/23/2018 06:44 PM     Triglycerides continue to be elevated despite statin therapy  Niacin was previously prescribed but has not been refilled, reason unclear  Encouraged to limit saturated fats in diet  Reviewed risks associated with such high triglyceride levels, including heart attack  Start Tricor 145mg daily

## 2021-03-05 ENCOUNTER — OFFICE VISIT (OUTPATIENT)
Dept: GASTROENTEROLOGY | Facility: CLINIC | Age: 41
End: 2021-03-05
Payer: COMMERCIAL

## 2021-03-05 ENCOUNTER — ANNUAL EXAM (OUTPATIENT)
Dept: OBGYN CLINIC | Facility: CLINIC | Age: 41
End: 2021-03-05

## 2021-03-05 VITALS
HEIGHT: 61 IN | DIASTOLIC BLOOD PRESSURE: 82 MMHG | SYSTOLIC BLOOD PRESSURE: 133 MMHG | BODY MASS INDEX: 24.55 KG/M2 | HEART RATE: 77 BPM | WEIGHT: 130 LBS

## 2021-03-05 VITALS
TEMPERATURE: 96 F | BODY MASS INDEX: 24.55 KG/M2 | HEART RATE: 85 BPM | WEIGHT: 130 LBS | SYSTOLIC BLOOD PRESSURE: 135 MMHG | HEIGHT: 61 IN | DIASTOLIC BLOOD PRESSURE: 85 MMHG

## 2021-03-05 DIAGNOSIS — R68.81 EARLY SATIETY: ICD-10-CM

## 2021-03-05 DIAGNOSIS — Z12.4 CERVICAL CANCER SCREENING: ICD-10-CM

## 2021-03-05 DIAGNOSIS — R30.0 DYSURIA: ICD-10-CM

## 2021-03-05 DIAGNOSIS — K21.9 GASTROESOPHAGEAL REFLUX DISEASE WITHOUT ESOPHAGITIS: Primary | ICD-10-CM

## 2021-03-05 DIAGNOSIS — R11.0 NAUSEA: ICD-10-CM

## 2021-03-05 DIAGNOSIS — Z72.51 HIGH RISK SEXUAL BEHAVIOR, UNSPECIFIED TYPE: ICD-10-CM

## 2021-03-05 DIAGNOSIS — N93.9 ABNORMAL UTERINE BLEEDING (AUB): Primary | ICD-10-CM

## 2021-03-05 DIAGNOSIS — R10.13 EPIGASTRIC PAIN: ICD-10-CM

## 2021-03-05 LAB
SL AMB  POCT GLUCOSE, UA: NEGATIVE
SL AMB LEUKOCYTE ESTERASE,UA: NEGATIVE
SL AMB POCT BILIRUBIN,UA: NEGATIVE
SL AMB POCT BLOOD,UA: NEGATIVE
SL AMB POCT KETONES,UA: NEGATIVE
SL AMB POCT NITRITE,UA: NEGATIVE
SL AMB POCT PH,UA: 7
SL AMB POCT SPECIFIC GRAVITY,UA: 1.01
SL AMB POCT URINE PROTEIN: NEGATIVE
SL AMB POCT UROBILINOGEN: NEGATIVE

## 2021-03-05 PROCEDURE — 99214 OFFICE O/P EST MOD 30 MIN: CPT | Performed by: INTERNAL MEDICINE

## 2021-03-05 PROCEDURE — 3079F DIAST BP 80-89 MM HG: CPT | Performed by: INTERNAL MEDICINE

## 2021-03-05 PROCEDURE — 87491 CHLMYD TRACH DNA AMP PROBE: CPT | Performed by: OBSTETRICS & GYNECOLOGY

## 2021-03-05 PROCEDURE — 87086 URINE CULTURE/COLONY COUNT: CPT | Performed by: OBSTETRICS & GYNECOLOGY

## 2021-03-05 PROCEDURE — G0124 SCREEN C/V THIN LAYER BY MD: HCPCS | Performed by: PATHOLOGY

## 2021-03-05 PROCEDURE — 99213 OFFICE O/P EST LOW 20 MIN: CPT | Performed by: OBSTETRICS & GYNECOLOGY

## 2021-03-05 PROCEDURE — 3075F SYST BP GE 130 - 139MM HG: CPT | Performed by: INTERNAL MEDICINE

## 2021-03-05 PROCEDURE — 1036F TOBACCO NON-USER: CPT | Performed by: OBSTETRICS & GYNECOLOGY

## 2021-03-05 PROCEDURE — 87624 HPV HI-RISK TYP POOLED RSLT: CPT | Performed by: OBSTETRICS & GYNECOLOGY

## 2021-03-05 PROCEDURE — 81002 URINALYSIS NONAUTO W/O SCOPE: CPT | Performed by: OBSTETRICS & GYNECOLOGY

## 2021-03-05 PROCEDURE — 87591 N.GONORRHOEAE DNA AMP PROB: CPT | Performed by: OBSTETRICS & GYNECOLOGY

## 2021-03-05 PROCEDURE — G0145 SCR C/V CYTO,THINLAYER,RESCR: HCPCS | Performed by: PATHOLOGY

## 2021-03-05 RX ORDER — ONDANSETRON 4 MG/1
4 TABLET, ORALLY DISINTEGRATING ORAL EVERY 6 HOURS PRN
Qty: 20 TABLET | Refills: 0 | Status: SHIPPED | OUTPATIENT
Start: 2021-03-05 | End: 2021-05-03 | Stop reason: ALTCHOICE

## 2021-03-05 NOTE — PROGRESS NOTES
Annual Well Woman visit    Subjective      Myra Jones is a 47 y o  female who presents for annual well woman exam  She reports that she has been having daily bleeding since January  This is also associated with vaginal itching and dysuria  Prior to January, patient was having monthly periods that lasted 3-4 days  They were heavy periods and occasionally associated with dizziness but this prolonged bleeding is new for her  GYN:  · Denies dyspareunia  · Contraception: s/p tubal  · Patient is  sexually active with 1 partner  OB:  ·  female  · C/s x3    :  · No  urinary frequency or urgency  · No flank pain, incontinence  Breast:  · No breast mass, skin changes, dimpling, reddening, nipple retraction  · No breast discharge  · Patient does not have a family history of breast, endometrial, or ovarian ca  General:  · Work: [employer]  · ETOH use: no  · Tobacco use: no  · Recreational drug use: no    Screening:  · Cervical cancer: due for pap today  · Breast cancer: not indicated  · STD screening: patient agreeable to STD testing today    Review of Systems  Pertinent items are noted in HPI  Objective      There were no vitals taken for this visit  General:   alert, appears stated age and cooperative   Heart: regular rate and rhythm, S1, S2 normal, no murmur, click, rub or gallop   Lungs: clear to auscultation bilaterally   Abdomen: soft, without masses or organomegaly; mild tenderness in suprapubic region   Vulva: normal   Vagina: normal mucosa, normal discharge   Cervix: no cervical motion tenderness and no lesions   Uterus: mobile, non-tender, normal shape and consistency   Adnexa: no mass, fullness, tenderness      KOH: negative  Wet prep: negative       Assessment: 41y/o here for annual exam  She has complaints of abnormal uterine bleeding associated with itching and dysuria   Minimal pain in suprapubic region on abdominal exam  No pain on bimanual exam, no blood on pelvic exam  Differential includes structural abnormalities vs infectious cause  No vaginal infections noted today        Plan   - Pelvic ultrasound ordered today  - UA and culture sent today  - Pap, GC/CT collected  - RTC in 4 weeks for ultrasound results    Zina Ngo MD, PGY-3  3/5/2021  10:45 AM

## 2021-03-05 NOTE — PATIENT INSTRUCTIONS
Egd on 3/26/21 with Dr Adam Tanner at [facility]  Prep instructions given in the office

## 2021-03-05 NOTE — PROGRESS NOTES
[facility] - Outpatient Follow-up Note  Buck Ramos 47 y o  female MRN: [id number]  Encounter: [id number]      Used [employer]  #  [id number]    ASSESSMENT AND PLAN:      1  Gastroesophageal reflux disease without esophagitis  EGD   2  Epigastric pain  EGD   3  Early satiety  EGD   4  Nausea  ondansetron (ZOFRAN-ODT) 4 mg disintegrating tablet    EGD     Increase pantoprazole 40 mg to twice daily half hour before meals  Will start her on Zofran to take to see if this helps with her nausea  Will proceed with EGD to further evaluate to rule out h pylori and peptic ulcer disease   ______________________________________________________________________    SUBJECTIVE:  Seen on  for severe abdominal pain and nausea  She had CT scan which was within normal limits  I have personally viewed the images in PACS  [facility] She continues to have abdominal discomfort  She has nausea at all times including when she eats and when she doesn't eat  Whenever she feels nauseous her mouth tastes sour  She has been taking pantoprazole once a day and she has noted it helps a little  She recently saw her PCP for chest pains and was found to have high triglyceride levels and started on fenofibrate  REVIEW OF SYSTEMS IS OTHERWISE NEGATIVE  Historical Information   Past Medical History:   Diagnosis Date    COVID-19 2020    Esophagitis     H  pylori infection 2018    Hepatic steatosis     Hiatal hernia     Hiatal hernia     High triglycerides     Hyperlipidemia     Hypertension     Ventral hernia      Past Surgical History:   Procedure Laterality Date     SECTION      3X--,,     COLONOSCOPY N/A 2018    Procedure: COLONOSCOPY;  Surgeon: Jamal Horner MD;  Location: [facility]; Service: Gastroenterology    ESOPHAGOGASTRODUODENOSCOPY N/A 2018    Procedure: ESOPHAGOGASTRODUODENOSCOPY (EGD);   Surgeon: Jamal Horner MD;  Location: [facility]; Service: Gastroenterology    HERNIA REPAIR      [place] REPAIR INCISIONAL HERNIA,FLAVIO N/A 5/6/2019    Procedure: REPAIR HERNIA VENTRAL;  Surgeon: Cyndi Rocha DO;  Location: [facility] MAIN OR;  Service: General    TUBAL LIGATION      UPPER GASTROINTESTINAL ENDOSCOPY       Social History   Social History     Substance and Sexual Activity   Alcohol Use No     Social History     Substance and Sexual Activity   Drug Use No     Social History     Tobacco Use   Smoking Status Never Smoker   Smokeless Tobacco Never Used     Family History   Problem Relation Age of Onset    Uterine cancer Mother     Heart disease Mother     Hypertension Mother     Cervical cancer Mother     Stomach cancer Father     Diabetes Brother     Hyperlipidemia Brother     Asthma Son        Meds/Allergies       Current Outpatient Medications:     atenolol (TENORMIN) 50 mg tablet    benzonatate (TESSALON) 200 MG capsule    clotrimazole-betamethasone (LOTRISONE) 1-0 05 % cream    fenofibrate (TRICOR) 145 mg tablet    fluticasone (FLONASE) 50 mcg/act nasal spray    pantoprazole (PROTONIX) 40 mg tablet    rosuvastatin (CRESTOR) 20 MG tablet    VASCEPA 1 g CAPS    No Known Allergies        Objective     Blood pressure 135/85, pulse 85, temperature (!) 96 °F (35 6 °C), temperature source Tympanic, height 5' 1" (1 549 m), weight 59 kg (130 lb), not currently breastfeeding  Body mass index is 24 56 kg/m²  PHYSICAL EXAM:      General Appearance:   Alert, cooperative, no distress   HEENT:   Normocephalic, atraumatic, anicteric           Lungs:   Equal chest rise and unlabored breathing, normal cough   Heart:   No visualized JVD   Abdomen:   Soft, non-tender, non-distended; no masses, no organomegaly    Genitalia:   Deferred    Rectal:   Deferred    Extremities:  No cyanosis, clubbing or edema    Pulses:  Musculoskeletal:  2+ and symmetric  Normal range of motion visualized    Skin:  Neuro:  No jaundice, rashes, or lesions   Alert and appropriate           Lab Results:   No visits with results within 1 Day(s) from this visit  Latest known visit with results is:   Lab on 02/12/2021   Component Date Value    Cholesterol 02/12/2021 160     Triglycerides 02/12/2021 670*    HDL, Direct 02/12/2021 27*    LDL Calculated 02/12/2021      Non-HDL-Chol (CHOL-HDL) 02/12/2021 133          Radiology Results:   No results found

## 2021-03-06 LAB — BACTERIA UR CULT: NORMAL

## 2021-03-09 LAB
HPV HR 12 DNA CVX QL NAA+PROBE: NEGATIVE
HPV16 DNA CVX QL NAA+PROBE: NEGATIVE
HPV18 DNA CVX QL NAA+PROBE: NEGATIVE

## 2021-03-10 LAB
C TRACH DNA SPEC QL NAA+PROBE: NEGATIVE
N GONORRHOEA DNA SPEC QL NAA+PROBE: NEGATIVE

## 2021-03-11 ENCOUNTER — TELEPHONE (OUTPATIENT)
Dept: LABOR AND DELIVERY | Facility: HOSPITAL | Age: 41
End: 2021-03-11

## 2021-03-11 DIAGNOSIS — B37.9 YEAST INFECTION: Primary | ICD-10-CM

## 2021-03-11 LAB
LAB AP GYN PRIMARY INTERPRETATION: NORMAL
Lab: NORMAL
PATH INTERP SPEC-IMP: NORMAL

## 2021-03-25 ENCOUNTER — ANESTHESIA EVENT (OUTPATIENT)
Dept: GASTROENTEROLOGY | Facility: HOSPITAL | Age: 41
End: 2021-03-25

## 2021-03-25 RX ORDER — METOCLOPRAMIDE HYDROCHLORIDE 5 MG/ML
10 INJECTION INTRAMUSCULAR; INTRAVENOUS ONCE AS NEEDED
Status: CANCELLED | OUTPATIENT
Start: 2021-03-25

## 2021-03-25 RX ORDER — LIDOCAINE HYDROCHLORIDE 10 MG/ML
0.5 INJECTION, SOLUTION EPIDURAL; INFILTRATION; INTRACAUDAL; PERINEURAL ONCE AS NEEDED
Status: CANCELLED | OUTPATIENT
Start: 2021-03-25

## 2021-03-25 RX ORDER — ONDANSETRON 2 MG/ML
4 INJECTION INTRAMUSCULAR; INTRAVENOUS ONCE AS NEEDED
Status: CANCELLED | OUTPATIENT
Start: 2021-03-25

## 2021-03-25 RX ORDER — SODIUM CHLORIDE, SODIUM LACTATE, POTASSIUM CHLORIDE, CALCIUM CHLORIDE 600; 310; 30; 20 MG/100ML; MG/100ML; MG/100ML; MG/100ML
125 INJECTION, SOLUTION INTRAVENOUS CONTINUOUS
Status: CANCELLED | OUTPATIENT
Start: 2021-03-25

## 2021-03-25 RX ORDER — DIPHENHYDRAMINE HYDROCHLORIDE 50 MG/ML
12.5 INJECTION INTRAMUSCULAR; INTRAVENOUS ONCE AS NEEDED
Status: CANCELLED | OUTPATIENT
Start: 2021-03-25

## 2021-03-26 ENCOUNTER — ANESTHESIA (OUTPATIENT)
Dept: GASTROENTEROLOGY | Facility: HOSPITAL | Age: 41
End: 2021-03-26

## 2021-03-26 ENCOUNTER — OFFICE VISIT (OUTPATIENT)
Dept: CARDIOLOGY CLINIC | Facility: CLINIC | Age: 41
End: 2021-03-26
Payer: COMMERCIAL

## 2021-03-26 ENCOUNTER — HOSPITAL ENCOUNTER (OUTPATIENT)
Dept: GASTROENTEROLOGY | Facility: HOSPITAL | Age: 41
Setting detail: OUTPATIENT SURGERY
Discharge: HOME | End: 2021-03-26
Attending: INTERNAL MEDICINE
Payer: COMMERCIAL

## 2021-03-26 VITALS
OXYGEN SATURATION: 97 % | SYSTOLIC BLOOD PRESSURE: 104 MMHG | HEART RATE: 100 BPM | HEIGHT: 61 IN | DIASTOLIC BLOOD PRESSURE: 70 MMHG | BODY MASS INDEX: 24.81 KG/M2 | WEIGHT: 131.4 LBS

## 2021-03-26 VITALS
TEMPERATURE: 96.7 F | OXYGEN SATURATION: 99 % | HEART RATE: 78 BPM | SYSTOLIC BLOOD PRESSURE: 116 MMHG | DIASTOLIC BLOOD PRESSURE: 78 MMHG | RESPIRATION RATE: 18 BRPM

## 2021-03-26 DIAGNOSIS — E78.2 MIXED HYPERLIPIDEMIA: ICD-10-CM

## 2021-03-26 DIAGNOSIS — R11.0 NAUSEA: ICD-10-CM

## 2021-03-26 DIAGNOSIS — R00.2 HEART PALPITATIONS: Primary | ICD-10-CM

## 2021-03-26 DIAGNOSIS — R10.13 EPIGASTRIC PAIN: ICD-10-CM

## 2021-03-26 DIAGNOSIS — I10 ESSENTIAL HYPERTENSION: ICD-10-CM

## 2021-03-26 DIAGNOSIS — E78.1 HYPERTRIGLYCERIDEMIA: ICD-10-CM

## 2021-03-26 DIAGNOSIS — K21.9 GASTROESOPHAGEAL REFLUX DISEASE WITHOUT ESOPHAGITIS: ICD-10-CM

## 2021-03-26 DIAGNOSIS — R68.81 EARLY SATIETY: ICD-10-CM

## 2021-03-26 DIAGNOSIS — R07.9 CHEST PAIN, UNSPECIFIED TYPE: ICD-10-CM

## 2021-03-26 PROCEDURE — 88305 TISSUE EXAM BY PATHOLOGIST: CPT | Performed by: PATHOLOGY

## 2021-03-26 PROCEDURE — 3008F BODY MASS INDEX DOCD: CPT | Performed by: INTERNAL MEDICINE

## 2021-03-26 PROCEDURE — 1036F TOBACCO NON-USER: CPT | Performed by: INTERNAL MEDICINE

## 2021-03-26 PROCEDURE — 43239 EGD BIOPSY SINGLE/MULTIPLE: CPT | Performed by: INTERNAL MEDICINE

## 2021-03-26 PROCEDURE — 3008F BODY MASS INDEX DOCD: CPT | Performed by: OBSTETRICS & GYNECOLOGY

## 2021-03-26 PROCEDURE — 99215 OFFICE O/P EST HI 40 MIN: CPT | Performed by: INTERNAL MEDICINE

## 2021-03-26 RX ORDER — LIDOCAINE HYDROCHLORIDE 10 MG/ML
INJECTION, SOLUTION EPIDURAL; INFILTRATION; INTRACAUDAL; PERINEURAL AS NEEDED
Status: DISCONTINUED | OUTPATIENT
Start: 2021-03-26 | End: 2021-03-26

## 2021-03-26 RX ORDER — SODIUM CHLORIDE 9 MG/ML
INJECTION, SOLUTION INTRAVENOUS CONTINUOUS PRN
Status: DISCONTINUED | OUTPATIENT
Start: 2021-03-26 | End: 2021-03-26

## 2021-03-26 RX ORDER — PROPOFOL 10 MG/ML
INJECTION, EMULSION INTRAVENOUS CONTINUOUS PRN
Status: DISCONTINUED | OUTPATIENT
Start: 2021-03-26 | End: 2021-03-26

## 2021-03-26 RX ORDER — ROSUVASTATIN CALCIUM 10 MG/1
10 TABLET, COATED ORAL DAILY
Qty: 30 TABLET | Refills: 5 | Status: SHIPPED | OUTPATIENT
Start: 2021-03-26 | End: 2021-05-03 | Stop reason: ALTCHOICE

## 2021-03-26 RX ORDER — FENOFIBRATE 145 MG/1
145 TABLET, COATED ORAL DAILY
Qty: 30 TABLET | Refills: 5 | Status: SHIPPED | OUTPATIENT
Start: 2021-03-26 | End: 2021-03-29 | Stop reason: ALTCHOICE

## 2021-03-26 RX ORDER — ICOSAPENT ETHYL 1000 MG/1
2 CAPSULE ORAL 2 TIMES DAILY
Qty: 120 CAPSULE | Refills: 5 | Status: SHIPPED | OUTPATIENT
Start: 2021-03-26

## 2021-03-26 RX ORDER — PROPOFOL 10 MG/ML
INJECTION, EMULSION INTRAVENOUS AS NEEDED
Status: DISCONTINUED | OUTPATIENT
Start: 2021-03-26 | End: 2021-03-26

## 2021-03-26 RX ADMIN — PROPOFOL 150 MCG/KG/MIN: 10 INJECTION, EMULSION INTRAVENOUS at 08:08

## 2021-03-26 RX ADMIN — LIDOCAINE HYDROCHLORIDE 70 MG: 10 INJECTION, SOLUTION EPIDURAL; INFILTRATION; INTRACAUDAL; PERINEURAL at 08:07

## 2021-03-26 RX ADMIN — PROPOFOL 100 MG: 10 INJECTION, EMULSION INTRAVENOUS at 08:08

## 2021-03-26 RX ADMIN — SODIUM CHLORIDE: 9 INJECTION, SOLUTION INTRAVENOUS at 07:58

## 2021-03-26 RX ADMIN — PROPOFOL 100 MG: 10 INJECTION, EMULSION INTRAVENOUS at 08:07

## 2021-03-26 NOTE — PROGRESS NOTES
Cardiology Follow Up    Mainor Bellamy  1980  [id number]  [facility] CARDIOLOGY [place] [address]  [facility] [address] [phone]    1  Heart palpitations     2  Mixed hyperlipidemia     3  Essential hypertension     4  Hypertriglyceridemia  Ambulatory referral to Cardiology   5  Chest pain, unspecified type  Ambulatory referral to Cardiology       Patient was 1st seen on 11/15/2019  She is a Green[place]ic-speaking female who has complaints of palpitations and chest discomfort  The palpitations occur at random  She feels like her heart is racing and pounding  Is not irregular  Sometimes she feels lightheaded  Sometimes she is short of breath  Sometimes she becomes nauseous and feels like she has to vomit  The episodes last for several minutes  They occur several times a week  Patient had a Holter monitor but states that she did not have any episodes of palpitations while she had the Holter monitor on  A Holter monitor was totally benign      Patient also has chest discomfort which is midsternal and radiates into her left arm  The chest discomfort is not related to the palpitations  Sometimes they occur together but most often they are separate problems  The chest pain last from 5-15 minutes  She usually is left feeling fatigued for about an hour after she has had the chest discomfort  Chest discomfort is also associated with shortness of breath, lightheadedness and sometimes nausea  The chest discomfort can occur from 1 to 3 times a week      The patient is a lifetime nonsmoker  There is a family history of hypertension  Patient also has hyperlipidemia with hypertriglyceridemia      02/12/2021 lipid profile: Cholesterol 160, triglycerides 670, HDL 27, LDL could not be calculated, non HDL cholesterol 133    Interval History:Patient was sent for management of her high  triglycerides  Her last triglycerides were 670    When her triglycerides are so high, she has breast pain and tenderness  She is on rosuvastatin 20 mg daily  And fenofibrate 145 mg daily  She had been on vascepa  2 capsules -  1 g each b i d  However the vascepa was stopped when the fenofibrate was started  Discussion/Summary:    1  Reduce rosuvastatin to 10 mg daily since the combination of a statin and fenofibrate has a higher incidence of side effects  2  Continue fenofibrate 145 mg daily  3  Resume vascepa 2 capsules -  1 g each,  2 times a day  4  Lipid profile with reflex direct LDL and BMP 1 week prior to return visit   5  Discontinue atenolol since beta-blockers increase triglycerides  6  Return in 2 months  7   Tried to indicate to patient the importance of taking her medication every day    Patient Active Problem List   Diagnosis    Abnormal uterine bleeding (AUB)    Anemia    Fatty liver    Granuloma annulare    Heart palpitations    Hypertriglyceridemia    Hyperlipidemia    Left ovarian cyst    Leukemia (HCC)    PCOS (polycystic ovarian syndrome)    Psoriasis    Right ovarian cyst    Vitamin D deficiency    Gastroesophageal reflux disease without esophagitis    Chest pain    COVID-19    Eczema    Hypercalcemia    Essential hypertension     Past Medical History:   Diagnosis Date    COVID-19 05/2020    Esophagitis     H  pylori infection 12/2018    Hepatic steatosis     Hiatal hernia     Hiatal hernia     High triglycerides     Hyperlipidemia     Hypertension     Ventral hernia      Social History     Socioeconomic History    Marital status: /Civil Union     Spouse name: Oumar Kohc Number of children: 3    Years of education: Not on file    Highest education level: Not on file   Occupational History    Not on file   Social Needs    Financial resource strain: Not very hard    Food insecurity     Worry: Never true     Inability: Not on file    Transportation needs     Medical: No     Non-medical: Not on file Tobacco Use    Smoking status: Never Smoker    Smokeless tobacco: Never Used   Substance and Sexual Activity    Alcohol use: No    Drug use: No    Sexual activity: Yes     Partners: Male     Birth control/protection: Female Sterilization   Lifestyle    Physical activity     Days per week: Not on file     Minutes per session: Not on file    Stress: Not on file   Relationships    Social connections     Talks on phone: Not on file     Gets together: Not on file     Attends Orthodoxy service: Not on file     Active member of club or organization: Not on file     Attends meetings of clubs or organizations: Not on file     Relationship status: Not on file    Intimate partner violence     Fear of current or ex partner: Not on file     Emotionally abused: Not on file     Physically abused: Not on file     Forced sexual activity: Not on file   Other Topics Concern    Not on file   Social History Narrative    Non smoker        Lives with  and children    Caffeine use    No recreational drug use    Always wears seatbelts    Three children    Jewish      Family History   Problem Relation Age of Onset    Uterine cancer Mother     Heart disease Mother     Hypertension Mother     Cervical cancer Mother     Stomach cancer Father     Diabetes Brother     Hyperlipidemia Brother     Asthma Son      Past Surgical History:   Procedure Laterality Date     SECTION      3X--,, 2008    COLONOSCOPY N/A 2018    Procedure: COLONOSCOPY;  Surgeon: Blaze Zazueta MD;  Location: [facility]; Service: Gastroenterology    ESOPHAGOGASTRODUODENOSCOPY N/A 2018    Procedure: ESOPHAGOGASTRODUODENOSCOPY (EGD); Surgeon: Blaze Zazueta MD;  Location: [facility];   Service: Gastroenterology    HERNIA REPAIR      [place] REPAIR INCISIONAL HERNIA,FLAVIO N/A 2019    Procedure: REPAIR HERNIA VENTRAL;  Surgeon: Mike Treviño DO;  Location: [address] [place] [address] OR;  Service: General    TUBAL LIGATION  UPPER GASTROINTESTINAL ENDOSCOPY         Current Outpatient Medications:     atenolol (TENORMIN) 50 mg tablet, TOME CAMILLE TABLETA TORACHELL RESENDIZS, Disp: 30 tablet, Rfl: 5    clotrimazole-betamethasone (LOTRISONE) 1-0 05 % cream, Apply topical over affected areas twice a day, Disp: 30 g, Rfl: 5    fenofibrate (TRICOR) 145 mg tablet, Take 1 tablet (145 mg total) by mouth daily, Disp: 30 tablet, Rfl: 5    fluticasone (FLONASE) 50 mcg/act nasal spray, 2 sprays into each nostril daily, Disp: 16 g, Rfl: 0    ondansetron (ZOFRAN-ODT) 4 mg disintegrating tablet, Take 1 tablet (4 mg total) by mouth every 6 (six) hours as needed for nausea or vomiting, Disp: 20 tablet, Rfl: 0    pantoprazole (PROTONIX) 40 mg tablet, TAKE 1 TABLET BY MOUTH EVERY DAY, Disp: 30 tablet, Rfl: 3    benzonatate (TESSALON) 200 MG capsule, Take 1 capsule (200 mg total) by mouth 3 (three) times a day as needed for cough (Patient not taking: Reported on 3/5/2021), Disp: 20 capsule, Rfl: 0    rosuvastatin (CRESTOR) 20 MG tablet, , Disp: , Rfl:     VASCEPA 1 g CAPS, TOME 2 C PSULAS POR V A ORAL DOS VECES AL D A, Disp: , Rfl:   No current facility-administered medications for this visit  No Known Allergies    No results found for this visit on 03/26/21  Review of Systems:  Review of Systems   Respiratory: Negative for cough, choking, chest tightness, shortness of breath and wheezing  Cardiovascular: Negative for chest pain, palpitations and leg swelling  Genitourinary:         Breast soreness   Musculoskeletal: Negative for gait problem  Skin: Negative for rash  Neurological: Negative for dizziness, tremors, syncope, weakness, light-headedness, numbness and headaches  Psychiatric/Behavioral: Negative for agitation and behavioral problems  The patient is not hyperactive          Physical Exam:  /70   Pulse 100   Ht 5' 1" (1 549 m)   Wt 59 6 kg (131 lb 6 4 oz)   SpO2 97%   BMI 24 83 kg/m²   Physical Exam  Vitals signs reviewed  Constitutional:       General: She is not in acute distress  Appearance: She is well-developed  HENT:      Head: Normocephalic and atraumatic  Neck:      Thyroid: No thyromegaly  Vascular: No JVD  Cardiovascular:      Rate and Rhythm: Normal rate and regular rhythm  Heart sounds: Normal heart sounds  No murmur  No systolic murmur  No diastolic murmur  No friction rub  No gallop  No S3 or S4 sounds  Pulmonary:      Effort: No respiratory distress  Breath sounds: Normal breath sounds  No wheezing or rales  Musculoskeletal:      Right lower leg: No edema  Left lower leg: No edema  Skin:     General: Skin is warm and dry  Neurological:      General: No focal deficit present  Mental Status: She is alert and oriented to person, place, and time  Psychiatric:         Mood and Affect: Mood normal          Behavior: Behavior normal          -------------------------------------------------------------------------------  TREADMILL STRESS  Results for orders placed during the hospital encounter of 20   Stress test only, exercise    N[facility]ative [facility]  [address]  [phone]    Stress Electrocardiography during Exercise    Name: Maurisio Diaz  MR #: [id number]  Account #: [de-identified]  Study date: 2020  : 1980  Age: 42 years  Gender: Female  Height: 61 in  Weight: 131 lb  BSA: 1 58 mï¾²    Allergies: NO KNOWN ALLERGIES    Diagnosis: R07 89 - Other chest pain    RN:  Lilian Carrel, RN  Primary Physician:  Lolis Quezada MD  Referring Physician:  Kesha Rivera MD  Interpreting Physician:  Kirstie Carrera MD  Group:  [facility]  Report Prepared By[de-identified]  Kerry Cali  Technician:  Kerry Cali    CLINICAL QUESTION: Detection of coronary artery disease  HISTORY: The patient is a 42year old  female  Chest pain status: recent onset chest pain   Coronary artery disease risk factors: dyslipidemia  Cardiovascular history: none significant  Medications: a lipid lowering agent  PHYSICAL EXAM: Baseline physical exam screening: no wheezes audible  REST ECG: Normal sinus rhythm  There was less than 1 mm, horizontal ST depression in leads II, III and aVF  PROCEDURE: Treadmill exercise testing was performed, using the Boyd protocol  Stress electrocardiographic evaluation was performed  BOYD PROTOCOL:  HR bpm SBP mmHg DBP mmHg Symptoms  Baseline 77 140 80 none  Stage 1 118 154 88 dyspnea, fatigue  Stage 2 153 168 88 same as above  Stage 3 162 -- -- same as above  Immediate 164 168 82 subsiding  Recovery 1 111 134 82 none  Recovery 2 103 124 74 none  pre 02 sat 100% peak 02 sat 99% No medications or fluids given  STRESS SUMMARY: Duration of exercise was 6 min and 30 sec  The patient exercised to protocol stage 3  Maximal work rate was 8 5 METs  Maximal heart rate during stress was 164 bpm ( 90 % of maximal predicted heart rate)  The rate-pressure  product for the peak heart rate and blood pressure was 33556  There was no chest pain during stress  The stress test was terminated due to achievement of target heart rate and fatigue  The stress ECG was negative for ischemia  Arrhythmia  during stress: isolated premature ventricular beats  SUMMARY:  -  Rest ECG: There was less than 1 mm, horizontal ST depression in leads II, III and aVF  -  Stress results: Duration of exercise was 6 min and 30 sec  Maximal heart rate during stress was 164 bpm ( 90 % of maximal predicted heart rate)  Target heart rate was achieved  There was no chest pain during stress  The stress test was  terminated due to achievement of target heart rate and fatigue  -  ECG conclusions: The stress ECG was negative for ischemia  Arrhythmia during stress: isolated premature ventricular beats  -  Impressions and recommendations: Normal study  IMPRESSIONS: Normal study      Prepared and signed by    Terese Barlow MD  Signed 2020 15:36:31          --------------------------------------------------------------------------------  ECHO:   Results for orders placed during the hospital encounter of 20   Echo complete with contrast if indicated    Narrative [address] [address]  [facility], [address] [facility]  [phone]    Transthoracic Echocardiogram  2D, M-mode, Doppler, and Color Doppler    Study date:  2020    Patient: Cheyenne Logan  MR number: [id number]  Account number: [de-identified]  : 1980  Age: 42 years  Gender: Female  Status: Outpatient  Location: [address] [place] [address]  Height: 61 in  Weight: 131 lb  BP: 132/ 80 mmHg    Indications: Chest pain; Palpitations    Diagnoses: R00 2 - Palpitations, R07 9 - Chest pain, unspecified    Sonographer:  MARIAM Talley, RDCS  Primary Physician:  Vinicio Hsu MD  Referring Physician:  Gopal Rodriguez MD  Group:  [facility]  Interpreting Physician:  Terese Barlow MD    IMPRESSIONS:  The study was within normal limits  SUMMARY    IMPRESSIONS:  The study was within normal limits  LEFT VENTRICLE:  Systolic function was normal  Ejection fraction was estimated in the range of 55 % to 65 % to be 60 %  Although no diagnostic regional wall motion abnormality was identified, this possibility cannot be completely excluded on the basis of this study  ATRIAL SEPTUM:  No defect or patent foramen ovale was identified  Mild IAS thickening    MITRAL VALVE:  Valve structure was normal     AORTIC VALVE:  The valve was trileaflet  Leaflets exhibited normal thickness and normal cuspal separation  HISTORY: PRIOR HISTORY: Hyperlipidemia    PROCEDURE: The study was performed in the [address]  This was a routine study  The transthoracic approach was used  The study included complete 2D imaging, M-mode, complete spectral Doppler, and color Doppler  The  heart rate was 66 bpm, at the start of the study  Images were obtained from the parasternal, apical, subcostal, and suprasternal notch acoustic windows  Image quality was adequate  LEFT VENTRICLE: Size was normal  Systolic function was normal  Ejection fraction was estimated in the range of 55 % to 65 % to be 60 %  Although no diagnostic regional wall motion abnormality was identified, this possibility cannot be  completely excluded on the basis of this study  Wall thickness was normal  DOPPLER: Left ventricular diastolic function parameters were normal     RIGHT VENTRICLE: The size was normal  Systolic function was normal  Wall thickness was normal     LEFT ATRIUM: Size was normal     ATRIAL SEPTUM: No defect or patent foramen ovale was identified  Mild IAS thickening    RIGHT ATRIUM: Size was normal     MITRAL VALVE: Valve structure was normal  There was normal leaflet separation  DOPPLER: The transmitral velocity was within the normal range  There was no evidence for stenosis  There was no significant regurgitation  AORTIC VALVE: The valve was trileaflet  Leaflets exhibited normal thickness and normal cuspal separation  DOPPLER: Transaortic velocity was within the normal range  There was no evidence for stenosis  There was no significant  regurgitation  TRICUSPID VALVE: The valve structure was normal  There was normal leaflet separation  DOPPLER: The transtricuspid velocity was within the normal range  There was no evidence for stenosis  There was no significant regurgitation  PULMONIC VALVE: Leaflets exhibited normal thickness, no calcification, and normal cuspal separation  DOPPLER: The transpulmonic velocity was within the normal range  There was no significant regurgitation  PERICARDIUM: There was no pericardial effusion  The pericardium was normal in appearance  AORTA: The root exhibited normal size  SYSTEMIC VEINS: IVC: The inferior vena cava was normal in size      SYSTEM MEASUREMENT TABLES    2D  %FS: 28 61 %  Ao Diam: 2 81 cm  EDV(Teich): 58 32 ml  EF(Cube): 63 61 %  EF(Teich): 55 96 %  ESV(Cube): 18 51 ml  ESV(Teich): 25 68 ml  IVSd: 0 99 cm  LA Area: 12 65 cm2  LA Diam: 3 27 cm  LVEDV MOD A4C: 60 93 ml  LVEF MOD A4C: 70 03 %  LVESV MOD A4C: 18 26 ml  LVIDd: 3 71 cm  LVIDs: 2 65 cm  LVLd A4C: 7 2 cm  LVLs A4C: 5 33 cm  LVPWd: 0 79 cm  RA Area: 13 59 cm2  RV Diam : 3 54 cm  SV MOD A4C: 42 67 ml  SV(Cube): 32 35 ml  SV(Teich): 32 64 ml    CW  TR Vmax: 2 27 m/s  TR maxP 68 mmHg    MM  TAPSE: 1 7 cm    PW  E': 0 09 m/s  E/E': 9 64  MV A Baljinder: 0 63 m/s  MV Dec Bledsoe: 4 54 m/s2  MV DecT: 200 06 ms  MV E Baljinder: 0 91 m/s  MV E/A Ratio: 1 45    [facility]    Prepared and electronically signed by    Rubens Bowen MD  Signed 2020 15:44:59       No results found for this or any previous visit   --------------------------------------------------------------------------------  HOLTER  Results for orders placed during the hospital encounter of 19   Holter monitor - 24 hour    Narrative 24 HOUR HOLTER MONITOR    INDICATION: Palpitations    Average heart rate: 90 bpm   Lowest heart rate: 56 bpm  Highest heart rate: 138 bpm    VENTRICULAR ECTOPY - 0 0% of all monitored beats  Total number: 0   Runs: 0  Ventricular Couplets: 0   Ventricular Triplets: 0  Bigeminy: 0   Trigeminy: 0     SUPRAVENTRICULAR ECTOPY - 0 0% of all monitored beats  Total number: 31   Runs: 0  Supraventricular Couplets: 0   Bigeminy: 0   Trigeminy: 0     BRADYCARDIA  Slowest episode: 1:38 am, (minimum heart rate of 56 bpm)  This   corresponded with sinus bradycardia with no evidence of heart block  TACHYCARDIA  Fastest episode: occurred at 9:09 am, (maximum heart rate of 138 bpm)  This corresponded with sinus tachycardia  SYMPTOMS  The patient experienced symptoms of palpitations or dizziness during the   monitoring time period    These correlated with no significant arrhythmias  Impression 1) Normal Holter monitor of 24 hrs duration  2) Normal burden of ventricular and supraventricular ectopic beats  3) Symptoms correlated with NSR with no significant arrhythmias  Interpreting Physician: Vandana Carr MD, [facility]       ======================================================    Lab Results   Component Value Date    WBC 4 06 (L) 01/18/2021    HGB 12 8 01/18/2021    HCT 38 6 01/18/2021    MCV 84 01/18/2021     01/18/2021      Lab Results   Component Value Date    SODIUM 140 01/18/2021    K 3 7 01/18/2021     01/18/2021    CO2 29 01/18/2021    BUN 9 01/18/2021    CREATININE 0 57 (L) 01/18/2021    GLUC 89 08/27/2020    CALCIUM 10 5 (H) 01/18/2021      Lab Results   Component Value Date    HGBA1C 4 8 09/01/2019      Lab Results   Component Value Date    CHOL 183 04/23/2018    CHOL 186 03/29/2014     Lab Results   Component Value Date    HDL 27 (L) 02/12/2021    HDL 30 (L) 05/22/2020    HDL 28 (L) 01/29/2020     Lab Results   Component Value Date    [facility]  02/12/2021      Comment:      Calculated LDL invalid, triglycerides >400 mg/dl  This screening LDL is a calculated result  It does not have the accuracy of the Direct Measured LDL in the monitoring of patients with hyperlipidemia and/or statin therapy  Direct Measure LDL (KPF022) must be ordered separately in these patients  LDLCALC 39 05/22/2020    LDLCALC 65 01/29/2020     Lab Results   Component Value Date    TRIG 670 (H) 02/12/2021    TRIG 308 (H) 05/22/2020    TRIG 381 (H) 01/29/2020     No results found for: CHOLHDL   No results found for: INR, PROTIME     Imaging:   I have personally reviewed pertinent reports  Portions of the record may have been created with voice recognition software  Occasional wrong word or "sound a like" substitutions may have occurred due to the inherent limitations of voice recognition software   Read the chart carefully and recognize, using context, where substitutions have occurred

## 2021-03-26 NOTE — H&P
History and Physical - [facility]  Loraine Preston 47 y o  female MRN: [id number]                  HPI: Loraine Preston is a 47y o  year old female who presents for EGD      REVIEW OF SYSTEMS: Per the HPI, and otherwise unremarkable  Historical Information   Past Medical History:   Diagnosis Date    COVID-19 2020    Esophagitis     H  pylori infection 2018    Hepatic steatosis     Hiatal hernia     Hiatal hernia     High triglycerides     Hyperlipidemia     Hypertension     Ventral hernia      Past Surgical History:   Procedure Laterality Date     SECTION      3X--,,     COLONOSCOPY N/A 2018    Procedure: COLONOSCOPY;  Surgeon: Doe Cowan MD;  Location: [facility]; Service: Gastroenterology    ESOPHAGOGASTRODUODENOSCOPY N/A 2018    Procedure: ESOPHAGOGASTRODUODENOSCOPY (EGD); Surgeon: Doe Cowan MD;  Location: [facility];   Service: Gastroenterology    HERNIA REPAIR      [place] REPAIR INCISIONAL HERNIA,FLAVIO N/A 2019    Procedure: REPAIR HERNIA VENTRAL;  Surgeon: Martha Alberto DO;  Location: [facility] MAIN OR;  Service: General    TUBAL LIGATION      UPPER GASTROINTESTINAL ENDOSCOPY       Social History   Social History     Substance and Sexual Activity   Alcohol Use No     Social History     Substance and Sexual Activity   Drug Use No     Social History     Tobacco Use   Smoking Status Never Smoker   Smokeless Tobacco Never Used     Family History   Problem Relation Age of Onset    Uterine cancer Mother     Heart disease Mother     Hypertension Mother     Cervical cancer Mother     Stomach cancer Father     Diabetes Brother     Hyperlipidemia Brother     Asthma Son        Meds/Allergies       Current Outpatient Medications:     atenolol (TENORMIN) 50 mg tablet    fenofibrate (TRICOR) 145 mg tablet    fluticasone (FLONASE) 50 mcg/act nasal spray    ondansetron (ZOFRAN-ODT) 4 mg disintegrating tablet    pantoprazole (PROTONIX) 40 mg tablet    rosuvastatin (CRESTOR) 20 MG tablet    benzonatate (TESSALON) 200 MG capsule    clotrimazole-betamethasone (LOTRISONE) 1-0 05 % cream    VASCEPA 1 g CAPS    No Known Allergies    Objective     /73   Temp (!) 97 2 °F (36 2 °C) (Tympanic)   Resp 16   LMP 02/03/2021   SpO2 99%       PHYSICAL EXAM    Gen: NAD  CV: RRR  CHEST: Clear  ABD: soft, NT/ND  EXT: no edema      ASSESSMENT/PLAN:  This is a 47y o  year old female here for EGD, and she is stable and optimized for her procedure

## 2021-03-26 NOTE — ANESTHESIA PREPROCEDURE EVALUATION
Procedure:  EGD    Relevant Problems   ANESTHESIA (within normal limits)      CARDIO   (+) Chest pain   (+) Hyperlipidemia   (+) Hypertriglyceridemia      ENDO (within normal limits)      GI/HEPATIC   (+) Fatty liver   (+) Gastroesophageal reflux disease without esophagitis      /RENAL (within normal limits)      GYN (within normal limits)      HEMATOLOGY   (+) Anemia      MUSCULOSKELETAL (within normal limits)      NEURO/PSYCH (within normal limits)      PULMONARY (within normal limits)      Other   (+) Heart palpitations   (+) Leukemia (HCC)   (+) PCOS (polycystic ovarian syndrome)        Physical Exam    Airway    Mallampati score: II  TM Distance: >3 FB  Neck ROM: full     Dental   No notable dental hx     Cardiovascular  Rhythm: regular, Rate: normal, Cardiovascular exam normal    Pulmonary  Pulmonary exam normal Breath sounds clear to auscultation,     Other Findings        Anesthesia Plan  ASA Score- 2     Anesthesia Type- IV sedation with anesthesia with ASA Monitors  Additional Monitors:   Airway Plan:           Plan Factors-    Chart reviewed  Existing labs reviewed  Patient summary reviewed  Induction- intravenous  Postoperative Plan-     Informed Consent- Anesthetic plan and risks discussed with patient  I personally reviewed this patient with the CRNA  Discussed and agreed on the Anesthesia Plan with the CRNA  Damien Chu Recent labs personally reviewed:  Lab Results   Component Value Date    WBC 4 06 (L) 01/18/2021    HGB 12 8 01/18/2021     01/18/2021     Lab Results   Component Value Date     04/23/2018    K 3 7 01/18/2021    BUN 9 01/18/2021    CREATININE 0 57 (L) 01/18/2021    GLUCOSE 79 04/23/2018     Lab Results   Component Value Date    HGBA1C 4 8 09/01/2019       I, Sloan Waggoner MD, have personally seen and evaluated the patient prior to anesthetic care    I have reviewed the pre-anesthetic record, and other medical records if appropriate to the anesthetic care  If a CRNA is involved in the case, I have reviewed the CRNA assessment, if present, and agree  Risks/benefits and alternatives discussed with patient including possible PONV, sore throat, and possibility of rare anesthetic and surgical emergencies

## 2021-03-26 NOTE — ANESTHESIA POSTPROCEDURE EVALUATION
Post-Op Assessment Note    CV Status:  Stable  Pain Score: 0    Pain management: adequate     Mental Status:  Awake and somnolent   Hydration Status:  Stable   PONV Controlled:  None   Airway Patency:  Patent      Post Op Vitals Reviewed: Yes      Staff: CRNA         No complications documented      /78 (03/26/21 0819)    Temp (!) 96 7 °F (35 9 °C) (03/26/21 0819)    Pulse (!) 109 (03/26/21 0819)   Resp 18 (03/26/21 0819)    SpO2 97 % (03/26/21 0819)

## 2021-03-27 ENCOUNTER — LAB (OUTPATIENT)
Dept: LAB | Facility: HOSPITAL | Age: 41
End: 2021-03-27
Payer: COMMERCIAL

## 2021-03-27 ENCOUNTER — HOSPITAL ENCOUNTER (OUTPATIENT)
Dept: RADIOLOGY | Facility: HOSPITAL | Age: 41
Discharge: HOME | End: 2021-03-27
Payer: COMMERCIAL

## 2021-03-27 DIAGNOSIS — E78.2 MIXED HYPERLIPIDEMIA: ICD-10-CM

## 2021-03-27 DIAGNOSIS — N93.9 ABNORMAL UTERINE BLEEDING (AUB): ICD-10-CM

## 2021-03-27 DIAGNOSIS — E83.52 HYPERCALCEMIA: ICD-10-CM

## 2021-03-27 LAB
ANION GAP SERPL CALCULATED.3IONS-SCNC: 4 MMOL/L (ref 4–13)
BUN SERPL-MCNC: 7 MG/DL (ref 5–25)
CA-I BLD-SCNC: 1.24 MMOL/L (ref 1.12–1.32)
CALCIUM SERPL-MCNC: 9.6 MG/DL (ref 8.3–10.1)
CHLORIDE SERPL-SCNC: 106 MMOL/L (ref 100–108)
CHOLEST SERPL-MCNC: 127 MG/DL (ref 50–200)
CO2 SERPL-SCNC: 28 MMOL/L (ref 21–32)
CREAT SERPL-MCNC: 0.61 MG/DL (ref 0.6–1.3)
GFR SERPL CREATININE-BSD FRML MDRD: 115 ML/MIN/1.73SQ M
GLUCOSE P FAST SERPL-MCNC: 90 MG/DL (ref 65–99)
HDLC SERPL-MCNC: 30 MG/DL
LDLC SERPL DIRECT ASSAY-MCNC: 43 MG/DL (ref 0–100)
POTASSIUM SERPL-SCNC: 4 MMOL/L (ref 3.5–5.3)
SODIUM SERPL-SCNC: 138 MMOL/L (ref 136–145)
TRIGL SERPL-MCNC: 404 MG/DL

## 2021-03-27 PROCEDURE — 76856 US EXAM PELVIC COMPLETE: CPT

## 2021-03-27 PROCEDURE — 82330 ASSAY OF CALCIUM: CPT

## 2021-03-27 PROCEDURE — 80048 BASIC METABOLIC PNL TOTAL CA: CPT | Performed by: INTERNAL MEDICINE

## 2021-03-27 PROCEDURE — 36415 COLL VENOUS BLD VENIPUNCTURE: CPT | Performed by: INTERNAL MEDICINE

## 2021-03-27 PROCEDURE — 83721 ASSAY OF BLOOD LIPOPROTEIN: CPT

## 2021-03-27 PROCEDURE — 76830 TRANSVAGINAL US NON-OB: CPT

## 2021-03-27 PROCEDURE — 80061 LIPID PANEL: CPT

## 2021-03-29 DIAGNOSIS — E78.2 MIXED HYPERLIPIDEMIA: ICD-10-CM

## 2021-03-29 DIAGNOSIS — E78.1 HYPERTRIGLYCERIDEMIA: ICD-10-CM

## 2021-03-29 RX ORDER — FENOFIBRATE 145 MG/1
145 TABLET, COATED ORAL DAILY
Qty: 30 TABLET | Refills: 5 | Status: SHIPPED | OUTPATIENT
Start: 2021-03-29 | End: 2021-05-03 | Stop reason: ALTCHOICE

## 2021-03-29 NOTE — RESULT ENCOUNTER NOTE
Persistent elevation of Triglycerides  Please follow indications from  Cardiology    [facility] reported irritation awaiting for Biopsy  Calcium is normal

## 2021-04-05 ENCOUNTER — TELEPHONE (OUTPATIENT)
Dept: GASTROENTEROLOGY | Facility: CLINIC | Age: 41
End: 2021-04-05

## 2021-04-05 NOTE — TELEPHONE ENCOUNTER
Please inform the patient that the EGD showed mild inflammation between where the stomach and esophagus connect, and this was biopsied, which showed chronic inflammation but was negative for any concerning changes  Duodenum was biopsied and was negative for celiac  Stomach was biopsied and negative for for the bacteria, H pylori but did show inflammation in the stomach but without concerning changes  Please continue PPI

## 2021-04-05 NOTE — TELEPHONE ENCOUNTER
Pt came in to office regarding results for EGD  Would need a   Pt can be reached at [phone]  Please advise

## 2021-04-16 NOTE — TELEPHONE ENCOUNTER
I returned pts call using  services, [id number] we spoke with pt we went over results, pt understood, no f u questions

## 2021-04-23 ENCOUNTER — OFFICE VISIT (OUTPATIENT)
Dept: OBGYN CLINIC | Facility: CLINIC | Age: 41
End: 2021-04-23

## 2021-04-23 VITALS
WEIGHT: 132 LBS | BODY MASS INDEX: 24.92 KG/M2 | HEIGHT: 61 IN | SYSTOLIC BLOOD PRESSURE: 120 MMHG | DIASTOLIC BLOOD PRESSURE: 85 MMHG | HEART RATE: 97 BPM

## 2021-04-23 DIAGNOSIS — N93.9 ABNORMAL UTERINE BLEEDING (AUB): Primary | ICD-10-CM

## 2021-04-23 PROCEDURE — 1036F TOBACCO NON-USER: CPT | Performed by: OBSTETRICS & GYNECOLOGY

## 2021-04-23 PROCEDURE — 99213 OFFICE O/P EST LOW 20 MIN: CPT | Performed by: OBSTETRICS & GYNECOLOGY

## 2021-04-23 NOTE — PROGRESS NOTES
Subjective:     Adam Valencia is a 47 y o   female who presents for follow up after TVUS for RLQ pain  The patient describes having intermittent RLQ that feels like "pressure"  She also endorses leg pain when she feels the RLQ pain that she states can make it difficult to walk  She feels a "tingling" down her right leg  TVUS revealed a simple appearing cyst      Objective:    Vitals: Blood pressure 120/85, pulse 97, height 5' 1" (1 549 m), weight 59 9 kg (132 lb), not currently breastfeeding  Body mass index is 24 94 kg/m²  Us Pelvis Complete W Transvaginal    Result Date: 4/3/2021  Impression: Left ovarian cyst measuring 2 8 x 1 5 x 3 1 cm with focal irregular inner wall nodular thickening  Based on the ACR O-RADS system, this is O-RADS category 3 (low-risk with 1% to <10% risk of malignancy ) The management recommendation is evaluation with pelvic MRI with and without IV contrast      Physical Exam  Constitutional:       Appearance: Normal appearance  She is normal weight  HENT:      Head: Normocephalic and atraumatic  Abdominal:      General: There is no distension  Palpations: Abdomen is soft  There is no mass  Tenderness: There is abdominal tenderness  There is no guarding  Musculoskeletal:         General: No swelling, tenderness, deformity or signs of injury  Right lower leg: No edema  Left lower leg: No edema  Neurological:      General: No focal deficit present  Mental Status: She is alert and oriented to person, place, and time  Psychiatric:         Mood and Affect: Mood normal          Behavior: Behavior normal          Thought Content: Thought content normal          Judgment: Judgment normal      Assessment/Plan:    Adam Valencia is a 48 yo  who presents for follow up for RLQ pain     - TVUS demonstrated a simple cyst with nodular thickening, will plan to repeat US in 3 months  - Offered patient hormonal therapy to see if ovarian suppression improved pain, however the patient declined     - Given the leg involvement with her pain, recommended that she see her PCP for assessment of MSK etiology           Giana Pimentel MD  4/23/2021  12:20 PM

## 2021-05-03 ENCOUNTER — OFFICE VISIT (OUTPATIENT)
Dept: FAMILY MEDICINE CLINIC | Facility: CLINIC | Age: 41
End: 2021-05-03
Payer: COMMERCIAL

## 2021-05-03 VITALS
HEIGHT: 61 IN | TEMPERATURE: 98 F | SYSTOLIC BLOOD PRESSURE: 134 MMHG | OXYGEN SATURATION: 99 % | HEART RATE: 92 BPM | BODY MASS INDEX: 24.55 KG/M2 | RESPIRATION RATE: 16 BRPM | DIASTOLIC BLOOD PRESSURE: 80 MMHG | WEIGHT: 130 LBS

## 2021-05-03 DIAGNOSIS — J01.00 ACUTE NON-RECURRENT MAXILLARY SINUSITIS: ICD-10-CM

## 2021-05-03 DIAGNOSIS — E78.1 HYPERTRIGLYCERIDEMIA: ICD-10-CM

## 2021-05-03 DIAGNOSIS — B00.1 HERPES LABIALIS: ICD-10-CM

## 2021-05-03 DIAGNOSIS — R09.81 NASAL CONGESTION: Primary | ICD-10-CM

## 2021-05-03 PROCEDURE — 99214 OFFICE O/P EST MOD 30 MIN: CPT | Performed by: FAMILY MEDICINE

## 2021-05-03 PROCEDURE — 3008F BODY MASS INDEX DOCD: CPT | Performed by: OBSTETRICS & GYNECOLOGY

## 2021-05-03 PROCEDURE — U0003 INFECTIOUS AGENT DETECTION BY NUCLEIC ACID (DNA OR RNA); SEVERE ACUTE RESPIRATORY SYNDROME CORONAVIRUS 2 (SARS-COV-2) (CORONAVIRUS DISEASE [COVID-19]), AMPLIFIED PROBE TECHNIQUE, MAKING USE OF HIGH THROUGHPUT TECHNOLOGIES AS DESCRIBED BY CMS-2020-01-R: HCPCS | Performed by: FAMILY MEDICINE

## 2021-05-03 PROCEDURE — 3008F BODY MASS INDEX DOCD: CPT | Performed by: INTERNAL MEDICINE

## 2021-05-03 PROCEDURE — U0005 INFEC AGEN DETEC AMPLI PROBE: HCPCS | Performed by: FAMILY MEDICINE

## 2021-05-03 RX ORDER — DEXTROMETHORPHAN HYDROBROMIDE AND PROMETHAZINE HYDROCHLORIDE 15; 6.25 MG/5ML; MG/5ML
5 SOLUTION ORAL 4 TIMES DAILY PRN
Qty: 150 ML | Refills: 0 | Status: SHIPPED | OUTPATIENT
Start: 2021-05-03 | End: 2021-08-13 | Stop reason: ALTCHOICE

## 2021-05-03 RX ORDER — FLUTICASONE PROPIONATE 50 MCG
2 SPRAY, SUSPENSION (ML) NASAL DAILY
Qty: 16 G | Refills: 0 | Status: SHIPPED | OUTPATIENT
Start: 2021-05-03 | End: 2021-08-13 | Stop reason: ALTCHOICE

## 2021-05-03 RX ORDER — VALACYCLOVIR HYDROCHLORIDE 1 G/1
1000 TABLET, FILM COATED ORAL DAILY
Qty: 30 TABLET | Refills: 0 | Status: SHIPPED | OUTPATIENT
Start: 2021-05-03 | End: 2021-05-26

## 2021-05-03 RX ORDER — ICOSAPENT ETHYL 1000 MG/1
2 CAPSULE ORAL 2 TIMES DAILY
Qty: 120 CAPSULE | Refills: 5 | Status: SHIPPED | OUTPATIENT
Start: 2021-05-03 | End: 2021-07-02 | Stop reason: SDUPTHER

## 2021-05-03 NOTE — ASSESSMENT & PLAN NOTE
Recommended patient to continue on the septal and stop rosuvastatin and fenofibrate  Follow-up labs in three month

## 2021-05-03 NOTE — LETTER
May 3, 2021     Patient: Duane Sims   YOB: 1980   Date of Visit: 5/3/2021       To Whom it May Concern:    Duane Sims is under my professional care  She was seen in my office on 5/3/2021  She is under investigation for COVID 19 infection  As soon test result is available will be send to you  She has no suspictious contact  If you have any questions or concerns, please don't hesitate to call           Sincerely,          Raffy Godinez MD

## 2021-05-03 NOTE — PROGRESS NOTES
Assessment/Plan:    Hypertriglyceridemia    Recommended patient to continue on the septal and stop rosuvastatin and fenofibrate  Follow-up labs in three month  Herpes labialis    And restarting Valtrex 1000 mg once a day    Nasal congestion    Likely congestion on COVID related, however employer wants to get clearance before return to work  COVID-19 PCR test       Diagnoses and all orders for this visit:    Nasal congestion  -     Promethazine-DM (PHENERGAN-DM) 6 25-15 mg/5 mL oral syrup; Take 5 mL by mouth 4 (four) times a day as needed for cough  -     Novel Coronavirus (Covid-19),PCR [facility] - Collected in Office    Herpes labialis  -     valACYclovir (VALTREX) 1,000 mg tablet; Take 1 tablet (1,000 mg total) by mouth daily    Hypertriglyceridemia  -     Icosapent Ethyl (Vascepa) 1 g CAPS; Take 2 capsules (2 g total) by mouth 2 (two) times a day    Acute non-recurrent maxillary sinusitis  -     fluticasone (FLONASE) 50 mcg/act nasal spray; 2 sprays into each nostril daily          Subjective:      Patient ID: Janell Mendez is a 47 y o  female  Patient has upper respiratory congestion and was requested by employer to stay at home and get cleared to return to work  She also complains of the beginning of herpes labialis  The following portions of the patient's history were reviewed and updated as appropriate: allergies, current medications, past family history, past medical history, past social history, past surgical history and problem list     Review of Systems   Constitutional: Negative for diaphoresis, fatigue, fever and unexpected weight change  HENT: Positive for congestion  Respiratory: Negative for cough, chest tightness, shortness of breath and wheezing  Cardiovascular: Negative for chest pain, palpitations and leg swelling  Gastrointestinal: Negative for abdominal pain, blood in stool and constipation  Neurological: Negative for dizziness, syncope, light-headedness and headaches  Hematological: Does not bruise/bleed easily  Psychiatric/Behavioral: Negative for behavioral problems, self-injury and sleep disturbance  The patient is not nervous/anxious  Objective:      /80 (BP Location: Left arm, Patient Position: Sitting, Cuff Size: Standard)   Pulse 92   Temp 98 °F (36 7 °C) (Temporal)   Resp 16   Ht 5' 1" (1 549 m)   Wt 59 kg (130 lb)   SpO2 99%   Breastfeeding No   BMI 24 56 kg/m²          Physical Exam  HENT:      Head: Normocephalic  Nose: Congestion and rhinorrhea present  Eyes:      Pupils: Pupils are equal, round, and reactive to light  Neck:      Musculoskeletal: Neck supple  Cardiovascular:      Rate and Rhythm: Normal rate and regular rhythm  Pulmonary:      Effort: Pulmonary effort is normal    Abdominal:      Palpations: Abdomen is soft  Musculoskeletal: Normal range of motion  Skin:     General: Skin is warm and dry  Neurological:      Mental Status: She is alert     Psychiatric:         Behavior: Behavior normal

## 2021-05-03 NOTE — ASSESSMENT & PLAN NOTE
Likely congestion on COVID related, however employer wants to get clearance before return to work    COVID-19 PCR test

## 2021-05-04 LAB — SARS-COV-2 RNA RESP QL NAA+PROBE: NEGATIVE

## 2021-05-26 DIAGNOSIS — B00.1 HERPES LABIALIS: ICD-10-CM

## 2021-05-26 RX ORDER — VALACYCLOVIR HYDROCHLORIDE 1 G/1
1000 TABLET, FILM COATED ORAL DAILY
Qty: 30 TABLET | Refills: 5 | Status: SHIPPED | OUTPATIENT
Start: 2021-05-26 | End: 2021-08-13 | Stop reason: ALTCHOICE

## 2021-07-02 DIAGNOSIS — E78.1 HYPERTRIGLYCERIDEMIA: ICD-10-CM

## 2021-07-03 RX ORDER — ICOSAPENT ETHYL 1000 MG/1
2 CAPSULE ORAL 2 TIMES DAILY
Qty: 120 CAPSULE | Refills: 5 | Status: SHIPPED | OUTPATIENT
Start: 2021-07-03 | End: 2021-08-13 | Stop reason: ALTCHOICE

## 2021-07-30 ENCOUNTER — APPOINTMENT (OUTPATIENT)
Dept: LAB | Facility: HOSPITAL | Age: 41
End: 2021-07-30
Payer: COMMERCIAL

## 2021-07-30 ENCOUNTER — TRANSCRIBE ORDERS (OUTPATIENT)
Dept: LAB | Facility: HOSPITAL | Age: 41
End: 2021-07-30

## 2021-07-30 DIAGNOSIS — E55.9 VITAMIN D DEFICIENCY: Primary | ICD-10-CM

## 2021-07-30 DIAGNOSIS — Z83.3 FAMILY HISTORY OF DIABETES MELLITUS TYPE II: ICD-10-CM

## 2021-07-30 DIAGNOSIS — R94.6 ABNORMAL THYROID FUNCTION TEST: ICD-10-CM

## 2021-07-30 DIAGNOSIS — E78.1 HIGH TRIGLYCERIDES: ICD-10-CM

## 2021-07-30 DIAGNOSIS — E55.9 VITAMIN D DEFICIENCY: ICD-10-CM

## 2021-07-30 LAB
25(OH)D3 SERPL-MCNC: 40.5 NG/ML (ref 30–100)
ALBUMIN SERPL BCP-MCNC: 3.9 G/DL (ref 3.5–5)
ALP SERPL-CCNC: 62 U/L (ref 46–116)
ALT SERPL W P-5'-P-CCNC: 56 U/L (ref 12–78)
ANION GAP SERPL CALCULATED.3IONS-SCNC: 1 MMOL/L (ref 4–13)
AST SERPL W P-5'-P-CCNC: 28 U/L (ref 5–45)
BILIRUB SERPL-MCNC: 0.46 MG/DL (ref 0.2–1)
BUN SERPL-MCNC: 9 MG/DL (ref 5–25)
CALCIUM SERPL-MCNC: 9.4 MG/DL (ref 8.3–10.1)
CHLORIDE SERPL-SCNC: 107 MMOL/L (ref 100–108)
CHOLEST SERPL-MCNC: 131 MG/DL (ref 50–200)
CO2 SERPL-SCNC: 26 MMOL/L (ref 21–32)
CREAT SERPL-MCNC: 0.55 MG/DL (ref 0.6–1.3)
EST. AVERAGE GLUCOSE BLD GHB EST-MCNC: 105 MG/DL
GFR SERPL CREATININE-BSD FRML MDRD: 119 ML/MIN/1.73SQ M
GLUCOSE P FAST SERPL-MCNC: 100 MG/DL (ref 65–99)
HBA1C MFR BLD: 5.3 %
HDLC SERPL-MCNC: 21 MG/DL
NONHDLC SERPL-MCNC: 110 MG/DL
POTASSIUM SERPL-SCNC: 4.2 MMOL/L (ref 3.5–5.3)
PROT SERPL-MCNC: 7.9 G/DL (ref 6.4–8.2)
PTH-INTACT SERPL-MCNC: 48.1 PG/ML (ref 18.4–80.1)
SODIUM SERPL-SCNC: 134 MMOL/L (ref 136–145)
T4 FREE SERPL-MCNC: 0.91 NG/DL (ref 0.76–1.46)
TRIGL SERPL-MCNC: 610 MG/DL
TSH SERPL DL<=0.05 MIU/L-ACNC: 0.86 UIU/ML (ref 0.36–3.74)

## 2021-07-30 PROCEDURE — 84439 ASSAY OF FREE THYROXINE: CPT

## 2021-07-30 PROCEDURE — 82306 VITAMIN D 25 HYDROXY: CPT

## 2021-07-30 PROCEDURE — 84443 ASSAY THYROID STIM HORMONE: CPT

## 2021-07-30 PROCEDURE — 80061 LIPID PANEL: CPT

## 2021-07-30 PROCEDURE — 80053 COMPREHEN METABOLIC PANEL: CPT

## 2021-07-30 PROCEDURE — 36415 COLL VENOUS BLD VENIPUNCTURE: CPT

## 2021-07-30 PROCEDURE — 83036 HEMOGLOBIN GLYCOSYLATED A1C: CPT

## 2021-07-30 PROCEDURE — 83970 ASSAY OF PARATHORMONE: CPT

## 2021-08-03 ENCOUNTER — IMMUNIZATIONS (OUTPATIENT)
Dept: FAMILY MEDICINE CLINIC | Facility: HOSPITAL | Age: 41
End: 2021-08-03

## 2021-08-03 DIAGNOSIS — Z23 ENCOUNTER FOR IMMUNIZATION: Primary | ICD-10-CM

## 2021-08-03 PROCEDURE — 0001A SARS-COV-2 / COVID-19 MRNA VACCINE (PFIZER-BIONTECH) 30 MCG: CPT

## 2021-08-03 PROCEDURE — 91300 SARS-COV-2 / COVID-19 MRNA VACCINE (PFIZER-BIONTECH) 30 MCG: CPT

## 2021-08-06 ENCOUNTER — RA CDI HCC (OUTPATIENT)
Dept: OTHER | Facility: HOSPITAL | Age: 41
End: 2021-08-06

## 2021-08-06 NOTE — PROGRESS NOTES
[place] [address]  coding opportunities             Chart Reviewed * (Number of) Inbasket suggestions sent to Provider: 1                  Patients insurance company: [employer] (Medicare Advantage and Commercial)     Visit status: Patient arrived for their scheduled appointment     Provider never responded to [place] [address]  coding request     [place] [address]  coding opportunities             Chart reviewed, (number of) suggestions sent to provider: 1                  Patients insurance company: [employer] ([employer] and [employer])           Found on active problem list - please assess using MEAT for 2021 billing or resolve into hx please  C92 10 Leukemia ([place] [address] )

## 2021-08-12 RX ORDER — ROSUVASTATIN CALCIUM 20 MG/1
20 TABLET, COATED ORAL DAILY
COMMUNITY
Start: 2021-08-07 | End: 2021-08-13 | Stop reason: ALTCHOICE

## 2021-08-12 RX ORDER — ERGOCALCIFEROL (VITAMIN D2) 1250 MCG
50000 CAPSULE ORAL WEEKLY
COMMUNITY
Start: 2021-08-07

## 2021-08-12 RX ORDER — FENOFIBRATE 200 MG/1
200 CAPSULE ORAL
COMMUNITY
Start: 2021-08-07 | End: 2022-03-18 | Stop reason: HOSPADM

## 2021-08-12 RX ORDER — FERROUS SULFATE 324(65)MG
65 TABLET, DELAYED RELEASE (ENTERIC COATED) ORAL
COMMUNITY
End: 2021-12-03 | Stop reason: SDUPTHER

## 2021-08-13 ENCOUNTER — OFFICE VISIT (OUTPATIENT)
Dept: FAMILY MEDICINE CLINIC | Facility: CLINIC | Age: 41
End: 2021-08-13
Payer: COMMERCIAL

## 2021-08-13 VITALS
HEART RATE: 84 BPM | DIASTOLIC BLOOD PRESSURE: 80 MMHG | TEMPERATURE: 97.9 F | WEIGHT: 133 LBS | BODY MASS INDEX: 25.11 KG/M2 | OXYGEN SATURATION: 98 % | HEIGHT: 61 IN | RESPIRATION RATE: 16 BRPM | SYSTOLIC BLOOD PRESSURE: 120 MMHG

## 2021-08-13 DIAGNOSIS — R06.02 SOB (SHORTNESS OF BREATH): Primary | ICD-10-CM

## 2021-08-13 DIAGNOSIS — J45.41 MODERATE PERSISTENT ASTHMA WITH ACUTE EXACERBATION: ICD-10-CM

## 2021-08-13 DIAGNOSIS — E78.1 HYPERTRIGLYCERIDEMIA: ICD-10-CM

## 2021-08-13 PROCEDURE — 99214 OFFICE O/P EST MOD 30 MIN: CPT | Performed by: FAMILY MEDICINE

## 2021-08-13 PROCEDURE — 3008F BODY MASS INDEX DOCD: CPT | Performed by: FAMILY MEDICINE

## 2021-08-13 PROCEDURE — 1036F TOBACCO NON-USER: CPT | Performed by: FAMILY MEDICINE

## 2021-08-13 RX ORDER — ALBUTEROL SULFATE 2.5 MG/3ML
2.5 SOLUTION RESPIRATORY (INHALATION) ONCE
Status: COMPLETED | OUTPATIENT
Start: 2021-08-13 | End: 2021-08-13

## 2021-08-13 RX ORDER — BUDESONIDE AND FORMOTEROL FUMARATE DIHYDRATE 80; 4.5 UG/1; UG/1
2 AEROSOL RESPIRATORY (INHALATION) 2 TIMES DAILY
Qty: 10.2 G | Refills: 5 | Status: SHIPPED | OUTPATIENT
Start: 2021-08-13 | End: 2021-09-17 | Stop reason: SDUPTHER

## 2021-08-13 RX ADMIN — ALBUTEROL SULFATE 2.5 MG: 2.5 SOLUTION RESPIRATORY (INHALATION) at 11:27

## 2021-08-24 ENCOUNTER — IMMUNIZATIONS (OUTPATIENT)
Dept: FAMILY MEDICINE CLINIC | Facility: HOSPITAL | Age: 41
End: 2021-08-24

## 2021-08-24 DIAGNOSIS — Z23 ENCOUNTER FOR IMMUNIZATION: Primary | ICD-10-CM

## 2021-08-24 PROCEDURE — 0002A SARS-COV-2 / COVID-19 MRNA VACCINE (PFIZER-BIONTECH) 30 MCG: CPT

## 2021-08-24 PROCEDURE — 91300 SARS-COV-2 / COVID-19 MRNA VACCINE (PFIZER-BIONTECH) 30 MCG: CPT

## 2021-08-30 PROBLEM — J45.41 MODERATE PERSISTENT ASTHMA WITH ACUTE EXACERBATION: Status: ACTIVE | Noted: 2021-08-30

## 2021-08-30 PROBLEM — R06.02 SOB (SHORTNESS OF BREATH): Status: ACTIVE | Noted: 2021-08-30

## 2021-08-30 PROCEDURE — 93000 ELECTROCARDIOGRAM COMPLETE: CPT | Performed by: FAMILY MEDICINE

## 2021-08-30 NOTE — PROGRESS NOTES
Assessment/Plan:  1  SOB (shortness of breath)  Her symptoms are more related to asthma worsening with exertion  Recommended to use Symbicort and follow-up in one month to ensure improvement  Avoid are agents, carpets and other items that she may find causing asthma  Patient may follow-up with allergy specialist later on   - POCT ECG  - albuterol inhalation solution 2 5 mg    2  Moderate persistent asthma with acute exacerbation  As found in spirometric she will be benefit for combination of long-acting beta agonist along with ICS agents  - budesonide-formoterol (SYMBICORT) 80-4 5 MCG/ACT inhaler; Inhale 2 puffs 2 (two) times a day Rinse mouth after use  Dispense: 10 2 g; Refill: 5    3  Hypertriglyceridemia  * recommended patient to continue treatment as requested by Endocrinology which is a combination of the septal a along with fenofibrate  No problem-specific Assessment & Plan notes found for this encounter  Diagnoses and all orders for this visit:    SOB (shortness of breath)  -     POCT ECG  -     albuterol inhalation solution 2 5 mg    Moderate persistent asthma with acute exacerbation  -     budesonide-formoterol (SYMBICORT) 80-4 5 MCG/ACT inhaler; Inhale 2 puffs 2 (two) times a day Rinse mouth after use  Other orders  -     Discontinue: Clobetasol Propionate 0 025 % CREA; Apply 1 application topically 2 (two) times a day  -     ergocalciferol (ERGOCALCIFEROL) 1 25 MG (74057 UT) capsule; Take 50,000 Units by mouth  -     fenofibrate micronized (LOFIBRA) 200 MG capsule; Take 200 mg by mouth  -     ferrous sulfate 324 MG TBEC; Take 65 mg by mouth  -     Discontinue: rosuvastatin (CRESTOR) 20 MG tablet; Take 20 mg by mouth daily          Subjective:      Patient ID: Syeda Meier is a 47 y o  female  History of hyperlipidemia  patient states good compliance with treatment  Denies chest pain, shortness of breath, angina, urinary problems  No exercise but follows low salt diet     Lab Results       Component                Value               Date/Time                  HGBA1C                   5 3                 07/30/2021 10:21 AM        HGBA1C                   5 5                 04/23/2018 06:44 PM        CHOLESTEROL              131                 07/30/2021 10:21 AM        LDLCALC                                      07/30/2021 10:21 AM     Comment:    Calculated LDL invalid, triglycerides >400 mg/dl  This screening LDL is a calculated result  It does not have the accuracy of the Direct Measured LDL in the monitoring of patients with hyperlipidemia and/or statin therapy  Direct Measure LDL (JVA618) must be ordered separately in these patients  LDLCALC                  73                  04/23/2018 06:44 PM        TRIG                     610 (H)             07/30/2021 10:21 AM        TRIG                     392 (H)             04/23/2018 06:44 PM        CREATININE               0 55 (L)            07/30/2021 10:21 AM        EGFR                     119                 07/30/2021 10:21 AM     budesonide-formoterol  ergocalciferol  fenofibrate micronized  ferrous sulfate Tbec  Icosapent Ethyl Caps the        The following portions of the patient's history were reviewed and updated as appropriate: allergies, current medications, past family history, past medical history, past social history, past surgical history and problem list     Review of Systems   Constitutional: Negative for diaphoresis, fatigue, fever and unexpected weight change  Respiratory: Positive for shortness of breath (with exertion)  Negative for cough, chest tightness and wheezing  Cardiovascular: Negative for chest pain, palpitations and leg swelling  Gastrointestinal: Negative for abdominal pain, blood in stool and constipation  Neurological: Negative for dizziness, syncope, light-headedness and headaches  Hematological: Does not bruise/bleed easily     Psychiatric/Behavioral: Negative for behavioral problems, self-injury and sleep disturbance  The patient is not nervous/anxious  Objective:      /80 (BP Location: Left arm, Patient Position: Sitting, Cuff Size: Standard)   Pulse 84   Temp 97 9 °F (36 6 °C) (Temporal)   Resp 16   Ht 5' 1" (1 549 m)   Wt 60 3 kg (133 lb)   SpO2 98%   Breastfeeding No   BMI 25 13 kg/m²          Physical Exam  HENT:      Head: Normocephalic  Eyes:      Pupils: Pupils are equal, round, and reactive to light  Cardiovascular:      Rate and Rhythm: Normal rate and regular rhythm  Pulmonary:      Effort: Pulmonary effort is normal       Comments: Spirometry obstruction, improved in FEV1 more than 20% after albuterol  Abdominal:      Palpations: Abdomen is soft  Musculoskeletal:         General: Normal range of motion  Cervical back: Neck supple  Skin:     General: Skin is warm and dry  Neurological:      Mental Status: She is alert     Psychiatric:         Behavior: Behavior normal

## 2021-09-16 RX ORDER — ROSUVASTATIN CALCIUM 20 MG/1
20 TABLET, COATED ORAL DAILY
COMMUNITY
Start: 2021-08-16

## 2021-09-17 ENCOUNTER — APPOINTMENT (OUTPATIENT)
Dept: LAB | Facility: HOSPITAL | Age: 41
End: 2021-09-17
Payer: COMMERCIAL

## 2021-09-17 ENCOUNTER — OFFICE VISIT (OUTPATIENT)
Dept: FAMILY MEDICINE CLINIC | Facility: CLINIC | Age: 41
End: 2021-09-17
Payer: COMMERCIAL

## 2021-09-17 VITALS
RESPIRATION RATE: 16 BRPM | DIASTOLIC BLOOD PRESSURE: 80 MMHG | SYSTOLIC BLOOD PRESSURE: 130 MMHG | OXYGEN SATURATION: 99 % | WEIGHT: 132 LBS | TEMPERATURE: 98 F | BODY MASS INDEX: 24.92 KG/M2 | HEART RATE: 88 BPM | HEIGHT: 61 IN

## 2021-09-17 DIAGNOSIS — R00.2 INTERMITTENT PALPITATIONS: Primary | ICD-10-CM

## 2021-09-17 DIAGNOSIS — R00.2 INTERMITTENT PALPITATIONS: ICD-10-CM

## 2021-09-17 DIAGNOSIS — J45.41 MODERATE PERSISTENT ASTHMA WITH ACUTE EXACERBATION: ICD-10-CM

## 2021-09-17 LAB
BASOPHILS # BLD AUTO: 0.04 THOUSANDS/ΜL (ref 0–0.1)
BASOPHILS NFR BLD AUTO: 1 % (ref 0–1)
EOSINOPHIL # BLD AUTO: 0.02 THOUSAND/ΜL (ref 0–0.61)
EOSINOPHIL NFR BLD AUTO: 1 % (ref 0–6)
ERYTHROCYTE [DISTWIDTH] IN BLOOD BY AUTOMATED COUNT: 13.3 % (ref 11.6–15.1)
HCT VFR BLD AUTO: 39.9 % (ref 34.8–46.1)
HGB BLD-MCNC: 13.2 G/DL (ref 11.5–15.4)
IMM GRANULOCYTES # BLD AUTO: 0.01 THOUSAND/UL (ref 0–0.2)
IMM GRANULOCYTES NFR BLD AUTO: 0 % (ref 0–2)
LYMPHOCYTES # BLD AUTO: 1.5 THOUSANDS/ΜL (ref 0.6–4.47)
LYMPHOCYTES NFR BLD AUTO: 48 % (ref 14–44)
MCH RBC QN AUTO: 27.6 PG (ref 26.8–34.3)
MCHC RBC AUTO-ENTMCNC: 33.1 G/DL (ref 31.4–37.4)
MCV RBC AUTO: 84 FL (ref 82–98)
MONOCYTES # BLD AUTO: 0.31 THOUSAND/ΜL (ref 0.17–1.22)
MONOCYTES NFR BLD AUTO: 10 % (ref 4–12)
NEUTROPHILS # BLD AUTO: 1.26 THOUSANDS/ΜL (ref 1.85–7.62)
NEUTS SEG NFR BLD AUTO: 40 % (ref 43–75)
NRBC BLD AUTO-RTO: 0 /100 WBCS
PLATELET # BLD AUTO: 319 THOUSANDS/UL (ref 149–390)
PMV BLD AUTO: 10.9 FL (ref 8.9–12.7)
RBC # BLD AUTO: 4.78 MILLION/UL (ref 3.81–5.12)
WBC # BLD AUTO: 3.14 THOUSAND/UL (ref 4.31–10.16)

## 2021-09-17 PROCEDURE — 3008F BODY MASS INDEX DOCD: CPT | Performed by: FAMILY MEDICINE

## 2021-09-17 PROCEDURE — 36415 COLL VENOUS BLD VENIPUNCTURE: CPT

## 2021-09-17 PROCEDURE — 99214 OFFICE O/P EST MOD 30 MIN: CPT | Performed by: FAMILY MEDICINE

## 2021-09-17 PROCEDURE — 85025 COMPLETE CBC W/AUTO DIFF WBC: CPT

## 2021-09-17 PROCEDURE — 1036F TOBACCO NON-USER: CPT | Performed by: FAMILY MEDICINE

## 2021-09-17 RX ORDER — CLOBETASOL PROPIONATE 0.5 MG/G
OINTMENT TOPICAL
COMMUNITY
Start: 2021-09-01 | End: 2021-12-03 | Stop reason: SDUPTHER

## 2021-09-17 RX ORDER — MONTELUKAST SODIUM 10 MG/1
10 TABLET ORAL
Qty: 30 TABLET | Refills: 5 | Status: SHIPPED | OUTPATIENT
Start: 2021-09-17 | End: 2022-03-28

## 2021-09-17 RX ORDER — BUDESONIDE AND FORMOTEROL FUMARATE DIHYDRATE 80; 4.5 UG/1; UG/1
2 AEROSOL RESPIRATORY (INHALATION) 2 TIMES DAILY
Qty: 10.2 G | Refills: 5 | Status: SHIPPED | OUTPATIENT
Start: 2021-09-17 | End: 2022-06-30

## 2021-09-17 NOTE — PATIENT INSTRUCTIONS

## 2021-09-17 NOTE — LETTER
September 17, 2021     Patient: Kieran Sanchez   YOB: 1980   Date of Visit: 9/17/2021       To Whom it May Concern:    Kieran Sanchez is under my professional care  She was seen in my office on 9/17/2021  Please allow patient to use inhaler every 4 hrs as needed in the work place  If you have any questions or concerns, please don't hesitate to call           Sincerely,          Dawson Mckeon MD

## 2021-09-17 NOTE — PROGRESS NOTES
Assessment/Plan:    Leukemia ([facility] [address] )  No history of active disease found , checking CBC       Diagnoses and all orders for this visit:    Intermittent palpitations  -     CBC and differential; Future    Moderate persistent asthma with acute exacerbation  -     Peak Flow Meter  -     montelukast (SINGULAIR) 10 mg tablet; Take 1 tablet (10 mg total) by mouth daily at bedtime  -     budesonide-formoterol (SYMBICORT) 80-4 5 MCG/ACT inhaler; Inhale 2 puffs 2 (two) times a day Rinse mouth after use  Other orders  -     rosuvastatin (CRESTOR) 20 MG tablet; TAKE ONE TABLET (20 MG TOTAL) BY MOUTH DAILY  -     Cancel: CBC and differential; Future  -     clobetasol (TEMOVATE) 0 05 % ointment; APPLY ONCE TOPICALLY TWO TIMES DAILY          Subjective:      Patient ID: Ritu Hood is a 47 y o  female  HPI  Patient is here to follow-up asthma, her symptom has been improved after treatment with Symbicort however max results were with the use of montelukast   The following portions of the patient's history were reviewed and updated as appropriate: allergies, current medications, past family history, past medical history, past social history, past surgical history and problem list     Review of Systems   Constitutional: Negative for diaphoresis, fatigue, fever and unexpected weight change  Respiratory: Negative for cough, chest tightness, shortness of breath and wheezing  Cardiovascular: Positive for palpitations (Which is intermittent and occurs about once per week lasting about 1 minute  )  Negative for chest pain and leg swelling  Gastrointestinal: Negative for abdominal pain, blood in stool and constipation  Neurological: Negative for dizziness, syncope, light-headedness and headaches  Hematological: Does not bruise/bleed easily  Psychiatric/Behavioral: Negative for behavioral problems, self-injury and sleep disturbance  The patient is not nervous/anxious            Objective:      /80 (BP Location: Left arm, Patient Position: Sitting, Cuff Size: Standard)   Pulse 88   Temp 98 °F (36 7 °C) (Temporal)   Resp 16   Ht 5' 1" (1 549 m)   Wt 59 9 kg (132 lb)   SpO2 99%   Breastfeeding No   BMI 24 94 kg/m²          Physical Exam  HENT:      Head: Normocephalic  Eyes:      Pupils: Pupils are equal, round, and reactive to light  Cardiovascular:      Rate and Rhythm: Normal rate and regular rhythm  Pulmonary:      Effort: Pulmonary effort is normal    Abdominal:      Palpations: Abdomen is soft  Musculoskeletal:         General: Normal range of motion  Cervical back: Neck supple  Skin:     General: Skin is warm and dry  Neurological:      Mental Status: She is alert  Psychiatric:         Behavior: Behavior normal          BMI Counseling: Body mass index is 24 94 kg/m²  The BMI is above normal  Nutrition recommendations include decreasing soda and/or juice intake, moderation in carbohydrate intake and increasing intake of lean protein  Exercise recommendations include exercising 3-5 times per week

## 2021-09-24 DIAGNOSIS — D70.9 NEUTROPENIA, UNSPECIFIED TYPE (HCC): Primary | ICD-10-CM

## 2021-09-29 ENCOUNTER — TELEPHONE (OUTPATIENT)
Dept: HEMATOLOGY ONCOLOGY | Facility: CLINIC | Age: 41
End: 2021-09-29

## 2021-10-01 ENCOUNTER — OFFICE VISIT (OUTPATIENT)
Dept: GASTROENTEROLOGY | Facility: CLINIC | Age: 41
End: 2021-10-01
Payer: COMMERCIAL

## 2021-10-01 VITALS
WEIGHT: 131 LBS | OXYGEN SATURATION: 98 % | HEIGHT: 61 IN | DIASTOLIC BLOOD PRESSURE: 90 MMHG | HEART RATE: 97 BPM | BODY MASS INDEX: 24.73 KG/M2 | TEMPERATURE: 97.6 F | SYSTOLIC BLOOD PRESSURE: 142 MMHG

## 2021-10-01 DIAGNOSIS — K21.9 GASTROESOPHAGEAL REFLUX DISEASE WITHOUT ESOPHAGITIS: Primary | ICD-10-CM

## 2021-10-01 DIAGNOSIS — R19.4 CHANGE IN BOWEL HABITS: ICD-10-CM

## 2021-10-01 PROCEDURE — 99245 OFF/OP CONSLTJ NEW/EST HI 55: CPT | Performed by: INTERNAL MEDICINE

## 2021-10-01 RX ORDER — POLYETHYLENE GLYCOL 3350 17 G/17G
POWDER, FOR SOLUTION ORAL
Qty: 238 G | Refills: 0 | Status: SHIPPED | OUTPATIENT
Start: 2021-10-01 | End: 2021-12-03 | Stop reason: ALTCHOICE

## 2021-10-01 RX ORDER — PANTOPRAZOLE SODIUM 40 MG/1
40 TABLET, DELAYED RELEASE ORAL
Qty: 60 TABLET | Refills: 2 | Status: SHIPPED | OUTPATIENT
Start: 2021-10-01 | End: 2022-03-30

## 2021-10-01 RX ORDER — POLYETHYLENE GLYCOL 3350 17 G/17G
17 POWDER, FOR SOLUTION ORAL DAILY
Qty: 510 G | Refills: 3 | Status: SHIPPED | OUTPATIENT
Start: 2021-10-01 | End: 2022-03-04

## 2021-10-08 ENCOUNTER — TELEPHONE (OUTPATIENT)
Dept: HEMATOLOGY ONCOLOGY | Facility: CLINIC | Age: 41
End: 2021-10-08

## 2021-10-15 ENCOUNTER — APPOINTMENT (OUTPATIENT)
Dept: LAB | Facility: HOSPITAL | Age: 41
End: 2021-10-15
Payer: COMMERCIAL

## 2021-10-15 ENCOUNTER — OFFICE VISIT (OUTPATIENT)
Dept: OBGYN CLINIC | Facility: CLINIC | Age: 41
End: 2021-10-15

## 2021-10-15 ENCOUNTER — TELEPHONE (OUTPATIENT)
Dept: HEMATOLOGY ONCOLOGY | Facility: CLINIC | Age: 41
End: 2021-10-15

## 2021-10-15 ENCOUNTER — CONSULT (OUTPATIENT)
Dept: HEMATOLOGY ONCOLOGY | Facility: CLINIC | Age: 41
End: 2021-10-15
Payer: COMMERCIAL

## 2021-10-15 VITALS
BODY MASS INDEX: 25.49 KG/M2 | HEART RATE: 88 BPM | WEIGHT: 135 LBS | RESPIRATION RATE: 16 BRPM | DIASTOLIC BLOOD PRESSURE: 60 MMHG | HEIGHT: 61 IN | SYSTOLIC BLOOD PRESSURE: 128 MMHG | TEMPERATURE: 98.5 F

## 2021-10-15 VITALS
HEIGHT: 61 IN | DIASTOLIC BLOOD PRESSURE: 85 MMHG | WEIGHT: 131 LBS | SYSTOLIC BLOOD PRESSURE: 145 MMHG | BODY MASS INDEX: 24.73 KG/M2 | HEART RATE: 81 BPM

## 2021-10-15 DIAGNOSIS — D72.819 LEUKOPENIA, UNSPECIFIED TYPE: Primary | ICD-10-CM

## 2021-10-15 DIAGNOSIS — G47.9 SLEEPING DIFFICULTIES: ICD-10-CM

## 2021-10-15 DIAGNOSIS — N93.9 ABNORMAL UTERINE BLEEDING (AUB): Primary | ICD-10-CM

## 2021-10-15 DIAGNOSIS — N93.9 ABNORMAL UTERINE BLEEDING (AUB): ICD-10-CM

## 2021-10-15 DIAGNOSIS — D72.819 LEUKOPENIA, UNSPECIFIED TYPE: ICD-10-CM

## 2021-10-15 LAB
FERRITIN SERPL-MCNC: 58 NG/ML (ref 8–388)
FOLATE SERPL-MCNC: 17.6 NG/ML (ref 3.1–17.5)
IRON SATN MFR SERPL: 10 % (ref 15–50)
IRON SERPL-MCNC: 43 UG/DL (ref 50–170)
TIBC SERPL-MCNC: 451 UG/DL (ref 250–450)
TSH SERPL DL<=0.05 MIU/L-ACNC: 0.92 UIU/ML (ref 0.36–3.74)
VIT B12 SERPL-MCNC: 573 PG/ML (ref 100–900)

## 2021-10-15 PROCEDURE — 99244 OFF/OP CNSLTJ NEW/EST MOD 40: CPT | Performed by: PHYSICIAN ASSISTANT

## 2021-10-15 PROCEDURE — 82746 ASSAY OF FOLIC ACID SERUM: CPT

## 2021-10-15 PROCEDURE — 99213 OFFICE O/P EST LOW 20 MIN: CPT | Performed by: OBSTETRICS & GYNECOLOGY

## 2021-10-15 PROCEDURE — 83540 ASSAY OF IRON: CPT

## 2021-10-15 PROCEDURE — 1036F TOBACCO NON-USER: CPT | Performed by: PHYSICIAN ASSISTANT

## 2021-10-15 PROCEDURE — 82607 VITAMIN B-12: CPT

## 2021-10-15 PROCEDURE — 83550 IRON BINDING TEST: CPT

## 2021-10-15 PROCEDURE — 82728 ASSAY OF FERRITIN: CPT

## 2021-10-15 PROCEDURE — 36415 COLL VENOUS BLD VENIPUNCTURE: CPT

## 2021-10-15 PROCEDURE — 84443 ASSAY THYROID STIM HORMONE: CPT

## 2021-10-20 ENCOUNTER — PROCEDURE VISIT (OUTPATIENT)
Dept: OBGYN CLINIC | Facility: CLINIC | Age: 41
End: 2021-10-20

## 2021-10-20 VITALS
WEIGHT: 132 LBS | SYSTOLIC BLOOD PRESSURE: 161 MMHG | BODY MASS INDEX: 24.92 KG/M2 | HEART RATE: 88 BPM | DIASTOLIC BLOOD PRESSURE: 95 MMHG | HEIGHT: 61 IN

## 2021-10-20 DIAGNOSIS — N93.9 ABNORMAL UTERINE BLEEDING (AUB): ICD-10-CM

## 2021-10-20 DIAGNOSIS — B37.9 CANDIDA ALBICANS INFECTION: Primary | ICD-10-CM

## 2021-10-20 PROCEDURE — 3008F BODY MASS INDEX DOCD: CPT | Performed by: PHYSICIAN ASSISTANT

## 2021-10-20 PROCEDURE — 58100 BIOPSY OF UTERUS LINING: CPT | Performed by: OBSTETRICS & GYNECOLOGY

## 2021-10-20 PROCEDURE — 88342 IMHCHEM/IMCYTCHM 1ST ANTB: CPT | Performed by: PATHOLOGY

## 2021-10-20 PROCEDURE — 88305 TISSUE EXAM BY PATHOLOGIST: CPT | Performed by: PATHOLOGY

## 2021-10-22 ENCOUNTER — HOSPITAL ENCOUNTER (OUTPATIENT)
Dept: RADIOLOGY | Facility: HOSPITAL | Age: 41
Discharge: HOME | End: 2021-10-22
Payer: COMMERCIAL

## 2021-10-22 DIAGNOSIS — N93.9 ABNORMAL UTERINE BLEEDING (AUB): ICD-10-CM

## 2021-10-22 PROCEDURE — 76856 US EXAM PELVIC COMPLETE: CPT

## 2021-10-22 PROCEDURE — 76830 TRANSVAGINAL US NON-OB: CPT

## 2021-10-27 ENCOUNTER — TELEPHONE (OUTPATIENT)
Dept: GYNECOLOGIC ONCOLOGY | Facility: CLINIC | Age: 41
End: 2021-10-27

## 2021-11-06 ENCOUNTER — APPOINTMENT (OUTPATIENT)
Dept: LAB | Facility: HOSPITAL | Age: 41
End: 2021-11-06
Payer: COMMERCIAL

## 2021-11-06 DIAGNOSIS — E78.1 HIGH TRIGLYCERIDES: ICD-10-CM

## 2021-11-06 DIAGNOSIS — E61.1 IRON DEFICIENCY: ICD-10-CM

## 2021-11-06 DIAGNOSIS — E55.9 VITAMIN D DEFICIENCY: ICD-10-CM

## 2021-11-06 LAB
25(OH)D3 SERPL-MCNC: 59 NG/ML (ref 30–100)
ALBUMIN SERPL BCP-MCNC: 4.3 G/DL (ref 3.5–5)
ALP SERPL-CCNC: 50 U/L (ref 46–116)
ALT SERPL W P-5'-P-CCNC: 41 U/L (ref 12–78)
ANION GAP SERPL CALCULATED.3IONS-SCNC: 4 MMOL/L (ref 4–13)
AST SERPL W P-5'-P-CCNC: 14 U/L (ref 5–45)
BASOPHILS # BLD AUTO: 0.02 THOUSANDS/ΜL (ref 0–0.1)
BASOPHILS NFR BLD AUTO: 1 % (ref 0–1)
BILIRUB SERPL-MCNC: 0.39 MG/DL (ref 0.2–1)
BUN SERPL-MCNC: 9 MG/DL (ref 5–25)
CALCIUM SERPL-MCNC: 10.2 MG/DL (ref 8.3–10.1)
CHLORIDE SERPL-SCNC: 109 MMOL/L (ref 100–108)
CHOLEST SERPL-MCNC: 117 MG/DL (ref 50–200)
CO2 SERPL-SCNC: 24 MMOL/L (ref 21–32)
CREAT SERPL-MCNC: 0.64 MG/DL (ref 0.6–1.3)
EOSINOPHIL # BLD AUTO: 0.05 THOUSAND/ΜL (ref 0–0.61)
EOSINOPHIL NFR BLD AUTO: 2 % (ref 0–6)
ERYTHROCYTE [DISTWIDTH] IN BLOOD BY AUTOMATED COUNT: 13.7 % (ref 11.6–15.1)
FERRITIN SERPL-MCNC: 45 NG/ML (ref 8–388)
GFR SERPL CREATININE-BSD FRML MDRD: 114 ML/MIN/1.73SQ M
GLUCOSE P FAST SERPL-MCNC: 89 MG/DL (ref 65–99)
HCT VFR BLD AUTO: 35.9 % (ref 34.8–46.1)
HDLC SERPL-MCNC: 28 MG/DL
HGB BLD-MCNC: 11.5 G/DL (ref 11.5–15.4)
IMM GRANULOCYTES # BLD AUTO: 0.01 THOUSAND/UL (ref 0–0.2)
IMM GRANULOCYTES NFR BLD AUTO: 0 % (ref 0–2)
IRON SATN MFR SERPL: 12 % (ref 15–50)
IRON SERPL-MCNC: 52 UG/DL (ref 50–170)
LDLC SERPL CALC-MCNC: 54 MG/DL (ref 0–100)
LYMPHOCYTES # BLD AUTO: 1.48 THOUSANDS/ΜL (ref 0.6–4.47)
LYMPHOCYTES NFR BLD AUTO: 49 % (ref 14–44)
MCH RBC QN AUTO: 26.5 PG (ref 26.8–34.3)
MCHC RBC AUTO-ENTMCNC: 32 G/DL (ref 31.4–37.4)
MCV RBC AUTO: 83 FL (ref 82–98)
MONOCYTES # BLD AUTO: 0.25 THOUSAND/ΜL (ref 0.17–1.22)
MONOCYTES NFR BLD AUTO: 8 % (ref 4–12)
NEUTROPHILS # BLD AUTO: 1.22 THOUSANDS/ΜL (ref 1.85–7.62)
NEUTS SEG NFR BLD AUTO: 40 % (ref 43–75)
NONHDLC SERPL-MCNC: 89 MG/DL
NRBC BLD AUTO-RTO: 0 /100 WBCS
PLATELET # BLD AUTO: 285 THOUSANDS/UL (ref 149–390)
PMV BLD AUTO: 10.9 FL (ref 8.9–12.7)
POTASSIUM SERPL-SCNC: 4 MMOL/L (ref 3.5–5.3)
PROT SERPL-MCNC: 8.1 G/DL (ref 6.4–8.2)
RBC # BLD AUTO: 4.34 MILLION/UL (ref 3.81–5.12)
SODIUM SERPL-SCNC: 137 MMOL/L (ref 136–145)
TIBC SERPL-MCNC: 442 UG/DL (ref 250–450)
TRIGL SERPL-MCNC: 173 MG/DL
WBC # BLD AUTO: 3.03 THOUSAND/UL (ref 4.31–10.16)

## 2021-11-06 PROCEDURE — 82306 VITAMIN D 25 HYDROXY: CPT

## 2021-11-06 PROCEDURE — 36415 COLL VENOUS BLD VENIPUNCTURE: CPT

## 2021-11-06 PROCEDURE — 80053 COMPREHEN METABOLIC PANEL: CPT

## 2021-11-06 PROCEDURE — 85025 COMPLETE CBC W/AUTO DIFF WBC: CPT

## 2021-11-06 PROCEDURE — 82728 ASSAY OF FERRITIN: CPT

## 2021-11-06 PROCEDURE — 83550 IRON BINDING TEST: CPT

## 2021-11-06 PROCEDURE — 83540 ASSAY OF IRON: CPT

## 2021-11-06 PROCEDURE — 80061 LIPID PANEL: CPT

## 2021-11-10 ENCOUNTER — OFFICE VISIT (OUTPATIENT)
Dept: OBGYN CLINIC | Facility: CLINIC | Age: 41
End: 2021-11-10

## 2021-11-10 VITALS
BODY MASS INDEX: 26.7 KG/M2 | SYSTOLIC BLOOD PRESSURE: 130 MMHG | HEIGHT: 60 IN | HEART RATE: 81 BPM | WEIGHT: 136 LBS | DIASTOLIC BLOOD PRESSURE: 86 MMHG

## 2021-11-10 DIAGNOSIS — N93.9 ABNORMAL UTERINE BLEEDING (AUB): Primary | ICD-10-CM

## 2021-11-10 PROCEDURE — 99212 OFFICE O/P EST SF 10 MIN: CPT | Performed by: OBSTETRICS & GYNECOLOGY

## 2021-11-10 PROCEDURE — 3008F BODY MASS INDEX DOCD: CPT | Performed by: OBSTETRICS & GYNECOLOGY

## 2021-11-10 PROCEDURE — 3008F BODY MASS INDEX DOCD: CPT | Performed by: PHYSICIAN ASSISTANT

## 2021-11-10 RX ORDER — NORGESTIMATE AND ETHINYL ESTRADIOL 0.25-0.035
1 KIT ORAL DAILY
Qty: 28 TABLET | Refills: 3 | Status: SHIPPED | OUTPATIENT
Start: 2021-11-10 | End: 2022-03-18 | Stop reason: HOSPADM

## 2021-12-03 DIAGNOSIS — L40.9 PSORIASIS: ICD-10-CM

## 2021-12-03 DIAGNOSIS — D50.8 IRON DEFICIENCY ANEMIA SECONDARY TO INADEQUATE DIETARY IRON INTAKE: Primary | ICD-10-CM

## 2021-12-03 RX ORDER — FERROUS SULFATE 324(65)MG
324 TABLET, DELAYED RELEASE (ENTERIC COATED) ORAL 2 TIMES DAILY
Qty: 60 TABLET | Refills: 2 | Status: SHIPPED | OUTPATIENT
Start: 2021-12-03 | End: 2022-03-04

## 2021-12-03 RX ORDER — CLOBETASOL PROPIONATE 0.5 MG/G
OINTMENT TOPICAL 2 TIMES DAILY
Qty: 30 G | Refills: 5 | Status: SHIPPED | OUTPATIENT
Start: 2021-12-03

## 2021-12-17 ENCOUNTER — TELEPHONE (OUTPATIENT)
Dept: GASTROENTEROLOGY | Facility: CLINIC | Age: 41
End: 2021-12-17

## 2021-12-21 ENCOUNTER — TELEPHONE (OUTPATIENT)
Dept: HEMATOLOGY ONCOLOGY | Facility: CLINIC | Age: 41
End: 2021-12-21

## 2022-01-03 ENCOUNTER — OFFICE VISIT (OUTPATIENT)
Dept: FAMILY MEDICINE CLINIC | Facility: CLINIC | Age: 42
End: 2022-01-03
Payer: COMMERCIAL

## 2022-01-03 VITALS
WEIGHT: 135.4 LBS | TEMPERATURE: 96.6 F | HEIGHT: 60 IN | HEART RATE: 87 BPM | RESPIRATION RATE: 20 BRPM | SYSTOLIC BLOOD PRESSURE: 122 MMHG | OXYGEN SATURATION: 99 % | BODY MASS INDEX: 26.58 KG/M2 | DIASTOLIC BLOOD PRESSURE: 78 MMHG

## 2022-01-03 DIAGNOSIS — B35.0 TINEA CAPITIS: ICD-10-CM

## 2022-01-03 DIAGNOSIS — J45.41 MODERATE PERSISTENT ASTHMA WITH ACUTE EXACERBATION: ICD-10-CM

## 2022-01-03 DIAGNOSIS — H93.13 TINNITUS OF BOTH EARS: Primary | ICD-10-CM

## 2022-01-03 PROCEDURE — 99214 OFFICE O/P EST MOD 30 MIN: CPT | Performed by: NURSE PRACTITIONER

## 2022-01-03 PROCEDURE — 3008F BODY MASS INDEX DOCD: CPT | Performed by: NURSE PRACTITIONER

## 2022-01-03 PROCEDURE — 1036F TOBACCO NON-USER: CPT | Performed by: NURSE PRACTITIONER

## 2022-01-03 RX ORDER — KETOCONAZOLE 20 MG/ML
1 SHAMPOO TOPICAL 2 TIMES WEEKLY
Qty: 100 ML | Refills: 0 | Status: SHIPPED | OUTPATIENT
Start: 2022-01-03 | End: 2022-01-31

## 2022-01-03 NOTE — ASSESSMENT & PLAN NOTE
-I am prescribing patient an antifungal shampoo to use Twice a week until tinea has resolved  I advised not to share any towels or brushes  When going to hair salon ensure all supplies has been disinfected  Pt in agreement with plan  Will f/u if s/s do not resolve

## 2022-01-03 NOTE — ASSESSMENT & PLAN NOTE
-pt reports having tinnitus of both ears for past 8 days  Her BP is normal and cholesterol is normal from latest labs  On exam TM is normal in color  No fluid collection  I recommending she trial out Gingko biloba to take daily as needed for this  I am also advising a f/u with ENT as she is having bilateral hearing loss as well  Pt in agreement with plan of care

## 2022-01-03 NOTE — PROGRESS NOTES
Assessment/Plan:    Tinnitus of both ears  -pt reports having tinnitus of both ears for past 8 days  Her BP is normal and cholesterol is normal from latest labs  On exam TM is normal in color  No fluid collection  I recommending she trial out Gingko biloba to take daily as needed for this  I am also advising a f/u with ENT as she is having bilateral hearing loss as well  Pt in agreement with plan of care  Tinea capitis  -I am prescribing patient an antifungal shampoo to use Twice a week until tinea has resolved  I advised not to share any towels or brushes  When going to hair salon ensure all supplies has been disinfected  Pt in agreement with plan  Will f/u if s/s do not resolve  Diagnoses and all orders for this visit:    Tinnitus of both ears  -     Ambulatory Referral to Otolaryngology; Future    Tinea capitis  -     ketoconazole (NIZORAL) 2 % shampoo; Apply 1 application topically 2 (two) times a week    Moderate persistent asthma with acute exacerbation          Subjective:      Patient ID: Lillie Sutton is a 46 y o  female  46year old female patient here for bilateral tinnitus in both ears  This started 8 days ago  Denies ear pain  Ear Fullness   There is pain in both ears  This is a new problem  The current episode started in the past 7 days  The problem occurs constantly  The problem has been gradually worsening  There has been no fever  The fever has been present for less than 1 day  The pain is at a severity of 0/10  The patient is experiencing no pain  Associated symptoms include hearing loss (bilateral) and a rash  Pertinent negatives include no coughing, diarrhea, ear discharge, headaches, rhinorrhea, sore throat or vomiting  She has tried nothing for the symptoms  The treatment provided no relief  There is no history of a chronic ear infection, hearing loss or a tympanostomy tube  Rash  This is a new problem  The current episode started 1 to 4 weeks ago   The problem has been waxing and waning since onset  The affected locations include the scalp  The rash is characterized by redness, scaling, itchiness and dryness  It is unknown if there was an exposure to a precipitant  Pertinent negatives include no anorexia, cough, diarrhea, eye pain, facial edema, joint pain, nail changes, rhinorrhea, sore throat or vomiting  Past treatments include nothing  The treatment provided no relief  There is no history of allergies, asthma, eczema or varicella  The following portions of the patient's history were reviewed and updated as appropriate: allergies, current medications, past family history, past medical history, past social history, past surgical history and problem list     Review of Systems   Constitutional: Negative  HENT: Positive for hearing loss (bilateral) and tinnitus  Negative for ear discharge, rhinorrhea and sore throat  Eyes: Negative  Negative for pain  Respiratory: Negative  Negative for cough  Cardiovascular: Negative  Gastrointestinal: Negative  Negative for anorexia, diarrhea and vomiting  Endocrine: Negative  Genitourinary: Negative  Musculoskeletal: Negative  Negative for joint pain  Skin: Positive for rash  Negative for nail changes  Allergic/Immunologic: Negative  Neurological: Negative  Negative for headaches  Hematological: Negative  Psychiatric/Behavioral: Negative  Objective:      /78 (BP Location: Left arm, Patient Position: Sitting, Cuff Size: Standard)   Pulse 87   Temp (!) 96 6 °F (35 9 °C) (Temporal)   Resp 20   Ht 5' (1 524 m)   Wt 61 4 kg (135 lb 6 4 oz)   SpO2 99%   BMI 26 44 kg/m²          Physical Exam  Vitals and nursing note reviewed  Constitutional:       General: She is not in acute distress  Appearance: Normal appearance  She is not ill-appearing  HENT:      Head: Normocephalic and atraumatic        Right Ear: Tympanic membrane, ear canal and external ear normal       Left Ear: Tympanic membrane, ear canal and external ear normal       Nose: Nose normal  No congestion or rhinorrhea  Mouth/Throat:      Pharynx: Oropharynx is clear  No oropharyngeal exudate  Eyes:      Extraocular Movements: Extraocular movements intact  Conjunctiva/sclera: Conjunctivae normal    Cardiovascular:      Rate and Rhythm: Normal rate and regular rhythm  Pulses: Normal pulses  Heart sounds: Normal heart sounds  Pulmonary:      Effort: Pulmonary effort is normal  No respiratory distress  Breath sounds: Normal breath sounds  Musculoskeletal:         General: Normal range of motion  Cervical back: Normal range of motion and neck supple  Skin:     General: Skin is warm and dry  Capillary Refill: Capillary refill takes less than 2 seconds  Findings: Lesion and rash present  Rash is macular  Comments: Occiput of scalp noted to have erythematous patch of dry skin  Itchy in nature  Neurological:      General: No focal deficit present  Mental Status: She is alert and oriented to person, place, and time     Psychiatric:         Mood and Affect: Mood normal          Behavior: Behavior normal

## 2022-01-20 ENCOUNTER — TELEPHONE (OUTPATIENT)
Dept: HEMATOLOGY ONCOLOGY | Facility: CLINIC | Age: 42
End: 2022-01-20

## 2022-01-20 DIAGNOSIS — G47.9 SLEEPING DIFFICULTIES: ICD-10-CM

## 2022-01-20 DIAGNOSIS — D72.819 LEUKOPENIA, UNSPECIFIED TYPE: Primary | ICD-10-CM

## 2022-01-20 DIAGNOSIS — E61.1 IRON DEFICIENCY: ICD-10-CM

## 2022-01-20 NOTE — TELEPHONE ENCOUNTER
TANESHA in [place] advising patient that  services are not available via [employer] to complete her virtual visit, she also did not connect  Patient advised that she should be seen in the office instead per [facility], PA-C  Patient has been rescheduled to 2/10/2022 @ 2:00 PM in the [facility] office with labs 1-2 weeks prior to appointment date

## 2022-02-04 ENCOUNTER — TELEPHONE (OUTPATIENT)
Dept: GASTROENTEROLOGY | Facility: CLINIC | Age: 42
End: 2022-02-04

## 2022-02-04 NOTE — TELEPHONE ENCOUNTER
Patients GI provider:  Dr Will Nam    Number to return call: [phone]     Reason for call: Pt calling asking what prep she needs for her procedure  Pt will need a       Scheduled procedure/appointment date if applicable: Procedure: 6/76/7357

## 2022-02-05 ENCOUNTER — APPOINTMENT (OUTPATIENT)
Dept: LAB | Facility: HOSPITAL | Age: 42
End: 2022-02-05
Payer: COMMERCIAL

## 2022-02-05 DIAGNOSIS — D72.819 LEUKOPENIA, UNSPECIFIED TYPE: ICD-10-CM

## 2022-02-05 DIAGNOSIS — E61.1 IRON DEFICIENCY: ICD-10-CM

## 2022-02-05 DIAGNOSIS — G47.9 SLEEPING DIFFICULTIES: ICD-10-CM

## 2022-02-05 LAB
BASOPHILS # BLD AUTO: 0.04 THOUSANDS/ΜL (ref 0–0.1)
BASOPHILS NFR BLD AUTO: 1 % (ref 0–1)
EOSINOPHIL # BLD AUTO: 0.05 THOUSAND/ΜL (ref 0–0.61)
EOSINOPHIL NFR BLD AUTO: 1 % (ref 0–6)
ERYTHROCYTE [DISTWIDTH] IN BLOOD BY AUTOMATED COUNT: 13.3 % (ref 11.6–15.1)
FERRITIN SERPL-MCNC: 30 NG/ML (ref 8–388)
FOLATE SERPL-MCNC: 11.5 NG/ML (ref 3.1–17.5)
HCT VFR BLD AUTO: 38.9 % (ref 34.8–46.1)
HGB BLD-MCNC: 12.4 G/DL (ref 11.5–15.4)
IMM GRANULOCYTES # BLD AUTO: 0.01 THOUSAND/UL (ref 0–0.2)
IMM GRANULOCYTES NFR BLD AUTO: 0 % (ref 0–2)
IRON SATN MFR SERPL: 12 % (ref 15–50)
IRON SERPL-MCNC: 59 UG/DL (ref 50–170)
LYMPHOCYTES # BLD AUTO: 1.36 THOUSANDS/ΜL (ref 0.6–4.47)
LYMPHOCYTES NFR BLD AUTO: 39 % (ref 14–44)
MCH RBC QN AUTO: 26.3 PG (ref 26.8–34.3)
MCHC RBC AUTO-ENTMCNC: 31.9 G/DL (ref 31.4–37.4)
MCV RBC AUTO: 82 FL (ref 82–98)
MONOCYTES # BLD AUTO: 0.36 THOUSAND/ΜL (ref 0.17–1.22)
MONOCYTES NFR BLD AUTO: 10 % (ref 4–12)
NEUTROPHILS # BLD AUTO: 1.67 THOUSANDS/ΜL (ref 1.85–7.62)
NEUTS SEG NFR BLD AUTO: 49 % (ref 43–75)
NRBC BLD AUTO-RTO: 0 /100 WBCS
PLATELET # BLD AUTO: 294 THOUSANDS/UL (ref 149–390)
PMV BLD AUTO: 11.2 FL (ref 8.9–12.7)
RBC # BLD AUTO: 4.72 MILLION/UL (ref 3.81–5.12)
TIBC SERPL-MCNC: 476 UG/DL (ref 250–450)
VIT B12 SERPL-MCNC: 546 PG/ML (ref 100–900)
WBC # BLD AUTO: 3.49 THOUSAND/UL (ref 4.31–10.16)

## 2022-02-05 PROCEDURE — 82728 ASSAY OF FERRITIN: CPT

## 2022-02-05 PROCEDURE — 82746 ASSAY OF FOLIC ACID SERUM: CPT

## 2022-02-05 PROCEDURE — 83540 ASSAY OF IRON: CPT

## 2022-02-05 PROCEDURE — 85025 COMPLETE CBC W/AUTO DIFF WBC: CPT

## 2022-02-05 PROCEDURE — 83550 IRON BINDING TEST: CPT

## 2022-02-05 PROCEDURE — 82607 VITAMIN B-12: CPT

## 2022-02-05 PROCEDURE — 36415 COLL VENOUS BLD VENIPUNCTURE: CPT

## 2022-02-08 ENCOUNTER — TELEPHONE (OUTPATIENT)
Dept: HEMATOLOGY ONCOLOGY | Facility: CLINIC | Age: 42
End: 2022-02-08

## 2022-02-08 NOTE — TELEPHONE ENCOUNTER
Left voice message for patient stating that PA [facility] will be conducting virtual appointments on 2/10/22 and to call back to verify if this will be ok

## 2022-02-10 ENCOUNTER — TELEPHONE (OUTPATIENT)
Dept: GASTROENTEROLOGY | Facility: HOSPITAL | Age: 42
End: 2022-02-10

## 2022-02-10 ENCOUNTER — TELEPHONE (OUTPATIENT)
Dept: HEMATOLOGY ONCOLOGY | Facility: CLINIC | Age: 42
End: 2022-02-10

## 2022-02-10 NOTE — TELEPHONE ENCOUNTER
Reschedule Appointment     Who is calling in Patient    Doctor Appointment Scheduled with Dakota Mac date and time 02/10 at 3:30pm   New date and time 03/11 at 10:30am   Location [place]   Patient verbalized understanding    yes

## 2022-02-11 ENCOUNTER — ANESTHESIA EVENT (OUTPATIENT)
Dept: GASTROENTEROLOGY | Facility: HOSPITAL | Age: 42
End: 2022-02-11

## 2022-02-11 ENCOUNTER — ANESTHESIA (OUTPATIENT)
Dept: GASTROENTEROLOGY | Facility: HOSPITAL | Age: 42
End: 2022-02-11

## 2022-02-11 ENCOUNTER — HOSPITAL ENCOUNTER (OUTPATIENT)
Dept: GASTROENTEROLOGY | Facility: HOSPITAL | Age: 42
Setting detail: OUTPATIENT SURGERY
Discharge: HOME | End: 2022-02-11
Attending: INTERNAL MEDICINE | Admitting: INTERNAL MEDICINE
Payer: COMMERCIAL

## 2022-02-11 VITALS
OXYGEN SATURATION: 98 % | HEART RATE: 88 BPM | RESPIRATION RATE: 18 BRPM | DIASTOLIC BLOOD PRESSURE: 79 MMHG | SYSTOLIC BLOOD PRESSURE: 127 MMHG | TEMPERATURE: 97.1 F

## 2022-02-11 DIAGNOSIS — R19.4 CHANGE IN BOWEL HABITS: ICD-10-CM

## 2022-02-11 DIAGNOSIS — K21.9 GASTROESOPHAGEAL REFLUX DISEASE WITHOUT ESOPHAGITIS: ICD-10-CM

## 2022-02-11 PROCEDURE — 43235 EGD DIAGNOSTIC BRUSH WASH: CPT | Performed by: INTERNAL MEDICINE

## 2022-02-11 PROCEDURE — 88305 TISSUE EXAM BY PATHOLOGIST: CPT | Performed by: PATHOLOGY

## 2022-02-11 PROCEDURE — 45380 COLONOSCOPY AND BIOPSY: CPT | Performed by: INTERNAL MEDICINE

## 2022-02-11 RX ORDER — SODIUM CHLORIDE 9 MG/ML
INJECTION, SOLUTION INTRAVENOUS CONTINUOUS PRN
Status: DISCONTINUED | OUTPATIENT
Start: 2022-02-11 | End: 2022-02-11

## 2022-02-11 RX ORDER — PROPOFOL 10 MG/ML
INJECTION, EMULSION INTRAVENOUS AS NEEDED
Status: DISCONTINUED | OUTPATIENT
Start: 2022-02-11 | End: 2022-02-11

## 2022-02-11 RX ADMIN — PROPOFOL 50 MG: 10 INJECTION, EMULSION INTRAVENOUS at 10:41

## 2022-02-11 RX ADMIN — SODIUM CHLORIDE: 9 INJECTION, SOLUTION INTRAVENOUS at 10:35

## 2022-02-11 RX ADMIN — PROPOFOL 100 MG: 10 INJECTION, EMULSION INTRAVENOUS at 10:37

## 2022-02-11 RX ADMIN — PROPOFOL 50 MG: 10 INJECTION, EMULSION INTRAVENOUS at 10:46

## 2022-02-11 NOTE — ANESTHESIA POSTPROCEDURE EVALUATION
Post-Op Assessment Note    CV Status:  Stable  Pain Score: 0    Pain management: adequate     Mental Status:  Alert and awake   Hydration Status:  Euvolemic   PONV Controlled:  Controlled   Airway Patency:  Patent      Post Op Vitals Reviewed: Yes      Staff: CRNA, Anesthesiologist         No complications documented      BP   144/72   Temp   97 1   Pulse  82   Resp   14   SpO2   98

## 2022-02-11 NOTE — ANESTHESIA PREPROCEDURE EVALUATION
Procedure:  EGD  COLONOSCOPY    Relevant Problems   CARDIO   (+) Chest pain   (+) Essential hypertension   (+) Hyperlipidemia   (+) Hypertriglyceridemia      GI/HEPATIC   (+) Fatty liver   (+) Gastroesophageal reflux disease without esophagitis      HEMATOLOGY   (+) Anemia      PULMONARY   (+) Moderate persistent asthma with acute exacerbation   (+) SOB (shortness of breath)      Other   (+) Abnormal uterine bleeding (AUB)   (+) Granuloma annulare   (+) Heart palpitations   (+) Hypercalcemia   (+) Left ovarian cyst   (+) Nasal congestion   (+) PCOS (polycystic ovarian syndrome)   (+) Psoriasis   (+) Right ovarian cyst        Physical Exam    Airway    Mallampati score: II  TM Distance: >3 FB  Neck ROM: full     Dental   No notable dental hx     Cardiovascular      Pulmonary      Other Findings        Anesthesia Plan  ASA Score- 2     Anesthesia Type- IV sedation with anesthesia with ASA Monitors  Additional Monitors:   Airway Plan:           Plan Factors-Exercise tolerance (METS): >4 METS  Chart reviewed  EKG reviewed  Imaging results reviewed  Existing labs reviewed  Patient summary reviewed  Induction- intravenous  Postoperative Plan-     Informed Consent- Anesthetic plan and risks discussed with patient and spouse  I personally reviewed this patient with the CRNA  Discussed and agreed on the Anesthesia Plan with the CRNA  Sheldon Berkowitz

## 2022-02-11 NOTE — RESULT ENCOUNTER NOTE
Estimada Kalie, la colonoscopia se ve bin  Estamos esperando resultado de biopsia del colon  Le avisare cuando llegue el reporte

## 2022-03-04 ENCOUNTER — OFFICE VISIT (OUTPATIENT)
Dept: FAMILY MEDICINE CLINIC | Facility: CLINIC | Age: 42
End: 2022-03-04
Payer: COMMERCIAL

## 2022-03-04 VITALS
HEIGHT: 60 IN | TEMPERATURE: 97.9 F | HEART RATE: 97 BPM | DIASTOLIC BLOOD PRESSURE: 80 MMHG | SYSTOLIC BLOOD PRESSURE: 140 MMHG | BODY MASS INDEX: 26.9 KG/M2 | OXYGEN SATURATION: 99 % | WEIGHT: 137 LBS | RESPIRATION RATE: 16 BRPM

## 2022-03-04 DIAGNOSIS — T14.8XXA BRUISING: ICD-10-CM

## 2022-03-04 DIAGNOSIS — R50.9 FEVER, UNSPECIFIED FEVER CAUSE: Primary | ICD-10-CM

## 2022-03-04 LAB
SL AMB  POCT GLUCOSE, UA: NEGATIVE
SL AMB LEUKOCYTE ESTERASE,UA: NEGATIVE
SL AMB POCT BILIRUBIN,UA: NEGATIVE
SL AMB POCT BLOOD,UA: NEGATIVE
SL AMB POCT CLARITY,UA: CLEAR
SL AMB POCT COLOR,UA: ABNORMAL
SL AMB POCT KETONES,UA: ABNORMAL
SL AMB POCT NITRITE,UA: NEGATIVE
SL AMB POCT PH,UA: 5
SL AMB POCT SPECIFIC GRAVITY,UA: 1.02
SL AMB POCT URINE PROTEIN: NEGATIVE
SL AMB POCT UROBILINOGEN: 0.2

## 2022-03-04 PROCEDURE — 99214 OFFICE O/P EST MOD 30 MIN: CPT | Performed by: NURSE PRACTITIONER

## 2022-03-04 PROCEDURE — 3725F SCREEN DEPRESSION PERFORMED: CPT | Performed by: NURSE PRACTITIONER

## 2022-03-04 PROCEDURE — 81002 URINALYSIS NONAUTO W/O SCOPE: CPT | Performed by: NURSE PRACTITIONER

## 2022-03-04 PROCEDURE — 1036F TOBACCO NON-USER: CPT | Performed by: NURSE PRACTITIONER

## 2022-03-04 PROCEDURE — 87086 URINE CULTURE/COLONY COUNT: CPT | Performed by: NURSE PRACTITIONER

## 2022-03-04 RX ORDER — NITROFURANTOIN 25; 75 MG/1; MG/1
100 CAPSULE ORAL 2 TIMES DAILY
Qty: 10 CAPSULE | Refills: 0 | Status: SHIPPED | OUTPATIENT
Start: 2022-03-04 | End: 2022-03-18 | Stop reason: HOSPADM

## 2022-03-04 NOTE — ASSESSMENT & PLAN NOTE
-pt reports having high fevers on Monday and Tuesday of 102 at home and Yesterday of 99 9  She denies sore throat  States that she felt a bit fatigued on Monday and then shortly after began to have a fever  Denies any more fatigue  Did report a left ear pain yesterday  On exam no abnormalities were found on her  She did have unexplained bruising that I am sending a CBC for and to check for infection  Otherwise her ear exam was normal  Sending urine culture as well  Will start on antibiotics empirically for urine

## 2022-03-04 NOTE — PROGRESS NOTES
Assessment/Plan:    Fever  -pt reports having high fevers on Monday and Tuesday of 102 at home and Yesterday of 99 9  She denies sore throat  States that she felt a bit fatigued on Monday and then shortly after began to have a fever  Denies any more fatigue  Did report a left ear pain yesterday  On exam no abnormalities were found on her  She did have unexplained bruising that I am sending a CBC for and to check for infection  Otherwise her ear exam was normal  Sending urine culture as well  Will start on antibiotics empirically for urine  Bruising  -noted on bilateral feet some bruising without trauma  Non-tender  Will order CBC  Diagnoses and all orders for this visit:    Fever, unspecified fever cause  -     POCT urine dip  -     CBC and differential; Future  -     Urine culture    Bruising          Subjective:      Patient ID: Olive Mojica is a 46 y o  female  46year old states on Tuesday she had a fever of 102 and Wednesday and Yesterday she 99 9 and she has been taking Tylenol  Denies any cough or sore throat  States on Monday she felt a bit fatigued and out of breath and since then has been having fever  She has not had this again  Fever  This is a new problem  The current episode started in the past 7 days (monday)  The problem occurs intermittently  The problem has been waxing and waning  Associated symptoms include fatigue (on monday) and a fever  Pertinent negatives include no abdominal pain, arthralgias, chest pain, congestion, coughing, diaphoresis, headaches, nausea, numbness, rash, sore throat, swollen glands, urinary symptoms or vomiting  Associated symptoms comments: Left ear pain yesterday only  Nothing aggravates the symptoms  She has tried acetaminophen for the symptoms  The treatment provided moderate relief         The following portions of the patient's history were reviewed and updated as appropriate: allergies, current medications, past family history, past medical history, past social history, past surgical history and problem list     Review of Systems   Constitutional: Positive for fatigue (on monday) and fever  Negative for diaphoresis  HENT: Negative  Negative for congestion and sore throat  Eyes: Negative  Respiratory: Negative  Negative for cough  Cardiovascular: Negative  Negative for chest pain  Gastrointestinal: Negative  Negative for abdominal pain, nausea and vomiting  Endocrine: Negative  Genitourinary: Negative  Musculoskeletal: Negative  Negative for arthralgias  Skin: Negative  Negative for rash  Allergic/Immunologic: Negative  Neurological: Negative  Negative for numbness and headaches  Hematological: Bruises/bleeds easily  Psychiatric/Behavioral: Negative  Objective:      /80 (BP Location: Right arm, Patient Position: Sitting, Cuff Size: Standard)   Pulse 97   Temp 97 9 °F (36 6 °C) (Temporal)   Resp 16   Ht 5' (1 524 m)   Wt 62 1 kg (137 lb)   LMP 01/12/2022   SpO2 99%   BMI 26 76 kg/m²          Physical Exam  Vitals and nursing note reviewed  Constitutional:       General: She is not in acute distress  Appearance: Normal appearance  She is not ill-appearing  HENT:      Head: Normocephalic and atraumatic  Right Ear: Tympanic membrane, ear canal and external ear normal       Left Ear: Tympanic membrane, ear canal and external ear normal       Nose: Nose normal  No congestion or rhinorrhea  Mouth/Throat:      Pharynx: Oropharynx is clear  No oropharyngeal exudate  Eyes:      Extraocular Movements: Extraocular movements intact  Conjunctiva/sclera: Conjunctivae normal       Pupils: Pupils are equal, round, and reactive to light  Cardiovascular:      Rate and Rhythm: Normal rate and regular rhythm  Pulses: Normal pulses  Heart sounds: Normal heart sounds  Pulmonary:      Effort: Pulmonary effort is normal  No respiratory distress  Breath sounds: Normal breath sounds  Abdominal:      General: Bowel sounds are normal  There is no distension  Palpations: Abdomen is soft  Tenderness: There is no abdominal tenderness  There is no right CVA tenderness, left CVA tenderness or guarding  Musculoskeletal:         General: Normal range of motion  Cervical back: Normal range of motion and neck supple  Skin:     General: Skin is warm and dry  Capillary Refill: Capillary refill takes less than 2 seconds  Findings: Bruising (noted on bilateral feet without evidence of trauma) present  Neurological:      General: No focal deficit present  Mental Status: She is alert and oriented to person, place, and time     Psychiatric:         Mood and Affect: Mood normal          Behavior: Behavior normal            Chief Complaint   Patient presents with    Fever

## 2022-03-05 ENCOUNTER — APPOINTMENT (OUTPATIENT)
Dept: LAB | Facility: HOSPITAL | Age: 42
End: 2022-03-05
Payer: COMMERCIAL

## 2022-03-05 DIAGNOSIS — R50.9 FEVER, UNSPECIFIED FEVER CAUSE: ICD-10-CM

## 2022-03-05 DIAGNOSIS — E61.1 IRON DEFICIENCY: ICD-10-CM

## 2022-03-05 LAB
BACTERIA UR CULT: NORMAL
BASOPHILS # BLD AUTO: 0.03 THOUSANDS/ΜL (ref 0–0.1)
BASOPHILS NFR BLD AUTO: 1 % (ref 0–1)
EOSINOPHIL # BLD AUTO: 0.03 THOUSAND/ΜL (ref 0–0.61)
EOSINOPHIL NFR BLD AUTO: 1 % (ref 0–6)
ERYTHROCYTE [DISTWIDTH] IN BLOOD BY AUTOMATED COUNT: 13.8 % (ref 11.6–15.1)
HCT VFR BLD AUTO: 34.9 % (ref 34.8–46.1)
HGB BLD-MCNC: 11.8 G/DL (ref 11.5–15.4)
IMM GRANULOCYTES # BLD AUTO: 0.01 THOUSAND/UL (ref 0–0.2)
IMM GRANULOCYTES NFR BLD AUTO: 0 % (ref 0–2)
LYMPHOCYTES # BLD AUTO: 1.44 THOUSANDS/ΜL (ref 0.6–4.47)
LYMPHOCYTES NFR BLD AUTO: 33 % (ref 14–44)
MCH RBC QN AUTO: 26.8 PG (ref 26.8–34.3)
MCHC RBC AUTO-ENTMCNC: 33.8 G/DL (ref 31.4–37.4)
MCV RBC AUTO: 79 FL (ref 82–98)
MONOCYTES # BLD AUTO: 0.38 THOUSAND/ΜL (ref 0.17–1.22)
MONOCYTES NFR BLD AUTO: 9 % (ref 4–12)
NEUTROPHILS # BLD AUTO: 2.48 THOUSANDS/ΜL (ref 1.85–7.62)
NEUTS SEG NFR BLD AUTO: 56 % (ref 43–75)
NRBC BLD AUTO-RTO: 0 /100 WBCS
PLATELET # BLD AUTO: 316 THOUSANDS/UL (ref 149–390)
PMV BLD AUTO: 10.8 FL (ref 8.9–12.7)
RBC # BLD AUTO: 4.41 MILLION/UL (ref 3.81–5.12)
WBC # BLD AUTO: 4.37 THOUSAND/UL (ref 4.31–10.16)

## 2022-03-05 PROCEDURE — 36415 COLL VENOUS BLD VENIPUNCTURE: CPT

## 2022-03-05 PROCEDURE — 85025 COMPLETE CBC W/AUTO DIFF WBC: CPT

## 2022-03-10 ENCOUNTER — HOSPITAL ENCOUNTER (INPATIENT)
Facility: HOSPITAL | Age: 42
LOS: 7 days | Discharge: HOME | DRG: 440 | End: 2022-03-18
Attending: EMERGENCY MEDICINE
Payer: COMMERCIAL

## 2022-03-10 ENCOUNTER — APPOINTMENT (EMERGENCY)
Dept: RADIOLOGY | Facility: HOSPITAL | Age: 42
DRG: 440 | End: 2022-03-10
Payer: COMMERCIAL

## 2022-03-10 DIAGNOSIS — K85.90 ACUTE PANCREATITIS: Primary | ICD-10-CM

## 2022-03-10 DIAGNOSIS — E78.1 HYPERTRIGLYCERIDEMIA: ICD-10-CM

## 2022-03-10 DIAGNOSIS — N83.209 OVARIAN CYST: ICD-10-CM

## 2022-03-10 DIAGNOSIS — E87.2 METABOLIC ACIDOSIS: ICD-10-CM

## 2022-03-10 PROBLEM — R10.9 ABDOMINAL PAIN: Status: ACTIVE | Noted: 2018-08-17

## 2022-03-10 LAB
ALBUMIN SERPL BCP-MCNC: 3.5 G/DL (ref 3.5–5)
ALP SERPL-CCNC: 63 U/L (ref 46–116)
ALT SERPL W P-5'-P-CCNC: 67 U/L (ref 12–78)
ANION GAP SERPL CALCULATED.3IONS-SCNC: 21 MMOL/L (ref 4–13)
ANION GAP SERPL CALCULATED.3IONS-SCNC: 8 MMOL/L (ref 4–13)
APAP SERPL-MCNC: <2 UG/ML (ref 10–20)
AST SERPL W P-5'-P-CCNC: 24 U/L (ref 5–45)
ATRIAL RATE: 100 BPM
ATRIAL RATE: 107 BPM
BACTERIA UR QL AUTO: ABNORMAL /HPF
BASE EX.OXY STD BLDV CALC-SCNC: 96.1 % (ref 60–80)
BASE EXCESS BLDV CALC-SCNC: -4 MMOL/L
BASOPHILS # BLD AUTO: 0.03 THOUSANDS/ΜL (ref 0–0.1)
BASOPHILS NFR BLD AUTO: 1 % (ref 0–1)
BETA-HYDROXYBUTYRATE: 0.3 MMOL/L
BILIRUB SERPL-MCNC: 0.64 MG/DL (ref 0.2–1)
BILIRUB UR QL STRIP: NEGATIVE
BUN SERPL-MCNC: 6 MG/DL (ref 5–25)
BUN SERPL-MCNC: 6 MG/DL (ref 5–25)
CALCIUM SERPL-MCNC: 8.3 MG/DL (ref 8.3–10.1)
CALCIUM SERPL-MCNC: 8.5 MG/DL (ref 8.3–10.1)
CARDIAC TROPONIN I PNL SERPL HS: 3 NG/L
CHLORIDE SERPL-SCNC: 100 MMOL/L (ref 100–108)
CHLORIDE SERPL-SCNC: 102 MMOL/L (ref 100–108)
CLARITY UR: ABNORMAL
CO2 SERPL-SCNC: 21 MMOL/L (ref 21–32)
CO2 SERPL-SCNC: 9 MMOL/L (ref 21–32)
COLOR UR: YELLOW
CREAT SERPL-MCNC: 0.52 MG/DL (ref 0.6–1.3)
CREAT SERPL-MCNC: 0.64 MG/DL (ref 0.6–1.3)
EOSINOPHIL # BLD AUTO: 0.01 THOUSAND/ΜL (ref 0–0.61)
EOSINOPHIL NFR BLD AUTO: 0 % (ref 0–6)
ERYTHROCYTE [DISTWIDTH] IN BLOOD BY AUTOMATED COUNT: 13.4 % (ref 11.6–15.1)
ETHANOL SERPL-MCNC: <3 MG/DL (ref 0–3)
EXT PREG TEST URINE: NEGATIVE
EXT. CONTROL ED NAV: NORMAL
GFR SERPL CREATININE-BSD FRML MDRD: 112 ML/MIN/1.73SQ M
GFR SERPL CREATININE-BSD FRML MDRD: 120 ML/MIN/1.73SQ M
GLUCOSE SERPL-MCNC: 149 MG/DL (ref 65–140)
GLUCOSE SERPL-MCNC: 168 MG/DL (ref 65–140)
GLUCOSE UR STRIP-MCNC: NEGATIVE MG/DL
HCO3 BLDV-SCNC: 19.9 MMOL/L (ref 24–30)
HCT VFR BLD AUTO: 34.3 % (ref 34.8–46.1)
HGB BLD-MCNC: 12.2 G/DL (ref 11.5–15.4)
HGB UR QL STRIP.AUTO: NEGATIVE
IMM GRANULOCYTES # BLD AUTO: 0.02 THOUSAND/UL (ref 0–0.2)
IMM GRANULOCYTES NFR BLD AUTO: 0 % (ref 0–2)
KETONES UR STRIP-MCNC: NEGATIVE MG/DL
LEUKOCYTE ESTERASE UR QL STRIP: NEGATIVE
LIPASE SERPL-CCNC: 1189 U/L (ref 73–393)
LYMPHOCYTES # BLD AUTO: 1.03 THOUSANDS/ΜL (ref 0.6–4.47)
LYMPHOCYTES NFR BLD AUTO: 16 % (ref 14–44)
MCH RBC QN AUTO: 27.4 PG (ref 26.8–34.3)
MCHC RBC AUTO-ENTMCNC: 35.6 G/DL (ref 31.4–37.4)
MCV RBC AUTO: 77 FL (ref 82–98)
MONOCYTES # BLD AUTO: 0.48 THOUSAND/ΜL (ref 0.17–1.22)
MONOCYTES NFR BLD AUTO: 7 % (ref 4–12)
MUCOUS THREADS UR QL AUTO: ABNORMAL
NEUTROPHILS # BLD AUTO: 4.99 THOUSANDS/ΜL (ref 1.85–7.62)
NEUTS SEG NFR BLD AUTO: 76 % (ref 43–75)
NITRITE UR QL STRIP: NEGATIVE
NON-SQ EPI CELLS URNS QL MICRO: ABNORMAL /HPF
NRBC BLD AUTO-RTO: 0 /100 WBCS
O2 CT BLDV-SCNC: 17.3 ML/DL
P AXIS: 55 DEGREES
P AXIS: 56 DEGREES
PCO2 BLDV: 32.8 MM HG (ref 42–50)
PH BLDV: 7.4 [PH] (ref 7.3–7.4)
PH UR STRIP.AUTO: 6 [PH]
PLATELET # BLD AUTO: 302 THOUSANDS/UL (ref 149–390)
PMV BLD AUTO: 10.9 FL (ref 8.9–12.7)
PO2 BLDV: 108.1 MM HG (ref 35–45)
POTASSIUM SERPL-SCNC: 3.6 MMOL/L (ref 3.5–5.3)
POTASSIUM SERPL-SCNC: 3.8 MMOL/L (ref 3.5–5.3)
PR INTERVAL: 142 MS
PR INTERVAL: 142 MS
PROT SERPL-MCNC: 8 G/DL (ref 6.4–8.2)
PROT UR STRIP-MCNC: ABNORMAL MG/DL
QRS AXIS: 35 DEGREES
QRS AXIS: 45 DEGREES
QRSD INTERVAL: 72 MS
QRSD INTERVAL: 76 MS
QT INTERVAL: 316 MS
QT INTERVAL: 328 MS
QTC INTERVAL: 421 MS
QTC INTERVAL: 423 MS
RBC # BLD AUTO: 4.46 MILLION/UL (ref 3.81–5.12)
RBC #/AREA URNS AUTO: ABNORMAL /HPF
SALICYLATES SERPL-MCNC: <3 MG/DL (ref 3–20)
SODIUM SERPL-SCNC: 130 MMOL/L (ref 136–145)
SODIUM SERPL-SCNC: 131 MMOL/L (ref 136–145)
SP GR UR STRIP.AUTO: 1.02 (ref 1–1.03)
T WAVE AXIS: 51 DEGREES
T WAVE AXIS: 52 DEGREES
UROBILINOGEN UR STRIP-ACNC: <2 MG/DL
VENTRICULAR RATE: 100 BPM
VENTRICULAR RATE: 107 BPM
WBC # BLD AUTO: 6.56 THOUSAND/UL (ref 4.31–10.16)
WBC #/AREA URNS AUTO: ABNORMAL /HPF

## 2022-03-10 PROCEDURE — 96361 HYDRATE IV INFUSION ADD-ON: CPT

## 2022-03-10 PROCEDURE — 84484 ASSAY OF TROPONIN QUANT: CPT

## 2022-03-10 PROCEDURE — 80179 DRUG ASSAY SALICYLATE: CPT | Performed by: EMERGENCY MEDICINE

## 2022-03-10 PROCEDURE — 93010 ELECTROCARDIOGRAM REPORT: CPT | Performed by: INTERNAL MEDICINE

## 2022-03-10 PROCEDURE — 90471 IMMUNIZATION ADMIN: CPT | Performed by: INTERNAL MEDICINE

## 2022-03-10 PROCEDURE — 36415 COLL VENOUS BLD VENIPUNCTURE: CPT

## 2022-03-10 PROCEDURE — 99291 CRITICAL CARE FIRST HOUR: CPT | Performed by: EMERGENCY MEDICINE

## 2022-03-10 PROCEDURE — 80048 BASIC METABOLIC PNL TOTAL CA: CPT | Performed by: EMERGENCY MEDICINE

## 2022-03-10 PROCEDURE — 82805 BLOOD GASES W/O2 SATURATION: CPT | Performed by: EMERGENCY MEDICINE

## 2022-03-10 PROCEDURE — 81001 URINALYSIS AUTO W/SCOPE: CPT

## 2022-03-10 PROCEDURE — 80143 DRUG ASSAY ACETAMINOPHEN: CPT | Performed by: EMERGENCY MEDICINE

## 2022-03-10 PROCEDURE — G1004 CDSM NDSC: HCPCS

## 2022-03-10 PROCEDURE — 82010 KETONE BODYS QUAN: CPT | Performed by: EMERGENCY MEDICINE

## 2022-03-10 PROCEDURE — 83690 ASSAY OF LIPASE: CPT

## 2022-03-10 PROCEDURE — 85025 COMPLETE CBC W/AUTO DIFF WBC: CPT

## 2022-03-10 PROCEDURE — 99285 EMERGENCY DEPT VISIT HI MDM: CPT

## 2022-03-10 PROCEDURE — 96375 TX/PRO/DX INJ NEW DRUG ADDON: CPT

## 2022-03-10 PROCEDURE — 74177 CT ABD & PELVIS W/CONTRAST: CPT

## 2022-03-10 PROCEDURE — 96376 TX/PRO/DX INJ SAME DRUG ADON: CPT

## 2022-03-10 PROCEDURE — 90686 IIV4 VACC NO PRSV 0.5 ML IM: CPT | Performed by: INTERNAL MEDICINE

## 2022-03-10 PROCEDURE — 81025 URINE PREGNANCY TEST: CPT

## 2022-03-10 PROCEDURE — 96374 THER/PROPH/DIAG INJ IV PUSH: CPT

## 2022-03-10 PROCEDURE — 80053 COMPREHEN METABOLIC PANEL: CPT

## 2022-03-10 PROCEDURE — 99220 PR INITIAL OBSERVATION CARE/DAY 70 MINUTES: CPT | Performed by: INTERNAL MEDICINE

## 2022-03-10 PROCEDURE — 93005 ELECTROCARDIOGRAM TRACING: CPT

## 2022-03-10 PROCEDURE — 82077 ASSAY SPEC XCP UR&BREATH IA: CPT | Performed by: EMERGENCY MEDICINE

## 2022-03-10 RX ORDER — ONDANSETRON 2 MG/ML
4 INJECTION INTRAMUSCULAR; INTRAVENOUS ONCE
Status: COMPLETED | OUTPATIENT
Start: 2022-03-10 | End: 2022-03-10

## 2022-03-10 RX ORDER — HYDROMORPHONE HCL/PF 1 MG/ML
0.5 SYRINGE (ML) INJECTION EVERY 6 HOURS PRN
Status: DISCONTINUED | OUTPATIENT
Start: 2022-03-10 | End: 2022-03-10

## 2022-03-10 RX ORDER — ATORVASTATIN CALCIUM 40 MG/1
40 TABLET, FILM COATED ORAL
Status: DISCONTINUED | OUTPATIENT
Start: 2022-03-10 | End: 2022-03-18 | Stop reason: HOSPADM

## 2022-03-10 RX ORDER — ACETAMINOPHEN 325 MG/1
650 TABLET ORAL EVERY 6 HOURS PRN
Status: DISCONTINUED | OUTPATIENT
Start: 2022-03-10 | End: 2022-03-18 | Stop reason: HOSPADM

## 2022-03-10 RX ORDER — BUDESONIDE AND FORMOTEROL FUMARATE DIHYDRATE 80; 4.5 UG/1; UG/1
2 AEROSOL RESPIRATORY (INHALATION) 2 TIMES DAILY
Status: DISCONTINUED | OUTPATIENT
Start: 2022-03-10 | End: 2022-03-18 | Stop reason: HOSPADM

## 2022-03-10 RX ORDER — KETOROLAC TROMETHAMINE 30 MG/ML
15 INJECTION, SOLUTION INTRAMUSCULAR; INTRAVENOUS ONCE
Status: COMPLETED | OUTPATIENT
Start: 2022-03-10 | End: 2022-03-10

## 2022-03-10 RX ORDER — HYDROMORPHONE HCL/PF 1 MG/ML
0.5 SYRINGE (ML) INJECTION EVERY 4 HOURS PRN
Status: DISCONTINUED | OUTPATIENT
Start: 2022-03-10 | End: 2022-03-11

## 2022-03-10 RX ORDER — HYDROMORPHONE HCL/PF 1 MG/ML
1 SYRINGE (ML) INJECTION ONCE
Status: COMPLETED | OUTPATIENT
Start: 2022-03-10 | End: 2022-03-10

## 2022-03-10 RX ORDER — HYDROMORPHONE HCL/PF 1 MG/ML
0.5 SYRINGE (ML) INJECTION ONCE
Status: COMPLETED | OUTPATIENT
Start: 2022-03-10 | End: 2022-03-10

## 2022-03-10 RX ORDER — SODIUM CHLORIDE 9 MG/ML
75 INJECTION, SOLUTION INTRAVENOUS CONTINUOUS
Status: DISCONTINUED | OUTPATIENT
Start: 2022-03-10 | End: 2022-03-11

## 2022-03-10 RX ORDER — ONDANSETRON 2 MG/ML
4 INJECTION INTRAMUSCULAR; INTRAVENOUS EVERY 6 HOURS PRN
Status: DISCONTINUED | OUTPATIENT
Start: 2022-03-10 | End: 2022-03-18 | Stop reason: HOSPADM

## 2022-03-10 RX ORDER — MONTELUKAST SODIUM 10 MG/1
10 TABLET ORAL
Status: DISCONTINUED | OUTPATIENT
Start: 2022-03-10 | End: 2022-03-18 | Stop reason: HOSPADM

## 2022-03-10 RX ADMIN — BUDESONIDE AND FORMOTEROL FUMARATE DIHYDRATE 2 PUFF: 80; 4.5 AEROSOL RESPIRATORY (INHALATION) at 20:53

## 2022-03-10 RX ADMIN — ONDANSETRON 4 MG: 2 INJECTION INTRAMUSCULAR; INTRAVENOUS at 07:17

## 2022-03-10 RX ADMIN — HYDROMORPHONE HYDROCHLORIDE 1 MG: 1 INJECTION, SOLUTION INTRAMUSCULAR; INTRAVENOUS; SUBCUTANEOUS at 10:32

## 2022-03-10 RX ADMIN — ENOXAPARIN SODIUM 40 MG: 40 INJECTION SUBCUTANEOUS at 14:00

## 2022-03-10 RX ADMIN — HYDROMORPHONE HYDROCHLORIDE 0.5 MG: 1 INJECTION, SOLUTION INTRAMUSCULAR; INTRAVENOUS; SUBCUTANEOUS at 17:05

## 2022-03-10 RX ADMIN — HYDROMORPHONE HYDROCHLORIDE 0.5 MG: 1 INJECTION, SOLUTION INTRAMUSCULAR; INTRAVENOUS; SUBCUTANEOUS at 20:41

## 2022-03-10 RX ADMIN — SODIUM CHLORIDE 1000 ML: 0.9 INJECTION, SOLUTION INTRAVENOUS at 10:23

## 2022-03-10 RX ADMIN — KETOROLAC TROMETHAMINE 15 MG: 30 INJECTION, SOLUTION INTRAMUSCULAR at 22:54

## 2022-03-10 RX ADMIN — SODIUM CHLORIDE 75 ML/HR: 9 INJECTION, SOLUTION INTRAVENOUS at 14:00

## 2022-03-10 RX ADMIN — INFLUENZA VIRUS VACCINE 0.5 ML: 15; 15; 15; 15 SUSPENSION INTRAMUSCULAR at 17:03

## 2022-03-10 RX ADMIN — HYDROMORPHONE HYDROCHLORIDE 0.5 MG: 1 INJECTION, SOLUTION INTRAMUSCULAR; INTRAVENOUS; SUBCUTANEOUS at 07:18

## 2022-03-10 RX ADMIN — MONTELUKAST 10 MG: 10 TABLET, FILM COATED ORAL at 21:50

## 2022-03-10 RX ADMIN — IOHEXOL 100 ML: 350 INJECTION, SOLUTION INTRAVENOUS at 10:58

## 2022-03-10 RX ADMIN — HYDROMORPHONE HYDROCHLORIDE 1 MG: 1 INJECTION, SOLUTION INTRAMUSCULAR; INTRAVENOUS; SUBCUTANEOUS at 09:01

## 2022-03-10 RX ADMIN — HYDROMORPHONE HYDROCHLORIDE 0.5 MG: 1 INJECTION, SOLUTION INTRAMUSCULAR; INTRAVENOUS; SUBCUTANEOUS at 14:04

## 2022-03-10 RX ADMIN — ATORVASTATIN CALCIUM 40 MG: 40 TABLET, FILM COATED ORAL at 17:03

## 2022-03-10 RX ADMIN — SODIUM CHLORIDE 1000 ML: 0.9 INJECTION, SOLUTION INTRAVENOUS at 07:18

## 2022-03-10 RX ADMIN — ONDANSETRON 4 MG: 2 INJECTION INTRAMUSCULAR; INTRAVENOUS at 12:51

## 2022-03-10 NOTE — ED PROVIDER NOTES
History  Chief Complaint   Patient presents with    Abdominal Pain     Patient presenting with abdominal pain since yesterday  + vomiting     42-year-old woman with relevant PMH HTN and HLD presents with epigastric abdominal pain  The pain begin yesterday at 1400 and has been constant  She reports the pain wraps around to her left flank  She has had three episodes of vomiting since the pain began  No diarrhea or constipation  She has not eaten due to the pain  She has not had pain like this before  She denies chest pain, dyspnea, dysuria, diarrhea, or constipation  Prior to Admission Medications   Prescriptions Last Dose Informant Patient Reported? Taking? Icosapent Ethyl 1 g CAPS 3/9/2022 at Unknown time Self No Yes   Sig: Take 2 capsules (2 g total) by mouth 2 (two) times a day   budesonide-formoterol (SYMBICORT) 80-4 5 MCG/ACT inhaler 3/9/2022 at Unknown time Self No Yes   Sig: Inhale 2 puffs 2 (two) times a day Rinse mouth after use     clobetasol (TEMOVATE) 0 05 % ointment Past Week at Unknown time  No Yes   Sig: Apply topically 2 (two) times a day   ergocalciferol (ERGOCALCIFEROL) 1 25 MG (01463 UT) capsule 3/9/2022 at Unknown time Self Yes Yes   Sig: Take 50,000 Units by mouth once a week    fenofibrate micronized (LOFIBRA) 200 MG capsule 3/9/2022 at Unknown time Self Yes Yes   Sig: Take 200 mg by mouth   ketoconazole (NIZORAL) 2 % shampoo Past Week at Unknown time  No Yes   Sig: APPLY 1 APPLICATION TOPICALLY 2 (TWO) TIMES A WEEK   miconazole (MONISTAT-7) 2 % vaginal cream Past Week at Unknown time  No Yes   Sig: Insert 1 applicator into the vagina daily at bedtime   montelukast (SINGULAIR) 10 mg tablet 3/9/2022 at Unknown time Self No Yes   Sig: Take 1 tablet (10 mg total) by mouth daily at bedtime   nitrofurantoin (MACROBID) 100 mg capsule   No No   Sig: Take 1 capsule (100 mg total) by mouth 2 (two) times a day for 5 days   norgestimate-ethinyl estradiol (Sprintec 28) 0 25-35 MG-MCG per tablet No No   Sig: Take 1 tablet by mouth daily for 28 days   pantoprazole (PROTONIX) 40 mg tablet 3/9/2022 at Unknown time  No Yes   Sig: Take 1 tablet (40 mg total) by mouth daily before breakfast   rosuvastatin (CRESTOR) 20 MG tablet 3/9/2022 at Unknown time Self Yes Yes   Sig: Take 20 mg by mouth daily       Facility-Administered Medications: None       Past Medical History:   Diagnosis Date    COVID-19 2020    Esophagitis     H  pylori infection 2018    Hepatic steatosis     Hiatal hernia     Hiatal hernia     High triglycerides     Hyperlipidemia     Hypertension     Ventral hernia        Past Surgical History:   Procedure Laterality Date     SECTION      3X--,,     COLONOSCOPY N/A 2018    Procedure: COLONOSCOPY;  Surgeon: Jeannie King MD;  Location: [facility]; Service: Gastroenterology    EGD  2021    ESOPHAGOGASTRODUODENOSCOPY N/A 2018    Procedure: ESOPHAGOGASTRODUODENOSCOPY (EGD); Surgeon: Jeannie King MD;  Location: [facility]; Service: Gastroenterology    HERNIA REPAIR      LA REPAIR INCISIONAL HERNIA,FLAVIO N/A 2019    Procedure: REPAIR HERNIA VENTRAL;  Surgeon: Kenneth Ruiz DO;  Location: [address] [place] [address] OR;  Service: General    TUBAL LIGATION      UPPER GASTROINTESTINAL ENDOSCOPY         Family History   Problem Relation Age of Onset    Heart disease Mother     Hypertension Mother     Cervical cancer Mother     Stomach cancer Father     Diabetes Brother     Hyperlipidemia Brother     Asthma Son     Colon cancer Maternal Uncle     Lung cancer Maternal Uncle      I have reviewed and agree with the history as documented      E-Cigarette/Vaping    E-Cigarette Use Never User      E-Cigarette/Vaping Substances    Nicotine No     THC No     CBD No     Flavoring No     Other No     Unknown No      Social History     Tobacco Use    Smoking status: Never Smoker    Smokeless tobacco: Never Used   Vaping Use    Vaping Use: Never used Substance Use Topics    Alcohol use: No    Drug use: No        Review of Systems   Constitutional: Negative for chills and fever  HENT: Negative for ear pain and sore throat  Eyes: Negative for pain and visual disturbance  Respiratory: Negative for cough, shortness of breath and wheezing  Cardiovascular: Negative for chest pain and palpitations  Gastrointestinal: Positive for abdominal pain and vomiting  Negative for constipation and diarrhea  Genitourinary: Negative for dysuria, frequency, hematuria and vaginal bleeding  Musculoskeletal: Negative for arthralgias and back pain  Skin: Negative for color change and rash  Neurological: Negative for seizures, syncope and headaches  Psychiatric/Behavioral: Negative for agitation and confusion  All other systems reviewed and are negative  Physical Exam  ED Triage Vitals   Temperature Pulse Respirations Blood Pressure SpO2   03/10/22 0634 03/10/22 0634 03/10/22 0634 03/10/22 0634 03/10/22 0634   98 2 °F (36 8 °C) 102 18 (!) 169/101 99 %      Temp Source Heart Rate Source Patient Position - Orthostatic VS BP Location FiO2 (%)   03/10/22 0634 03/10/22 0634 03/10/22 1315 03/10/22 0634 --   Oral Monitor Lying Right arm       Pain Score       03/10/22 0634       10 - Worst Possible Pain             Orthostatic Vital Signs  Vitals:    03/10/22 1315 03/10/22 1353 03/10/22 2259 03/10/22 2328   BP: 130/80 134/79 105/61    Pulse: 88 85 (!) 117 (!) 107   Patient Position - Orthostatic VS: Lying          Physical Exam  Vitals and nursing note reviewed  Constitutional:       General: She is not in acute distress  Appearance: Normal appearance  She is well-developed  HENT:      Head: Normocephalic and atraumatic  Right Ear: External ear normal       Left Ear: External ear normal       Nose: Nose normal  No congestion  Eyes:      Conjunctiva/sclera: Conjunctivae normal    Cardiovascular:      Rate and Rhythm: Normal rate and regular rhythm  Heart sounds: No murmur heard  Pulmonary:      Effort: Pulmonary effort is normal  No respiratory distress  Breath sounds: Normal breath sounds  Abdominal:      Palpations: Abdomen is soft  Tenderness: There is generalized abdominal tenderness and tenderness in the epigastric area and left upper quadrant  There is left CVA tenderness  There is no guarding or rebound  Musculoskeletal:         General: Normal range of motion  Cervical back: Normal range of motion and neck supple  Right lower leg: No edema  Left lower leg: No edema  Skin:     General: Skin is warm and dry  Neurological:      General: No focal deficit present  Mental Status: She is alert and oriented to person, place, and time  Sensory: No sensory deficit  Motor: No weakness     Psychiatric:         Mood and Affect: Mood normal          Behavior: Behavior normal          ED Medications  Medications   budesonide-formoterol (SYMBICORT) 80-4 5 MCG/ACT inhaler 2 puff (2 puffs Inhalation Given 3/10/22 2053)   montelukast (SINGULAIR) tablet 10 mg (10 mg Oral Given 3/10/22 2150)   atorvastatin (LIPITOR) tablet 40 mg (40 mg Oral Given 3/10/22 1703)   ondansetron (ZOFRAN) injection 4 mg (4 mg Intravenous Refused 3/10/22 1841)   enoxaparin (LOVENOX) subcutaneous injection 40 mg (40 mg Subcutaneous Given 3/10/22 1400)   sodium chloride 0 9 % infusion (75 mL/hr Intravenous New Bag 3/11/22 0044)   acetaminophen (TYLENOL) tablet 650 mg (has no administration in time range)   HYDROmorphone (DILAUDID) injection 0 5 mg (0 5 mg Intravenous Given 3/11/22 0043)   sodium chloride 0 9 % bolus 1,000 mL (0 mL Intravenous Stopped 3/10/22 0945)   HYDROmorphone (DILAUDID) injection 0 5 mg (0 5 mg Intravenous Given 3/10/22 0718)   ondansetron (ZOFRAN) injection 4 mg (4 mg Intravenous Given 3/10/22 0717)   HYDROmorphone (DILAUDID) injection 1 mg (1 mg Intravenous Given 3/10/22 0901)   sodium chloride 0 9 % bolus 1,000 mL (0 mL Intravenous Stopped 3/10/22 1252)   HYDROmorphone (DILAUDID) injection 1 mg (1 mg Intravenous Given 3/10/22 1032)   iohexol (OMNIPAQUE) 350 MG/ML injection (SINGLE-DOSE) 100 mL (100 mL Intravenous Given 3/10/22 1058)   ondansetron (ZOFRAN) injection 4 mg (4 mg Intravenous Given 3/10/22 1251)   influenza vaccine, quadrivalent (FLUARIX) IM injection 0 5 mL (0 5 mL Intramuscular Given 3/10/22 1703)   ketorolac (TORADOL) injection 15 mg (15 mg Intravenous Given 3/10/22 2254)       Diagnostic Studies  Results Reviewed     Procedure Component Value Units Date/Time    Comprehensive metabolic panel [[id number]]     Lab Status: No result Specimen: Blood     Basic metabolic panel [[id number]]  (Abnormal) Collected: 03/10/22 1022    Lab Status: Final result Specimen: Blood from Arm, Right Updated: 03/10/22 1115     Sodium 131 mmol/L      Potassium 3 8 mmol/L      Chloride 102 mmol/L      CO2 21 mmol/L      ANION GAP 8 mmol/L      BUN 6 mg/dL      Creatinine 0 52 mg/dL      Glucose 149 mg/dL      Calcium 8 3 mg/dL      eGFR 120 ml/min/1 73sq m     Narrative:      [place] guidelines for Chronic Kidney Disease (CKD):     Stage 1 with normal or high GFR (GFR > 90 mL/min/1 73 square meters)    Stage 2 Mild CKD (GFR = 60-89 mL/min/1 73 square meters)    Stage 3A Moderate CKD (GFR = 45-59 mL/min/1 73 square meters)    Stage 3B Moderate CKD (GFR = 30-44 mL/min/1 73 square meters)    Stage 4 Severe CKD (GFR = 15-29 mL/min/1 73 square meters)    Stage 5 End Stage CKD (GFR <15 mL/min/1 73 square meters)  Note: GFR calculation is accurate only with a steady state creatinine    Salicylate level [[id number]]  (Abnormal) Collected: 03/10/22 1022    Lab Status: Final result Specimen: Blood from Arm, Right Updated: 67/27/51 8671     Salicylate Lvl <3 mg/dL     Acetaminophen level-If concentration is detectable, please discuss with medical  on call   [[id number]]  (Abnormal) Collected: 03/10/22 1022 Lab Status: Final result Specimen: Blood from Arm, Right Updated: 03/10/22 1115     Acetaminophen Level <2 ug/mL     Ethanol [[id number]]  (Normal) Collected: 03/10/22 1022    Lab Status: Final result Specimen: Blood from Arm, Right Updated: 03/10/22 1051     Ethanol Lvl <3 mg/dL     Beta Hydroxybutyrate [[id number]]  (Normal) Collected: 03/10/22 1022    Lab Status: Final result Specimen: Blood from Arm, Right Updated: 03/10/22 1039     BETA-HYDROXYBUTYRATE 0 3 mmol/L     Blood gas, venous [[id number]]  (Abnormal) Collected: 03/10/22 1022    Lab Status: Final result Specimen: Blood from Arm, Right Updated: 03/10/22 1032     pH, Saul 7 401     pCO2, Saul 32 8 mm Hg      pO2, Saul 108 1 mm Hg      HCO3, Saul 19 9 mmol/L      Base Excess, Saul -4 0 mmol/L      O2 Content, Saul 17 3 ml/dL      O2 HGB, VENOUS 96 1 %     Comprehensive metabolic panel [[id number]]  (Abnormal) Collected: 03/10/22 0717    Lab Status: Final result Specimen: Blood from Arm, Left Updated: 03/10/22 0953     Sodium 130 mmol/L      Potassium 3 6 mmol/L      Chloride 100 mmol/L      CO2 9 mmol/L      ANION GAP 21 mmol/L      BUN 6 mg/dL      Creatinine 0 64 mg/dL      Glucose 168 mg/dL      Calcium 8 5 mg/dL      AST 24 U/L      ALT 67 U/L      Alkaline Phosphatase 63 U/L      Total Protein 8 0 g/dL      Albumin 3 5 g/dL      Total Bilirubin 0 64 mg/dL      eGFR 112 ml/min/1 73sq m     Narrative:      [place] guidelines for Chronic Kidney Disease (CKD):     Stage 1 with normal or high GFR (GFR > 90 mL/min/1 73 square meters)    Stage 2 Mild CKD (GFR = 60-89 mL/min/1 73 square meters)    Stage 3A Moderate CKD (GFR = 45-59 mL/min/1 73 square meters)    Stage 3B Moderate CKD (GFR = 30-44 mL/min/1 73 square meters)    Stage 4 Severe CKD (GFR = 15-29 mL/min/1 73 square meters)    Stage 5 End Stage CKD (GFR <15 mL/min/1 73 square meters)  Note: GFR calculation is accurate only with a steady state creatinine    Urine Microscopic [[id number]]  (Abnormal) Collected: 03/10/22 0750    Lab Status: Final result Specimen: Urine, Other Updated: 03/10/22 0821     RBC, UA 2-4 /hpf      WBC, UA 2-4 /hpf      Epithelial Cells Moderate /hpf      Bacteria, UA None Seen /hpf      MUCUS THREADS Moderate    HS Troponin 0hr (reflex protocol) [[id number]]  (Normal) Collected: 03/10/22 0717    Lab Status: Final result Specimen: Blood from Arm, Left Updated: 03/10/22 0805     hs TnI 0hr 3 ng/L     UA w Reflex to Microscopic w Reflex to Culture [[id number]]  (Abnormal) Collected: 03/10/22 0750    Lab Status: Final result Specimen: Urine, Other Updated: 03/10/22 0803     Color, UA Yellow     Clarity, UA Turbid     Specific [place], UA 1 018     pH, UA 6 0     Leukocytes, UA Negative     Nitrite, UA Negative     Protein, UA 50 (1+) mg/dl      Glucose, UA Negative mg/dl      Ketones, UA Negative mg/dl      Urobilinogen, UA <2 0 mg/dl      Bilirubin, UA Negative     Blood, UA Negative    POCT pregnancy, urine [[id number]]  (Normal) Resulted: 03/10/22 0754    Lab Status: Final result Updated: 03/10/22 0754     EXT PREG TEST UR (Ref: Negative) negative     Control valid    Lipase [[id number]]  (Abnormal) Collected: 03/10/22 0717    Lab Status: Final result Specimen: Blood from Arm, Left Updated: 03/10/22 0746     Lipase 1,189 u/L     CBC and differential [[id number]]  (Abnormal) Collected: 03/10/22 0717    Lab Status: Final result Specimen: Blood from Arm, Left Updated: 03/10/22 0740     WBC 6 56 Thousand/uL      RBC 4 46 Million/uL      Hemoglobin 12 2 g/dL      Hematocrit 34 3 %      MCV 77 fL      MCH 27 4 pg      MCHC 35 6 g/dL      RDW 13 4 %      MPV 10 9 fL      Platelets 586 Thousands/uL      nRBC 0 /100 WBCs      Neutrophils Relative 76 %      Immat GRANS % 0 %      Lymphocytes Relative 16 %      Monocytes Relative 7 %      Eosinophils Relative 0 %      Basophils Relative 1 %      Neutrophils Absolute 4 99 Thousands/µL      Immature Grans Absolute 0 02 Thousand/uL Lymphocytes Absolute 1 03 Thousands/µL      Monocytes Absolute 0 48 Thousand/µL      Eosinophils Absolute 0 01 Thousand/µL      Basophils Absolute 0 03 Thousands/µL                  CT abdomen pelvis with contrast   Final Result by Salvador Cristina MD (03/10 1150)      Findings consistent with acute pancreatitis  No focal intrapancreatic or peripancreatic fluid collection  No evidence for pancreatic necrosis  Small sliding type hiatal hernia, similar to prior  Hepatomegaly  3 7 x 2 6 x 2 1 cm septated right ovarian cyst  This can be further evaluated with nonemergent dedicated pelvic ultrasound as clinically warranted  The study was marked in [facility] for immediate notification  Workstation performed: [id number]               Procedures  ECG 12 Lead Documentation Only    Date/Time: 3/10/2022 7:34 AM  Performed by: Brianne Farfan MD  Authorized by: Brianne Farfan MD     Indications / Diagnosis:  Epigastric pain  ECG reviewed by me, the ED Provider: yes    Patient location:  ED  Interpretation:     Interpretation: normal    Rate:     ECG rate:  100    ECG rate assessment: normal    Rhythm:     Rhythm: sinus rhythm    Ectopy:     Ectopy: none    QRS:     QRS axis:  Normal    QRS intervals:  Normal  Conduction:     Conduction: normal    ST segments:     ST segments:  Normal  T waves:     T waves: normal            ED Course       MDM  Number of Diagnoses or Management Options  Acute pancreatitis  Metabolic acidosis  Ovarian cyst  Diagnosis management comments: Presenting with severe epigastric abdominal pain radiating to the left flank and vomiting  Concerned for biliary disease, pancreatitis, renal stone, pregnancy, and ACS  Will order labs, UA, and CT abd/pelvis  Workup significant for pancreatitis with CT scan pending  Patient signed out to Dr Jalen Bhatia with presumed admission for pain control and IV fluids  Patient in agreement with plan and questions were answered  Portions or all of this note were generated using voice recognition software  Occasional wrong word or "sound a like" substitutions may have occurred due to the inherent limitations of voice recognition software  Please interpret any errors within the intended context of the whole sentence or idea  Disposition  Final diagnoses:   Acute pancreatitis   Metabolic acidosis   Ovarian cyst     Time reflects when diagnosis was documented in both MDM as applicable and the Disposition within this note     Time User Action Codes Description Comment    3/10/2022 11:19 AM April Tammy A Add [K85 90] Acute pancreatitis     3/10/2022 11:19 AM April Tammy A Add [J25 3] Metabolic acidosis     0/53/3564 11:52 AM Ricketts Che Add [X17 690] Ovarian cyst       ED Disposition     ED Disposition Condition Date/Time Comment    Admit Stable Thu Mar 10, 2022 12:30 PM Case was discussed with Dr Veto Perez and the patient's admission status was agreed to be Admission Status: observation status to the service of Dr Veto Perez   Follow-up Information    None         Current Discharge Medication List      CONTINUE these medications which have NOT CHANGED    Details   budesonide-formoterol (SYMBICORT) 80-4 5 MCG/ACT inhaler Inhale 2 puffs 2 (two) times a day Rinse mouth after use  Qty: 10 2 g, Refills: 5    Associated Diagnoses:  Moderate persistent asthma with acute exacerbation      clobetasol (TEMOVATE) 0 05 % ointment Apply topically 2 (two) times a day  Qty: 30 g, Refills: 5    Associated Diagnoses: Psoriasis      ergocalciferol (ERGOCALCIFEROL) 1 25 MG (31855 UT) capsule Take 50,000 Units by mouth once a week       fenofibrate micronized (LOFIBRA) 200 MG capsule Take 200 mg by mouth      Icosapent Ethyl 1 g CAPS Take 2 capsules (2 g total) by mouth 2 (two) times a day  Qty: 120 capsule, Refills: 5    Associated Diagnoses: Mixed hyperlipidemia      ketoconazole (NIZORAL) 2 % shampoo APPLY 1 APPLICATION TOPICALLY 2 (TWO) TIMES A WEEK  Qty: 120 mL, Refills: 5    Associated Diagnoses: Tinea capitis      miconazole (MONISTAT-7) 2 % vaginal cream Insert 1 applicator into the vagina daily at bedtime  Qty: 45 g, Refills: 0    Associated Diagnoses: Candida albicans infection      montelukast (SINGULAIR) 10 mg tablet Take 1 tablet (10 mg total) by mouth daily at bedtime  Qty: 30 tablet, Refills: 5    Associated Diagnoses: Moderate persistent asthma with acute exacerbation      pantoprazole (PROTONIX) 40 mg tablet Take 1 tablet (40 mg total) by mouth daily before breakfast  Qty: 60 tablet, Refills: 2    Associated Diagnoses: Gastroesophageal reflux disease without esophagitis      rosuvastatin (CRESTOR) 20 MG tablet Take 20 mg by mouth daily       norgestimate-ethinyl estradiol (Sprintec 28) 0 25-35 MG-MCG per tablet Take 1 tablet by mouth daily for 28 days  Qty: 28 tablet, Refills: 3    Associated Diagnoses: Abnormal uterine bleeding (AUB)         STOP taking these medications       nitrofurantoin (MACROBID) 100 mg capsule Comments:   Reason for Stopping:             No discharge procedures on file  PDMP Review     None           ED Provider  Attending physically available and evaluated Kehinde Wright I managed the patient along with the ED Attending      Electronically Signed by         Mamie Gaucher, MD  03/11/22 2034

## 2022-03-10 NOTE — PLAN OF CARE
Problem: PAIN - ADULT  Goal: Verbalizes/displays adequate comfort level or baseline comfort level  Description: Interventions:  - Encourage patient to monitor pain and request assistance  - Assess pain using appropriate pain scale  - Administer analgesics based on type and severity of pain and evaluate response  - Implement non-pharmacological measures as appropriate and evaluate response  - Consider cultural and social influences on pain and pain management  - Notify physician/advanced practitioner if interventions unsuccessful or patient reports new pain  Outcome: Progressing     Problem: GASTROINTESTINAL - ADULT  Goal: Minimal or absence of nausea and/or vomiting  Description: INTERVENTIONS:  - Administer IV fluids if ordered to ensure adequate hydration  - Maintain NPO status until nausea and vomiting are resolved  - Nasogastric tube if ordered  - Administer ordered antiemetic medications as needed  - Provide nonpharmacologic comfort measures as appropriate  - Advance diet as tolerated, if ordered  - Consider nutrition services referral to assist patient with adequate nutrition and appropriate food choices  Outcome: Progressing  Goal: Maintains or returns to baseline bowel function  Description: INTERVENTIONS:  - Assess bowel function  - Encourage oral fluids to ensure adequate hydration  - Administer IV fluids if ordered to ensure adequate hydration  - Administer ordered medications as needed  - Encourage mobilization and activity  - Consider nutritional services referral to assist patient with adequate nutrition and appropriate food choices  Outcome: Progressing  Goal: Maintains adequate nutritional intake  Description: INTERVENTIONS:  - Monitor percentage of each meal consumed  - Identify factors contributing to decreased intake, treat as appropriate  - Assist with meals as needed  - Monitor I&O, weight, and lab values if indicated  - Obtain nutrition services referral as needed  Outcome: Progressing  Goal: Establish and maintain optimal ostomy function  Description: INTERVENTIONS:  - Assess bowel function  - Encourage oral fluids to ensure adequate hydration  - Administer IV fluids if ordered to ensure adequate hydration   - Administer ordered medications as needed  - Encourage mobilization and activity  - Nutrition services referral to assist patient with appropriate food choices  - Assess stoma site  - Consider wound care consult   Outcome: Progressing  Goal: Oral mucous membranes remain intact  Description: INTERVENTIONS  - Assess oral mucosa and hygiene practices  - Implement preventative oral hygiene regimen  - Implement oral medicated treatments as ordered  - Initiate Nutrition services referral as needed  Outcome: Progressing

## 2022-03-10 NOTE — ASSESSMENT & PLAN NOTE
Acute pancreatitis  Monitor symptoms closely  Note elevated lipase in addition to CT findings  NPO for now  Monitor with pain meds  Continue supportive care  Follow-up on triglycerides in a m  With lipid profile  Patient had previously elevated levels  No evidence of gallstone    Abstinence the alcohol

## 2022-03-10 NOTE — H&P
[facility]  H&P- Vicki Shin 1980, 46 y o  female MRN: [id number]  Unit/Bed#: CRB Encounter: [id number]  Primary Care Provider: Marian Stoddard MD   Date and time admitted to hospital: 3/10/2022  6:33 AM    * Abdominal pain  Assessment & Plan  Acute pancreatitis  Monitor symptoms closely  Note elevated lipase in addition to CT findings  NPO for now  Monitor with pain meds  Continue supportive care  Follow-up on triglycerides in a m  With lipid profile  Patient had previously elevated levels  No evidence of gallstone  Patient denies any ingestion recently    Ovarian cyst  Assessment & Plan  Outpatient follow-up regarding right-sided ovarian cyst     Hyperlipidemia  Assessment & Plan  Follow-up on lipid profile  Continue with Crestor    VTE Prophylaxis: Enoxaparin (Lovenox)  / sequential compression device   Code Status:  Full code  POLST: There is no POLST form on file for this patient (pre-hospital)      Anticipated Length of Stay:  Patient will be admitted on an Emergency basis with an anticipated length of stay of  less than 2 midnights  Justification for Hospital Stay:  Needs further evaluation of abdominal pain    Total Time for Visit, including Counseling / Coordination of Care: 45 minutes  Greater than 50% of this total time spent on direct patient counseling and coordination of care  Chief Complaint:  Abdominal pain    History of Present Illness:    Vicki Shin is a 46 y o  female who presents with a past medical history of hypertension hyperlipidemia who presents the hospital with epigastric pain  Patient reports the pain wraps around her left flank  She has had 3 episodes of vomiting and nausea as well  She denies any diarrhea symptoms or constipation symptoms  She presents for further workup evaluation  She has noted have elevated lipase and CT findings consistent with acute pancreatitis on presentation    Patient denies any history of alcohol use  Patient denies any biliary colic symptoms   She reports that the symptoms began yesterday when drinking a glass of orange juice  Review of Systems:    Review of Systems   Constitutional: Negative for chills, fatigue and fever  Respiratory: Negative for chest tightness and shortness of breath  Gastrointestinal: Negative for abdominal distention and abdominal pain  Musculoskeletal: Negative for arthralgias  Neurological: Negative for dizziness  Hematological: Negative for adenopathy  All other systems reviewed and are negative  Past Medical and Surgical History:     Past Medical History:   Diagnosis Date    COVID-19 2020    Esophagitis     H  pylori infection 2018    Hepatic steatosis     Hiatal hernia     Hiatal hernia     High triglycerides     Hyperlipidemia     Hypertension     Ventral hernia        Past Surgical History:   Procedure Laterality Date     SECTION      3X--,,     COLONOSCOPY N/A 2018    Procedure: COLONOSCOPY;  Surgeon: Kamari López MD;  Location: [facility] GI LAB; Service: Gastroenterology    EGD  2021    ESOPHAGOGASTRODUODENOSCOPY N/A 2018    Procedure: ESOPHAGOGASTRODUODENOSCOPY (EGD); Surgeon: Kamari López MD;  Location: [facility] GI LAB; Service: Gastroenterology    HERNIA REPAIR      [place] REPAIR INCISIONAL HERNIA,FLAVIO N/A 2019    Procedure: REPAIR HERNIA VENTRAL;  Surgeon: Miya Eckert DO;  Location: [address] [place] [address] OR;  Service: General    TUBAL LIGATION      UPPER GASTROINTESTINAL ENDOSCOPY         Meds/Allergies:    Prior to Admission medications    Medication Sig Start Date End Date Taking? Authorizing Provider   budesonide-formoterol (SYMBICORT) 80-4 5 MCG/ACT inhaler Inhale 2 puffs 2 (two) times a day Rinse mouth after use   21   Alexandra Bentley MD   clobetasol (TEMOVATE) 0 05 % ointment Apply topically 2 (two) times a day 12/3/21   Alexandra Bentley MD   ergocalciferol (ERGOCALCIFEROL) 1 25 MG (68445 UT) capsule Take 50,000 Units by mouth once a week  8/7/21   Historical Provider, MD   fenofibrate micronized (LOFIBRA) 200 MG capsule Take 200 mg by mouth 8/7/21 8/7/22  Historical Provider, MD   Icosapent Ethyl 1 g CAPS Take 2 capsules (2 g total) by mouth 2 (two) times a day  Patient not taking: Reported on 3/4/2022  3/26/21   Yojana Mcgovern MD   ketoconazole (NIZORAL) 2 % shampoo APPLY 1 APPLICATION TOPICALLY 2 (TWO) TIMES A WEEK 1/31/22   Eran Brown MD   miconazole (MONISTAT-7) 2 % vaginal cream Insert 1 applicator into the vagina daily at bedtime 10/20/21   Darion Perez MD   montelukast (SINGULAIR) 10 mg tablet Take 1 tablet (10 mg total) by mouth daily at bedtime 9/17/21   Eran Brown MD   nitrofurantoin (MACROBID) 100 mg capsule Take 1 capsule (100 mg total) by mouth 2 (two) times a day for 5 days 3/4/22 3/9/22  ADAN Pablo   norgestimate-ethinyl estradiol (Sprintec 28) 0 25-35 MG-MCG per tablet Take 1 tablet by mouth daily for 28 days 11/10/21 3/4/22  Bib Baum MD   pantoprazole (PROTONIX) 40 mg tablet Take 1 tablet (40 mg total) by mouth daily before breakfast 10/1/21   Kati Beach MD   rosuvastatin (CRESTOR) 20 MG tablet Take 20 mg by mouth daily  8/16/21   Historical Provider, MD GOSS have reviewed home medications with patient personally      Allergies: No Known Allergies    Social History:     Marital Status: /Civil Union     Patient Pre-hospital Living Situation:  Home  Patient Pre-hospital Level of Mobility:  Ambulatory  Patient Pre-hospital Diet Restrictions:  Denies  Substance Use History:   Social History     Substance and Sexual Activity   Alcohol Use No     Social History     Tobacco Use   Smoking Status Never Smoker   Smokeless Tobacco Never Used     Social History     Substance and Sexual Activity   Drug Use No       Family History:    Family History   Problem Relation Age of Onset    Heart disease Mother     Hypertension Mother    Arde Needs Cervical cancer Mother     Stomach cancer Father     Diabetes Brother     Hyperlipidemia Brother     Asthma Son     Colon cancer Maternal Uncle     Lung cancer Maternal Uncle        Physical Exam:     Vitals:   Blood Pressure: 133/86 (03/10/22 1000)  Pulse: 92 (03/10/22 1000)  Temperature: 98 2 °F (36 8 °C) (03/10/22 0634)  Temp Source: Oral (03/10/22 5080)  Respirations: 18 (03/10/22 1000)  SpO2: 95 % (03/10/22 1000)    Physical Exam  Constitutional:       Appearance: Normal appearance  HENT:      Head: Normocephalic and atraumatic  Eyes:      Extraocular Movements: Extraocular movements intact  Pupils: Pupils are equal, round, and reactive to light  Cardiovascular:      Rate and Rhythm: Normal rate  Pulmonary:      Breath sounds: No wheezing  Abdominal:      General: There is distension  Tenderness: There is abdominal tenderness  Musculoskeletal:         General: Normal range of motion  Skin:     General: Skin is warm and dry  Neurological:      General: No focal deficit present  Mental Status: She is alert and oriented to person, place, and time  Psychiatric:         Mood and Affect: Mood normal          Behavior: Behavior normal              Additional Data:     Lab Results: I have personally reviewed pertinent reports        Results from last 7 days   Lab Units 03/10/22  0717   WBC Thousand/uL 6 56   HEMOGLOBIN g/dL 12 2   HEMATOCRIT % 34 3*   PLATELETS Thousands/uL 302   NEUTROS PCT % 76*   LYMPHS PCT % 16   MONOS PCT % 7   EOS PCT % 0     Results from last 7 days   Lab Units 03/10/22  1022 03/10/22  0717 03/10/22  0717   SODIUM mmol/L 131*   < > 130*   POTASSIUM mmol/L 3 8   < > 3 6   CHLORIDE mmol/L 102   < > 100   CO2 mmol/L 21   < > 9*   BUN mg/dL 6   < > 6   CREATININE mg/dL 0 52*   < > 0 64   ANION GAP mmol/L 8   < > 21*   CALCIUM mg/dL 8 3   < > 8 5   ALBUMIN g/dL  --   --  3 5   TOTAL BILIRUBIN mg/dL  --   --  0 64   ALK PHOS U/L  --   --  63   ALT U/L  --   --  67 AST U/L  --   --  24   GLUCOSE RANDOM mg/dL 149*   < > 168*    < > = values in this interval not displayed  Imaging: I have personally reviewed pertinent reports  CT abdomen pelvis with contrast   Final Result by Delvis Hudson MD (03/10 1150)      Findings consistent with acute pancreatitis  No focal intrapancreatic or peripancreatic fluid collection  No evidence for pancreatic necrosis  Small sliding type hiatal hernia, similar to prior  Hepatomegaly  3 7 x 2 6 x 2 1 cm septated right ovarian cyst  This can be further evaluated with nonemergent dedicated pelvic ultrasound as clinically warranted  The study was marked in [facility] for immediate notification  Workstation performed: [id number]             Allscripts / Epic Records Reviewed: No     ** Please Note: This note has been constructed using a voice recognition system   **

## 2022-03-10 NOTE — ED ATTENDING ATTESTATION
3/10/2022  I, Mela Castillo MD, saw and evaluated the patient  I have discussed the patient with the resident/non-physician practitioner and agree with the resident's/non-physician practitioner's findings, Plan of Care, and MDM as documented in the resident's/non-physician practitioner's note, except where noted  All available labs and Radiology studies were reviewed  I was present for key portions of any procedure(s) performed by the resident/non-physician practitioner and I was immediately available to provide assistance  At this point I agree with the current assessment done in the Emergency Department  I have conducted an independent evaluation of this patient a history and physical is as follows:    53-year-old woman presenting with 1 day of severe epigastric pain, constant, radiating to the left flank  Associated with nausea and vomiting  No fever  No chest pain or dyspnea  On exam patient A&O, in significant distress  Head AT/NC  Oropharynx clear  Neck supple  Heart RRR, no M/R/G  Lungs CTA/B  Abd soft/ND  There is epigastric tenderness with voluntary guarding  No extremity swelling or edema  On initial labs there was significant metabolic acidosis with serum bicarbonate of 9 with elevated AG  Further workup was started and IV fluids given  On repeat labs, pH normalized and serum bicarbonate normalized with gap closed  Admitted to medicine  ED Course  ED Course as of 03/10/22 1329   Thu Mar 10, 2022   1010 Serum bicarbonate 9 mmol/L  Has received one liter IV fluids  At this time will give additional 1 liter of IV fluids, check repeat BMP, VBG, coma panel, and reassess  Patient has had recurrent pain which improved with hydromorphone  Will continue to monitor  1117 Serum bicarbonate has normalized with IV fluids, anion gap closed  Will continue IV fluids and pain control           Critical Care Time  CriticalCare Time  Performed by: Mela Castillo MD  Authorized by: Robina Trinidad Donavan Avalos MD     Critical care provider statement:     Critical care time (minutes):  31    Critical care time was exclusive of:  Separately billable procedures and treating other patients and teaching time    Critical care was necessary to treat or prevent imminent or life-threatening deterioration of the following conditions:  Metabolic crisis    Critical care was time spent personally by me on the following activities:  Obtaining history from patient or surrogate, development of treatment plan with patient or surrogate, discussions with consultants, evaluation of patient's response to treatment, examination of patient, interpretation of cardiac output measurements, ordering and performing treatments and interventions, ordering and review of laboratory studies, ordering and review of radiographic studies and re-evaluation of patient's condition    I assumed direction of critical care for this patient from another provider in my specialty: no

## 2022-03-11 ENCOUNTER — APPOINTMENT (OUTPATIENT)
Dept: RADIOLOGY | Facility: HOSPITAL | Age: 42
DRG: 440 | End: 2022-03-11
Payer: COMMERCIAL

## 2022-03-11 ENCOUNTER — TELEPHONE (OUTPATIENT)
Dept: SURGICAL ONCOLOGY | Facility: CLINIC | Age: 42
End: 2022-03-11

## 2022-03-11 PROBLEM — K85.90 ACUTE PANCREATITIS: Status: ACTIVE | Noted: 2022-03-11

## 2022-03-11 LAB
ALBUMIN SERPL BCP-MCNC: 2.7 G/DL (ref 3.5–5)
ALP SERPL-CCNC: 46 U/L (ref 46–116)
ALT SERPL W P-5'-P-CCNC: 14 U/L (ref 12–78)
ANION GAP SERPL CALCULATED.3IONS-SCNC: 5 MMOL/L (ref 4–13)
ANISOCYTOSIS BLD QL SMEAR: PRESENT
AST SERPL W P-5'-P-CCNC: 12 U/L (ref 5–45)
BASOPHILS # BLD AUTO: 0.02 THOUSANDS/ΜL (ref 0–0.1)
BASOPHILS # BLD MANUAL: 0 THOUSAND/UL (ref 0–0.1)
BASOPHILS NFR BLD AUTO: 0 % (ref 0–1)
BASOPHILS NFR MAR MANUAL: 0 % (ref 0–1)
BILIRUB SERPL-MCNC: 0.69 MG/DL (ref 0.2–1)
BUN SERPL-MCNC: 5 MG/DL (ref 5–25)
CALCIUM ALBUM COR SERPL-MCNC: 9.7 MG/DL (ref 8.3–10.1)
CALCIUM SERPL-MCNC: 8.7 MG/DL (ref 8.3–10.1)
CHLORIDE SERPL-SCNC: 103 MMOL/L (ref 100–108)
CHOLEST SERPL-MCNC: 282 MG/DL
CO2 SERPL-SCNC: 27 MMOL/L (ref 21–32)
CREAT SERPL-MCNC: 0.52 MG/DL (ref 0.6–1.3)
EOSINOPHIL # BLD AUTO: 0 THOUSAND/ΜL (ref 0–0.61)
EOSINOPHIL # BLD MANUAL: 0 THOUSAND/UL (ref 0–0.4)
EOSINOPHIL NFR BLD AUTO: 0 % (ref 0–6)
EOSINOPHIL NFR BLD MANUAL: 0 % (ref 0–6)
ERYTHROCYTE [DISTWIDTH] IN BLOOD BY AUTOMATED COUNT: 13.9 % (ref 11.6–15.1)
ERYTHROCYTE [DISTWIDTH] IN BLOOD BY AUTOMATED COUNT: 14.5 % (ref 11.6–15.1)
EST. AVERAGE GLUCOSE BLD GHB EST-MCNC: 100 MG/DL
GFR SERPL CREATININE-BSD FRML MDRD: 120 ML/MIN/1.73SQ M
GLUCOSE SERPL-MCNC: 152 MG/DL (ref 65–140)
GLUCOSE SERPL-MCNC: 153 MG/DL (ref 65–140)
GLUCOSE SERPL-MCNC: 93 MG/DL (ref 65–140)
HBA1C MFR BLD: 5.1 %
HCT VFR BLD AUTO: 31.7 % (ref 34.8–46.1)
HCT VFR BLD AUTO: 33.1 % (ref 34.8–46.1)
HDLC SERPL-MCNC: 21 MG/DL
HGB BLD-MCNC: 10.7 G/DL (ref 11.5–15.4)
HGB BLD-MCNC: 11.2 G/DL (ref 11.5–15.4)
HYPERCHROMIA BLD QL SMEAR: PRESENT
IMM GRANULOCYTES # BLD AUTO: 0.02 THOUSAND/UL (ref 0–0.2)
IMM GRANULOCYTES NFR BLD AUTO: 0 % (ref 0–2)
LACTATE SERPL-SCNC: 1.3 MMOL/L (ref 0.5–2)
LYMPHOCYTES # BLD AUTO: 0.73 THOUSANDS/ΜL (ref 0.6–4.47)
LYMPHOCYTES # BLD AUTO: 1.31 THOUSAND/UL (ref 0.6–4.47)
LYMPHOCYTES # BLD AUTO: 20 % (ref 14–44)
LYMPHOCYTES NFR BLD AUTO: 11 % (ref 14–44)
MACROCYTES BLD QL AUTO: PRESENT
MCH RBC QN AUTO: 26.3 PG (ref 26.8–34.3)
MCH RBC QN AUTO: 26.4 PG (ref 26.8–34.3)
MCHC RBC AUTO-ENTMCNC: 33.8 G/DL (ref 31.4–37.4)
MCHC RBC AUTO-ENTMCNC: 33.8 G/DL (ref 31.4–37.4)
MCV RBC AUTO: 78 FL (ref 82–98)
MCV RBC AUTO: 78 FL (ref 82–98)
MONOCYTES # BLD AUTO: 0.26 THOUSAND/UL (ref 0–1.22)
MONOCYTES # BLD AUTO: 0.49 THOUSAND/ΜL (ref 0.17–1.22)
MONOCYTES NFR BLD AUTO: 7 % (ref 4–12)
MONOCYTES NFR BLD: 4 % (ref 4–12)
NEUTROPHILS # BLD AUTO: 5.36 THOUSANDS/ΜL (ref 1.85–7.62)
NEUTROPHILS # BLD MANUAL: 4.85 THOUSAND/UL (ref 1.85–7.62)
NEUTS BAND NFR BLD MANUAL: 2 % (ref 0–8)
NEUTS SEG NFR BLD AUTO: 72 % (ref 43–75)
NEUTS SEG NFR BLD AUTO: 82 % (ref 43–75)
NONHDLC SERPL-MCNC: 261 MG/DL
NRBC BLD AUTO-RTO: 0 /100 WBCS
PLATELET # BLD AUTO: 247 THOUSANDS/UL (ref 149–390)
PLATELET # BLD AUTO: 252 THOUSANDS/UL (ref 149–390)
PLATELET BLD QL SMEAR: ADEQUATE
PMV BLD AUTO: 10.7 FL (ref 8.9–12.7)
PMV BLD AUTO: 11.4 FL (ref 8.9–12.7)
POTASSIUM SERPL-SCNC: 3.3 MMOL/L (ref 3.5–5.3)
PROT SERPL-MCNC: 6.7 G/DL (ref 6.4–8.2)
RBC # BLD AUTO: 4.06 MILLION/UL (ref 3.81–5.12)
RBC # BLD AUTO: 4.26 MILLION/UL (ref 3.81–5.12)
RBC MORPH BLD: PRESENT
SODIUM SERPL-SCNC: 135 MMOL/L (ref 136–145)
TRIGL SERPL-MCNC: 1455 MG/DL
TRIGL SERPL-MCNC: 735 MG/DL
VARIANT LYMPHS # BLD AUTO: 2 %
WBC # BLD AUTO: 6.56 THOUSAND/UL (ref 4.31–10.16)
WBC # BLD AUTO: 6.62 THOUSAND/UL (ref 4.31–10.16)

## 2022-03-11 PROCEDURE — 83735 ASSAY OF MAGNESIUM: CPT | Performed by: PHYSICIAN ASSISTANT

## 2022-03-11 PROCEDURE — 87040 BLOOD CULTURE FOR BACTERIA: CPT | Performed by: PHYSICIAN ASSISTANT

## 2022-03-11 PROCEDURE — 99233 SBSQ HOSP IP/OBS HIGH 50: CPT | Performed by: INTERNAL MEDICINE

## 2022-03-11 PROCEDURE — 99222 1ST HOSP IP/OBS MODERATE 55: CPT | Performed by: INTERNAL MEDICINE

## 2022-03-11 PROCEDURE — 85025 COMPLETE CBC W/AUTO DIFF WBC: CPT | Performed by: PHYSICIAN ASSISTANT

## 2022-03-11 PROCEDURE — 84478 ASSAY OF TRIGLYCERIDES: CPT | Performed by: STUDENT IN AN ORGANIZED HEALTH CARE EDUCATION/TRAINING PROGRAM

## 2022-03-11 PROCEDURE — 82948 REAGENT STRIP/BLOOD GLUCOSE: CPT

## 2022-03-11 PROCEDURE — 76705 ECHO EXAM OF ABDOMEN: CPT

## 2022-03-11 PROCEDURE — 74177 CT ABD & PELVIS W/CONTRAST: CPT

## 2022-03-11 PROCEDURE — 83036 HEMOGLOBIN GLYCOSYLATED A1C: CPT | Performed by: STUDENT IN AN ORGANIZED HEALTH CARE EDUCATION/TRAINING PROGRAM

## 2022-03-11 PROCEDURE — G1004 CDSM NDSC: HCPCS

## 2022-03-11 PROCEDURE — 83605 ASSAY OF LACTIC ACID: CPT | Performed by: PHYSICIAN ASSISTANT

## 2022-03-11 PROCEDURE — 80053 COMPREHEN METABOLIC PANEL: CPT | Performed by: PHYSICIAN ASSISTANT

## 2022-03-11 PROCEDURE — 85027 COMPLETE CBC AUTOMATED: CPT | Performed by: INTERNAL MEDICINE

## 2022-03-11 PROCEDURE — 85007 BL SMEAR W/DIFF WBC COUNT: CPT | Performed by: INTERNAL MEDICINE

## 2022-03-11 PROCEDURE — 80061 LIPID PANEL: CPT | Performed by: INTERNAL MEDICINE

## 2022-03-11 RX ORDER — HYDROMORPHONE HCL/PF 1 MG/ML
0.5 SYRINGE (ML) INJECTION EVERY 4 HOURS PRN
Status: DISCONTINUED | OUTPATIENT
Start: 2022-03-11 | End: 2022-03-11

## 2022-03-11 RX ORDER — FENOFIBRATE 145 MG/1
145 TABLET, COATED ORAL DAILY
Status: DISCONTINUED | OUTPATIENT
Start: 2022-03-12 | End: 2022-03-18 | Stop reason: HOSPADM

## 2022-03-11 RX ORDER — POTASSIUM CHLORIDE 20 MEQ/1
40 TABLET, EXTENDED RELEASE ORAL ONCE
Status: COMPLETED | OUTPATIENT
Start: 2022-03-11 | End: 2022-03-11

## 2022-03-11 RX ORDER — HYDROMORPHONE HCL/PF 1 MG/ML
1 SYRINGE (ML) INJECTION ONCE
Status: COMPLETED | OUTPATIENT
Start: 2022-03-11 | End: 2022-03-11

## 2022-03-11 RX ORDER — POTASSIUM CHLORIDE 14.9 MG/ML
20 INJECTION INTRAVENOUS 2 TIMES DAILY
Status: DISCONTINUED | OUTPATIENT
Start: 2022-03-11 | End: 2022-03-12

## 2022-03-11 RX ORDER — DEXTROSE, SODIUM CHLORIDE, SODIUM LACTATE, POTASSIUM CHLORIDE, AND CALCIUM CHLORIDE 5; .6; .31; .03; .02 G/100ML; G/100ML; G/100ML; G/100ML; G/100ML
200 INJECTION, SOLUTION INTRAVENOUS CONTINUOUS
Status: DISCONTINUED | OUTPATIENT
Start: 2022-03-11 | End: 2022-03-13

## 2022-03-11 RX ORDER — SODIUM CHLORIDE, SODIUM LACTATE, POTASSIUM CHLORIDE, CALCIUM CHLORIDE 600; 310; 30; 20 MG/100ML; MG/100ML; MG/100ML; MG/100ML
250 INJECTION, SOLUTION INTRAVENOUS CONTINUOUS
Status: DISCONTINUED | OUTPATIENT
Start: 2022-03-11 | End: 2022-03-11

## 2022-03-11 RX ORDER — HYDROMORPHONE HCL/PF 1 MG/ML
0.5 SYRINGE (ML) INJECTION ONCE
Status: DISCONTINUED | OUTPATIENT
Start: 2022-03-11 | End: 2022-03-11

## 2022-03-11 RX ADMIN — HYDROMORPHONE HYDROCHLORIDE 1 MG: 1 INJECTION, SOLUTION INTRAMUSCULAR; INTRAVENOUS; SUBCUTANEOUS at 11:39

## 2022-03-11 RX ADMIN — ACETAMINOPHEN 325MG 650 MG: 325 TABLET ORAL at 15:15

## 2022-03-11 RX ADMIN — ATORVASTATIN CALCIUM 40 MG: 40 TABLET, FILM COATED ORAL at 15:56

## 2022-03-11 RX ADMIN — SODIUM CHLORIDE 75 ML/HR: 9 INJECTION, SOLUTION INTRAVENOUS at 00:44

## 2022-03-11 RX ADMIN — IOHEXOL 80 ML: 350 INJECTION, SOLUTION INTRAVENOUS at 16:56

## 2022-03-11 RX ADMIN — SODIUM CHLORIDE, SODIUM LACTATE, POTASSIUM CHLORIDE, AND CALCIUM CHLORIDE 250 ML/HR: .6; .31; .03; .02 INJECTION, SOLUTION INTRAVENOUS at 09:23

## 2022-03-11 RX ADMIN — HYDROMORPHONE HYDROCHLORIDE 0.5 MG: 1 INJECTION, SOLUTION INTRAMUSCULAR; INTRAVENOUS; SUBCUTANEOUS at 05:12

## 2022-03-11 RX ADMIN — POTASSIUM CHLORIDE 40 MEQ: 1500 TABLET, EXTENDED RELEASE ORAL at 14:07

## 2022-03-11 RX ADMIN — HYDROMORPHONE HYDROCHLORIDE: 10 INJECTION, SOLUTION INTRAMUSCULAR; INTRAVENOUS; SUBCUTANEOUS at 15:15

## 2022-03-11 RX ADMIN — MONTELUKAST 10 MG: 10 TABLET, FILM COATED ORAL at 21:49

## 2022-03-11 RX ADMIN — ENOXAPARIN SODIUM 40 MG: 40 INJECTION SUBCUTANEOUS at 09:21

## 2022-03-11 RX ADMIN — DEXTROSE, SODIUM CHLORIDE, SODIUM LACTATE, POTASSIUM CHLORIDE, AND CALCIUM CHLORIDE 200 ML/HR: 5; .6; .31; .03; .02 INJECTION, SOLUTION INTRAVENOUS at 14:07

## 2022-03-11 RX ADMIN — HYDROMORPHONE HYDROCHLORIDE 0.5 MG: 1 INJECTION, SOLUTION INTRAMUSCULAR; INTRAVENOUS; SUBCUTANEOUS at 14:10

## 2022-03-11 RX ADMIN — ACETAMINOPHEN 325MG 650 MG: 325 TABLET ORAL at 20:30

## 2022-03-11 RX ADMIN — HYDROMORPHONE HYDROCHLORIDE 0.5 MG: 1 INJECTION, SOLUTION INTRAMUSCULAR; INTRAVENOUS; SUBCUTANEOUS at 00:43

## 2022-03-11 RX ADMIN — HYDROMORPHONE HYDROCHLORIDE 0.5 MG: 1 INJECTION, SOLUTION INTRAMUSCULAR; INTRAVENOUS; SUBCUTANEOUS at 09:21

## 2022-03-11 RX ADMIN — DEXTROSE, SODIUM CHLORIDE, SODIUM LACTATE, POTASSIUM CHLORIDE, AND CALCIUM CHLORIDE 200 ML/HR: 5; .6; .31; .03; .02 INJECTION, SOLUTION INTRAVENOUS at 20:15

## 2022-03-11 NOTE — CONSULTS
Consultation - Oumar Montaño 46 y o  female MRN: [id number]    Unit/Bed#: [facility] 915-01 Encounter: [id number]      Assessment/Plan     Acute Pancreatitis    This is a 74-year-old female with a medical history significant for, but not limited to dyslipidemia, presenting with acute pancreatitis; this is likely secondary to elevated triglycerides  Lipid panel shows significant dyslipidemia  She was prescribed rousvastatin, fenofibrate and vascepa as outpatient, but states that she has only been taking vascepa  There is no reported evidence of hepatobiliary disease on imaging, but noted for hepatomegaly  AST and ALT within normal limit  Patient does have significant central adiposity, but does not appear to have diabetes, as current A1c and most recent fasting glucose have been normal   She does, however, have relatively thin extremities, and mild proximal weakness on physical examination, prompting consideration for workup for a Cushing Syndrome but her current pain/discomfort should also be taken into consideration  She does not have hirsutism or other observed signs of hypercortisolism  Continue to keep patient NPO, and advance diet as tolerated,  under the direction of primary team and gastroenterology,  continue dextrose-containing IV fluids, and monitor for improvements in triglycerides with the aforementioned interventions Reduced dietary lipid intake, in addition to insulin response from dextrose-containing IV fluids, should provide some benefit in reducing serum triglycerides  Will defer from initiating insulin gtt, as patients poc glucose is low-normal  Provide fenofibrate  Will defer work-up for Cushing Syndrome as outpatient, given acuity of her condition, and subsequent lower positive predictive value in screening evaluations at this time        Consults    CC:     History of Present Illness     HPI: Oumar Montaño is a 46y o  year old female admitted withepigastric pain/dyspepsia who presents with severe abdominal pain  Patient was found to have elevated lipase and triglycerides on admission  Triglyceride level was 1455  Calcium was 8 5  Imaging was performed in consistent with acute pancreatitis  Endocrinology services has been consulted for management of hypertriglyceridemia in the setting of acute pancreatitis  Review of Systems   Constitutional: Negative for chills and fever  HENT: Negative for ear pain and sore throat  Eyes: Negative for pain and visual disturbance  Respiratory: Negative for cough and shortness of breath  Cardiovascular: Negative for chest pain and palpitations  Gastrointestinal: Positive for abdominal pain and nausea  Negative for constipation, diarrhea and vomiting  Genitourinary: Negative for dysuria and hematuria  Musculoskeletal: Negative for arthralgias and back pain  Skin: Negative for color change and rash  Neurological: Negative for seizures and syncope  All other systems reviewed and are negative  Historical Information   Past Medical History:   Diagnosis Date    COVID-19 2020    Esophagitis     H  pylori infection 2018    Hepatic steatosis     Hiatal hernia     Hiatal hernia     High triglycerides     Hyperlipidemia     Hypertension     Ventral hernia      Past Surgical History:   Procedure Laterality Date     SECTION      3X--2003,, 2008    COLONOSCOPY N/A 2018    Procedure: COLONOSCOPY;  Surgeon: Oz Alba MD;  Location: [facility] GI LAB; Service: Gastroenterology    EGD  2021    ESOPHAGOGASTRODUODENOSCOPY N/A 2018    Procedure: ESOPHAGOGASTRODUODENOSCOPY (EGD); Surgeon: Oz Alba MD;  Location: [facility] GI LAB;   Service: Gastroenterology    HERNIA REPAIR      [place] REPAIR INCISIONAL HERNIA,FLAVIO N/A 2019    Procedure: REPAIR HERNIA VENTRAL;  Surgeon: Cindy Vila DO;  Location: [address] [place] [address];  Service: General    TUBAL LIGATION      UPPER GASTROINTESTINAL ENDOSCOPY       Social History Social History     Substance and Sexual Activity   Alcohol Use No     Social History     Substance and Sexual Activity   Drug Use No     Social History     Tobacco Use   Smoking Status Never Smoker   Smokeless Tobacco Never Used     Family History:   Family History   Problem Relation Age of Onset    Heart disease Mother     Hypertension Mother     Cervical cancer Mother     Stomach cancer Father     Diabetes Brother     Hyperlipidemia Brother     Asthma Son     Colon cancer Maternal Uncle     Lung cancer Maternal Uncle        Meds/Allergies   Current Facility-Administered Medications   Medication Dose Route Frequency Provider Last Rate Last Admin    acetaminophen (TYLENOL) tablet 650 mg  650 mg Oral Q6H PRN Hetul Llanes, DO   650 mg at 03/11/22 1515    atorvastatin (LIPITOR) tablet 40 mg  40 mg Oral Daily With [employer], DO   40 mg at 03/11/22 1556    budesonide-formoterol (SYMBICORT) 80-4 5 MCG/ACT inhaler 2 puff  2 puff Inhalation BID Hetul Llanes, DO   2 puff at 03/10/22 2053    dextrose 5 % in lactated Ringer's infusion  200 mL/hr Intravenous Continuous Rajwinder Shupp,  mL/hr at 03/11/22 1407 200 mL/hr at 03/11/22 1407    [START ON 3/12/2022] fenofibrate (TRICOR) tablet 145 mg  145 mg Oral Daily Robin Escobar MD        HYDROmorphone (DILAUDID) 1 mg/mL 50 mL PCA   Intravenous Continuous Rajendra ALOK Treviño   New Bag at 03/11/22 1515    HYDROmorphone (DILAUDID) injection 0 5 mg  0 5 mg Intravenous Once Rajendra ALOK Treviño        insulin regular (HumuLIN R,NovoLIN R) 1 Units/mL in sodium chloride 0 9 % 100 mL infusion  0 3-21 Units/hr Intravenous Titrated Rajwinder Shupp, DO   Held at 03/11/22 1148    montelukast (SINGULAIR) tablet 10 mg  10 mg Oral HS Hetul Llanes, DO   10 mg at 03/10/22 2150    ondansetron (ZOFRAN) injection 4 mg  4 mg Intravenous Q6H PRN Hetul Llanes, DO         No Known Allergies    Objective   Vitals: Blood pressure 136/84, pulse (!) 133, temperature (!) 101 °F (38 3 °C), temperature source Oral, resp  rate (!) 26, SpO2 93 %, not currently breastfeeding  Intake/Output Summary (Last 24 hours) at 3/11/2022 1700  Last data filed at 3/11/2022 1407  Gross per 24 hour   Intake 2451 25 ml   Output --   Net 2451 25 ml     Invasive Devices  Report    Peripheral Intravenous Line            Peripheral IV 03/10/22 Dorsal (posterior); Left Forearm 1 day    Peripheral IV 03/10/22 Right Forearm 1 day                Physical Exam  Vitals reviewed  Constitutional:       Appearance: Normal appearance  She is obese  HENT:      Head: Normocephalic and atraumatic  Mouth/Throat:      Mouth: Mucous membranes are moist    Eyes:      General: No scleral icterus  Right eye: No discharge  Left eye: No discharge  Conjunctiva/sclera: Conjunctivae normal    Cardiovascular:      Rate and Rhythm: Normal rate and regular rhythm  Heart sounds: S1 normal and S2 normal    Pulmonary:      Effort: Pulmonary effort is normal       Breath sounds: Normal breath sounds  Abdominal:      General: Abdomen is protuberant  Bowel sounds are decreased  Palpations: Abdomen is soft  Tenderness: There is abdominal tenderness  There is no guarding or rebound  Musculoskeletal:         General: No swelling or tenderness  Cervical back: Neck supple  Skin:     General: Skin is warm and dry  Neurological:      General: No focal deficit present  Mental Status: She is alert  Psychiatric:         Mood and Affect: Mood normal          Behavior: Behavior normal          The history was obtained from the review of the chart, patient and family      Lab Results:   Results from last 7 days   Lab Units 03/11/22  0456   HEMOGLOBIN A1C % 5 1     Lab Results   Component Value Date    WBC 6 56 03/11/2022    HGB 10 7 (L) 03/11/2022    HCT 31 7 (L) 03/11/2022    MCV 78 (L) 03/11/2022     03/11/2022     Lab Results   Component Value Date/Time    BUN 6 03/10/2022 10:22 AM    BUN 9 04/23/2018 06:44 PM     04/23/2018 06:44 PM    K 3 8 03/10/2022 10:22 AM    K 4 3 04/23/2018 06:44 PM     03/10/2022 10:22 AM     04/23/2018 06:44 PM    CO2 21 03/10/2022 10:22 AM    CO2 24 04/23/2018 06:44 PM    CREATININE 0 52 (L) 03/10/2022 10:22 AM    AST 24 03/10/2022 07:17 AM    AST 27 04/23/2018 06:44 PM    ALT 67 03/10/2022 07:17 AM    ALT 32 04/23/2018 06:44 PM    ALB 3 5 03/10/2022 07:17 AM    ALB 4 7 04/23/2018 06:44 PM     Recent Labs     03/11/22  0456   HDL 21*   TRIG 1,455*     No results found for: Wyatt Monsivais  POC Glucose (mg/dl)   Date Value   03/11/2022 93       Imaging Studies: I have personally reviewed pertinent reports  Portions of the record may have been created with voice recognition software

## 2022-03-11 NOTE — PLAN OF CARE
Problem: PAIN - ADULT  Goal: Verbalizes/displays adequate comfort level or baseline comfort level  Description: Interventions:  - Encourage patient to monitor pain and request assistance  - Assess pain using appropriate pain scale  - Administer analgesics based on type and severity of pain and evaluate response  - Implement non-pharmacological measures as appropriate and evaluate response  - Consider cultural and social influences on pain and pain management  - Notify physician/advanced practitioner if interventions unsuccessful or patient reports new pain  Outcome: Progressing     Problem: GASTROINTESTINAL - ADULT  Goal: Minimal or absence of nausea and/or vomiting  Description: INTERVENTIONS:  - Administer IV fluids if ordered to ensure adequate hydration  - Maintain NPO status until nausea and vomiting are resolved  - Nasogastric tube if ordered  - Administer ordered antiemetic medications as needed  - Provide nonpharmacologic comfort measures as appropriate  - Advance diet as tolerated, if ordered  - Consider nutrition services referral to assist patient with adequate nutrition and appropriate food choices  Outcome: Progressing  Goal: Maintains or returns to baseline bowel function  Description: INTERVENTIONS:  - Assess bowel function  - Encourage oral fluids to ensure adequate hydration  - Administer IV fluids if ordered to ensure adequate hydration  - Administer ordered medications as needed  - Encourage mobilization and activity  - Consider nutritional services referral to assist patient with adequate nutrition and appropriate food choices  Outcome: Not Progressing  Goal: Maintains adequate nutritional intake  Description: INTERVENTIONS:  - Monitor percentage of each meal consumed  - Identify factors contributing to decreased intake, treat as appropriate  - Assist with meals as needed  - Monitor I&O, weight, and lab values if indicated  - Obtain nutrition services referral as needed  Outcome: Not Progressing  Goal: Establish and maintain optimal ostomy function  Description: INTERVENTIONS:  - Assess bowel function  - Encourage oral fluids to ensure adequate hydration  - Administer IV fluids if ordered to ensure adequate hydration   - Administer ordered medications as needed  - Encourage mobilization and activity  - Nutrition services referral to assist patient with appropriate food choices  - Assess stoma site  - Consider wound care consult   Outcome: Completed  Goal: Oral mucous membranes remain intact  Description: INTERVENTIONS  - Assess oral mucosa and hygiene practices  - Implement preventative oral hygiene regimen  - Implement oral medicated treatments as ordered  - Initiate Nutrition services referral as needed  Outcome: Progressing     Problem: Potential for Falls  Goal: Patient will remain free of falls  Description: INTERVENTIONS:  - Educate patient/family on patient safety including physical limitations  - Instruct patient to call for assistance with activity   - Consult OT/PT to assist with strengthening/mobility   - Keep Call bell within reach  - Keep bed low and locked with side rails adjusted as appropriate  - Keep care items and personal belongings within reach  - Initiate and maintain comfort rounds  - Make Fall Risk Sign visible to staff  - Apply yellow socks and bracelet for high fall risk patients  - Consider moving patient to room near nurses station  Outcome: Completed

## 2022-03-11 NOTE — PLAN OF CARE
Problem: PAIN - ADULT  Goal: Verbalizes/displays adequate comfort level or baseline comfort level  Description: Interventions:  - Encourage patient to monitor pain and request assistance  - Assess pain using appropriate pain scale  - Administer analgesics based on type and severity of pain and evaluate response  - Implement non-pharmacological measures as appropriate and evaluate response  - Consider cultural and social influences on pain and pain management  - Notify physician/advanced practitioner if interventions unsuccessful or patient reports new pain  Outcome: Progressing     Problem: GASTROINTESTINAL - ADULT  Goal: Minimal or absence of nausea and/or vomiting  Description: INTERVENTIONS:  - Administer IV fluids if ordered to ensure adequate hydration  - Maintain NPO status until nausea and vomiting are resolved  - Nasogastric tube if ordered  - Administer ordered antiemetic medications as needed  - Provide nonpharmacologic comfort measures as appropriate  - Advance diet as tolerated, if ordered  - Consider nutrition services referral to assist patient with adequate nutrition and appropriate food choices  Outcome: Progressing     Problem: Potential for Falls  Goal: Patient will remain free of falls  Description: INTERVENTIONS:  - Educate patient/family on patient safety including physical limitations  - Instruct patient to call for assistance with activity   - Consult OT/PT to assist with strengthening/mobility   - Keep Call bell within reach  - Keep bed low and locked with side rails adjusted as appropriate  - Keep care items and personal belongings within reach  - Initiate and maintain comfort rounds  - Make Fall Risk Sign visible to staff  - Offer Toileting every 2 Hours, in advance of need  - Initiate/Maintain bed alarm  - Obtain necessary fall risk management equipment  - Apply yellow socks and bracelet for high fall risk patients  - Consider moving patient to room near nurses station  Outcome: Progressing

## 2022-03-11 NOTE — CONSULTS
Consultation - [address] [facility]  Richard Red 46 y o  female MRN: [id number]  Unit/Bed#: [facility] 915-01 Encounter: [id number]        Inpatient consult to gastroenterology  Consult performed by: Breana Hay DO  Consult ordered by: Betina Starkey MD          Reason for Consult / Principal Problem:  Pancreatitis      ASSESSMENT AND PLAN:      71-year-old female presenting with epigastric pain and associated nausea/vomiting with elevated lipase and triglycerides along with imaging findings consistent with triglyceride induced pancreatitis  # Triglyceride Induced Pancreatitis  # Hyperlipidemia    Triglyceride Induced Pancreatitis:  Patient presenting classic epigastric pain with radiation to the back with associated nausea and vomiting  Lipase elevated to 1180 on arrival and with abdominal CT imaging demonstrating acute pancreatitis  Etiology likely triglyceride induced given triglyceride level of 1455 on arrival   Calcium level 8 5 and no evidence of end-organ damage  Due to absence of worrisome features, no indication for plasmapheresis at this time  However, patient remains in significant amount of pain  Pain primarily in RUQ and LFTs WNL but will order RUQ US to rule out gallstones and potential  Additionally, will initiate insulin drip to acutely reduce triglyceride levels to less than 500  · Aggressive IVF with LR @ 250 cc/hr   · Begin Insulin gtt;  monitor BG begin on D5W pending readings  · Trend triglycerides levels q12 hours until <500 then transition off insulin gtt   · Would benefit from endocrine consultation   · RUQ US ordered to follow up   · Hemoglobin A1c ordered; follow-up on results  · NPO; advance diet as patient tolerates  · Pain and symptom management per primary team      Hyperlipidemia: Lipid panel completed on admission - cholesterol 282 and TG 1455  Now resulting in admission for TG induced pancreatitis     · Continue Lipitor 40 mg daily  · Would benefit from fenofibrate or omega-3 given triglyceride levels   · Management per primary team  ______________________________________________________________________    HPI:  81-year-old female with past medical history of hypertension, hyperlipidemia, and ovarian cyst who initially presented to [facility] ED on 3/10/2022 with a complaint of epigastric pain  Gastroenterology consulted for pancreatitis  Patient states that since Wednesday she has been experiencing worsening abdominal pain  Pain initially began in her epigastric area but now radiates across her upper abdomen in a bandlike fashion  Pain also radiates to the back and has been associated with nausea and vomiting with inability to tolerate oral intake  Patient notes no prior history of gallstones with negative right upper quadrant ultrasound completed 2020  Also denies past history of pancreatitis  No diagnosis of diabetes but does have known hyperlipidemia  Furthermore, patient denies any recent alcohol/drug use or changes to her medications  At time of initial ED arrival, patient was hemodynamically stable and afebrile  Initial labs significant for lipase of 1189 and triglyceride of 1455  Electrolytes within limits with baseline creatinine of 0 52 and BUN 6  CT abdomen and pelvis was completed which demonstrated, "Diffuse enlargement of pancreatic head proximal body was extensive associated inflammatory changes consistent with acute pancreatitis  No focal intra pancreatic or peripancreatic fluid collection  No evidence for pancreatic necrosis "       REVIEW OF SYSTEMS:    CONSTITUTIONAL: Denies any fever, chills, rigors, and weight loss  HEENT: No earache or tinnitus  Denies hearing loss or visual disturbances  CARDIOVASCULAR: No chest pain or palpitations  RESPIRATORY: Denies any cough, hemoptysis, shortness of breath or dyspnea on exertion  GASTROINTESTINAL: As noted in the History of Present Illness  GENITOURINARY: No problems with urination  Denies any hematuria or dysuria  NEUROLOGIC: No dizziness or vertigo, denies headaches  MUSCULOSKELETAL: Denies any muscle or joint pain  SKIN: Denies skin rashes or itching  ENDOCRINE: Denies excessive thirst  Denies intolerance to heat or cold  PSYCHOSOCIAL: Denies depression or anxiety  Denies any recent memory loss  Historical Information   Past Medical History:   Diagnosis Date    COVID-19 2020    Esophagitis     H  pylori infection 2018    Hepatic steatosis     Hiatal hernia     Hiatal hernia     High triglycerides     Hyperlipidemia     Hypertension     Ventral hernia      Past Surgical History:   Procedure Laterality Date     SECTION      3X--,,     COLONOSCOPY N/A 2018    Procedure: COLONOSCOPY;  Surgeon: Janay Garcia MD;  Location: [facility]; Service: Gastroenterology    EGD  2021    ESOPHAGOGASTRODUODENOSCOPY N/A 2018    Procedure: ESOPHAGOGASTRODUODENOSCOPY (EGD); Surgeon: Janay Garcia MD;  Location: [facility];   Service: Gastroenterology    HERNIA REPAIR      UT REPAIR INCISIONAL HERNIA,FLAVIO N/A 2019    Procedure: REPAIR HERNIA VENTRAL;  Surgeon: Taylor Sargent DO;  Location: [address] [place] [address];  Service: General    TUBAL LIGATION      UPPER GASTROINTESTINAL ENDOSCOPY       Social History   Social History     Substance and Sexual Activity   Alcohol Use No     Social History     Substance and Sexual Activity   Drug Use No     Social History     Tobacco Use   Smoking Status Never Smoker   Smokeless Tobacco Never Used     Family History   Problem Relation Age of Onset    Heart disease Mother     Hypertension Mother     Cervical cancer Mother     Stomach cancer Father     Diabetes Brother     Hyperlipidemia Brother     Asthma Son     Colon cancer Maternal Uncle     Lung cancer Maternal Uncle        Meds/Allergies     Medications Prior to Admission   Medication    budesonide-formoterol (SYMBICORT) 80-4 5 MCG/ACT inhaler    clobetasol (TEMOVATE) 0 05 % ointment    ergocalciferol (ERGOCALCIFEROL) 1 25 MG (39871 UT) capsule    fenofibrate micronized (LOFIBRA) 200 MG capsule    Icosapent Ethyl 1 g CAPS    ketoconazole (NIZORAL) 2 % shampoo    miconazole (MONISTAT-7) 2 % vaginal cream    montelukast (SINGULAIR) 10 mg tablet    pantoprazole (PROTONIX) 40 mg tablet    rosuvastatin (CRESTOR) 20 MG tablet    [] nitrofurantoin (MACROBID) 100 mg capsule    norgestimate-ethinyl estradiol (Sprintec 28) 0 25-35 MG-MCG per tablet     Current Facility-Administered Medications   Medication Dose Route Frequency    acetaminophen (TYLENOL) tablet 650 mg  650 mg Oral Q6H PRN    atorvastatin (LIPITOR) tablet 40 mg  40 mg Oral Daily With Dinner    budesonide-formoterol (SYMBICORT) 80-4 5 MCG/ACT inhaler 2 puff  2 puff Inhalation BID    enoxaparin (LOVENOX) subcutaneous injection 40 mg  40 mg Subcutaneous Daily    HYDROmorphone (DILAUDID) injection 0 5 mg  0 5 mg Intravenous Q4H PRN    montelukast (SINGULAIR) tablet 10 mg  10 mg Oral HS    ondansetron (ZOFRAN) injection 4 mg  4 mg Intravenous Q6H PRN    sodium chloride 0 9 % infusion  75 mL/hr Intravenous Continuous       No Known Allergies        Objective     Blood pressure 105/61, pulse (!) 107, temperature 99 7 °F (37 6 °C), resp  rate (!) 24, SpO2 95 %, not currently breastfeeding  There is no height or weight on file to calculate BMI  Intake/Output Summary (Last 24 hours) at 3/11/2022 0828  Last data filed at 3/11/2022 0600  Gross per 24 hour   Intake 3198 75 ml   Output --   Net 3198 75 ml         PHYSICAL EXAM:      General Appearance:   Alert, cooperative, no distress   HEENT:   Normocephalic, atraumatic, anicteric  Dry mucous membranes    Neck:  Supple, symmetrical, trachea midline   Lungs:   Clear to auscultation bilaterally; no rales, rhonchi or wheezing; respirations unlabored    Heart[de-identified]   Regular rate and rhythm; no murmur, rub, or gallop     Abdomen:   Soft, nondistended abdomen with tenderness to palpation of the epigastric and RUQ area  No rebound or guarding  Normoactive bowel sounds  Genitalia:   Deferred    Rectal:   Deferred    Extremities:  No cyanosis, clubbing or edema    Pulses:  2+ and symmetric all extremities    Skin:  No jaundice, rashes, or lesions   Decreased skin turgor    Lymph nodes:  No palpable cervical lymphadenopathy        Lab Results:   Admission on 03/10/2022   Component Date Value    WBC 03/10/2022 6 56     RBC 03/10/2022 4 46     Hemoglobin 03/10/2022 12 2     Hematocrit 03/10/2022 34 3*    MCV 03/10/2022 77*    MCH 03/10/2022 27 4     MCHC 03/10/2022 35 6     RDW 03/10/2022 13 4     MPV 03/10/2022 10 9     Platelets 19/72/7986 302     nRBC 03/10/2022 0     Neutrophils Relative 03/10/2022 76*    Immat GRANS % 03/10/2022 0     Lymphocytes Relative 03/10/2022 16     Monocytes Relative 03/10/2022 7     Eosinophils Relative 03/10/2022 0     Basophils Relative 03/10/2022 1     Neutrophils Absolute 03/10/2022 4 99     Immature Grans Absolute 03/10/2022 0 02     Lymphocytes Absolute 03/10/2022 1 03     Monocytes Absolute 03/10/2022 0 48     Eosinophils Absolute 03/10/2022 0 01     Basophils Absolute 03/10/2022 0 03     Sodium 03/10/2022 130*    Potassium 03/10/2022 3 6     Chloride 03/10/2022 100     CO2 03/10/2022 9*    ANION GAP 03/10/2022 21*    BUN 03/10/2022 6     Creatinine 03/10/2022 0 64     Glucose 03/10/2022 168*    Calcium 03/10/2022 8 5     AST 03/10/2022 24     ALT 03/10/2022 67     Alkaline Phosphatase 03/10/2022 63     Total Protein 03/10/2022 8 0     Albumin 03/10/2022 3 5     Total Bilirubin 03/10/2022 0 64     eGFR 03/10/2022 112     Color, UA 03/10/2022 Yellow     Clarity, UA 03/10/2022 Turbid     Specific Gravity, UA 03/10/2022 1 018     pH, UA 03/10/2022 6 0     Leukocytes, UA 03/10/2022 Negative     Nitrite, UA 03/10/2022 Negative     Protein, UA 03/10/2022 50 (1+)*    Glucose, UA 03/10/2022 Negative     Ketones, UA 03/10/2022 Negative     Urobilinogen, UA 03/10/2022 <2 0     Bilirubin, UA 03/10/2022 Negative     Blood, UA 03/10/2022 Negative     hs TnI 0hr 03/10/2022 3     Lipase 03/10/2022 1,189*    EXT PREG TEST UR (Ref: N* 03/10/2022 negative     Control 03/10/2022 valid     RBC, UA 03/10/2022 2-4*    WBC, UA 03/10/2022 2-4*    Epithelial Cells 03/10/2022 Moderate*    Bacteria, UA 03/10/2022 None Seen     MUCUS THREADS 03/10/2022 Moderate*    Ventricular Rate 03/10/2022 107     Atrial Rate 03/10/2022 107     MS Interval 03/10/2022 142     QRSD Interval 03/10/2022 72     QT Interval 03/10/2022 316     QTC Interval 03/10/2022 421     P Axis 03/10/2022 56     QRS Axis 03/10/2022 35     T Wave Axis 03/10/2022 51     Ventricular Rate 03/10/2022 100     Atrial Rate 03/10/2022 100     MS Interval 03/10/2022 142     QRSD Interval 03/10/2022 76     QT Interval 03/10/2022 328     QTC Interval 03/10/2022 423     P Axis 03/10/2022 55     QRS Axis 03/10/2022 45     T Wave Axis 03/10/2022 52     pH, Saul 03/10/2022 7 401*    pCO2, Saul 03/10/2022 32 8*    pO2, Saul 03/10/2022 108 1*    HCO3, Saul 03/10/2022 19 9*    Base Excess, Saul 03/10/2022 -4 0     O2 Content, Saul 03/10/2022 17 3     O2 HGB, VENOUS 03/10/2022 96 1*    Sodium 03/10/2022 131*    Potassium 03/10/2022 3 8     Chloride 03/10/2022 102     CO2 03/10/2022 21     ANION GAP 03/10/2022 8     BUN 03/10/2022 6     Creatinine 03/10/2022 0 52*    Glucose 03/10/2022 149*    Calcium 03/10/2022 8 3     eGFR 03/10/2022 120     Ethanol Lvl 39/08/5507 <3     Salicylate Lvl 06/67/6020 <3*    Acetaminophen Level 03/10/2022 <2*    BETA-HYDROXYBUTYRATE 03/10/2022 0 3     Cholesterol 03/11/2022 282*    Triglycerides 03/11/2022 1,455*    HDL, Direct 03/11/2022 21*    LDL Calculated 03/11/2022      Non-HDL-Chol (CHOL-HDL) 03/11/2022 261     WBC 03/11/2022 6 56     RBC 03/11/2022 4 06     Hemoglobin 03/11/2022 10 7*    Hematocrit 03/11/2022 31 7*    MCV 03/11/2022 78*    MCH 03/11/2022 26 4*    MCHC 03/11/2022 33 8     RDW 03/11/2022 13 9     MPV 03/11/2022 10 7     Platelets 70/75/7357 252        Imaging Studies: I have personally reviewed pertinent imaging studies

## 2022-03-11 NOTE — ASSESSMENT & PLAN NOTE
· Likely secondary to acute pancreatitis  · Complains of 10/10 stabbing pain in the right upper and lower quadrants with radiation into the right leg  · Has had several doses of IV Dilaudid with minimal relief  · No history of chronic pain or chronic opioid use  · Continue Tylenol 650 mg p o  q 6 hours p r n  mild pain  · Discontinue current IV Dilaudid orders  · Will start Dilaudid PCA with continuous rate of 0 mL/hr, PCA dose 0 3 mg, lockout 10 minutes and 1 hour maximum 1 8 mg   · If pain still not controlled on this dose, suggest change lockout to 6 minutes and 1 hour maximum 2 2 mg   · Heating pad to abdomen, 20 minutes/hour as needed  · Bowel regimen at discretion of primary service and Gastroenterology, however recommended while patient on opioid pain medication

## 2022-03-11 NOTE — UTILIZATION REVIEW
Initial Clinical Review    OBS 3/10 @ [address] [place] [address] 3/11 @ 1725 Merged with [facility] WITH CONTINUED SEVERE PAIN REQUIRING IV PAIN MEDS, IVF'S, INS GTT    Start   Ordered   03/11/22 1957  Inpatient Admission  Once        Transfer Service: Hospitalist       Question Answer Comment   Level of Care Med Surg        03/11/22 1956     ED Arrival Information     Expected Arrival Acuity    - 3/10/2022 06:22 Urgent         Means of arrival Escorted by Service Admission type    CONOR LUND  [facility] Hospitalist Urgent         Arrival complaint    left side abdominal pain radiating to back        Chief Complaint   Patient presents with    Abdominal Pain     Patient presenting with abdominal pain since yesterday  + vomiting       Initial Presentation: 46 y o  female with PMHX of HTN, HLD presents to ED as walk-in with epigastric pain  She reports that the symptoms began yesterday when drinking a glass of orange juice  Pt reports the pain wraps around her left flank  She has had 3 episodes of vomiting and nausea as well  She denies any diarrhea symptoms or constipation symptoms  In ED elevated lipase and CT findings consistent with acute pancreatitis  Pt denies any history of alcohol use  Lipase 1,189  Admit to M/S unit under observation with  acute pancreatitis -- Npo  IVF's  Analgesics prn  Consult GI    GI consult 3/11 -- Triglyceride induced pancreatitis  Begin Insulin gtt;  monitor BG begin on D5W pending readings  Trend triglycerides levels q12 hours until <500 then transition off insulin gtt   Endocrine consultation  RUQ US ordered to f/u/ Hgb A1c ordered; f/u on results  NPO; advance diet as pt tolerates  Pain and symptom management per primary team      APS consult 3/11 -- c/o 10/10 stabbing pain in R upper and lower quadrants into his R leg  Had several doses of IV Diludid with minimal relief  No h/o chronic pain or chronic opioid use  Continue Tylenol 650 mg p o  q 6 hrs prn for mild pain   D/c current IV Dilaudid orders  Start Dilaudid PCA with continuous rate of 0 mL/hr, PCA dose 0 3 mg, lockout 10 minutes and 1 hour maximum 1 8 mg  If pain still not controlled on this dose, suggest change lockout to 6 minutes and 1 hour maximum 2 2 mg  Heating pad to abdomen, 20 minutes/hour prn  Bowel regimen  Endocrine consult 3/11 -- Pancreatitis due to hypertriglyceridemia-continue supportive care  2  Hypertriglyceridemia-at this time, would continue NPO status  Would not use IV insulin d/t significant risk of hypoglycemia even with dextrose infusion  D/c Lovenox as this can worsen hypertriglyceridemia and free fatty acids  SCDs for  DVT ppx  Consider Cushing syndrome and acquired lipodystrophy S potential etiology for hypertriglyceridemia  The workup for Cushing's should be done as an outpatient  Continue pain management per APS       Date: 3/11  Pt reports having right lower abdominal pain rated 10/10  The scar on the LLQ is from an accident as a child  Pt denies chest pain, nausea, or vomiting  Continue pain meds per APS  Date: 3/12 -- Pt states that pain is well managed with Dilaudid PCA  She's holding a cup of coffee and drinking it  Patient was advised to not drink coffee  Continue clear liquid diet today  Analgesics per APS  Blood cxs neg to date  No abx at this time  Continue supportive care      ED Triage Vitals   Temperature Pulse Respirations Blood Pressure SpO2   03/10/22 0634 03/10/22 0634 03/10/22 0634 03/10/22 0634 03/10/22 0634   98 2 °F (36 8 °C) 102 18 (!) 169/101 99 %      Temp Source Heart Rate Source Patient Position - Orthostatic VS BP Location FiO2 (%)   03/10/22 0634 03/10/22 0634 03/10/22 1315 03/10/22 0634 --   Oral Monitor Lying Right arm       Pain Score       03/10/22 0634       10 - Worst Possible Pain          Wt Readings from Last 1 Encounters:   03/04/22 62 1 kg (137 lb)     Additional Vital Signs:   Date/Time Temp Pulse Resp BP MAP (mmHg) SpO2 Calculated FIO2 (%) - Nasal Cannula Nasal Cannula O2 Flow Rate (L/min) O2 Device Patient Position - Orthostatic VS   03/13/22 11:32:05 98 1 °F (36 7 °C) 98 18 95/55 68 99 % -- -- -- --   03/13/22 0833 101 3 °F (38 5 °C) Abnormal  -- -- -- -- -- -- -- -- --   03/13/22 0719 -- -- -- -- -- 97 % 28 2 L/min Nasal cannula --   03/12/22 23:04:33 98 6 °F (37 °C) 100 20 120/85 97 94 % -- -- -- --   03/12/22 2040 -- -- -- -- -- -- 28 2 L/min Nasal cannula --   03/12/22 20:26:04 100 6 °F (38 1 °C) Abnormal   118 Abnormal  20 110/68 82 93 % -- -- -- --   03/12/22 15:52:33 100 °F (37 8 °C) 128 Abnormal  18 114/68 83 96 % -- -- -- --   03/12/22 08:42:42 99 2 °F (37 3 °C) 115 Abnormal  16 117/68 84 94 % -- -- -- --   03/12/22 06:51:59 101 2 °F (38 4 °C) Abnormal  127 Abnormal  16 122/76 91 92 % -- -- -- --   03/12/22 02:50:17 100 7 °F (38 2 °C) Abnormal  131 Abnormal  19 125/79 94 93 % -- -- None (Room air) --   03/11/22 22:51:13 100 4 °F (38 °C) 132 Abnormal  24 Abnormal  125/84 98 93 % -- -- -- --   03/11/22 22:15:34 100 3 °F (37 9 °C) 123 Abnormal  -- 125/84 98 91 % -- -- -- --   03/11/22 1903 102 5 °F (39 2 °C) Abnormal  124 Abnormal  24 Abnormal  125/83 97 89 % Abnormal  -- -- None (Room air) Lying   03/11/22 1705 99 8 °F (37 7 °C) 122 Abnormal  -- 133/81 98 94 % -- -- -- --   03/11/22 1610 -- 122 Abnormal  -- 124/77 93 95 % -- -- -- --   03/11/22 1540 -- 124 Abnormal  -- 135/83 100 92 % -- -- -- --   03/11/22 15:24:42 101 °F (38 3 °C) Abnormal  133 Abnormal  26 Abnormal  136/84 101 93 % -- -- -- Lying   03/11/22 14:57:03 101 °F (38 3 °C) Abnormal  131 Abnormal  20 114/75 88 93 % -- -- -- --   03/11/22 12:05:27 99 7 °F (37 6 °C) -- 20 112/75 87 -- -- -- -- --   03/11/22 08:33:59 100 5 °F (38 1 °C) 127 Abnormal  18 124/78 93 96 % -- -- -- --    Comments:      Pertinent Labs/Diagnostic Test Results:   CT a/p 3/11 --   1   Worsening pancreatitis   Increasing fluid in the right mid abdomen, retroperitoneum and pelvis   No evidence of fluid collection or pancreatic necrosis  2   Mild duodenitis in the 2nd portion of the duodenum and mild focal colitis at the hepatic flexure, both secondary to adjacent peripancreatic inflammation inflammation   No evidence of bowel obstruction or perforation     US RUQ 3/12 -- 1   Suboptimal examination due to overlying bowel gas  2   Small amount of free fluid surrounding the liver and pancreas, consistent with the history of acute pancreatitis  3   Hepatomegaly with fatty infiltration  CT abdomen pelvis with contrast   Final Result by Sarah Mayfield MD (03/10 1150)      Findings consistent with acute pancreatitis  No focal intrapancreatic or peripancreatic fluid collection  No evidence for pancreatic necrosis  Small sliding type hiatal hernia, similar to prior  Hepatomegaly  3 7 x 2 6 x 2 1 cm septated right ovarian cyst  This can be further evaluated with nonemergent dedicated pelvic ultrasound as clinically warranted  Results from last 7 days   Lab Units 03/13/22  0604 03/11/22 2046 03/11/22  0456 03/10/22  0717 03/10/22  0717   WBC Thousand/uL 4 91 6 62 6 56   < > 6 56   HEMOGLOBIN g/dL 8 4* 11 2* 10 7*  --  12 2   HEMATOCRIT % 26 1* 33 1* 31 7*  --  34 3*   PLATELETS Thousands/uL 207 247 252   < > 302   NEUTROS ABS Thousands/µL 3 62 5 36  --   --  4 99   BANDS PCT %  --   --  2  --   --     < > = values in this interval not displayed       Results from last 7 days   Lab Units 03/13/22  0604 03/12/22  0516 03/11/22  2046 03/10/22  1022 03/10/22  0717   SODIUM mmol/L 139 134* 135* 131* 130*   POTASSIUM mmol/L 3 4* 3 9 3 3* 3 8 3 6   CHLORIDE mmol/L 106 103 103 102 100   CO2 mmol/L 27 25 27 21 9*   ANION GAP mmol/L 6 6 5 8 21*   BUN mg/dL 2* 4* 5 6 6   CREATININE mg/dL 0 51* 0 52* 0 52* 0 52* 0 64   EGFR ml/min/1 73sq m 121 120 120 120 112   CALCIUM mg/dL 8 6 8 8 8 7 8 3 8 5   MAGNESIUM mg/dL 2 0 1 8 1 9  --   --    PHOSPHORUS mg/dL 2 2* 1 0*  --   --   --      Results from last 7 days   Lab Units 03/13/22  0604 03/11/22  2046 03/10/22  0717   AST U/L 11 12 24   ALT U/L 10* 14 67   ALK PHOS U/L 39* 46 63   TOTAL PROTEIN g/dL 5 7* 6 7 8 0   ALBUMIN g/dL 2 1* 2 7* 3 5   TOTAL BILIRUBIN mg/dL 0 59 0 69 0 64     Results from last 7 days   Lab Units 03/13/22  1202 03/13/22  0603 03/12/22  2356 03/12/22  1807 03/12/22  1206 03/12/22  0651 03/11/22  2157 03/11/22  1152   POC GLUCOSE mg/dl 90 152* 136 115 112 121 153* 93     Results from last 7 days   Lab Units 03/13/22  0604 03/12/22  0516 03/11/22  2046 03/10/22  1022 03/10/22  0717   GLUCOSE RANDOM mg/dL 148* 153* 152* 149* 168*     Results from last 7 days   Lab Units 03/11/22  0456   HEMOGLOBIN A1C % 5 1   EAG mg/dl 100     BETA-HYDROXYBUTYRATE   Date Value Ref Range Status   03/10/2022 0 3 <0 6 mmol/L Final      Results from last 7 days   Lab Units 03/13/22  1108 03/10/22  1022   PH MICHELLE  7 348 7 401*   PCO2 MICHELLE mm Hg 47 3 32 8*   PO2 MICHELLE mm Hg 36 9 108 1*   HCO3 MICHELLE mmol/L 25 4 19 9*   BASE EXC MICHELLE mmol/L -0 5 -4 0   O2 CONTENT MICHELLE ml/dL 9 8 17 3   O2 HGB, VENOUS % 67 0 96 1*     Results from last 7 days   Lab Units 03/10/22  0717   HS TNI 0HR ng/L 3     Results from last 7 days   Lab Units 03/13/22  0756 03/12/22  0516 03/12/22  0012   PROCALCITONIN ng/ml 0 26* 0 38* 0 18     Results from last 7 days   Lab Units 03/11/22  2047   LACTIC ACID mmol/L 1 3     Results from last 7 days   Lab Units 03/13/22  0604   NT-PRO BNP pg/mL 271*     Results from last 7 days   Lab Units 03/10/22  0717   LIPASE u/L 1,189*     Results from last 7 days   Lab Units 03/10/22  0750   CLARITY UA  Turbid   COLOR UA  Yellow   SPEC GRAV UA  1 018   PH UA  6 0   GLUCOSE UA mg/dl Negative   KETONES UA mg/dl Negative   BLOOD UA  Negative   PROTEIN UA mg/dl 50 (1+)*   NITRITE UA  Negative   BILIRUBIN UA  Negative   UROBILINOGEN UA (BE) mg/dl <2 0   LEUKOCYTES UA  Negative   WBC UA /hpf 2-4*   RBC UA /hpf 2-4*   BACTERIA UA /hpf None Seen   EPITHELIAL CELLS WET PREP /hpf Moderate* MUCUS THREADS  Moderate*     Results from last 7 days   Lab Units 03/10/22  1022   ETHANOL LVL mg/dL <3   ACETAMINOPHEN LVL ug/mL <2*   SALICYLATE LVL mg/dL <3*     Results from last 7 days   Lab Units 03/13/22  0802 03/13/22  0759 03/11/22 2049 03/11/22 2046   BLOOD CULTURE  Received in Microbiology Lab  Culture in Progress  Received in Microbiology Lab  Culture in Progress  No Growth at 24 hrs  No Growth at 24 hrs       ED Treatment:   Medication Administration from 03/10/2022 0621 to 03/10/2022 1326       Date/Time Order Dose Route Action     03/10/2022 0718 sodium chloride 0 9 % bolus 1,000 mL 1,000 mL Intravenous New Bag     03/10/2022 0718 HYDROmorphone (DILAUDID) injection 0 5 mg 0 5 mg Intravenous Given     03/10/2022 0717 ondansetron (ZOFRAN) injection 4 mg 4 mg Intravenous Given     03/10/2022 0901 HYDROmorphone (DILAUDID) injection 1 mg 1 mg Intravenous Given     03/10/2022 1023 sodium chloride 0 9 % bolus 1,000 mL 1,000 mL Intravenous New Bag     03/10/2022 1032 HYDROmorphone (DILAUDID) injection 1 mg 1 mg Intravenous Given     03/10/2022 1251 ondansetron (ZOFRAN) injection 4 mg 4 mg Intravenous Given     Past Medical History:   Diagnosis Date    COVID-19 05/2020    Esophagitis     H  pylori infection 12/2018    Hepatic steatosis     Hiatal hernia     Hiatal hernia     High triglycerides     Hyperlipidemia     Hypertension     Ventral hernia      Present on Admission:   Hyperlipidemia      Admitting Diagnosis: Acute pancreatitis [K85 90]  Ovarian cyst [P48 514]  Metabolic acidosis [W16 7]  Abdominal pain [R10 9]  Age/Sex: 46 y o  female  Admission Orders:  Scheduled Medications:  atorvastatin, 40 mg, Oral, Daily With Dinner  budesonide-formoterol, 2 puff, Inhalation, BID  enoxaparin, 40 mg, Subcutaneous, Daily  montelukast, 10 mg, Oral, HS    Continuous IV Infusions:  sodium chloride, 75 mL/hr, Intravenous, Continuous    PRN Meds:  acetaminophen, 650 mg, Oral, Q6H PRN  HYDROmorphone, 0 5 mg, Intravenous, Q4H PRN 3/10 x3, 3/11 x2  ondansetron, 4 mg, Intravenous, Q6H PRN      Network Utilization Review Department  ATTENTION: Please call with any questions or concerns to [phone] and carefully listen to the prompts so that you are directed to the right person  All voicemails are confidential   Nina Phillips all requests for admission clinical reviews, approved or denied determinations and any other requests to dedicated fax number below belonging to the campus where the patient is receiving treatment   List of dedicated fax numbers for the Facilities:  [address] DENIALS (Administrative/Medical Necessity) [phone]   [address] (Maternity/NICU/Pediatrics) [phone]   [address]  [address] Medical [place] [address] [address] [place] [address] Melissa Harmon [address] [place] [address] [phone]

## 2022-03-11 NOTE — CONSULTS
[address] [facility]  Consult Note - Kylie Flores 1980, 46 y o  female MRN: [id number]  Unit/Bed#: [facility] 915-01 Encounter: [id number]  Primary Care Provider: Faraz Ruiz MD   Date and time admitted to hospital: 3/10/2022  6:33 AM    * Abdominal pain  Assessment & Plan  · Likely secondary to acute pancreatitis  · Complains of 10/10 stabbing pain in the right upper and lower quadrants with radiation into the right leg  · Has had several doses of IV Dilaudid with minimal relief  · No history of chronic pain or chronic opioid use  · Continue Tylenol 650 mg p o  q 6 hours p r n  mild pain  · Discontinue current IV Dilaudid orders  · Will start Dilaudid PCA with continuous rate of 0 mL/hr, PCA dose 0 3 mg, lockout 10 minutes and 1 hour maximum 1 8 mg   · If pain still not controlled on this dose, suggest change lockout to 6 minutes and 1 hour maximum 2 2 mg   · Heating pad to abdomen, 20 minutes/hour as needed  · Bowel regimen at discretion of primary service and Gastroenterology, however recommended while patient on opioid pain medication  Kylie Flores is a 46 y o  female  with history of hypertriglyceridemia, hyperlipidemia, hepatic steatosis admitted with sudden onset of abdominal pain, found to have acute pancreatitis  APS will continue to follow  Please contact Acute Pain Service - [facility] via [employer] from [phone] with additional questions or concerns  See Jeff or Francheska for additional contacts and after hours information  History of Present Illness    Admit Date:  3/10/2022  Hospital Day:  0 days  Primary Service:  Hospitalist  Attending Provider: Shirin Kovacs MD  Reason for Consult / Principal Problem:  Abdominal pain  HPI: Kylie Flores is a 46 y o  female who presents with acute onset of abdominal pain 2 days ago  Currently complains of right upper and lower quadrant abdominal pain, 10/10 and described as sharp and stabbing    Pain waxes and wanes but does not go away  Denies current nausea or vomiting  Patient with no history of chronic pain or chronic opioid use  Denies tobacco, alcohol or drug use  Patient had several doses of IV Dilaudid with minimal relief to this point  Note:  Patient is Togolese-speaking  Patient has adult son able to translate history and physical examination  Current pain location(s):  Right upper and lower quadrants  Pain Scale:   10/10  Quality:  Sharp, stabbing  Current Analgesic regimen:    Dilaudid 0 5 mg IV Q 4 hours p r n  moderate to severe pain  Tylenol 650 mg p o  q 6 hours p r n  mild pain  Pain History:  None  Pain Management Provider:  None    I have reviewed the patient's controlled substance dispensing history in the Prescription Drug Monitoring Program in compliance with the [facility] regulations before prescribing any controlled substances  Past 1 year review of PDMP:  No prescriptions for controlled substances  Consults    Review of Systems   Gastrointestinal: Positive for abdominal distention and abdominal pain  Negative for diarrhea, nausea and vomiting  All other systems reviewed and are negative  Historical Information   Past Medical History:   Diagnosis Date    COVID-19 2020    Esophagitis     H  pylori infection 2018    Hepatic steatosis     Hiatal hernia     Hiatal hernia     High triglycerides     Hyperlipidemia     Hypertension     Ventral hernia      Past Surgical History:   Procedure Laterality Date     SECTION      3X--2003,2005, 2008    COLONOSCOPY N/A 2018    Procedure: COLONOSCOPY;  Surgeon: Kyle Vega MD;  Location: [facility]; Service: Gastroenterology    EGD  2021    ESOPHAGOGASTRODUODENOSCOPY N/A 2018    Procedure: ESOPHAGOGASTRODUODENOSCOPY (EGD); Surgeon: Kyle Vega MD;  Location: [facility];   Service: Gastroenterology    HERNIA REPAIR      AL REPAIR INCISIONAL HERNIA,FLAVIO N/A 2019    Procedure: REPAIR HERNIA VENTRAL;  Surgeon: Billy Coffey DO;  Location: [address] [place] [address] OR;  Service: General    TUBAL LIGATION      UPPER GASTROINTESTINAL ENDOSCOPY       Social History   Social History     Substance and Sexual Activity   Alcohol Use No     Social History     Substance and Sexual Activity   Drug Use No     Social History     Tobacco Use   Smoking Status Never Smoker   Smokeless Tobacco Never Used     Family History:   Family History   Problem Relation Age of Onset    Heart disease Mother     Hypertension Mother     Cervical cancer Mother     Stomach cancer Father     Diabetes Brother     Hyperlipidemia Brother     Asthma Son     Colon cancer Maternal Uncle     Lung cancer Maternal Uncle        Meds/Allergies   all current active meds have been reviewed, current meds:   Current Facility-Administered Medications   Medication Dose Route Frequency    acetaminophen (TYLENOL) tablet 650 mg  650 mg Oral Q6H PRN    atorvastatin (LIPITOR) tablet 40 mg  40 mg Oral Daily With Dinner    budesonide-formoterol (SYMBICORT) 80-4 5 MCG/ACT inhaler 2 puff  2 puff Inhalation BID    dextrose 5 % in lactated Ringer's infusion  200 mL/hr Intravenous Continuous    enoxaparin (LOVENOX) subcutaneous injection 40 mg  40 mg Subcutaneous Daily    HYDROmorphone (DILAUDID) 1 mg/mL 50 mL PCA   Intravenous Continuous    HYDROmorphone (DILAUDID) injection 0 5 mg  0 5 mg Intravenous Once    insulin regular (HumuLIN R,NovoLIN R) 1 Units/mL in sodium chloride 0 9 % 100 mL infusion  0 3-21 Units/hr Intravenous Titrated    montelukast (SINGULAIR) tablet 10 mg  10 mg Oral HS    ondansetron (ZOFRAN) injection 4 mg  4 mg Intravenous Q6H PRN    and PTA meds:   Prior to Admission Medications   Prescriptions Last Dose Informant Patient Reported? Taking?    Icosapent Ethyl 1 g CAPS 3/9/2022 at Unknown time Self No Yes   Sig: Take 2 capsules (2 g total) by mouth 2 (two) times a day   budesonide-formoterol (SYMBICORT) 80-4 5 MCG/ACT inhaler 3/9/2022 at Unknown time Self No Yes   Sig: Inhale 2 puffs 2 (two) times a day Rinse mouth after use  clobetasol (TEMOVATE) 0 05 % ointment Past Week at Unknown time  No Yes   Sig: Apply topically 2 (two) times a day   ergocalciferol (ERGOCALCIFEROL) 1 25 MG (93953 UT) capsule 3/9/2022 at Unknown time Self Yes Yes   Sig: Take 50,000 Units by mouth once a week    fenofibrate micronized (LOFIBRA) 200 MG capsule 3/9/2022 at Unknown time Self Yes Yes   Sig: Take 200 mg by mouth   ketoconazole (NIZORAL) 2 % shampoo Past Week at Unknown time  No Yes   Sig: APPLY 1 APPLICATION TOPICALLY 2 (TWO) TIMES A WEEK   miconazole (MONISTAT-7) 2 % vaginal cream Past Week at Unknown time  No Yes   Sig: Insert 1 applicator into the vagina daily at bedtime   montelukast (SINGULAIR) 10 mg tablet 3/9/2022 at Unknown time Self No Yes   Sig: Take 1 tablet (10 mg total) by mouth daily at bedtime   nitrofurantoin (MACROBID) 100 mg capsule   No No   Sig: Take 1 capsule (100 mg total) by mouth 2 (two) times a day for 5 days   norgestimate-ethinyl estradiol (Sprintec 28) 0 25-35 MG-MCG per tablet   No No   Sig: Take 1 tablet by mouth daily for 28 days   pantoprazole (PROTONIX) 40 mg tablet 3/9/2022 at Unknown time  No Yes   Sig: Take 1 tablet (40 mg total) by mouth daily before breakfast   rosuvastatin (CRESTOR) 20 MG tablet 3/9/2022 at Unknown time Self Yes Yes   Sig: Take 20 mg by mouth daily       Facility-Administered Medications: None       No Known Allergies    Objective   Temp:  [99 7 °F (37 6 °C)-100 5 °F (38 1 °C)] 99 7 °F (37 6 °C)  HR:  [107-127] 127  Resp:  [18-20] 20  BP: (105-124)/(61-78) 112/75    Intake/Output Summary (Last 24 hours) at 3/11/2022 1434  Last data filed at 3/11/2022 1407  Gross per 24 hour   Intake 2451 25 ml   Output --   Net 2451 25 ml       Physical Exam  Vitals and nursing note reviewed  Constitutional:       General: She is awake  She is in acute distress  Appearance: She is ill-appearing   She is not toxic-appearing or diaphoretic  Eyes:      Conjunctiva/sclera: Conjunctivae normal       Pupils: Pupils are equal, round, and reactive to light  Cardiovascular:      Rate and Rhythm: Normal rate and regular rhythm  Pulmonary:      Effort: Pulmonary effort is normal       Breath sounds: Normal breath sounds and air entry  Abdominal:      General: Abdomen is flat  Palpations: Abdomen is soft  Tenderness: There is generalized abdominal tenderness  There is no guarding or rebound  Skin:     General: Skin is warm and dry  Capillary Refill: Capillary refill takes less than 2 seconds  Neurological:      Mental Status: She is alert and oriented to person, place, and time  GCS: GCS eye subscore is 4  GCS verbal subscore is 5  GCS motor subscore is 6  Psychiatric:         Attention and Perception: Attention normal          Speech: Speech normal          Behavior: Behavior normal  Behavior is cooperative  Lab Results:   I have personally reviewed pertinent labs  , CBC:   Lab Results   Component Value Date    WBC 6 56 03/11/2022    HGB 10 7 (L) 03/11/2022    HCT 31 7 (L) 03/11/2022    MCV 78 (L) 03/11/2022     03/11/2022    MCH 26 4 (L) 03/11/2022    MCHC 33 8 03/11/2022    RDW 13 9 03/11/2022    MPV 10 7 03/11/2022   , CMP: No results found for: SODIUM, K, CL, CO2, ANIONGAP, BUN, CREATININE, GLUCOSE, CALCIUM, AST, ALT, ALKPHOS, PROT, BILITOT, EGFR, BMP:No results found for: SODIUM, K, CL, CO2, BUN, CREATININE, GLUC, GLUF, CALCIUM, AGAP, EGFR, PT/PTT:No results found for: PT, PTT Estimated Creatinine Clearance: 119 5 mL/min (A) (by C-G formula based on SCr of 0 52 mg/dL (L))  Imaging Studies: I have personally reviewed pertinent reports  EKG, Pathology, and Other Studies: I have personally reviewed pertinent reports  Counseling / Coordination of Care  Greater than 50% of total time was spent with the patient and / or family counseling and / or coordination of care   A description of the counseling / coordination of care:  Patient interview, physical examination, review of PDMP, review of medical record, review of imaging and laboratory data, development of pain management plan, discussion of pain management plan with patient and primary service  Please note that the APS provides consultative services regarding pain management only  With the exception of ketamine and epidural infusions and except when indicated, final decisions regarding starting or changing doses of analgesic medications are at the discretion of the consulting service  Off hours consultation and/or medication management is generally not available      Wisam Colmenares PA-C  Acute Pain Service

## 2022-03-12 PROBLEM — E83.39 HYPOPHOSPHATEMIA: Status: ACTIVE | Noted: 2022-03-12

## 2022-03-12 PROBLEM — E87.6 HYPOKALEMIA: Status: ACTIVE | Noted: 2022-03-12

## 2022-03-12 PROBLEM — A41.9 SEPSIS DUE TO UNDETERMINED ORGANISM WITHOUT RESULTANT ORGAN FAILURE (HCC): Status: ACTIVE | Noted: 2022-03-12

## 2022-03-12 LAB
ANION GAP SERPL CALCULATED.3IONS-SCNC: 6 MMOL/L (ref 4–13)
ATRIAL RATE: 123 BPM
BUN SERPL-MCNC: 4 MG/DL (ref 5–25)
CALCIUM SERPL-MCNC: 8.8 MG/DL (ref 8.3–10.1)
CHLORIDE SERPL-SCNC: 103 MMOL/L (ref 100–108)
CO2 SERPL-SCNC: 25 MMOL/L (ref 21–32)
CREAT SERPL-MCNC: 0.52 MG/DL (ref 0.6–1.3)
GFR SERPL CREATININE-BSD FRML MDRD: 120 ML/MIN/1.73SQ M
GLUCOSE SERPL-MCNC: 112 MG/DL (ref 65–140)
GLUCOSE SERPL-MCNC: 115 MG/DL (ref 65–140)
GLUCOSE SERPL-MCNC: 121 MG/DL (ref 65–140)
GLUCOSE SERPL-MCNC: 136 MG/DL (ref 65–140)
GLUCOSE SERPL-MCNC: 153 MG/DL (ref 65–140)
MAGNESIUM SERPL-MCNC: 1.8 MG/DL (ref 1.6–2.6)
MAGNESIUM SERPL-MCNC: 1.9 MG/DL (ref 1.6–2.6)
P AXIS: 53 DEGREES
PHOSPHATE SERPL-MCNC: 1 MG/DL (ref 2.7–4.5)
POTASSIUM SERPL-SCNC: 3.9 MMOL/L (ref 3.5–5.3)
PR INTERVAL: 142 MS
PROCALCITONIN SERPL-MCNC: 0.18 NG/ML
PROCALCITONIN SERPL-MCNC: 0.38 NG/ML
QRS AXIS: 47 DEGREES
QRSD INTERVAL: 78 MS
QT INTERVAL: 296 MS
QTC INTERVAL: 423 MS
SODIUM SERPL-SCNC: 134 MMOL/L (ref 136–145)
T WAVE AXIS: 29 DEGREES
TRIGL SERPL-MCNC: 425 MG/DL
TRIGL SERPL-MCNC: 454 MG/DL
VENTRICULAR RATE: 123 BPM

## 2022-03-12 PROCEDURE — 82948 REAGENT STRIP/BLOOD GLUCOSE: CPT

## 2022-03-12 PROCEDURE — 83735 ASSAY OF MAGNESIUM: CPT | Performed by: INTERNAL MEDICINE

## 2022-03-12 PROCEDURE — 99232 SBSQ HOSP IP/OBS MODERATE 35: CPT | Performed by: INTERNAL MEDICINE

## 2022-03-12 PROCEDURE — 84145 PROCALCITONIN (PCT): CPT | Performed by: PHYSICIAN ASSISTANT

## 2022-03-12 PROCEDURE — 93005 ELECTROCARDIOGRAM TRACING: CPT

## 2022-03-12 PROCEDURE — 80048 BASIC METABOLIC PNL TOTAL CA: CPT | Performed by: INTERNAL MEDICINE

## 2022-03-12 PROCEDURE — 84100 ASSAY OF PHOSPHORUS: CPT | Performed by: INTERNAL MEDICINE

## 2022-03-12 PROCEDURE — 84478 ASSAY OF TRIGLYCERIDES: CPT | Performed by: STUDENT IN AN ORGANIZED HEALTH CARE EDUCATION/TRAINING PROGRAM

## 2022-03-12 PROCEDURE — 93010 ELECTROCARDIOGRAM REPORT: CPT | Performed by: INTERNAL MEDICINE

## 2022-03-12 PROCEDURE — 99233 SBSQ HOSP IP/OBS HIGH 50: CPT | Performed by: INTERNAL MEDICINE

## 2022-03-12 RX ORDER — AMOXICILLIN 250 MG
1 CAPSULE ORAL
Status: DISCONTINUED | OUTPATIENT
Start: 2022-03-12 | End: 2022-03-14

## 2022-03-12 RX ORDER — POLYETHYLENE GLYCOL 3350 17 G/17G
17 POWDER, FOR SOLUTION ORAL DAILY
Status: DISCONTINUED | OUTPATIENT
Start: 2022-03-12 | End: 2022-03-14

## 2022-03-12 RX ORDER — MAGNESIUM SULFATE HEPTAHYDRATE 40 MG/ML
2 INJECTION, SOLUTION INTRAVENOUS ONCE
Status: COMPLETED | OUTPATIENT
Start: 2022-03-12 | End: 2022-03-12

## 2022-03-12 RX ADMIN — BUDESONIDE AND FORMOTEROL FUMARATE DIHYDRATE 2 PUFF: 80; 4.5 AEROSOL RESPIRATORY (INHALATION) at 17:52

## 2022-03-12 RX ADMIN — ACETAMINOPHEN 325MG 650 MG: 325 TABLET ORAL at 20:33

## 2022-03-12 RX ADMIN — HYDROMORPHONE HYDROCHLORIDE: 10 INJECTION, SOLUTION INTRAMUSCULAR; INTRAVENOUS; SUBCUTANEOUS at 19:00

## 2022-03-12 RX ADMIN — POTASSIUM CHLORIDE 20 MEQ: 14.9 INJECTION, SOLUTION INTRAVENOUS at 02:39

## 2022-03-12 RX ADMIN — SENNOSIDES AND DOCUSATE SODIUM 1 TABLET: 50; 8.6 TABLET ORAL at 21:51

## 2022-03-12 RX ADMIN — SENNOSIDES AND DOCUSATE SODIUM 1 TABLET: 50; 8.6 TABLET ORAL at 12:55

## 2022-03-12 RX ADMIN — MONTELUKAST 10 MG: 10 TABLET, FILM COATED ORAL at 21:50

## 2022-03-12 RX ADMIN — DEXTROSE, SODIUM CHLORIDE, SODIUM LACTATE, POTASSIUM CHLORIDE, AND CALCIUM CHLORIDE 200 ML/HR: 5; .6; .31; .03; .02 INJECTION, SOLUTION INTRAVENOUS at 00:11

## 2022-03-12 RX ADMIN — DEXTROSE, SODIUM CHLORIDE, SODIUM LACTATE, POTASSIUM CHLORIDE, AND CALCIUM CHLORIDE 200 ML/HR: 5; .6; .31; .03; .02 INJECTION, SOLUTION INTRAVENOUS at 04:47

## 2022-03-12 RX ADMIN — DEXTROSE, SODIUM CHLORIDE, SODIUM LACTATE, POTASSIUM CHLORIDE, AND CALCIUM CHLORIDE 200 ML/HR: 5; .6; .31; .03; .02 INJECTION, SOLUTION INTRAVENOUS at 17:55

## 2022-03-12 RX ADMIN — ATORVASTATIN CALCIUM 40 MG: 40 TABLET, FILM COATED ORAL at 17:50

## 2022-03-12 RX ADMIN — DIBASIC SODIUM PHOSPHATE, MONOBASIC POTASSIUM PHOSPHATE AND MONOBASIC SODIUM PHOSPHATE 1 TABLET: 852; 155; 130 TABLET ORAL at 21:50

## 2022-03-12 RX ADMIN — FENOFIBRATE 145 MG: 145 TABLET, FILM COATED ORAL at 09:02

## 2022-03-12 RX ADMIN — DEXTROSE, SODIUM CHLORIDE, SODIUM LACTATE, POTASSIUM CHLORIDE, AND CALCIUM CHLORIDE 200 ML/HR: 5; .6; .31; .03; .02 INJECTION, SOLUTION INTRAVENOUS at 12:54

## 2022-03-12 RX ADMIN — POLYETHYLENE GLYCOL 3350 17 G: 17 POWDER, FOR SOLUTION ORAL at 12:55

## 2022-03-12 RX ADMIN — BUDESONIDE AND FORMOTEROL FUMARATE DIHYDRATE 2 PUFF: 80; 4.5 AEROSOL RESPIRATORY (INHALATION) at 09:02

## 2022-03-12 RX ADMIN — POTASSIUM CHLORIDE 20 MEQ: 14.9 INJECTION, SOLUTION INTRAVENOUS at 00:27

## 2022-03-12 RX ADMIN — DIBASIC SODIUM PHOSPHATE, MONOBASIC POTASSIUM PHOSPHATE AND MONOBASIC SODIUM PHOSPHATE 1 TABLET: 852; 155; 130 TABLET ORAL at 17:50

## 2022-03-12 RX ADMIN — ACETAMINOPHEN 325MG 650 MG: 325 TABLET ORAL at 07:00

## 2022-03-12 RX ADMIN — MAGNESIUM SULFATE IN WATER 2 G: 40 INJECTION, SOLUTION INTRAVENOUS at 04:49

## 2022-03-12 RX ADMIN — DEXTROSE, SODIUM CHLORIDE, SODIUM LACTATE, POTASSIUM CHLORIDE, AND CALCIUM CHLORIDE 200 ML/HR: 5; .6; .31; .03; .02 INJECTION, SOLUTION INTRAVENOUS at 23:23

## 2022-03-12 RX ADMIN — ONDANSETRON 4 MG: 2 INJECTION INTRAMUSCULAR; INTRAVENOUS at 00:14

## 2022-03-12 NOTE — QUICK NOTE
Notified patient febrile at 102 5  Per chart review has been febrile and tachycardic through day  GI ordered CTAP  1   Worsening pancreatitis  Increasing fluid in the right mid abdomen, retroperitoneum and pelvis  No evidence of fluid collection or pancreatic necrosis  2   Mild duodenitis in the 2nd portion of the duodenum and mild focal colitis at the hepatic flexure, both secondary to adjacent peripancreatic inflammation inflammation  No evidence of bowel obstruction or perforation    Tonight patient reports ongoing abdominal pain but denies worsening or new pain  No URI or urinary symptoms  Order for labs - BCx2 pending  Lactate and LFTs WNL  No leukocytosis  Discussed with GI, no indication for antibiotics - continue IVF and conservative management  Per GI note plan to start insulin gtt, endocrinology recommend no IV insulin - notified GI order would be d/c  Continue to monitor closely, notify of change

## 2022-03-12 NOTE — PROGRESS NOTES
Gastroenterology Progress Note      PATIENT INFORMATION      Patient: Ashutosh Medel 46 y o  female   MRN: [id number]  PCP: Alejandro Osullivan MD  Unit/Bed#: [facility] 915-01 Encounter: [id number]  Date Of Visit: 22  Current Length of Stay: 1 day(s)     ASSESSMENTS & PLAN    46year old female with hypertriglyceridemia induced pancreatitis     Pancreatitis  · As evidenced by pain, lipase elevation and CT findings  · Etiology secondary to hypertriglyceridemia- presented with a level of 1455  · IVF at 200/hour  · Was on insulin drip but discontinued per endocrinology  · On statin, fibrate  · Trending triglycerides Q12H- improving  · No indication for plasmapheresis currently   · Will advance to clear liquid diet   · Pain control with dilaudid drip     Constipation  · Likely opioid induced  · Will start miralax scheduled  · Will also start senakot  · If no improvement by tomorrow plan for relistor       Disposition: We will follow, plan discussed with  at bedside      SUBJECTIVE     Patient reports diffuse abdominal pain and bloating  No bowel movement in 4 days  Pain getting better with dilaudid  Wanting to drink apple juice  No nausea or vomiting currently  OBJECTIVE     Vitals:   Temp (24hrs), Av 7 °F (38 2 °C), Min:99 2 °F (37 3 °C), Max:102 5 °F (39 2 °C)    Temp:  [99 2 °F (37 3 °C)-102 5 °F (39 2 °C)] 99 2 °F (37 3 °C)  HR:  [115-133] 115  Resp:  [16-26] 16  BP: (114-136)/(68-84) 117/68  SpO2:  [89 %-95 %] 94 %  There is no height or weight on file to calculate BMI  Input and Output Summary (last 24 hours):        Intake/Output Summary (Last 24 hours) at 3/12/2022 1319  Last data filed at 3/12/2022 1254  Gross per 24 hour   Intake 3230 87 ml   Output --   Net 3230 87 ml       Physical Exam:   GENERAL: Non toxic appearing  HEENT:  Normocephalic, atraumatic, pupils bilaterally reactive to light  CARDIAC:  Regular rate and rhythm, S1, S2 heard, no murmurs  PULMONARY:  CTA B/L, no wheezing/rales/rhonci, non-labored breathing  ABDOMEN:  Soft, tender in the epigastric and right upper quadrant, +BS, no rebound/guarding/rigidity  Extremities:  2+ Pulses in DP/PT  No edema, cyanosis, or clubbing  NEUROLOGIC:  Alert/oriented x3  SKIN:  No rashes or erythema          ADDITIONAL DATA     Labs & Recent Cultures:     Results from last 7 days   Lab Units 03/11/22  2046   WBC Thousand/uL 6 62   HEMOGLOBIN g/dL 11 2*   HEMATOCRIT % 33 1*   PLATELETS Thousands/uL 247   NEUTROS PCT % 82*   LYMPHS PCT % 11*   MONOS PCT % 7   EOS PCT % 0     Results from last 7 days   Lab Units 03/12/22  0516 03/11/22 2046 03/11/22 2046   POTASSIUM mmol/L 3 9   < > 3 3*   CHLORIDE mmol/L 103   < > 103   CO2 mmol/L 25   < > 27   BUN mg/dL 4*   < > 5   CREATININE mg/dL 0 52*   < > 0 52*   CALCIUM mg/dL 8 8   < > 8 7   ALK PHOS U/L  --   --  46   ALT U/L  --   --  14   AST U/L  --   --  12    < > = values in this interval not displayed  Results from last 7 days   Lab Units 03/11/22 2049 03/11/22 2046   BLOOD CULTURE  Received in Microbiology Lab  Culture in Progress  Received in Microbiology Lab  Culture in Progress  Nutrition:  Diet Clear Liquid  Radiology Results:   CT abdomen pelvis w contrast   Final Result by Leena Ortiz MD (03/11 1720)         1  Worsening pancreatitis  Increasing fluid in the right mid abdomen, retroperitoneum and pelvis  No evidence of fluid collection or pancreatic necrosis  2   Mild duodenitis in the 2nd portion of the duodenum and mild focal colitis at the hepatic flexure, both secondary to adjacent peripancreatic inflammation inflammation  No evidence of bowel obstruction or perforation            Workstation performed: [id number]         US right upper quadrant   Final Result by Ted Covarrubias DO (03/12 3640)   1  Suboptimal examination due to overlying bowel gas     2   Small amount of free fluid surrounding the liver and pancreas, consistent with the history of acute pancreatitis  3   Hepatomegaly with fatty infiltration  Please see follow-up CT abdomen pelvis for more in-depth details  The results of which are documented in the patient's chart  CT scan is performed following this ultrasound  The study documented worsening pancreatitis not appreciated on the ultrasound    due to limited visualization of the pancreas secondary to overlying bowel gas  Workstation performed: [id number]         CT abdomen pelvis with contrast   Final Result by Charlotte Nagy MD (03/10 1150)      Findings consistent with acute pancreatitis  No focal intrapancreatic or peripancreatic fluid collection  No evidence for pancreatic necrosis  Small sliding type hiatal hernia, similar to prior  Hepatomegaly  3 7 x 2 6 x 2 1 cm septated right ovarian cyst  This can be further evaluated with nonemergent dedicated pelvic ultrasound as clinically warranted  The study was marked in [facility] for immediate notification              Workstation performed: [id number]           Scheduled Medications:  atorvastatin, 40 mg, Daily With Dinner  budesonide-formoterol, 2 puff, BID  fenofibrate, 145 mg, Daily  montelukast, 10 mg, HS  polyethylene glycol, 17 g, Daily  potassium chloride, 20 mEq, BID  senna-docusate sodium, 1 tablet, HS        PRN MEDS:  acetaminophen, 650 mg, Q6H PRN  ondansetron, 4 mg, Q6H PRN        Last 24 Hours Medication List:   Current Facility-Administered Medications   Medication Dose Route Frequency Provider Last Rate    acetaminophen  650 mg Oral Q6H PRN Hetul Llanes, DO      atorvastatin  40 mg Oral Daily With [employer], DO      budesonide-formoterol  2 puff Inhalation BID Hetul Llanes, DO      dextrose 5% lactated ringer's  200 mL/hr Intravenous Continuous Rajwinder Shupp,  mL/hr (03/12/22 1254)    fenofibrate  145 mg Oral Daily Ying Mane MD      HYDROmorphone   Intravenous Continuous Elizabeth Blake PA-C      montelukast  10 mg Oral HS Hetul Llanes, DO      ondansetron  4 mg Intravenous Q6H PRN Hetul Llanes, DO      polyethylene glycol  17 g Oral Daily Edin Baxter MD      potassium chloride  20 mEq Intravenous BID ALOK Null Stopped (03/12/22 6001)    senna-docusate sodium  1 tablet Oral HS Edin Baxter MD            Time Spent for Care: 30 mins spent in total   More than 50% of total time spent on counseling and coordination of care as described above  Current Length of Stay: 1 day(s)      Code Status: Level 1 - Full Code          ** Please Note: This note is constructed using a voice recognition dictation system   **

## 2022-03-12 NOTE — PLAN OF CARE
Problem: PAIN - ADULT  Goal: Verbalizes/displays adequate comfort level or baseline comfort level  Description: Interventions:  - Encourage patient to monitor pain and request assistance  - Assess pain using appropriate pain scale  - Administer analgesics based on type and severity of pain and evaluate response  - Implement non-pharmacological measures as appropriate and evaluate response  - Consider cultural and social influences on pain and pain management  - Notify physician/advanced practitioner if interventions unsuccessful or patient reports new pain  Outcome: Progressing     Problem: GASTROINTESTINAL - ADULT  Goal: Minimal or absence of nausea and/or vomiting  Description: INTERVENTIONS:  - Administer IV fluids if ordered to ensure adequate hydration  - Maintain NPO status until nausea and vomiting are resolved  - Nasogastric tube if ordered  - Administer ordered antiemetic medications as needed  - Provide nonpharmacologic comfort measures as appropriate  - Advance diet as tolerated, if ordered  - Consider nutrition services referral to assist patient with adequate nutrition and appropriate food choices  Outcome: Progressing  Goal: Maintains or returns to baseline bowel function  Description: INTERVENTIONS:  - Assess bowel function  - Encourage oral fluids to ensure adequate hydration  - Administer IV fluids if ordered to ensure adequate hydration  - Administer ordered medications as needed  - Encourage mobilization and activity  - Consider nutritional services referral to assist patient with adequate nutrition and appropriate food choices  Outcome: Progressing  Goal: Maintains adequate nutritional intake  Description: INTERVENTIONS:  - Monitor percentage of each meal consumed  - Identify factors contributing to decreased intake, treat as appropriate  - Assist with meals as needed  - Monitor I&O, weight, and lab values if indicated  - Obtain nutrition services referral as needed  Outcome: Progressing  Goal: Oral mucous membranes remain intact  Description: INTERVENTIONS  - Assess oral mucosa and hygiene practices  - Implement preventative oral hygiene regimen  - Implement oral medicated treatments as ordered  - Initiate Nutrition services referral as needed  Outcome: Progressing

## 2022-03-12 NOTE — ASSESSMENT & PLAN NOTE
Stable  Sepsis criteria: febrile (last episode 03/13 5 PM) and tachycardia  Likely secondary to acute pancreatitis noted on CT A/P  AM CXR identified "Infiltrates or atelectasis at the right lung base;atelectasis at the left lung base"  Blood cultures collected on 03/11 NEGATIVE   Repeat Blood cultures collected on 03/16 showed NGTD    Procalcitonin mildly above range at 0 26 and of no clinical significance  Will defer antibiotics at this time   Encourage incentive spirometer use  Continue to monitor daily CBC and fever curve

## 2022-03-12 NOTE — PLAN OF CARE
Problem: PAIN - ADULT  Goal: Verbalizes/displays adequate comfort level or baseline comfort level  Description: Interventions:  - Encourage patient to monitor pain and request assistance  - Assess pain using appropriate pain scale  - Administer analgesics based on type and severity of pain and evaluate response  - Implement non-pharmacological measures as appropriate and evaluate response  - Consider cultural and social influences on pain and pain management  - Notify physician/advanced practitioner if interventions unsuccessful or patient reports new pain  Outcome: Progressing     Problem: GASTROINTESTINAL - ADULT  Goal: Minimal or absence of nausea and/or vomiting  Description: INTERVENTIONS:  - Administer IV fluids if ordered to ensure adequate hydration  - Maintain NPO status until nausea and vomiting are resolved  - Nasogastric tube if ordered  - Administer ordered antiemetic medications as needed  - Provide nonpharmacologic comfort measures as appropriate  - Advance diet as tolerated, if ordered  - Consider nutrition services referral to assist patient with adequate nutrition and appropriate food choices  Outcome: Progressing  Goal: Maintains or returns to baseline bowel function  Description: INTERVENTIONS:  - Assess bowel function  - Encourage oral fluids to ensure adequate hydration  - Administer IV fluids if ordered to ensure adequate hydration  - Administer ordered medications as needed  - Encourage mobilization and activity  - Consider nutritional services referral to assist patient with adequate nutrition and appropriate food choices  Outcome: Progressing  Goal: Maintains adequate nutritional intake  Description: INTERVENTIONS:  - Monitor percentage of each meal consumed  - Identify factors contributing to decreased intake, treat as appropriate  - Assist with meals as needed  - Monitor I&O, weight, and lab values if indicated  - Obtain nutrition services referral as needed  Outcome: Progressing  Goal: Oral mucous membranes remain intact  Description: INTERVENTIONS  - Assess oral mucosa and hygiene practices  - Implement preventative oral hygiene regimen  - Implement oral medicated treatments as ordered  - Initiate Nutrition services referral as needed  Outcome: Progressing show

## 2022-03-12 NOTE — ASSESSMENT & PLAN NOTE
Present on admission  Worsening  Lkely secondary to pancreatitis with atypical abdominal pain 2/2 chronic pain syndrome  Consulted APS; appreciate assistance  Patient started on Dilaudid PCA; continue management per recs

## 2022-03-12 NOTE — ASSESSMENT & PLAN NOTE
Resolved  AM Phosphorous measured at 3 7  PO replacement completed  Continue to monitor daily Phosphorous

## 2022-03-12 NOTE — ASSESSMENT & PLAN NOTE
Chronic and progressively improving - likely etiology of pancreatitis  Noted to have elevated triglycerides since 2013  TF on admission measured >1400 -> 419 currently  Appreciate prior endocrinology input -> outpatient follow-up  Continue Lipitor/Tricor  Encourage diet/exercise

## 2022-03-12 NOTE — PROGRESS NOTES
[address] [facility]  Progress Note - Richard Moon 1980, 46 y o  female MRN: [id number]  Unit/Bed#: [facility] 915-01 Encounter: [id number]  Primary Care Provider: Jeniffer Romano MD   Date and time admitted to hospital: 3/10/2022  6:33 AM    * Acute pancreatitis  Assessment & Plan  Present on admission   Noted on CT A/P on 03/11  Likely secondary to hypertriglyceridemia  Patient remains hemodynamically stable on Dilaudid PCA and MIVF  GI consulted; patient advanced from NPO to Clear Liquid Diet today  Continue to monitor for clinical improvement    Abdominal pain  Assessment & Plan  Improving  Present on admission  Lkely secondary to pancreatitis with atypical abdominal pain 2/2 chronic pain syndrome  Consulted APS; appreciate assistance  Patient remains on Dilaudid PCA; continue management per recs    Hypertriglyceridemia  Assessment & Plan  Chronic and improving  Noted to have elevated TG since 2013  TF on admission measured >1400  Insulin gtt ordered by GI was not initiated  Endocrinology consulted; appreciate assistance  Continue Lipitor and Tricor    Ovarian cyst  Assessment & Plan  Noted on CT A/P  Continue outpatient follow-up regarding right-sided ovarian cyst    Sepsis due to undetermined organism without resultant organ failure [facility])  Assessment & Plan  Worsening  Sepsis criteria: febrile and tachycardia  Likely secondary to acute pancreatitis  Blood cultures collected on 03/11 showed NGTD  Will defer antibiotics at this time   Continue to monitor daily CBC and fever curve    Moderate persistent asthma with acute exacerbation  Assessment & Plan  Not in acute exacerbation  Continue Symbicort and Singular    Hyperlipidemia  Assessment & Plan  Home Crestor not on formulary  Continue Lipitor 40 mg qhs        VTE Pharmacologic Prophylaxis:   Low Risk (Score 0-2) - Encourage Ambulation  Patient Centered Rounds: I performed bedside rounds with nursing staff today    Discussions with Specialists or Other Care Team Provider: GI resident, APS attending, Nurse and      Education and Discussions with Family / Patient: Updated  (, sister, brother and friend) at bedside  Time Spent for Care: 30 minutes  More than 50% of total time spent on counseling and coordination of care as described above  Current Length of Stay: 1 day(s)  Current Patient Status: Inpatient   Certification Statement: The patient will continue to require additional inpatient hospital stay due to acute pancreatitis management  Discharge Plan: Anticipate discharge in >72 hrs to home  Code Status: Level 1 - Full Code    Subjective  Patient states that pain is well managed with Dilaudid PCA  She's holding a cup of coffee and drinking it  Patient was advised to not drink coffee  Objective  Vitals:   Temp (24hrs), Av 7 °F (38 2 °C), Min:99 2 °F (37 3 °C), Max:102 5 °F (39 2 °C)    Temp:  [99 2 °F (37 3 °C)-102 5 °F (39 2 °C)] 99 2 °F (37 3 °C)  HR:  [115-133] 115  Resp:  [16-26] 16  BP: (114-136)/(68-84) 117/68  SpO2:  [89 %-95 %] 94 %  There is no height or weight on file to calculate BMI  Input and Output Summary (last 24 hours): Intake/Output Summary (Last 24 hours) at 3/12/2022 1425  Last data filed at 3/12/2022 1254  Gross per 24 hour   Intake 2230 87 ml   Output --   Net 2230 87 ml       Physical Exam:   Physical Exam  Vitals and nursing note reviewed  Constitutional:       Appearance: Normal appearance  She is not ill-appearing or diaphoretic  HENT:      Head: Normocephalic and atraumatic  Right Ear: External ear normal       Left Ear: External ear normal       Nose: Nose normal       Mouth/Throat:      Mouth: Mucous membranes are moist    Eyes:      General: No scleral icterus  Right eye: No discharge  Left eye: No discharge  Cardiovascular:      Rate and Rhythm: Regular rhythm  Tachycardia present  Heart sounds: No murmur heard    No gallop  Pulmonary:      Effort: Pulmonary effort is normal       Breath sounds: Normal breath sounds  No stridor  No rhonchi  Abdominal:      General: Bowel sounds are normal  There is no distension  Palpations: Abdomen is soft  Tenderness: There is no abdominal tenderness  There is no guarding or rebound  Comments: RLQ abdominal pain   Genitourinary:     Comments: No Ruelas present  Musculoskeletal:         General: No swelling or tenderness  Cervical back: Normal range of motion  Skin:     General: Skin is warm  Coloration: Skin is not pale  Findings: No erythema or rash  Neurological:      General: No focal deficit present  Mental Status: She is alert and oriented to person, place, and time  Mental status is at baseline  Psychiatric:         Mood and Affect: Mood normal          Thought Content: Thought content normal          Additional Data:     Labs:  Results from last 7 days   Lab Units 03/11/22 2046 03/11/22 0456 03/11/22 0456   WBC Thousand/uL 6 62   < > 6 56   HEMOGLOBIN g/dL 11 2*   < > 10 7*   HEMATOCRIT % 33 1*   < > 31 7*   PLATELETS Thousands/uL 247   < > 252   BANDS PCT %  --   --  2   NEUTROS PCT % 82*  --   --    LYMPHS PCT % 11*  --   --    LYMPHO PCT %  --   --  20   MONOS PCT % 7  --   --    MONO PCT %  --   --  4   EOS PCT % 0  --  0    < > = values in this interval not displayed  Results from last 7 days   Lab Units 03/12/22 0516 03/11/22 2046 03/11/22 2046   SODIUM mmol/L 134*   < > 135*   POTASSIUM mmol/L 3 9   < > 3 3*   CHLORIDE mmol/L 103   < > 103   CO2 mmol/L 25   < > 27   BUN mg/dL 4*   < > 5   CREATININE mg/dL 0 52*   < > 0 52*   ANION GAP mmol/L 6   < > 5   CALCIUM mg/dL 8 8   < > 8 7   ALBUMIN g/dL  --   --  2 7*   TOTAL BILIRUBIN mg/dL  --   --  0 69   ALK PHOS U/L  --   --  46   ALT U/L  --   --  14   AST U/L  --   --  12   GLUCOSE RANDOM mg/dL 153*   < > 152*    < > = values in this interval not displayed           Results from last 7 days   Lab Units 03/12/22  0651 03/11/22  2157 03/11/22  1152   POC GLUCOSE mg/dl 121 153* 93     Results from last 7 days   Lab Units 03/11/22  0456   HEMOGLOBIN A1C % 5 1     Results from last 7 days   Lab Units 03/12/22  0516 03/12/22  0012 03/11/22  2047   LACTIC ACID mmol/L  --   --  1 3   PROCALCITONIN ng/ml 0 38* 0 18  --        Lines/Drains:  Invasive Devices  Report    Peripheral Intravenous Line            Peripheral IV 03/10/22 Dorsal (posterior); Left Forearm 2 days    Peripheral IV 03/10/22 Right Forearm 2 days                Imaging: No pertinent imaging reviewed  Recent Cultures (last 7 days):   Results from last 7 days   Lab Units 03/11/22  2049 03/11/22 2046   BLOOD CULTURE  Received in Microbiology Lab  Culture in Progress  Received in Microbiology Lab  Culture in Progress  Last 24 Hours Medication List:   Current Facility-Administered Medications   Medication Dose Route Frequency Provider Last Rate    acetaminophen  650 mg Oral Q6H PRN Hetul Llanes, DO      atorvastatin  40 mg Oral Daily With [employer], DO      budesonide-formoterol  2 puff Inhalation BID Hetul Llanes, DO      dextrose 5% lactated ringer's  200 mL/hr Intravenous Continuous Rajwinder Shupp,  mL/hr (03/12/22 1254)    fenofibrate  145 mg Oral Daily Ina Monroy MD      HYDROmorphone   Intravenous Continuous Kwesi Caldera PA-C      montelukast  10 mg Oral HS Hetul Llanes, DO      ondansetron  4 mg Intravenous Q6H PRN Hetul Llanes, DO      polyethylene glycol  17 g Oral Daily Elba Seth MD      potassium chloride  20 mEq Intravenous BID Neftali Crocker PA-C Stopped (03/12/22 5164)    senna-docusate sodium  1 tablet Oral HS Elba Seth MD          Today, Patient Was Seen By: Callum Blevins MD    **Please Note: This note may have been constructed using a voice recognition system  **

## 2022-03-12 NOTE — ASSESSMENT & PLAN NOTE
Chronic and worsening  Noted to have elevated TG in 2013  TF on admission measured >1000  Started on insulin gtt and Endocrinology consulted  Continue Lipitor and Tricor

## 2022-03-12 NOTE — ASSESSMENT & PLAN NOTE
Symptoms including pain/nausea improving  Follow-up imaging on 3/14 noting "Stable pancreatitis with stable appearance of the fluid extending from the pancreas into the root of the mesentery and retroperitoneum in addition to a small amount of fluid within the pelvis   No evidence for intra-abdominal or pelvic hemorrhage "  Likely secondary to hypertriglyceridemia  Continue IV fluids  Appreciate pain management input -> Dilaudid PCA transitioned to an oral/IV analgesic regimen  Remains on clear liquids - surgery recommending not advancing diet yet until further improvement in pain  Appreciate gastroenterology input  PRN emesis control

## 2022-03-12 NOTE — PROGRESS NOTES
[facility]  Progress Note - Carly Buckley 1980, 46 y o  female MRN: [id number]  Unit/Bed#: [facility] 915-01 Encounter: [id number]  Primary Care Provider: Lucy Alcocer MD   Date and time admitted to hospital: 3/10/2022  6:33 AM    Abdominal pain  Assessment & Plan  Present on admission  Worsening  Lkely secondary to pancreatitis with atypical abdominal pain 2/2 chronic pain syndrome  Consulted APS; appreciate assistance  Patient started on Dilaudid PCA; continue management per recs    Hypertriglyceridemia  Assessment & Plan  Chronic and worsening  Noted to have elevated TG in 2013  TF on admission measured >1000  Started on insulin gtt and Endocrinology consulted  Continue Lipitor and Tricor    Ovarian cyst  Assessment & Plan  Noted on CT A/P  Continue outpatient follow-up regarding right-sided ovarian cyst    Moderate persistent asthma with acute exacerbation  Assessment & Plan  Not in acute exacerbation  Continue Symbicort and Singular    Hyperlipidemia  Assessment & Plan  Home Crestor not on formulary  Continue Lipitor 40 mg qhs          VTE Pharmacologic Prophylaxis:   Low Risk (Score 0-2) - Encourage Ambulation  Patient Centered Rounds: I performed bedside rounds with nursing staff today  Discussions with Specialists or Other Care Team Provider: GI resident, APS attending, Nurse and      Education and Discussions with Family / Patient: Updated  (sister and brother) at bedside  Time Spent for Care: 30 minutes  More than 50% of total time spent on counseling and coordination of care as described above  Current Length of Stay: 0 day(s)  Current Patient Status: Observation   Certification Statement: The patient will continue to require additional inpatient hospital stay due to acute pancreatitis management  Discharge Plan: Anticipate discharge in >72 hrs to home      Code Status: Level 1 - Full Code    Subjective:   Patient's brother provides translation  Patient reports having right lower abdominal pain rated 10 out of 10  The scar on the LLQ is from an accident as a child  Patient denies chest pain, nausea, or vomiting  Objective:     Vitals:   Temp (24hrs), Av 4 °F (38 °C), Min:99 7 °F (37 6 °C), Max:101 °F (38 3 °C)    Temp:  [99 7 °F (37 6 °C)-101 °F (38 3 °C)] 101 °F (38 3 °C)  HR:  [107-133] 128  Resp:  [18-26] 22  BP: (105-136)/(61-84) 126/83  SpO2:  [91 %-96 %] 91 %  There is no height or weight on file to calculate BMI  Input and Output Summary (last 24 hours): Intake/Output Summary (Last 24 hours) at 3/11/2022 1930  Last data filed at 3/11/2022 1809  Gross per 24 hour   Intake 2451 25 ml   Output --   Net 2451 25 ml       Physical Exam:   Physical Exam  Vitals and nursing note reviewed  Constitutional:       General: She is in acute distress  Appearance: She is not toxic-appearing  HENT:      Head: Normocephalic and atraumatic  Right Ear: External ear normal       Left Ear: External ear normal       Nose: Nose normal       Mouth/Throat:      Mouth: Mucous membranes are moist    Eyes:      Extraocular Movements: Extraocular movements intact  Conjunctiva/sclera: Conjunctivae normal    Cardiovascular:      Rate and Rhythm: Regular rhythm  Tachycardia present  Heart sounds: No murmur heard  Pulmonary:      Effort: Pulmonary effort is normal  No respiratory distress  Breath sounds: Normal breath sounds  Abdominal:      General: Bowel sounds are normal  There is no distension  Palpations: Abdomen is soft  Tenderness: There is abdominal tenderness  Comments: RLQ abdominal pain   Genitourinary:     Comments: No Ruelas present  Musculoskeletal:      Cervical back: Normal range of motion  Right lower leg: No edema  Left lower leg: No edema  Skin:     General: Skin is warm  Coloration: Skin is not jaundiced  Findings: No bruising or lesion     Neurological: General: No focal deficit present  Mental Status: She is alert and oriented to person, place, and time  Mental status is at baseline  Psychiatric:         Mood and Affect: Mood normal          Additional Data:     Labs:  Results from last 7 days   Lab Units 03/11/22  0456 03/10/22  0717 03/10/22  0717   WBC Thousand/uL 6 56   < > 6 56   HEMOGLOBIN g/dL 10 7*   < > 12 2   HEMATOCRIT % 31 7*   < > 34 3*   PLATELETS Thousands/uL 252   < > 302   BANDS PCT % 2  --   --    NEUTROS PCT %  --   --  76*   LYMPHS PCT %  --   --  16   LYMPHO PCT % 20  --   --    MONOS PCT %  --   --  7   MONO PCT % 4  --   --    EOS PCT % 0  --  0    < > = values in this interval not displayed  Results from last 7 days   Lab Units 03/10/22  1022 03/10/22  0717 03/10/22  0717   SODIUM mmol/L 131*   < > 130*   POTASSIUM mmol/L 3 8   < > 3 6   CHLORIDE mmol/L 102   < > 100   CO2 mmol/L 21   < > 9*   BUN mg/dL 6   < > 6   CREATININE mg/dL 0 52*   < > 0 64   ANION GAP mmol/L 8   < > 21*   CALCIUM mg/dL 8 3   < > 8 5   ALBUMIN g/dL  --   --  3 5   TOTAL BILIRUBIN mg/dL  --   --  0 64   ALK PHOS U/L  --   --  63   ALT U/L  --   --  67   AST U/L  --   --  24   GLUCOSE RANDOM mg/dL 149*   < > 168*    < > = values in this interval not displayed  Results from last 7 days   Lab Units 03/11/22  1152   POC GLUCOSE mg/dl 93     Results from last 7 days   Lab Units 03/11/22  0456   HEMOGLOBIN A1C % 5 1           Lines/Drains:  Invasive Devices  Report    Peripheral Intravenous Line            Peripheral IV 03/10/22 Dorsal (posterior); Left Forearm 1 day    Peripheral IV 03/10/22 Right Forearm 1 day                Imaging: Reviewed radiology reports from this admission including: abdominal/pelvic CT    Recent Cultures (last 7 days):         Last 24 Hours Medication List:   Current Facility-Administered Medications   Medication Dose Route Frequency Provider Last Rate    acetaminophen  650 mg Oral Q6H PRN Bernie Llanes DO      atorvastatin  40 mg Oral Daily With [employer], DO      budesonide-formoterol  2 puff Inhalation BID Hetul Llanes, DO      dextrose 5% lactated ringer's  200 mL/hr Intravenous Continuous Rajwinder Shupp,  mL/hr (03/11/22 1407)    [START ON 3/12/2022] fenofibrate  145 mg Oral Daily Aziza Vo MD      HYDROmorphone   Intravenous Continuous Cherylynn Snare, PA-C      HYDROmorphone  0 5 mg Intravenous Once Cherylynn Snare, PA-C      insulin regular (HumuLIN R,NovoLIN R) infusion  0 3-21 Units/hr Intravenous Titrated Rajwinder Shupp, DO Stopped (03/11/22 1148)    montelukast  10 mg Oral HS Hetul Llanes, DO      ondansetron  4 mg Intravenous Q6H PRN Hetul Llanes, DO          Today, Patient Was Seen By: Oliver Hallman MD    **Please Note: This note may have been constructed using a voice recognition system  **

## 2022-03-12 NOTE — ASSESSMENT & PLAN NOTE
Improving  Present on admission  Lkely secondary to pancreatitis with atypical abdominal pain 2/2 chronic pain syndrome  Consulted APS; appreciate assistance  Patient currently on Dilaudid PCA; continue management per recs

## 2022-03-13 ENCOUNTER — APPOINTMENT (INPATIENT)
Dept: RADIOLOGY | Facility: HOSPITAL | Age: 42
DRG: 440 | End: 2022-03-13
Payer: COMMERCIAL

## 2022-03-13 LAB
ALBUMIN SERPL BCP-MCNC: 2.1 G/DL (ref 3.5–5)
ALP SERPL-CCNC: 39 U/L (ref 46–116)
ALT SERPL W P-5'-P-CCNC: 10 U/L (ref 12–78)
ANION GAP SERPL CALCULATED.3IONS-SCNC: 6 MMOL/L (ref 4–13)
AST SERPL W P-5'-P-CCNC: 11 U/L (ref 5–45)
BASE EX.OXY STD BLDV CALC-SCNC: 67 % (ref 60–80)
BASE EXCESS BLDV CALC-SCNC: -0.5 MMOL/L
BASOPHILS # BLD AUTO: 0.01 THOUSANDS/ΜL (ref 0–0.1)
BASOPHILS NFR BLD AUTO: 0 % (ref 0–1)
BILIRUB SERPL-MCNC: 0.59 MG/DL (ref 0.2–1)
BUN SERPL-MCNC: 2 MG/DL (ref 5–25)
CALCIUM ALBUM COR SERPL-MCNC: 10.1 MG/DL (ref 8.3–10.1)
CALCIUM SERPL-MCNC: 8.6 MG/DL (ref 8.3–10.1)
CHLORIDE SERPL-SCNC: 106 MMOL/L (ref 100–108)
CO2 SERPL-SCNC: 27 MMOL/L (ref 21–32)
CREAT SERPL-MCNC: 0.51 MG/DL (ref 0.6–1.3)
EOSINOPHIL # BLD AUTO: 0.05 THOUSAND/ΜL (ref 0–0.61)
EOSINOPHIL NFR BLD AUTO: 1 % (ref 0–6)
ERYTHROCYTE [DISTWIDTH] IN BLOOD BY AUTOMATED COUNT: 14.3 % (ref 11.6–15.1)
GFR SERPL CREATININE-BSD FRML MDRD: 121 ML/MIN/1.73SQ M
GLUCOSE SERPL-MCNC: 148 MG/DL (ref 65–140)
GLUCOSE SERPL-MCNC: 152 MG/DL (ref 65–140)
GLUCOSE SERPL-MCNC: 90 MG/DL (ref 65–140)
GLUCOSE SERPL-MCNC: 97 MG/DL (ref 65–140)
HCO3 BLDV-SCNC: 25.4 MMOL/L (ref 24–30)
HCT VFR BLD AUTO: 26.1 % (ref 34.8–46.1)
HGB BLD-MCNC: 8.4 G/DL (ref 11.5–15.4)
IMM GRANULOCYTES # BLD AUTO: 0.01 THOUSAND/UL (ref 0–0.2)
IMM GRANULOCYTES NFR BLD AUTO: 0 % (ref 0–2)
LYMPHOCYTES # BLD AUTO: 0.69 THOUSANDS/ΜL (ref 0.6–4.47)
LYMPHOCYTES NFR BLD AUTO: 14 % (ref 14–44)
MAGNESIUM SERPL-MCNC: 2 MG/DL (ref 1.6–2.6)
MCH RBC QN AUTO: 26.1 PG (ref 26.8–34.3)
MCHC RBC AUTO-ENTMCNC: 32.2 G/DL (ref 31.4–37.4)
MCV RBC AUTO: 81 FL (ref 82–98)
MONOCYTES # BLD AUTO: 0.53 THOUSAND/ΜL (ref 0.17–1.22)
MONOCYTES NFR BLD AUTO: 11 % (ref 4–12)
NEUTROPHILS # BLD AUTO: 3.62 THOUSANDS/ΜL (ref 1.85–7.62)
NEUTS SEG NFR BLD AUTO: 74 % (ref 43–75)
NRBC BLD AUTO-RTO: 0 /100 WBCS
NT-PROBNP SERPL-MCNC: 271 PG/ML
O2 CT BLDV-SCNC: 9.8 ML/DL
PCO2 BLDV: 47.3 MM HG (ref 42–50)
PH BLDV: 7.35 [PH] (ref 7.3–7.4)
PHOSPHATE SERPL-MCNC: 2.2 MG/DL (ref 2.7–4.5)
PLATELET # BLD AUTO: 207 THOUSANDS/UL (ref 149–390)
PMV BLD AUTO: 10.5 FL (ref 8.9–12.7)
PO2 BLDV: 36.9 MM HG (ref 35–45)
POTASSIUM SERPL-SCNC: 3.4 MMOL/L (ref 3.5–5.3)
PROCALCITONIN SERPL-MCNC: 0.26 NG/ML
PROT SERPL-MCNC: 5.7 G/DL (ref 6.4–8.2)
RBC # BLD AUTO: 3.22 MILLION/UL (ref 3.81–5.12)
SODIUM SERPL-SCNC: 139 MMOL/L (ref 136–145)
TRIGL SERPL-MCNC: 435 MG/DL
WBC # BLD AUTO: 4.91 THOUSAND/UL (ref 4.31–10.16)

## 2022-03-13 PROCEDURE — 84478 ASSAY OF TRIGLYCERIDES: CPT | Performed by: INTERNAL MEDICINE

## 2022-03-13 PROCEDURE — 83880 ASSAY OF NATRIURETIC PEPTIDE: CPT | Performed by: INTERNAL MEDICINE

## 2022-03-13 PROCEDURE — 71045 X-RAY EXAM CHEST 1 VIEW: CPT

## 2022-03-13 PROCEDURE — 80053 COMPREHEN METABOLIC PANEL: CPT | Performed by: INTERNAL MEDICINE

## 2022-03-13 PROCEDURE — 87040 BLOOD CULTURE FOR BACTERIA: CPT | Performed by: PHYSICIAN ASSISTANT

## 2022-03-13 PROCEDURE — 85025 COMPLETE CBC W/AUTO DIFF WBC: CPT | Performed by: INTERNAL MEDICINE

## 2022-03-13 PROCEDURE — 83735 ASSAY OF MAGNESIUM: CPT | Performed by: INTERNAL MEDICINE

## 2022-03-13 PROCEDURE — 84478 ASSAY OF TRIGLYCERIDES: CPT | Performed by: STUDENT IN AN ORGANIZED HEALTH CARE EDUCATION/TRAINING PROGRAM

## 2022-03-13 PROCEDURE — 94640 AIRWAY INHALATION TREATMENT: CPT

## 2022-03-13 PROCEDURE — 84145 PROCALCITONIN (PCT): CPT | Performed by: PHYSICIAN ASSISTANT

## 2022-03-13 PROCEDURE — 99233 SBSQ HOSP IP/OBS HIGH 50: CPT | Performed by: INTERNAL MEDICINE

## 2022-03-13 PROCEDURE — 94760 N-INVAS EAR/PLS OXIMETRY 1: CPT

## 2022-03-13 PROCEDURE — 82948 REAGENT STRIP/BLOOD GLUCOSE: CPT

## 2022-03-13 PROCEDURE — 99232 SBSQ HOSP IP/OBS MODERATE 35: CPT | Performed by: INTERNAL MEDICINE

## 2022-03-13 PROCEDURE — 82805 BLOOD GASES W/O2 SATURATION: CPT | Performed by: PHYSICIAN ASSISTANT

## 2022-03-13 PROCEDURE — 84100 ASSAY OF PHOSPHORUS: CPT | Performed by: INTERNAL MEDICINE

## 2022-03-13 RX ORDER — POTASSIUM CHLORIDE 20 MEQ/1
40 TABLET, EXTENDED RELEASE ORAL ONCE
Status: COMPLETED | OUTPATIENT
Start: 2022-03-13 | End: 2022-03-13

## 2022-03-13 RX ORDER — POTASSIUM CHLORIDE 14.9 MG/ML
20 INJECTION INTRAVENOUS ONCE
Status: COMPLETED | OUTPATIENT
Start: 2022-03-13 | End: 2022-03-13

## 2022-03-13 RX ORDER — ALBUTEROL SULFATE 90 UG/1
2 AEROSOL, METERED RESPIRATORY (INHALATION) EVERY 4 HOURS PRN
Status: DISCONTINUED | OUTPATIENT
Start: 2022-03-13 | End: 2022-03-18 | Stop reason: HOSPADM

## 2022-03-13 RX ORDER — DEXTROSE, SODIUM CHLORIDE, SODIUM LACTATE, POTASSIUM CHLORIDE, AND CALCIUM CHLORIDE 5; .6; .31; .03; .02 G/100ML; G/100ML; G/100ML; G/100ML; G/100ML
125 INJECTION, SOLUTION INTRAVENOUS CONTINUOUS
Status: DISCONTINUED | OUTPATIENT
Start: 2022-03-13 | End: 2022-03-18 | Stop reason: HOSPADM

## 2022-03-13 RX ORDER — ALBUTEROL SULFATE 2.5 MG/3ML
2.5 SOLUTION RESPIRATORY (INHALATION) ONCE
Status: COMPLETED | OUTPATIENT
Start: 2022-03-13 | End: 2022-03-13

## 2022-03-13 RX ADMIN — MONTELUKAST 10 MG: 10 TABLET, FILM COATED ORAL at 21:15

## 2022-03-13 RX ADMIN — ATORVASTATIN CALCIUM 40 MG: 40 TABLET, FILM COATED ORAL at 17:58

## 2022-03-13 RX ADMIN — BUDESONIDE AND FORMOTEROL FUMARATE DIHYDRATE 2 PUFF: 80; 4.5 AEROSOL RESPIRATORY (INHALATION) at 08:51

## 2022-03-13 RX ADMIN — ONDANSETRON 4 MG: 2 INJECTION INTRAMUSCULAR; INTRAVENOUS at 07:21

## 2022-03-13 RX ADMIN — DIBASIC SODIUM PHOSPHATE, MONOBASIC POTASSIUM PHOSPHATE AND MONOBASIC SODIUM PHOSPHATE 1 TABLET: 852; 155; 130 TABLET ORAL at 21:15

## 2022-03-13 RX ADMIN — ACETAMINOPHEN 325MG 650 MG: 325 TABLET ORAL at 18:03

## 2022-03-13 RX ADMIN — SENNOSIDES AND DOCUSATE SODIUM 1 TABLET: 50; 8.6 TABLET ORAL at 21:15

## 2022-03-13 RX ADMIN — ENOXAPARIN SODIUM 40 MG: 40 INJECTION SUBCUTANEOUS at 15:04

## 2022-03-13 RX ADMIN — DIBASIC SODIUM PHOSPHATE, MONOBASIC POTASSIUM PHOSPHATE AND MONOBASIC SODIUM PHOSPHATE 1 TABLET: 852; 155; 130 TABLET ORAL at 08:36

## 2022-03-13 RX ADMIN — BUDESONIDE AND FORMOTEROL FUMARATE DIHYDRATE 2 PUFF: 80; 4.5 AEROSOL RESPIRATORY (INHALATION) at 17:58

## 2022-03-13 RX ADMIN — POLYETHYLENE GLYCOL 3350 17 G: 17 POWDER, FOR SOLUTION ORAL at 08:50

## 2022-03-13 RX ADMIN — DEXTROSE, SODIUM CHLORIDE, SODIUM LACTATE, POTASSIUM CHLORIDE, AND CALCIUM CHLORIDE 200 ML/HR: 5; .6; .31; .03; .02 INJECTION, SOLUTION INTRAVENOUS at 05:26

## 2022-03-13 RX ADMIN — POTASSIUM CHLORIDE 40 MEQ: 1500 TABLET, EXTENDED RELEASE ORAL at 08:36

## 2022-03-13 RX ADMIN — POTASSIUM CHLORIDE 20 MEQ: 14.9 INJECTION, SOLUTION INTRAVENOUS at 08:46

## 2022-03-13 RX ADMIN — DIBASIC SODIUM PHOSPHATE, MONOBASIC POTASSIUM PHOSPHATE AND MONOBASIC SODIUM PHOSPHATE 1 TABLET: 852; 155; 130 TABLET ORAL at 15:04

## 2022-03-13 RX ADMIN — DEXTROSE, SODIUM CHLORIDE, SODIUM LACTATE, POTASSIUM CHLORIDE, AND CALCIUM CHLORIDE 200 ML/HR: 5; .6; .31; .03; .02 INJECTION, SOLUTION INTRAVENOUS at 21:14

## 2022-03-13 RX ADMIN — ALBUTEROL SULFATE 2.5 MG: 2.5 SOLUTION RESPIRATORY (INHALATION) at 07:26

## 2022-03-13 RX ADMIN — ACETAMINOPHEN 325MG 650 MG: 325 TABLET ORAL at 08:36

## 2022-03-13 NOTE — PROGRESS NOTES
Gastroenterology Progress Note      PATIENT INFORMATION      Patient: Sivan Drummond 46 y o  female   MRN: [id number]  PCP: Beau Mike MD  Unit/Bed#: [facility] 915-01 Encounter: [id number]  Date Of Visit: 22  Current Length of Stay: 2 day(s)     ASSESSMENTS & PLAN    46year old female with hypertriglyceridemia induced pancreatitis      Pancreatitis  · As evidenced by pain, lipase elevation and CT findings  · Etiology secondary to hypertriglyceridemia- presented with a level of 1455  · IVF at 200/hour  · Was on insulin drip but discontinued per endocrinology  · On statin, fibrate  · Trending triglycerides Q12H- improving  · No indication for plasmapheresis currently   · Will keep clear liquid diet today because of significant pain  · Pain control with dilaudid drip      Constipation  · Currently improved  · Likely opioid induced  · Continue MiraLax and Senokot        Disposition: We will follow, plan discussed with  at bedside        SUBJECTIVE     Patient reports significant abdominal pain same as yesterday  She reports having a bowel movement yesterday and that helped her abdominal distention  OBJECTIVE     Vitals:   Temp (24hrs), Av 4 °F (37 4 °C), Min:98 1 °F (36 7 °C), Max:101 3 °F (38 5 °C)    Temp:  [98 1 °F (36 7 °C)-101 3 °F (38 5 °C)] 98 1 °F (36 7 °C)  HR:  [] 98  Resp:  [18-22] 18  BP: ()/(52-85) 95/55  SpO2:  [91 %-99 %] 99 %  There is no height or weight on file to calculate BMI  Input and Output Summary (last 24 hours):        Intake/Output Summary (Last 24 hours) at 3/13/2022 1248  Last data filed at 3/13/2022 0751  Gross per 24 hour   Intake 4295 27 ml   Output 700 ml   Net 3595 27 ml       Physical Exam:   GENERAL: Non toxic appearing  HEENT:  Normocephalic, atraumatic, pupils bilaterally reactive to light  CARDIAC:  Regular rate and rhythm, S1, S2 heard, no murmurs  PULMONARY:  CTA B/L, no wheezing/rales/rhonci, non-labored breathing  ABDOMEN:  Tender in the epigastric and right upper quadrant  Extremities:  2+ Pulses in DP/PT  No edema, cyanosis, or clubbing  NEUROLOGIC:  Alert/oriented x3  SKIN:  No rashes or erythema          ADDITIONAL DATA     Labs & Recent Cultures:     Results from last 7 days   Lab Units 03/13/22  0604   WBC Thousand/uL 4 91   HEMOGLOBIN g/dL 8 4*   HEMATOCRIT % 26 1*   PLATELETS Thousands/uL 207   NEUTROS PCT % 74   LYMPHS PCT % 14   MONOS PCT % 11   EOS PCT % 1     Results from last 7 days   Lab Units 03/13/22  0604   POTASSIUM mmol/L 3 4*   CHLORIDE mmol/L 106   CO2 mmol/L 27   BUN mg/dL 2*   CREATININE mg/dL 0 51*   CALCIUM mg/dL 8 6   ALK PHOS U/L 39*   ALT U/L 10*   AST U/L 11             Results from last 7 days   Lab Units 03/11/22 2049 03/11/22 2046   BLOOD CULTURE  No Growth at 24 hrs  No Growth at 24 hrs  Nutrition:  Diet Clear Liquid  Radiology Results:   XR chest portable   Final Result by Magi Pearson MD (03/13 2134)      Infiltrates or atelectasis at the right lung base  Atelectasis at the left lung base  Workstation performed: [id number]         CT abdomen pelvis w contrast   Final Result by Drew Randall MD (03/11 1720)         1  Worsening pancreatitis  Increasing fluid in the right mid abdomen, retroperitoneum and pelvis  No evidence of fluid collection or pancreatic necrosis  2   Mild duodenitis in the 2nd portion of the duodenum and mild focal colitis at the hepatic flexure, both secondary to adjacent peripancreatic inflammation inflammation  No evidence of bowel obstruction or perforation            Workstation performed: [id number]         US right upper quadrant   Final Result by Pio Bliss DO (03/12 8671)   1  Suboptimal examination due to overlying bowel gas  2   Small amount of free fluid surrounding the liver and pancreas, consistent with the history of acute pancreatitis  3   Hepatomegaly with fatty infiltration              Please see follow-up CT abdomen pelvis for more in-depth details  The results of which are documented in the patient's chart  CT scan is performed following this ultrasound  The study documented worsening pancreatitis not appreciated on the ultrasound    due to limited visualization of the pancreas secondary to overlying bowel gas  Workstation performed: [id number]         CT abdomen pelvis with contrast   Final Result by Sonia Polanco MD (03/10 1150)      Findings consistent with acute pancreatitis  No focal intrapancreatic or peripancreatic fluid collection  No evidence for pancreatic necrosis  Small sliding type hiatal hernia, similar to prior  Hepatomegaly  3 7 x 2 6 x 2 1 cm septated right ovarian cyst  This can be further evaluated with nonemergent dedicated pelvic ultrasound as clinically warranted  The study was marked in [facility] for immediate notification              Workstation performed: [id number]           Scheduled Medications:  atorvastatin, 40 mg, Daily With Dinner  budesonide-formoterol, 2 puff, BID  fenofibrate, 145 mg, Daily  montelukast, 10 mg, HS  polyethylene glycol, 17 g, Daily  potassium-sodium phosphateS, 1 tablet, 4x Daily (with meals and at bedtime)  senna-docusate sodium, 1 tablet, HS        PRN MEDS:  acetaminophen, 650 mg, Q6H PRN  albuterol, 2 puff, Q4H PRN  ondansetron, 4 mg, Q6H PRN        Last 24 Hours Medication List:   Current Facility-Administered Medications   Medication Dose Route Frequency Provider Last Rate    acetaminophen  650 mg Oral Q6H PRN Hetul Llanes, DO      albuterol  2 puff Inhalation Q4H PRN Isabel Pascual MD      atorvastatin  40 mg Oral Daily With [employer], DO      budesonide-formoterol  2 puff Inhalation BID Hetul Llanes, DO      fenofibrate  145 mg Oral Daily Swati Warren MD      HYDROmorphone   Intravenous Continuous Dietrich Buerger, PA-C      montelukast  10 mg Oral HS Hetul Llanes, DO      ondansetron  4 mg Intravenous Q6H PRN Bernie Llanes DO      polyethylene glycol  17 g Oral Daily Pamela Kahn MD      potassium-sodium phosphateS  1 tablet Oral 4x Daily (with meals and at bedtime) Isabel Pascual MD      senna-docusate sodium  1 tablet Oral HS Pamela Kahn MD            Time Spent for Care: 30 mins spent in total   More than 50% of total time spent on counseling and coordination of care as described above  Current Length of Stay: 2 day(s)      Code Status: Level 1 - Full Code          ** Please Note: This note is constructed using a voice recognition dictation system   **

## 2022-03-13 NOTE — QUICK NOTE
Eval patient this morning for SOB   [id number] used for conversation  Patient reports since last evening she has felt SOB  No desat on RA, placed on 2L NC for comfort through night  Intermittent dry cough  Wheezing throughout bilateral lung fields  Also reports facial feels puffy  Denies trouble swallowing or mouth/throat/neck swelling  I/O recording not accurate, has had > 9 5L intake since admission  Order placed for I/O monitoring  No change to abdominal pain this morning  CXR Infiltrates or atelectasis at the right lung base  Atelectasis at the left lung base  - IVF on hold for now  - Hx asthma  RT protocol and tx given, prn albuterol to be ordered  - IS  - Noted elevated procal yesterday 0 38  Will repeat this morning  Consider antibiotics for pneumonia if elevated

## 2022-03-13 NOTE — RESPIRATORY THERAPY NOTE
RT Protocol Note  Marjan Bhandari 46 y o  female MRN: [id number]  Unit/Bed#: [facility] 915-01 Encounter: [id number]    Assessment    Principal Problem:    Acute pancreatitis  Active Problems:    Hypertriglyceridemia    Hyperlipidemia    Ovarian cyst    Abdominal pain    Moderate persistent asthma with acute exacerbation    Sepsis due to undetermined organism without resultant organ failure (HCC)    Hypokalemia    Hypophosphatemia      Home Pulmonary Medications:  symbicort       Past Medical History:   Diagnosis Date    COVID-19 05/2020    Esophagitis     H  pylori infection 12/2018    Hepatic steatosis     Hiatal hernia     Hiatal hernia     High triglycerides     Hyperlipidemia     Hypertension     Ventral hernia      Social History     Socioeconomic History    Marital status: /Civil Union     Spouse name: Shaheed Vázquez Number of children: 3    Years of education: None    Highest education level: None   Occupational History    None   Tobacco Use    Smoking status: Never Smoker    Smokeless tobacco: Never Used   Vaping Use    Vaping Use: Never used   Substance and Sexual Activity    Alcohol use: No    Drug use: No    Sexual activity: Yes     Partners: Male     Birth control/protection: Female Sterilization   Other Topics Concern    None   Social History Narrative    Non smoker        Lives with  and children    Caffeine use    No recreational drug use    Always wears seatbelts    Three children    [facility]     Social Determinants of Health     Financial Resource Strain: Not on file   Food Insecurity: Not on file   Transportation Needs: Not on file   Physical Activity: Not on file   Stress: Not on file   Social Connections: Not on file   Intimate Partner Violence: Not on file   Housing Stability: Not on file       Subjective         Objective    Physical Exam:   Assessment Type: Pre-treatment  General Appearance: Awake,Alert  Respiratory Pattern: Normal  Chest Assessment: Chest expansion symmetrical  Bilateral Breath Sounds: Expiratory wheezes,Inspiratory wheezes    Vitals:  Blood pressure 124/58, pulse 98, temperature 98 9 °F (37 2 °C), resp  rate 22, SpO2 97 %, not currently breastfeeding  Imaging and other studies: I have personally reviewed pertinent reports  Plan    Respiratory Plan: Home Bronchodilator Patient pathway        Resp Comments: (P) pt has hx of asthma takes symbicort at home  called to pts room for sob  pt on 2l nc SpO2 = 97%  pt uses inhaler at home   pt has been receiving fluids gven one time albuterol udn will order albuterol mid q4 prn  per respiratroy protocol

## 2022-03-13 NOTE — PROGRESS NOTES
[address] [facility]  Progress Note - Savanna Mckeon 1980, 46 y o  female MRN: [id number]  Unit/Bed#: [facility] 915-01 Encounter: [id number]  Primary Care Provider: Glynn Martin MD   Date and time admitted to hospital: 3/10/2022  6:33 AM    * Acute pancreatitis  Assessment & Plan  Present on admission   Noted on CT A/P on 03/11  Likely secondary to hypertriglyceridemia  Patient remains hemodynamically stable on Dilaudid PCA and MIVF  GI consulted; patient advanced to Clear Liquid Diet on 03/12  Continue to monitor for clinical improvement    Sepsis due to undetermined organism without resultant organ failure [facility])  Assessment & Plan  Stable  Sepsis criteria: febrile and tachycardia  Likely secondary to acute pancreatitis noted on CT A/P  AM CXR identified "Infiltrates or atelectasis at the right lung base;atelectasis at the left lung base"  Blood cultures collected on 03/11 showed NGTD  Pending repeat Blood cultures collected on 03/16  Procalcitonin mildly above range at 0 26 and of no clinical significance  Will defer antibiotics at this time   Encourage incentive spirometer use  Continue to monitor daily CBC and fever curve    Abdominal pain  Assessment & Plan  Improving  Present on admission  Lkely secondary to pancreatitis with atypical abdominal pain 2/2 chronic pain syndrome  Consulted APS; appreciate assistance  Patient remains on Dilaudid PCA; continue management per recs    Hypertriglyceridemia  Assessment & Plan  Chronic and improving  Noted to have elevated TG since 2013  TF on admission measured >1400  Insulin gtt ordered by GI was not initiated  Endocrinology consulted; appreciate assistance  Continue Lipitor and Tricor    Ovarian cyst  Assessment & Plan  Noted on CT A/P  Continue outpatient follow-up regarding right-sided ovarian cyst    Hypophosphatemia  Assessment & Plan  Improving  AM Phosphorous measured at 2 2  PO replacement ordered  Continue to monitor daily Phosphorous    Hypokalemia  Assessment & Plan  Improving  AM Potassium measured at 3 4  PO/IV replacement ordered  Continue to monitor daily BMP     Moderate persistent asthma with acute exacerbation  Assessment & Plan  Not in acute exacerbation  Continue Symbicort and Singular    Hyperlipidemia  Assessment & Plan  Home Crestor not on formulary  Continue Lipitor 40 mg qhs        VTE Pharmacologic Prophylaxis:   Moderate Risk (Score 3-4) - Pharmacological DVT Prophylaxis Ordered: enoxaparin (Lovenox)  Patient Centered Rounds: I performed bedside rounds with nursing staff today  Discussions with Specialists or Other Care Team Provider: Nurse and      Education and Discussions with Family / Patient: Updated  ( and sister) at bedside  Time Spent for Care: 30 minutes  More than 50% of total time spent on counseling and coordination of care as described above  Current Length of Stay: 2 day(s)  Current Patient Status: Inpatient   Certification Statement: The patient will continue to require additional inpatient hospital stay due to acute pancreatitis management  Discharge Plan: Anticipate discharge in >72 hrs to home  Code Status: Level 1 - Full Code    Subjective  Patient states she felt shortness of breath this morning and broke out into sweats  She denies abdominal pain, fevers, chills, or chest pain  Objective  Vitals:   Temp (24hrs), Av 4 °F (37 4 °C), Min:98 1 °F (36 7 °C), Max:101 3 °F (38 5 °C)    Temp:  [98 1 °F (36 7 °C)-101 3 °F (38 5 °C)] 98 1 °F (36 7 °C)  HR:  [] 98  Resp:  [18-22] 18  BP: ()/(52-85) 95/55  SpO2:  [91 %-99 %] 99 %  There is no height or weight on file to calculate BMI  Input and Output Summary (last 24 hours):      Intake/Output Summary (Last 24 hours) at 3/13/2022 1331  Last data filed at 3/13/2022 0751  Gross per 24 hour   Intake 3295 27 ml   Output 700 ml   Net 2595 27 ml       Physical Exam:   Physical Exam  Vitals and nursing note reviewed  Constitutional:       General: She is not in acute distress  Appearance: Normal appearance  She is not toxic-appearing  Comments: Sitting in chair and having hair groomed   HENT:      Head: Normocephalic and atraumatic  Right Ear: External ear normal       Left Ear: External ear normal       Nose: Nose normal       Mouth/Throat:      Mouth: Mucous membranes are moist    Eyes:      Extraocular Movements: Extraocular movements intact  Conjunctiva/sclera: Conjunctivae normal    Cardiovascular:      Rate and Rhythm: Regular rhythm  Tachycardia present  Heart sounds: No friction rub  No gallop  Pulmonary:      Effort: Pulmonary effort is normal  No respiratory distress  Breath sounds: Normal breath sounds  No wheezing  Abdominal:      General: Bowel sounds are normal       Palpations: Abdomen is soft  Tenderness: There is no guarding or rebound  Comments: RLQ abdominal pain   Genitourinary:     Comments: No Ruelas present  Musculoskeletal:         General: No swelling or tenderness  Cervical back: Normal range of motion  Skin:     General: Skin is warm  Coloration: Skin is not pale  Findings: No bruising or rash  Neurological:      General: No focal deficit present  Mental Status: She is alert and oriented to person, place, and time  Mental status is at baseline  Psychiatric:         Mood and Affect: Mood normal          Thought Content:  Thought content normal          Additional Data:     Labs:  Results from last 7 days   Lab Units 03/13/22  0604 03/11/22  2046 03/11/22  0456   WBC Thousand/uL 4 91   < > 6 56   HEMOGLOBIN g/dL 8 4*   < > 10 7*   HEMATOCRIT % 26 1*   < > 31 7*   PLATELETS Thousands/uL 207   < > 252   BANDS PCT %  --   --  2   NEUTROS PCT % 74   < >  --    LYMPHS PCT % 14   < >  --    LYMPHO PCT %  --   --  20   MONOS PCT % 11   < >  --    MONO PCT %  --   --  4   EOS PCT % 1   < > 0    < > = values in this interval not displayed  Results from last 7 days   Lab Units 03/13/22  0604   SODIUM mmol/L 139   POTASSIUM mmol/L 3 4*   CHLORIDE mmol/L 106   CO2 mmol/L 27   BUN mg/dL 2*   CREATININE mg/dL 0 51*   ANION GAP mmol/L 6   CALCIUM mg/dL 8 6   ALBUMIN g/dL 2 1*   TOTAL BILIRUBIN mg/dL 0 59   ALK PHOS U/L 39*   ALT U/L 10*   AST U/L 11   GLUCOSE RANDOM mg/dL 148*         Results from last 7 days   Lab Units 03/13/22  1202 03/13/22  0603 03/12/22  2356 03/12/22  1807 03/12/22  1206 03/12/22  0651 03/11/22  2157 03/11/22  1152   POC GLUCOSE mg/dl 90 152* 136 115 112 121 153* 93     Results from last 7 days   Lab Units 03/11/22  0456   HEMOGLOBIN A1C % 5 1     Results from last 7 days   Lab Units 03/13/22  0756 03/12/22  0516 03/12/22  0012 03/11/22  2047   LACTIC ACID mmol/L  --   --   --  1 3   PROCALCITONIN ng/ml 0 26* 0 38* 0 18  --        Lines/Drains:  Invasive Devices  Report    Peripheral Intravenous Line            Peripheral IV 03/10/22 Right Forearm 3 days    Peripheral IV 03/13/22 Left;Ventral (anterior) Forearm <1 day                Imaging: No pertinent imaging reviewed  Recent Cultures (last 7 days):   Results from last 7 days   Lab Units 03/11/22  2049 03/11/22  2046   BLOOD CULTURE  No Growth at 24 hrs  No Growth at 24 hrs         Last 24 Hours Medication List:   Current Facility-Administered Medications   Medication Dose Route Frequency Provider Last Rate    acetaminophen  650 mg Oral Q6H PRN Estelleul Llanes, DO      albuterol  2 puff Inhalation Q4H PRN Laureen Ott MD      atorvastatin  40 mg Oral Daily With [employer], DO      budesonide-formoterol  2 puff Inhalation BID Hetjr Llanes, DO      enoxaparin  40 mg Subcutaneous Q24H [facility] Laureen Ott MD      fenofibrate  145 mg Oral Daily Gonzalez Alvarado MD      HYDROmorphone   Intravenous Continuous Hope [facility]OK [place]      montelukast  10 mg Oral HS Hetul Llanes, DO      ondansetron  4 mg Intravenous Q6H PRN Hetul Aravind Rivas DO      polyethylene glycol  17 g Oral Daily Janki Huggins MD      potassium-sodium phosphateS  1 tablet Oral 4x Daily (with meals and at bedtime) Bambi Villalobos MD      potassium-sodium phosphateS  1 tablet Oral 4x Daily (with meals and at bedtime) Bambi Villalobos MD      senna-docusate sodium  1 tablet Oral HS Janki Huggins MD          Today, Patient Was Seen By: Bambi Villalobos MD    **Please Note: This note may have been constructed using a voice recognition system  **

## 2022-03-14 ENCOUNTER — APPOINTMENT (INPATIENT)
Dept: RADIOLOGY | Facility: HOSPITAL | Age: 42
DRG: 440 | End: 2022-03-14
Payer: COMMERCIAL

## 2022-03-14 LAB
ANION GAP SERPL CALCULATED.3IONS-SCNC: 5 MMOL/L (ref 4–13)
BASOPHILS # BLD AUTO: 0.01 THOUSANDS/ΜL (ref 0–0.1)
BASOPHILS NFR BLD AUTO: 0 % (ref 0–1)
BUN SERPL-MCNC: 4 MG/DL (ref 5–25)
CALCIUM SERPL-MCNC: 8.8 MG/DL (ref 8.3–10.1)
CHLORIDE SERPL-SCNC: 106 MMOL/L (ref 100–108)
CO2 SERPL-SCNC: 27 MMOL/L (ref 21–32)
CREAT SERPL-MCNC: 0.39 MG/DL (ref 0.6–1.3)
EOSINOPHIL # BLD AUTO: 0.08 THOUSAND/ΜL (ref 0–0.61)
EOSINOPHIL NFR BLD AUTO: 2 % (ref 0–6)
ERYTHROCYTE [DISTWIDTH] IN BLOOD BY AUTOMATED COUNT: 14.4 % (ref 11.6–15.1)
FERRITIN SERPL-MCNC: 257 NG/ML (ref 8–388)
GFR SERPL CREATININE-BSD FRML MDRD: 132 ML/MIN/1.73SQ M
GLUCOSE SERPL-MCNC: 106 MG/DL (ref 65–140)
GLUCOSE SERPL-MCNC: 108 MG/DL (ref 65–140)
GLUCOSE SERPL-MCNC: 137 MG/DL (ref 65–140)
GLUCOSE SERPL-MCNC: 140 MG/DL (ref 65–140)
GLUCOSE SERPL-MCNC: 172 MG/DL (ref 65–140)
GLUCOSE SERPL-MCNC: 91 MG/DL (ref 65–140)
HCT VFR BLD AUTO: 25.6 % (ref 34.8–46.1)
HGB BLD-MCNC: 8.3 G/DL (ref 11.5–15.4)
IMM GRANULOCYTES # BLD AUTO: 0.01 THOUSAND/UL (ref 0–0.2)
IMM GRANULOCYTES NFR BLD AUTO: 0 % (ref 0–2)
IRON SATN MFR SERPL: 3 % (ref 15–50)
IRON SERPL-MCNC: 9 UG/DL (ref 50–170)
LYMPHOCYTES # BLD AUTO: 0.88 THOUSANDS/ΜL (ref 0.6–4.47)
LYMPHOCYTES NFR BLD AUTO: 22 % (ref 14–44)
MAGNESIUM SERPL-MCNC: 1.9 MG/DL (ref 1.6–2.6)
MCH RBC QN AUTO: 26.1 PG (ref 26.8–34.3)
MCHC RBC AUTO-ENTMCNC: 32.4 G/DL (ref 31.4–37.4)
MCV RBC AUTO: 81 FL (ref 82–98)
MONOCYTES # BLD AUTO: 0.55 THOUSAND/ΜL (ref 0.17–1.22)
MONOCYTES NFR BLD AUTO: 14 % (ref 4–12)
NEUTROPHILS # BLD AUTO: 2.49 THOUSANDS/ΜL (ref 1.85–7.62)
NEUTS SEG NFR BLD AUTO: 62 % (ref 43–75)
NRBC BLD AUTO-RTO: 0 /100 WBCS
PHOSPHATE SERPL-MCNC: 3.7 MG/DL (ref 2.7–4.5)
PLATELET # BLD AUTO: 255 THOUSANDS/UL (ref 149–390)
PMV BLD AUTO: 10.9 FL (ref 8.9–12.7)
POTASSIUM SERPL-SCNC: 3.4 MMOL/L (ref 3.5–5.3)
PROCALCITONIN SERPL-MCNC: 0.22 NG/ML
RBC # BLD AUTO: 3.18 MILLION/UL (ref 3.81–5.12)
SODIUM SERPL-SCNC: 138 MMOL/L (ref 136–145)
TIBC SERPL-MCNC: 289 UG/DL (ref 250–450)
TRIGL SERPL-MCNC: 552 MG/DL
WBC # BLD AUTO: 4.02 THOUSAND/UL (ref 4.31–10.16)

## 2022-03-14 PROCEDURE — 82728 ASSAY OF FERRITIN: CPT | Performed by: INTERNAL MEDICINE

## 2022-03-14 PROCEDURE — 74174 CTA ABD&PLVS W/CONTRAST: CPT

## 2022-03-14 PROCEDURE — 83735 ASSAY OF MAGNESIUM: CPT | Performed by: INTERNAL MEDICINE

## 2022-03-14 PROCEDURE — 85025 COMPLETE CBC W/AUTO DIFF WBC: CPT | Performed by: INTERNAL MEDICINE

## 2022-03-14 PROCEDURE — 83540 ASSAY OF IRON: CPT | Performed by: INTERNAL MEDICINE

## 2022-03-14 PROCEDURE — 82948 REAGENT STRIP/BLOOD GLUCOSE: CPT

## 2022-03-14 PROCEDURE — 99222 1ST HOSP IP/OBS MODERATE 55: CPT | Performed by: SURGERY

## 2022-03-14 PROCEDURE — 80048 BASIC METABOLIC PNL TOTAL CA: CPT | Performed by: INTERNAL MEDICINE

## 2022-03-14 PROCEDURE — G1004 CDSM NDSC: HCPCS

## 2022-03-14 PROCEDURE — 99233 SBSQ HOSP IP/OBS HIGH 50: CPT | Performed by: INTERNAL MEDICINE

## 2022-03-14 PROCEDURE — 84100 ASSAY OF PHOSPHORUS: CPT | Performed by: INTERNAL MEDICINE

## 2022-03-14 PROCEDURE — 83550 IRON BINDING TEST: CPT | Performed by: INTERNAL MEDICINE

## 2022-03-14 PROCEDURE — 99233 SBSQ HOSP IP/OBS HIGH 50: CPT | Performed by: PHYSICIAN ASSISTANT

## 2022-03-14 PROCEDURE — 99232 SBSQ HOSP IP/OBS MODERATE 35: CPT | Performed by: INTERNAL MEDICINE

## 2022-03-14 RX ORDER — POLYETHYLENE GLYCOL 3350 17 G/17G
17 POWDER, FOR SOLUTION ORAL DAILY PRN
Status: DISCONTINUED | OUTPATIENT
Start: 2022-03-14 | End: 2022-03-18 | Stop reason: HOSPADM

## 2022-03-14 RX ORDER — AMOXICILLIN 250 MG
1 CAPSULE ORAL 2 TIMES DAILY PRN
Status: DISCONTINUED | OUTPATIENT
Start: 2022-03-14 | End: 2022-03-18 | Stop reason: HOSPADM

## 2022-03-14 RX ORDER — POTASSIUM CHLORIDE 20MEQ/15ML
40 LIQUID (ML) ORAL 2 TIMES DAILY
Status: COMPLETED | OUTPATIENT
Start: 2022-03-14 | End: 2022-03-14

## 2022-03-14 RX ORDER — AMOXICILLIN 250 MG
1 CAPSULE ORAL 2 TIMES DAILY PRN
Status: DISCONTINUED | OUTPATIENT
Start: 2022-03-14 | End: 2022-03-14

## 2022-03-14 RX ADMIN — FENOFIBRATE 145 MG: 145 TABLET, FILM COATED ORAL at 08:22

## 2022-03-14 RX ADMIN — MONTELUKAST 10 MG: 10 TABLET, FILM COATED ORAL at 21:03

## 2022-03-14 RX ADMIN — HYDROMORPHONE HYDROCHLORIDE: 10 INJECTION, SOLUTION INTRAMUSCULAR; INTRAVENOUS; SUBCUTANEOUS at 21:38

## 2022-03-14 RX ADMIN — IRON SUCROSE 100 MG: 20 INJECTION, SOLUTION INTRAVENOUS at 11:12

## 2022-03-14 RX ADMIN — ENOXAPARIN SODIUM 40 MG: 40 INJECTION SUBCUTANEOUS at 08:25

## 2022-03-14 RX ADMIN — Medication 40 MEQ: at 11:16

## 2022-03-14 RX ADMIN — POLYETHYLENE GLYCOL 3350 17 G: 17 POWDER, FOR SOLUTION ORAL at 08:25

## 2022-03-14 RX ADMIN — DIBASIC SODIUM PHOSPHATE, MONOBASIC POTASSIUM PHOSPHATE AND MONOBASIC SODIUM PHOSPHATE 1 TABLET: 852; 155; 130 TABLET ORAL at 08:22

## 2022-03-14 RX ADMIN — DEXTROSE, SODIUM CHLORIDE, SODIUM LACTATE, POTASSIUM CHLORIDE, AND CALCIUM CHLORIDE 200 ML/HR: 5; .6; .31; .03; .02 INJECTION, SOLUTION INTRAVENOUS at 11:15

## 2022-03-14 RX ADMIN — ACETAMINOPHEN 325MG 650 MG: 325 TABLET ORAL at 21:03

## 2022-03-14 RX ADMIN — Medication 40 MEQ: at 23:16

## 2022-03-14 RX ADMIN — BUDESONIDE AND FORMOTEROL FUMARATE DIHYDRATE 2 PUFF: 80; 4.5 AEROSOL RESPIRATORY (INHALATION) at 08:22

## 2022-03-14 RX ADMIN — DEXTROSE, SODIUM CHLORIDE, SODIUM LACTATE, POTASSIUM CHLORIDE, AND CALCIUM CHLORIDE 200 ML/HR: 5; .6; .31; .03; .02 INJECTION, SOLUTION INTRAVENOUS at 21:47

## 2022-03-14 RX ADMIN — IOHEXOL 100 ML: 350 INJECTION, SOLUTION INTRAVENOUS at 13:11

## 2022-03-14 RX ADMIN — BUDESONIDE AND FORMOTEROL FUMARATE DIHYDRATE 2 PUFF: 80; 4.5 AEROSOL RESPIRATORY (INHALATION) at 18:22

## 2022-03-14 RX ADMIN — ATORVASTATIN CALCIUM 40 MG: 40 TABLET, FILM COATED ORAL at 18:20

## 2022-03-14 NOTE — CASE MANAGEMENT
Case Management Assessment & Discharge Planning Note    [facility]icki Shin  [facility] [id number]  : 1980 Date 3/14/2022       Current Admission Date: 3/10/2022  Current Admission Diagnosis:Acute pancreatitis   Patient Active Problem List    Diagnosis Date Noted    Sepsis due to undetermined organism without resultant organ failure ([facility] [address] ) 2022    Hypokalemia 2022    Hypophosphatemia 2022    Acute pancreatitis 2022    Fever 2022    Bruising 2022    Tinnitus of both ears 2022    Tinea capitis 2022    SOB (shortness of breath) 2021    Moderate persistent asthma with acute exacerbation 2021    Herpes labialis 2021    Nasal congestion 2021    Essential hypertension 2021    Hypercalcemia 2021    Eczema 2020    COVID-19 2020    Chest pain 2019    Gastroesophageal reflux disease without esophagitis 2018    Abdominal pain 2018    Hyperlipidemia 06/15/2018    PCOS (polycystic ovarian syndrome) 2018    Ovarian cyst 10/14/2016    Granuloma annulare 2016    Heart palpitations 2016    Right ovarian cyst 2015    Hypertriglyceridemia 2015    Psoriasis 2015    Vitamin D deficiency 2015    Anemia 2015    Abnormal uterine bleeding (AUB) 10/20/2014    Fatty liver 10/20/2014      LOS (days): 3  Geometric Mean LOS (GMLOS) (days):   Days to GMLOS:     OBJECTIVE:    Risk of Unplanned Readmission Score: 14         Current admission status: Inpatient       Preferred Pharmacy:   [id number], [address]  [address] [place] [address]  Phone: [phone] Fax: [phone]    Primary Care Provider: Marian Stoddard MD    Primary Insurance: BLUE CROSS  Secondary Insurance:     ASSESSMENT:  Juan Alberto Merritt Proxies    There are no active Health Care Proxies on file                       Patient Information  Admitted from[de-identified] Home  Mental Status: Alert  During Assessment patient was accompanied by: Son  Assessment information provided by[de-identified] Patient,Son  Primary Caregiver: Self  Support Systems: Gnosticist/jojo community,Son,Parent,Spouse/significant other  What city do you live in[place]  Home entry access options   Select all that apply : No steps to enter home (from the back)  Type of Current Residence: 2 story home  Upon entering residence, is there a bedroom on the main floor (no further steps)?: No  A bedroom is located on the following floor levels of residence (select all that apply):: 2nd Floor  Upon entering residence, is there a bathroom on the main floor (no further steps)?: No  Indicate which floors of current residence have a bathroom (select all the apply):: 2nd Floor  Number of steps to 2nd floor from main floor: One Flight  In the last 12 months, was there a time when you were not able to pay the mortgage or rent on time?: No  In the last 12 months, how many places have you lived?: 1  In the last 12 months, was there a time when you did not have a steady place to sleep or slept in a shelter (including now)?: No  Living Arrangements: Lives w/ Parent(s),Lives w/ Son,Other (Comment) (and siblings)    Activities of Daily Living Prior to Admission  Functional Status: Independent  Completes ADLs independently?: Yes  Ambulates independently?: Yes  Does patient use assisted devices?: No  Does patient currently own DME?: No  Does patient have a history of Outpatient Therapy (PT/OT)?: No  Does the patient have a history of Short-Term Rehab?: No  Does patient have a history of HHC?: No  Does patient currently have [address]?: No         Patient Information Continued  Income Source: Employed  Does patient have prescription coverage?: Yes  Within the past 12 months, you worried that your food would run out before you got the money to buy more : Never true  Within the past 12 months, the food you bought just didnt last and you didnt have money to get more : Never true  Does patient receive dialysis treatments?: No  Does patient have a history of substance abuse?: No  Does patient have a history of Mental Health Diagnosis?: No         Means of Transportation  Means of Transport to Appts[de-identified] Drives Self  In the past 12 months, has lack of transportation kept you from medical appointments or from getting medications?: No  In the past 12 months, has lack of transportation kept you from meetings, work, or from getting things needed for daily living?: No        DISCHARGE DETAILS:    Discharge planning discussed with[de-identified] patient and her son  Freedom of Choice: Yes     CM contacted family/caregiver?: Yes (son in the room)             Other Referral/Resources/Interventions Provided:  Referral Comments: anticipate home with no needs - patient currently denying any needs from CM at this time          Additional Comments: confirms that she has received 2 doses of the COVID vaccine - has not received booster vaccine                    CM reviewed d/c planning process including the following: identifying help at home, patient preference for d/c planning needs, Discharge unge, [facility] Meds to Bed program, availability of treatment team to discuss questions or concerns patient and/or family may have regarding understanding medications and recognizing signs and symptoms once discharged  CM also encouraged patient to follow up with all recommended appointments after discharge  Patient advised of importance for patient and family to participate in managing patients medical well being  Patient/caregiver received discharge checklist  Content reviewed  Patient/caregiver encouraged to participate in discharge plan of care prior to discharge home

## 2022-03-14 NOTE — ASSESSMENT & PLAN NOTE
· Likely secondary to acute pancreatitis  · Pain has improved over past several days  · No history of chronic pain or chronic opioid use  · Continue Tylenol 650 mg p o  q 6 hours p r n  mild pain  · Discontinue Dilaudid PCA  · Suggest start oxycodone 5 mg p o  q 4 hours p r n  moderate pain  · Suggest start oxycodone 10 mg p o  q 4 hours p r n  severe pain  · Suggest start Dilaudid 0 5 mg IV q 4 hours p r n  breakthrough pain  · Heating pad to abdomen, 20 minutes/hour as needed  · Bowel regimen at discretion of primary service and Gastroenterology, however recommended while patient on opioid pain medication

## 2022-03-14 NOTE — ASSESSMENT & PLAN NOTE
Acute pancreatitis as evident by pain, lipase elevation and CT scan findings  Etiology believed to be secondary to hypertriglyceridemia given levels were above 1400 on presentation  Patient has been started on fibrate therapy  Initially on insulin drip discontinued due to hypoglycemia as per endocrinology  Continuing IV fluids with dextrose, which should prompt self insulin release  Currently patient is on clear liquid diet  Triglycerides have trended down, today's a m  [id number]  Today on examination-patient reports that she continues to be in pain, reports pain 11/10, pain more severe in the right upper and lower quadrant  Labs reviewed-follow hemoglobin, today's hemoglobin is 8 3, baseline around 12  Patient continues to be febrile, last spike a fever yesterday at 5:00 p m  101 3 blood pressure seems to be softer than the baseline, today a m  Systolic 346D  Plan to repeat CT scan with contrast stat    Acute general surgery has been consulted-input is appreciated

## 2022-03-14 NOTE — ASSESSMENT & PLAN NOTE
Acute  Possible etiology: hemorrhagic pancreatitis per GI vs dilutional anemia due to IV fluids  Iron panel suggestive of iron deficiency  Ordered 3-day course IV Venofer  Continue hemoglobin monitoring - currently stable @ 9 3

## 2022-03-14 NOTE — PLAN OF CARE
Problem: GASTROINTESTINAL - ADULT  Goal: Oral mucous membranes remain intact  Description: INTERVENTIONS  - Assess oral mucosa and hygiene practices  - Implement preventative oral hygiene regimen  - Implement oral medicated treatments as ordered  - Initiate Nutrition services referral as needed  Outcome: Progressing

## 2022-03-14 NOTE — PROGRESS NOTES
[facility]  Progress Note - Luke Reddy 1980, 46 y o  female MRN: [id number]  Unit/Bed#: [facility] 915-01 Encounter: [id number]  Primary Care Provider: Malena Aguero MD   Date and time admitted to hospital: 3/10/2022  6:33 AM    Abdominal pain  Assessment & Plan  · Likely secondary to acute pancreatitis  · Pain has improved over past several days  · No history of chronic pain or chronic opioid use  · Continue Tylenol 650 mg p o  q 6 hours p r n  mild pain  · Discontinue Dilaudid PCA  · Suggest start oxycodone 5 mg p o  q 4 hours p r n  moderate pain  · Suggest start oxycodone 10 mg p o  q 4 hours p r n  severe pain  · Suggest start Dilaudid 0 5 mg IV q 4 hours p r n  breakthrough pain  · Heating pad to abdomen, 20 minutes/hour as needed  · Bowel regimen at discretion of primary service and Gastroenterology, however recommended while patient on opioid pain medication  Acute pain due to acute pancreatitis  APS will continue to follow  Please contact Acute Pain Service - [facility] via [employer] from [phone] with additional questions or concerns  See Fitt[place]ona or Francheska for additional contacts and after hours information  Pain History  Current pain location(s):  Upper right abdomen  Pain Scale:   6/10  Quality:  Sharp, cramping  24 hour history:  Overall, pain improved, however somewhat worse currently  No other new complaints  Opioid requirement previous 24 hours:  Dilaudid 5 7 mg via PCA      Meds/Allergies   all current active meds have been reviewed, current meds:   Current Facility-Administered Medications   Medication Dose Route Frequency    acetaminophen (TYLENOL) tablet 650 mg  650 mg Oral Q6H PRN    albuterol (PROVENTIL HFA,VENTOLIN HFA) inhaler 2 puff  2 puff Inhalation Q4H PRN    atorvastatin (LIPITOR) tablet 40 mg  40 mg Oral Daily With [employer]    budesonide-formoterol (SYMBICORT) 80-4 5 MCG/ACT inhaler 2 puff  2 puff Inhalation BID    dextrose 5 % in lactated Ringer's infusion  200 mL/hr Intravenous Continuous    fenofibrate (TRICOR) tablet 145 mg  145 mg Oral Daily    HYDROmorphone (DILAUDID) 1 mg/mL 50 mL PCA   Intravenous Continuous    iron sucrose (VENOFER) 100 mg in sodium chloride 0 9 % 100 mL IVPB  100 mg Intravenous Daily    montelukast (SINGULAIR) tablet 10 mg  10 mg Oral HS    ondansetron (ZOFRAN) injection 4 mg  4 mg Intravenous Q6H PRN    polyethylene glycol (MIRALAX) packet 17 g  17 g Oral Daily PRN    potassium chloride oral solution 40 mEq  40 mEq Oral BID    senna-docusate sodium (SENOKOT S) 8 6-50 mg per tablet 1 tablet  1 tablet Oral BID PRN    and PTA meds:   Prior to Admission Medications   Prescriptions Last Dose Informant Patient Reported? Taking? Icosapent Ethyl 1 g CAPS 3/9/2022 at Unknown time Self No Yes   Sig: Take 2 capsules (2 g total) by mouth 2 (two) times a day   budesonide-formoterol (SYMBICORT) 80-4 5 MCG/ACT inhaler 3/9/2022 at Unknown time Self No Yes   Sig: Inhale 2 puffs 2 (two) times a day Rinse mouth after use     clobetasol (TEMOVATE) 0 05 % ointment Past Week at Unknown time  No Yes   Sig: Apply topically 2 (two) times a day   ergocalciferol (ERGOCALCIFEROL) 1 25 MG (95925 UT) capsule 3/9/2022 at Unknown time Self Yes Yes   Sig: Take 50,000 Units by mouth once a week    fenofibrate micronized (LOFIBRA) 200 MG capsule 3/9/2022 at Unknown time Self Yes Yes   Sig: Take 200 mg by mouth   ketoconazole (NIZORAL) 2 % shampoo Past Week at Unknown time  No Yes   Sig: APPLY 1 APPLICATION TOPICALLY 2 (TWO) TIMES A WEEK   miconazole (MONISTAT-7) 2 % vaginal cream Past Week at Unknown time  No Yes   Sig: Insert 1 applicator into the vagina daily at bedtime   montelukast (SINGULAIR) 10 mg tablet 3/9/2022 at Unknown time Self No Yes   Sig: Take 1 tablet (10 mg total) by mouth daily at bedtime   nitrofurantoin (MACROBID) 100 mg capsule   No No   Sig: Take 1 capsule (100 mg total) by mouth 2 (two) times a day for 5 days   norgestimate-ethinyl estradiol (Sprintec 28) 0 25-35 MG-MCG per tablet   No No   Sig: Take 1 tablet by mouth daily for 28 days   pantoprazole (PROTONIX) 40 mg tablet 3/9/2022 at Unknown time  No Yes   Sig: Take 1 tablet (40 mg total) by mouth daily before breakfast   rosuvastatin (CRESTOR) 20 MG tablet 3/9/2022 at Unknown time Self Yes Yes   Sig: Take 20 mg by mouth daily       Facility-Administered Medications: None       No Known Allergies    Objective     Temp:  [99 1 °F (37 3 °C)-101 3 °F (38 5 °C)] 99 8 °F (37 7 °C)  HR:  [] 101  Resp:  [18-20] 18  BP: ()/(64-88) 156/88    Physical Exam  Gen: Awake and alert, NAD, Does not appear ill  Vital signs reviewed  Nursing notes reviewed  Eyes: PERRL, sclera/conjunctiva normal   ENT: No JVD, trachea midline, moist mucus membranes  ABD:  Tender to palpation right upper quadrant  No rebound  No guarding  CV: Heart normal rhythm and normal rate  No extra systoles  Skin: Warm, dry  Cap refill <2 seconds  No diaphoresis  Neuro: A&O x 3, GCS 15 (E4, V5, M6)  No obvious focal motor deficit  Psych: Normal speech, normal attention, normal behavior  Lab Results:   Results from last 7 days   Lab Units 03/14/22  0552   WBC Thousand/uL 4 02*   HEMOGLOBIN g/dL 8 3*   HEMATOCRIT % 25 6*   PLATELETS Thousands/uL 255      Results from last 7 days   Lab Units 03/14/22  0552 03/13/22  0604 03/13/22  0604   POTASSIUM mmol/L 3 4*   < > 3 4*   CHLORIDE mmol/L 106   < > 106   CO2 mmol/L 27   < > 27   BUN mg/dL 4*   < > 2*   CREATININE mg/dL 0 39*   < > 0 51*   CALCIUM mg/dL 8 8   < > 8 6   ALK PHOS U/L  --   --  39*   ALT U/L  --   --  10*   AST U/L  --   --  11    < > = values in this interval not displayed  Estimated Creatinine Clearance: 159 3 mL/min (A) (by C-G formula based on SCr of 0 39 mg/dL (L))  Imaging Studies: I have personally reviewed pertinent reports          Counseling / Coordination of Care  Greater than 50% of total time was spent with the patient and / or family counseling and / or coordination of care  A description of the counseling / coordination of care:  Patient interview, physical examination, review of medical record, review of imaging and laboratory data, development of pain management plan, discussion of pain management plan with patient and primary service  Explanation of risks and benefits of suggested pain medications  Please note that the APS provides consultative services regarding pain management only  With the exception of ketamine and epidural infusions and except when indicated, final decisions regarding starting or changing doses of analgesic medications are at the discretion of the consulting service  Off hours consultation and/or medication management is generally not available  Portions of the record may have been created with voice recognition software  Occasional wrong-word or 'sound-a-like' substitutions may have occurred due to the inherent limitations of voice recognition software       Noelle Parker PA-C  Acute Pain Service

## 2022-03-14 NOTE — ASSESSMENT & PLAN NOTE
Patient noticed to have drop in hemoglobin, baseline around 12, today a m -8 2  Patient does mention of black stools yesterday  Reports clear stools today    We have ordered CT scan stat with contrast   Discontinue Lovenox  Will follow

## 2022-03-14 NOTE — PROGRESS NOTES
[address] [facility]  Progress Note - Pam Wray 1980, 46 y o  female MRN: [id number]  Unit/Bed#: [facility] 915-01 Encounter: [id number]  Primary Care Provider: Aydee Sandoval MD   Date and time admitted to hospital: 3/10/2022  6:33 AM    * Acute pancreatitis  Assessment & Plan  Acute pancreatitis as evident by pain, lipase elevation and CT scan findings  Etiology believed to be secondary to hypertriglyceridemia given levels were above 1400 on presentation  Patient has been started on fibrate therapy  Initially on insulin drip discontinued due to hypoglycemia as per endocrinology  Continuing IV fluids with dextrose, which should prompt self insulin release  Currently patient is on clear liquid diet  Triglycerides have trended down, today's a m  [id number]  Today on examination-patient reports that she continues to be in pain, reports pain 11/10, pain more severe in the right upper and lower quadrant  Labs reviewed-follow hemoglobin, today's hemoglobin is 8 3, baseline around 12  Patient continues to be febrile, last spike a fever yesterday at 5:00 p m  101 3 blood pressure seems to be softer than the baseline, today a m  Systolic [id number]  Plan to repeat CT scan with contrast stat  Acute general surgery has been consulted-input is appreciated    Anemia  Assessment & Plan  Patient noticed to have drop in hemoglobin, baseline around 12, today a m -8 2  Patient does mention of black stools yesterday  Reports clear stools today  We have ordered CT scan stat with contrast   Discontinue Lovenox  Will follow      VTE Pharmacologic Prophylaxis:     High Risk (Score >/= 5) - Pharmacological DVT Prophylaxis Contraindicated  Sequential Compression Devices Ordered  Subjective:   Patient seen at bedside today morning  She appears in distress from abdominal pain    She reports that the pain is 11/10, she reports that she has been having diarrhea since today morning, reports 3 loose stools  She reports that she had black stool yesterday  She does not report of any breathing problem  No chest pain  Objective:     Vitals:   Temp (24hrs), Av 5 °F (37 5 °C), Min:98 1 °F (36 7 °C), Max:101 3 °F (38 5 °C)    Temp:  [98 1 °F (36 7 °C)-101 3 °F (38 5 °C)] 99 8 °F (37 7 °C)  HR:  [] 100  Resp:  [16-20] 18  BP: ()/(55-86) 146/86  SpO2:  [94 %-99 %] 96 %  There is no height or weight on file to calculate BMI  Input and Output Summary (last 24 hours): Intake/Output Summary (Last 24 hours) at 3/14/2022 1057  Last data filed at 3/14/2022 0700  Gross per 24 hour   Intake 5 7 ml   Output --   Net 5 7 ml       Physical Exam:     Physical Exam  Constitutional:       Appearance: Normal appearance  HENT:      Head: Normocephalic and atraumatic  Cardiovascular:      Rate and Rhythm: Regular rhythm  Tachycardia present  Pulses: Normal pulses  Heart sounds: Normal heart sounds  Pulmonary:      Effort: Pulmonary effort is normal       Breath sounds: Normal breath sounds  Abdominal:      Tenderness: There is abdominal tenderness  There is guarding  There is no right CVA tenderness or left CVA tenderness  Comments: On inspection-surgical scar of hernia repair  Tenderness to palpation over right upper and lower quadrant   Musculoskeletal:         General: Normal range of motion  Right lower leg: No edema  Left lower leg: No edema  Neurological:      Mental Status: She is alert            Additional Data:     Labs:  Results from last 7 days   Lab Units 22  0552 22  2046 22  0456   WBC Thousand/uL 4 02*   < > 6 56   HEMOGLOBIN g/dL 8 3*   < > 10 7*   HEMATOCRIT % 25 6*   < > 31 7*   PLATELETS Thousands/uL 255   < > 252   BANDS PCT %  --   --  2   NEUTROS PCT % 62   < >  --    LYMPHS PCT % 22   < >  --    LYMPHO PCT %  --   --  20   MONOS PCT % 14*   < >  --    MONO PCT %  --   --  4   EOS PCT % 2   < > 0    < > = values in this interval not displayed  Results from last 7 days   Lab Units 03/14/22  0552 03/13/22  0604 03/13/22  0604   SODIUM mmol/L 138   < > 139   POTASSIUM mmol/L 3 4*   < > 3 4*   CHLORIDE mmol/L 106   < > 106   CO2 mmol/L 27   < > 27   BUN mg/dL 4*   < > 2*   CREATININE mg/dL 0 39*   < > 0 51*   ANION GAP mmol/L 5   < > 6   CALCIUM mg/dL 8 8   < > 8 6   ALBUMIN g/dL  --   --  2 1*   TOTAL BILIRUBIN mg/dL  --   --  0 59   ALK PHOS U/L  --   --  39*   ALT U/L  --   --  10*   AST U/L  --   --  11   GLUCOSE RANDOM mg/dL 140   < > 148*    < > = values in this interval not displayed  Results from last 7 days   Lab Units 03/14/22  1034 03/14/22  0524 03/14/22  0002 03/13/22  1820 03/13/22  1202 03/13/22  0603 03/12/22  2356 03/12/22  1807 03/12/22  1206 03/12/22  0651 03/11/22  2157 03/11/22  1152   POC GLUCOSE mg/dl 108 137 172* 97 90 152* 136 115 112 121 153* 93     Results from last 7 days   Lab Units 03/11/22  0456   HEMOGLOBIN A1C % 5 1     Results from last 7 days   Lab Units 03/13/22  2253 03/13/22  0756 03/12/22  0516 03/12/22  0012 03/11/22  2047   LACTIC ACID mmol/L  --   --   --   --  1 3   PROCALCITONIN ng/ml 0 22 0 26* 0 38* 0 18  --            Recent Cultures (last 7 days):     Results from last 7 days   Lab Units 03/13/22  0802 03/13/22  0759 03/11/22  2049 03/11/22  2046   BLOOD CULTURE  Received in Microbiology Lab  Culture in Progress  Received in Microbiology Lab  Culture in Progress  No Growth at 48 hrs  No Growth at 48 hrs         Lines/Drains:  Invasive Devices  Report    Peripheral Intravenous Line            Peripheral IV 03/13/22 Left;Ventral (anterior) Forearm 1 day                Telemetry:        Last 24 Hours Medication List:   Current Facility-Administered Medications   Medication Dose Route Frequency Provider Last Rate    acetaminophen  650 mg Oral Q6H PRN Hetul Llanes, DO      albuterol  2 puff Inhalation Q4H PRN Aníbalgianluca Burt MD      atorvastatin  40 mg Oral Daily With [employer], DO      budesonide-formoterol  2 puff Inhalation BID Hetul Llanes, DO      dextrose 5% lactated ringer's  200 mL/hr Intravenous Continuous Klarissa Baker PA-C 200 mL/hr (03/13/22 2114)    fenofibrate  145 mg Oral Daily Jovon Abernathy MD      HYDROmorphone   Intravenous Continuous Isela Robin PA-C      iron sucrose  100 mg Intravenous Daily Bambi Villalobos MD      montelukast  10 mg Oral HS Hetul Llanes, DO      ondansetron  4 mg Intravenous Q6H PRN Hetul Llanes, DO      polyethylene glycol  17 g Oral Daily PRN Susana Borden MD      potassium chloride  40 mEq Oral BID Bambi Villalobos MD      senna-docusate sodium  1 tablet Oral BID PRN Susana Borden MD          Today, Patient Was Seen By: Susana Borden MD    ** Please Note: This note has been constructed using a voice recognition system   **

## 2022-03-14 NOTE — PROGRESS NOTES
[address] [facility]  Progress Note - Vicki Shin 1980, 46 y o  female MRN: [id number]  Unit/Bed#: [facility] 915-01 Encounter: [id number]  Primary Care Provider: Marian Stoddard MD   Date and time admitted to hospital: 3/10/2022  6:33 AM    * Acute pancreatitis  Assessment & Plan  Improving  Present on admission   Noted on CT A/P on 03/11  Likely secondary to hypertriglyceridemia  Patient remains hemodynamically stable on Dilaudid PCA and MIVF  GI consulted; patient advanced from Clear Liquid to Full Liquid today  Continue to monitor for clinical improvement    Sepsis due to undetermined organism without resultant organ failure [facility])  Assessment & Plan  Stable  Sepsis criteria: febrile (last episode 03/13 5 PM) and tachycardia  Likely secondary to acute pancreatitis noted on CT A/P  AM CXR identified "Infiltrates or atelectasis at the right lung base;atelectasis at the left lung base"  Blood cultures collected on 03/11 NEGATIVE   Repeat Blood cultures collected on 03/16 showed NGTD    Procalcitonin mildly above range at 0 26 and of no clinical significance  Will defer antibiotics at this time   Encourage incentive spirometer use  Continue to monitor daily CBC and fever curve    Abdominal pain  Assessment & Plan  Improving  Present on admission  Lkely secondary to pancreatitis with atypical abdominal pain 2/2 chronic pain syndrome  Consulted APS; appreciate assistance  Patient currently on Dilaudid PCA; continue management per recs    Hypertriglyceridemia  Assessment & Plan  Chronic and improving  Noted to have elevated TG since 2013  TF on admission measured >1400  Insulin gtt ordered by GI was not initiated  Endocrinology consulted; appreciate assistance  Continue Lipitor and Tricor    Ovarian cyst  Assessment & Plan  Noted on CT A/P  Continue outpatient follow-up regarding right-sided ovarian cyst    Hypophosphatemia  Assessment & Plan  Resolved  AM Phosphorous measured at 3 7  PO replacement completed  Continue to monitor daily Phosphorous    Hypokalemia  Assessment & Plan  Improving  AM Potassium measured at 3 4  PO/IV replacement ordered  Continue to monitor daily BMP    Moderate persistent asthma with acute exacerbation  Assessment & Plan  Not in acute exacerbation  Continue Symbicort and Singular    Hyperlipidemia  Assessment & Plan  Home Crestor not on formulary  Continue Lipitor 40 mg qhs    Anemia  Assessment & Plan  Acute  Possible etiology: hemorrhagic pancreatitis per GI vs dilutional anemia 2/2 IVF  Iron panel suggestive of iron deficiency  Ordered 3-day course IV Venofer  Continue to monitor CBC        VTE Pharmacologic Prophylaxis:   Moderate Risk (Score 3-4) - Pharmacological DVT Prophylaxis Ordered: enoxaparin (Lovenox)  Patient Centered Rounds: I performed bedside rounds with nursing staff today  Discussions with Specialists or Other Care Team Provider: APS, Nurse, and      Education and Discussions with Family / Patient: Updated  (son and sister) at bedside  Time Spent for Care: 30 minutes  More than 50% of total time spent on counseling and coordination of care as described above  Current Length of Stay: 3 day(s)  Current Patient Status: Inpatient   Certification Statement: The patient will continue to require additional inpatient hospital stay due to acute pancreatitis management  Discharge Plan: Anticipate discharge in >72 hrs to home  Code Status: Level 1 - Full Code    Subjective  Patient states that she has abdominal pain when eating clear liquids  Otherwise, she denies chest pain, cough, or shortness of breath  Objective  Vitals:   Temp (24hrs), Av 7 °F (37 6 °C), Min:99 1 °F (37 3 °C), Max:101 3 °F (38 5 °C)    Temp:  [99 1 °F (37 3 °C)-101 3 °F (38 5 °C)] 99 8 °F (37 7 °C)  HR:  [] 100  Resp:  [16-20] 18  BP: ()/(64-86) 146/86  SpO2:  [94 %-97 %] 96 %  There is no height or weight on file to calculate BMI  Input and Output Summary (last 24 hours): Intake/Output Summary (Last 24 hours) at 3/14/2022 1350  Last data filed at 3/14/2022 0700  Gross per 24 hour   Intake 5 7 ml   Output --   Net 5 7 ml       Physical Exam:   Physical Exam  Vitals and nursing note reviewed  Constitutional:       General: She is not in acute distress  Appearance: Normal appearance  She is not ill-appearing, toxic-appearing or diaphoretic  Comments: Sitting in chair and having hair groomed   HENT:      Head: Normocephalic and atraumatic  Right Ear: External ear normal       Left Ear: External ear normal       Nose: Nose normal       Mouth/Throat:      Mouth: Mucous membranes are moist    Eyes:      General: No scleral icterus  Right eye: No discharge  Left eye: No discharge  Cardiovascular:      Rate and Rhythm: Regular rhythm  Tachycardia present  Heart sounds: No murmur heard  No friction rub  Pulmonary:      Effort: Pulmonary effort is normal       Breath sounds: Normal breath sounds  No stridor  No rhonchi  Abdominal:      General: Bowel sounds are normal  There is no distension  Palpations: Abdomen is soft  Tenderness: There is no abdominal tenderness  Comments: RLQ abdominal pain   Genitourinary:     Comments: No Ruelas present  Musculoskeletal:      Cervical back: Normal range of motion  Right lower leg: No edema  Left lower leg: No edema  Skin:     General: Skin is warm  Coloration: Skin is not jaundiced  Findings: No erythema or lesion  Neurological:      General: No focal deficit present  Mental Status: She is alert and oriented to person, place, and time  Mental status is at baseline  Psychiatric:         Mood and Affect: Mood normal          Thought Content:  Thought content normal          Additional Data  Labs:  Results from last 7 days   Lab Units 03/14/22  0552 03/11/22  2046 03/11/22  0456   WBC Thousand/uL 4 02*   < > 6 56 HEMOGLOBIN g/dL 8 3*   < > 10 7*   HEMATOCRIT % 25 6*   < > 31 7*   PLATELETS Thousands/uL 255   < > 252   BANDS PCT %  --   --  2   NEUTROS PCT % 62   < >  --    LYMPHS PCT % 22   < >  --    LYMPHO PCT %  --   --  20   MONOS PCT % 14*   < >  --    MONO PCT %  --   --  4   EOS PCT % 2   < > 0    < > = values in this interval not displayed  Results from last 7 days   Lab Units 03/14/22  0552 03/13/22  0604 03/13/22  0604   SODIUM mmol/L 138   < > 139   POTASSIUM mmol/L 3 4*   < > 3 4*   CHLORIDE mmol/L 106   < > 106   CO2 mmol/L 27   < > 27   BUN mg/dL 4*   < > 2*   CREATININE mg/dL 0 39*   < > 0 51*   ANION GAP mmol/L 5   < > 6   CALCIUM mg/dL 8 8   < > 8 6   ALBUMIN g/dL  --   --  2 1*   TOTAL BILIRUBIN mg/dL  --   --  0 59   ALK PHOS U/L  --   --  39*   ALT U/L  --   --  10*   AST U/L  --   --  11   GLUCOSE RANDOM mg/dL 140   < > 148*    < > = values in this interval not displayed  Results from last 7 days   Lab Units 03/14/22  1034 03/14/22  0524 03/14/22  0002 03/13/22  1820 03/13/22  1202 03/13/22  0603 03/12/22  2356 03/12/22  1807 03/12/22  1206 03/12/22  0651 03/11/22  2157 03/11/22  1152   POC GLUCOSE mg/dl 108 137 172* 97 90 152* 136 115 112 121 153* 93     Results from last 7 days   Lab Units 03/11/22  0456   HEMOGLOBIN A1C % 5 1     Results from last 7 days   Lab Units 03/13/22  2253 03/13/22  0756 03/12/22  0516 03/12/22  0012 03/11/22  2047   LACTIC ACID mmol/L  --   --   --   --  1 3   PROCALCITONIN ng/ml 0 22 0 26* 0 38* 0 18  --        Lines/Drains:  Invasive Devices  Report    Peripheral Intravenous Line            Peripheral IV 03/13/22 Left;Ventral (anterior) Forearm 1 day    Peripheral IV 03/14/22 Right;Ventral (anterior) Forearm <1 day                Imaging: No pertinent imaging reviewed  Recent Cultures (last 7 days):   Results from last 7 days   Lab Units 03/13/22  0802 03/13/22  0759 03/11/22 2049 03/11/22 2046   BLOOD CULTURE  No Growth at 24 hrs  No Growth at 24 hrs   No Growth at 48 hrs  No Growth at 48 hrs  Last 24 Hours Medication List:   Current Facility-Administered Medications   Medication Dose Route Frequency Provider Last Rate    acetaminophen  650 mg Oral Q6H PRN Hetul Llanes, DO      albuterol  2 puff Inhalation Q4H PRN Albert Tripp MD      atorvastatin  40 mg Oral Daily With [employer], DO      budesonide-formoterol  2 puff Inhalation BID Hetul Llanes, DO      dextrose 5% lactated ringer's  200 mL/hr Intravenous Continuous Ynes Baker PA-C 200 mL/hr (03/14/22 1115)    fenofibrate  145 mg Oral Daily Thierry Mary MD      HYDROmorphone   Intravenous Continuous Dedra Mcnulty PA-C      iron sucrose  100 mg Intravenous Daily Albert Tripp MD      montelukast  10 mg Oral HS Hetul Llanes, DO      ondansetron  4 mg Intravenous Q6H PRN Hetul Llanes, DO      polyethylene glycol  17 g Oral Daily PRN Daren Washington MD      potassium chloride  40 mEq Oral BID Albert Tripp MD      senna-docusate sodium  1 tablet Oral BID PRN Daren Washington MD          Today, Patient Was Seen By: Albert Tripp MD    **Please Note: This note may have been constructed using a voice recognition system  **

## 2022-03-15 LAB
ANION GAP SERPL CALCULATED.3IONS-SCNC: 5 MMOL/L (ref 4–13)
BASOPHILS # BLD AUTO: 0.01 THOUSANDS/ΜL (ref 0–0.1)
BASOPHILS NFR BLD AUTO: 0 % (ref 0–1)
BUN SERPL-MCNC: 2 MG/DL (ref 5–25)
CALCIUM SERPL-MCNC: 9.5 MG/DL (ref 8.3–10.1)
CHLORIDE SERPL-SCNC: 106 MMOL/L (ref 100–108)
CO2 SERPL-SCNC: 27 MMOL/L (ref 21–32)
CREAT SERPL-MCNC: 0.52 MG/DL (ref 0.6–1.3)
EOSINOPHIL # BLD AUTO: 0.08 THOUSAND/ΜL (ref 0–0.61)
EOSINOPHIL NFR BLD AUTO: 2 % (ref 0–6)
ERYTHROCYTE [DISTWIDTH] IN BLOOD BY AUTOMATED COUNT: 14.2 % (ref 11.6–15.1)
GFR SERPL CREATININE-BSD FRML MDRD: 120 ML/MIN/1.73SQ M
GLUCOSE SERPL-MCNC: 121 MG/DL (ref 65–140)
GLUCOSE SERPL-MCNC: 139 MG/DL (ref 65–140)
GLUCOSE SERPL-MCNC: 173 MG/DL (ref 65–140)
GLUCOSE SERPL-MCNC: 81 MG/DL (ref 65–140)
HCT VFR BLD AUTO: 29.1 % (ref 34.8–46.1)
HGB BLD-MCNC: 9.3 G/DL (ref 11.5–15.4)
IMM GRANULOCYTES # BLD AUTO: 0.01 THOUSAND/UL (ref 0–0.2)
IMM GRANULOCYTES NFR BLD AUTO: 0 % (ref 0–2)
LYMPHOCYTES # BLD AUTO: 0.96 THOUSANDS/ΜL (ref 0.6–4.47)
LYMPHOCYTES NFR BLD AUTO: 26 % (ref 14–44)
MAGNESIUM SERPL-MCNC: 2 MG/DL (ref 1.6–2.6)
MCH RBC QN AUTO: 25.3 PG (ref 26.8–34.3)
MCHC RBC AUTO-ENTMCNC: 32 G/DL (ref 31.4–37.4)
MCV RBC AUTO: 79 FL (ref 82–98)
MONOCYTES # BLD AUTO: 0.56 THOUSAND/ΜL (ref 0.17–1.22)
MONOCYTES NFR BLD AUTO: 15 % (ref 4–12)
NEUTROPHILS # BLD AUTO: 2.01 THOUSANDS/ΜL (ref 1.85–7.62)
NEUTS SEG NFR BLD AUTO: 57 % (ref 43–75)
NRBC BLD AUTO-RTO: 0 /100 WBCS
PHOSPHATE SERPL-MCNC: 4.4 MG/DL (ref 2.7–4.5)
PLATELET # BLD AUTO: 307 THOUSANDS/UL (ref 149–390)
PMV BLD AUTO: 10.5 FL (ref 8.9–12.7)
POTASSIUM SERPL-SCNC: 3.8 MMOL/L (ref 3.5–5.3)
RBC # BLD AUTO: 3.67 MILLION/UL (ref 3.81–5.12)
SODIUM SERPL-SCNC: 138 MMOL/L (ref 136–145)
TRIGL SERPL-MCNC: 419 MG/DL
WBC # BLD AUTO: 3.63 THOUSAND/UL (ref 4.31–10.16)

## 2022-03-15 PROCEDURE — 99232 SBSQ HOSP IP/OBS MODERATE 35: CPT | Performed by: INTERNAL MEDICINE

## 2022-03-15 PROCEDURE — 84478 ASSAY OF TRIGLYCERIDES: CPT | Performed by: INTERNAL MEDICINE

## 2022-03-15 PROCEDURE — 83735 ASSAY OF MAGNESIUM: CPT | Performed by: INTERNAL MEDICINE

## 2022-03-15 PROCEDURE — 99232 SBSQ HOSP IP/OBS MODERATE 35: CPT | Performed by: SURGERY

## 2022-03-15 PROCEDURE — 82948 REAGENT STRIP/BLOOD GLUCOSE: CPT

## 2022-03-15 PROCEDURE — 85025 COMPLETE CBC W/AUTO DIFF WBC: CPT | Performed by: INTERNAL MEDICINE

## 2022-03-15 PROCEDURE — 80048 BASIC METABOLIC PNL TOTAL CA: CPT | Performed by: INTERNAL MEDICINE

## 2022-03-15 PROCEDURE — 84100 ASSAY OF PHOSPHORUS: CPT | Performed by: INTERNAL MEDICINE

## 2022-03-15 RX ORDER — HYDROMORPHONE HCL/PF 1 MG/ML
0.5 SYRINGE (ML) INJECTION EVERY 4 HOURS PRN
Status: DISCONTINUED | OUTPATIENT
Start: 2022-03-15 | End: 2022-03-16

## 2022-03-15 RX ORDER — OXYCODONE HYDROCHLORIDE 10 MG/1
10 TABLET ORAL EVERY 4 HOURS PRN
Status: DISCONTINUED | OUTPATIENT
Start: 2022-03-15 | End: 2022-03-18 | Stop reason: HOSPADM

## 2022-03-15 RX ORDER — OXYCODONE HYDROCHLORIDE 5 MG/1
5 TABLET ORAL EVERY 4 HOURS PRN
Status: DISCONTINUED | OUTPATIENT
Start: 2022-03-15 | End: 2022-03-18 | Stop reason: HOSPADM

## 2022-03-15 RX ADMIN — BUDESONIDE AND FORMOTEROL FUMARATE DIHYDRATE 2 PUFF: 80; 4.5 AEROSOL RESPIRATORY (INHALATION) at 18:32

## 2022-03-15 RX ADMIN — OXYCODONE HYDROCHLORIDE 10 MG: 10 TABLET ORAL at 12:30

## 2022-03-15 RX ADMIN — OXYCODONE HYDROCHLORIDE 10 MG: 10 TABLET ORAL at 20:13

## 2022-03-15 RX ADMIN — DEXTROSE, SODIUM CHLORIDE, SODIUM LACTATE, POTASSIUM CHLORIDE, AND CALCIUM CHLORIDE 200 ML/HR: 5; .6; .31; .03; .02 INJECTION, SOLUTION INTRAVENOUS at 02:40

## 2022-03-15 RX ADMIN — ATORVASTATIN CALCIUM 40 MG: 40 TABLET, FILM COATED ORAL at 18:32

## 2022-03-15 RX ADMIN — DEXTROSE, SODIUM CHLORIDE, SODIUM LACTATE, POTASSIUM CHLORIDE, AND CALCIUM CHLORIDE 200 ML/HR: 5; .6; .31; .03; .02 INJECTION, SOLUTION INTRAVENOUS at 13:40

## 2022-03-15 RX ADMIN — BUDESONIDE AND FORMOTEROL FUMARATE DIHYDRATE 2 PUFF: 80; 4.5 AEROSOL RESPIRATORY (INHALATION) at 10:33

## 2022-03-15 RX ADMIN — DEXTROSE, SODIUM CHLORIDE, SODIUM LACTATE, POTASSIUM CHLORIDE, AND CALCIUM CHLORIDE 200 ML/HR: 5; .6; .31; .03; .02 INJECTION, SOLUTION INTRAVENOUS at 07:45

## 2022-03-15 RX ADMIN — MONTELUKAST 10 MG: 10 TABLET, FILM COATED ORAL at 21:27

## 2022-03-15 RX ADMIN — FENOFIBRATE 145 MG: 145 TABLET, FILM COATED ORAL at 10:33

## 2022-03-15 RX ADMIN — IRON SUCROSE 100 MG: 20 INJECTION, SOLUTION INTRAVENOUS at 10:33

## 2022-03-15 NOTE — ASSESSMENT & PLAN NOTE
· Likely secondary to acute pancreatitis  · Pain has improved over past several days  · No history of chronic pain or chronic opioid use  · Continue Tylenol 650 mg p o  q 6 hours p r n  mild pain  · Continue oxycodone 5 mg p o  q 4 hours p r n  moderate pain  · Continue oxycodone 10 mg p o  q 4 hours p r n  severe pain  · Continue Dilaudid 0 5 mg IV q 4 hours p r n  breakthrough pain  Would discontinue this tomorrow morning  · Heating pad to abdomen, 20 minutes/hour as needed  · Bowel regimen at discretion of primary service and Gastroenterology, however recommended while patient on opioid pain medication

## 2022-03-15 NOTE — UTILIZATION REVIEW
Continued Stay Review    Date: 3/15/22                          Current Patient Class: inpatient  Current Level of Care: med surg    HPI:41 y o  female initially admitted on 3/11/22 Acute pancreatitis     Assessment/Plan:  Pt w/ persistent abd pain and tenderness  In last 24 hrs, pain in RLQ  Do not advance diet remain on clear liquids for now, continue IVF, pain control, strict IOs, GI and endocrine following, continue to monitor cbc and fever curve, continue 3 day course of venofer dt anemia      Vital Signs:   Date/Time Temp Pulse Resp BP MAP (mmHg) SpO2 Calculated FIO2 (%) - Nasal Cannula Nasal Cannula O2 Flow Rate (L/min) O2 Device   03/15/22 08:10:37 99 2 °F (37 3 °C) -- 17 127/83 98 -- -- -- --   03/15/22 05:28:34 97 8 °F (36 6 °C) -- -- 125/83 97 -- -- -- --   03/15/22 02:59:50 98 6 °F (37 °C) -- -- 121/82 95 -- -- -- --   03/14/22 23:24:55 97 9 °F (36 6 °C) -- 17 110/68 82 -- -- -- --   03/14/22 2300 -- -- -- -- -- -- -- -- None (Room air)   03/14/22 19:32:04 100 °F (37 8 °C) -- 18 129/77 94 -- -- -- --   03/14/22 15:40:57 -- 101 18 156/88 111 96 % -- -- --   03/14/22 10:32:25 99 8 °F (37 7 °C) 100 18 146/86 106 96 % -- -- --   03/14/22 06:35:23 99 2 °F (37 3 °C) 99 18 102/67 79 94 % -- -- --   03/14/22 0600 99 3 °F (37 4 °C) -- -- 102/67 -- -- -- -- --   03/14/22 02:16:41 99 8 °F (37 7 °C) 103 18 98/64 75 95 % -- -- --   03/13/22 22:43:31 99 4 °F (37 4 °C) 99 18 94/64 74 97 % -- -- --   03/13/22 2109 -- -- -- -- -- -- -- -- None (Room air)   03/13/22 20:01:52 99 1 °F (37 3 °C) 98 20 124/78 93 96 % -- -- --   03/13/22 1700 101 3 °F (38 5 °C) Abnormal  -- 18 -- -- -- -- -- --   03/13/22 15:52:27 99 8 °F (37 7 °C) 106 Abnormal  16 108/70 83 95 % -- -- --   03/13/22 1507 99 2 °F (37 3 °C) 100 18 -- -- -- -- -- --   03/13/22 11:32:05 98 1 °F (36 7 °C) 98 18 95/55 68 99 % -- -- --   03/13/22 0833 101 3 °F (38 5 °C) Abnormal  -- -- -- -- -- -- -- --   03/13/22 0730 -- -- -- -- -- 97 % -- -- None (Room air) 03/13/22 0719 -- -- -- -- -- 97 % 28 2 L/min Nasal cannula   03/13/22 0631 -- -- -- 124/58 -- -- -- -- --   03/13/22 06:25:47 98 9 °F (37 2 °C) 98 22 96/54 68 97 % -- -- --   03/13/22 0345 -- -- -- 105/60 -- -- -- -- --   03/13/22 03:21:51 98 3 °F (36 8 °C) 87 18 97/52 67 96 % -- -- --       Pertinent Labs/Diagnostic Results:       Results from last 7 days   Lab Units 03/15/22  0533 03/14/22  0552 03/13/22  0604 03/11/22  2046 03/11/22  0456   WBC Thousand/uL 3 63* 4 02* 4 91 6 62 6 56   HEMOGLOBIN g/dL 9 3* 8 3* 8 4* 11 2* 10 7*   HEMATOCRIT % 29 1* 25 6* 26 1* 33 1* 31 7*   PLATELETS Thousands/uL 307 255 207 247 252   NEUTROS ABS Thousands/µL 2 01 2 49 3 62 5 36  --    BANDS PCT %  --   --   --   --  2         Results from last 7 days   Lab Units 03/15/22  0533 03/14/22  0552 03/13/22  0604 03/12/22  0516 03/11/22  2046   SODIUM mmol/L 138 138 139 134* 135*   POTASSIUM mmol/L 3 8 3 4* 3 4* 3 9 3 3*   CHLORIDE mmol/L 106 106 106 103 103   CO2 mmol/L 27 27 27 25 27   ANION GAP mmol/L 5 5 6 6 5   BUN mg/dL 2* 4* 2* 4* 5   CREATININE mg/dL 0 52* 0 39* 0 51* 0 52* 0 52*   EGFR ml/min/1 73sq m 120 132 121 120 120   CALCIUM mg/dL 9 5 8 8 8 6 8 8 8 7   MAGNESIUM mg/dL 2 0 1 9 2 0 1 8 1 9   PHOSPHORUS mg/dL 4 4 3 7 2 2* 1 0*  --      Results from last 7 days   Lab Units 03/13/22  0604 03/11/22  2046 03/10/22  0717   AST U/L 11 12 24   ALT U/L 10* 14 67   ALK PHOS U/L 39* 46 63   TOTAL PROTEIN g/dL 5 7* 6 7 8 0   ALBUMIN g/dL 2 1* 2 7* 3 5   TOTAL BILIRUBIN mg/dL 0 59 0 69 0 64     Results from last 7 days   Lab Units 03/15/22  1138 03/15/22  0813 03/14/22  2134 03/14/22  1745 03/14/22  1034 03/14/22  0524 03/14/22  0002 03/13/22  1820 03/13/22  1202 03/13/22  0603 03/12/22  2356 03/12/22  1807   POC GLUCOSE mg/dl 173* 139 91 106 108 137 172* 97 90 152* 136 115     Results from last 7 days   Lab Units 03/15/22  0533 03/14/22  0552 03/13/22  0604 03/12/22  0516 03/11/22  2046 03/10/22  1022 03/10/22  0717   GLUCOSE RANDOM mg/dL 121 140 148* 153* 152* 149* 168*     Results from last 7 days   Lab Units 03/11/22  0456   HEMOGLOBIN A1C % 5 1   EAG mg/dl 100     BETA-HYDROXYBUTYRATE   Date Value Ref Range Status   03/10/2022 0 3 <0 6 mmol/L Final      Results from last 7 days   Lab Units 03/13/22  1108 03/10/22  1022   PH MICHELLE  7 348 7 401*   PCO2 MICHELLE mm Hg 47 3 32 8*   PO2 MICHELLE mm Hg 36 9 108 1*   HCO3 MICHELLE mmol/L 25 4 19 9*   BASE EXC MICHELLE mmol/L -0 5 -4 0   O2 CONTENT MICHELLE ml/dL 9 8 17 3   O2 HGB, VENOUS % 67 0 96 1*     Results from last 7 days   Lab Units 03/10/22  0717   HS TNI 0HR ng/L 3     Results from last 7 days   Lab Units 03/13/22  2253 03/13/22  0756 03/12/22  0516 03/12/22  0012   PROCALCITONIN ng/ml 0 22 0 26* 0 38* 0 18     Results from last 7 days   Lab Units 03/11/22  2047   LACTIC ACID mmol/L 1 3     Results from last 7 days   Lab Units 03/13/22  0604   NT-PRO BNP pg/mL 271*     Results from last 7 days   Lab Units 03/14/22  0552   FERRITIN ng/mL 257     Results from last 7 days   Lab Units 03/10/22  0717   LIPASE u/L 1,189*     Results from last 7 days   Lab Units 03/10/22  0750   CLARITY UA  Turbid   COLOR UA  Yellow   SPEC GRAV UA  1 018   PH UA  6 0   GLUCOSE UA mg/dl Negative   KETONES UA mg/dl Negative   BLOOD UA  Negative   PROTEIN UA mg/dl 50 (1+)*   NITRITE UA  Negative   BILIRUBIN UA  Negative   UROBILINOGEN UA (BE) mg/dl <2 0   LEUKOCYTES UA  Negative   WBC UA /hpf 2-4*   RBC UA /hpf 2-4*   BACTERIA UA /hpf None Seen   EPITHELIAL CELLS WET PREP /hpf Moderate*   MUCUS THREADS  Moderate*     Results from last 7 days   Lab Units 03/10/22  1022   ETHANOL LVL mg/dL <3   ACETAMINOPHEN LVL ug/mL <2*   SALICYLATE LVL mg/dL <3*     Results from last 7 days   Lab Units 03/13/22  0802 03/13/22  0759 03/11/22  2049 03/11/22  2046   BLOOD CULTURE  No Growth at 24 hrs  No Growth at 24 hrs  No Growth at 72 hrs  No Growth at 72 hrs       Medications:   Scheduled Medications:  atorvastatin, 40 mg, Oral, Daily With Dinner  budesonide-formoterol, 2 puff, Inhalation, BID  fenofibrate, 145 mg, Oral, Daily  iron sucrose, 100 mg, Intravenous, Daily  montelukast, 10 mg, Oral, HS      Continuous IV Infusions:  dextrose 5% lactated ringer's, 200 mL/hr, Intravenous, Continuous    HYDROmorphone (DILAUDID) 1 mg/mL 50 mL PCA  Continuous Rate: 0 mg/hr  PCA Dose: 0 3 mg  PCA Lock-out: 10 Minutes  One Hour Limit: 1 8 mg  Freq: Continuous Route: IV  Last Dose: Stopped (03/15/22 1044)  Start: 03/11/22 1430 End: 03/15/22 0757    PRN Meds:  acetaminophen, 650 mg, Oral, Q6H PRN x8 thus far  albuterol, 2 puff, Inhalation, Q4H PRN  HYDROmorphone, 0 5 mg, Intravenous, Q4H PRN x7 then dc'd  ondansetron, 4 mg, Intravenous, Q6H PRN x2 thus far  oxyCODONE, 10 mg, Oral, Q4H PRN  oxyCODONE, 5 mg, Oral, Q4H PRN  polyethylene glycol, 17 g, Oral, Daily PRN  senna-docusate sodium, 1 tablet, Oral, BID PRN        Discharge Plan: d    Network Utilization Review Department  ATTENTION: Please call with any questions or concerns to [phone] and carefully listen to the prompts so that you are directed to the right person  All voicemails are confidential   Candia Siemens all requests for admission clinical reviews, approved or denied determinations and any other requests to dedicated fax number below belonging to the campus where the patient is receiving treatment   List of dedicated fax numbers for the Facilities:  [address] DENIALS (Administrative/Medical Necessity) [phone]   [address] (Maternity/NICU/Pediatrics) [phone]   [address] [address] [place] [address] [address] [facility] Celestine Dos Santosa Harmon Alireza [address] [phone]

## 2022-03-15 NOTE — PROGRESS NOTES
[facility]  Progress Note - Elaine Lopez 1980, 46 y o  female MRN: [id number]  Unit/Bed#: [facility] 915-01 Encounter: [id number]  Primary Care Provider: Morales Miller MD   Date and time admitted to hospital: 3/10/2022  6:33 AM    * Acute pancreatitis  Assessment & Plan  Acute pancreatitis as evident by pain, lipase elevation and CT scan findings  Etiology believed to be secondary to hypertriglyceridemia given levels were above 1400 on presentation  Patient has been started on fibrate therapy  Initially on insulin drip discontinued due to hypoglycemia as per endocrinology  Continuing IV fluids with dextrose, which should prompt self insulin release  On a repeat CT scan on 03/14/2022- stable pancreatitis  No evidence for intra-abdominal pelvic hemorrhage, small right pleural effusion  Currently patient is on clear liquid diet  Triglycerides are down-trending a m  [id number]  Patient has been afebrile for past 24 hours  She feels that she is much better, she reports her pain now 6/10 on the pain scale  She did not have any further episodes of diarrhea since yesterday  She reports Dilaudid is helping with her valentino    Anemia  Assessment & Plan  Patient noticed to have drop in hemoglobin, baseline around 12, today a m -9 3  Patient does mention of black stools on 3/14/22  No further episodes of any black stools  CT scan repeated on 03/14-no evidence of intra-abdominal or pelvic hemorrhage  VTE Pharmacologic Prophylaxis:     High Risk (Score >/= 5) - Pharmacological DVT Prophylaxis Contraindicated  Sequential Compression Devices Ordered  Subjective:   Patient was seen at bedside today morning  She looks much better than yesterday  She reports that her pain is 6/10 over pain scale  She reports she did not have any further loose stools since yesterday  She is tolerating clear liquid diet and is wondering if he can switch to soups    No chest pain no shortness of breath  Patient's son was also in the room who helped me to translate Arabic to [place]  Objective:     Vitals:   Temp (24hrs), Av 9 °F (37 2 °C), Min:97 8 °F (36 6 °C), Max:100 °F (37 8 °C)    Temp:  [97 8 °F (36 6 °C)-100 °F (37 8 °C)] 99 2 °F (37 3 °C)  HR:  [100-101] 101  Resp:  [17-18] 17  BP: (110-156)/(68-88) 127/83  SpO2:  [96 %] 96 %  There is no height or weight on file to calculate BMI  Input and Output Summary (last 24 hours): Intake/Output Summary (Last 24 hours) at 3/15/2022 0932  Last data filed at 3/15/2022 5465  Gross per 24 hour   Intake 2221 8 ml   Output 0 ml   Net 2221 8 ml       Physical Exam:     Physical Exam  Constitutional:       Appearance: Normal appearance  HENT:      Head: Normocephalic and atraumatic  Cardiovascular:      Rate and Rhythm: Regular rhythm  Tachycardia present  Pulses: Normal pulses  Heart sounds: Normal heart sounds  Pulmonary:      Effort: Pulmonary effort is normal       Breath sounds: Normal breath sounds  Abdominal:      Tenderness: There is abdominal tenderness  There is guarding  There is no right CVA tenderness or left CVA tenderness  Comments: On inspection-surgical scar of hernia repair  Tenderness to palpation over right upper and lower quadrant   Musculoskeletal:         General: Normal range of motion  Right lower leg: No edema  Left lower leg: No edema  Neurological:      Mental Status: She is alert  Additional Data:     Labs:  Results from last 7 days   Lab Units 03/15/22  0533 22  2046 22  0456   WBC Thousand/uL 3 63*   < > 6 56   HEMOGLOBIN g/dL 9 3*   < > 10 7*   HEMATOCRIT % 29 1*   < > 31 7*   PLATELETS Thousands/uL 307   < > 252   BANDS PCT %  --   --  2   NEUTROS PCT % 57   < >  --    LYMPHS PCT % 26   < >  --    LYMPHO PCT %  --   --  20   MONOS PCT % 15*   < >  --    MONO PCT %  --   --  4   EOS PCT % 2   < > 0    < > = values in this interval not displayed  Results from last 7 days   Lab Units 03/15/22  0533 03/14/22  0552 03/13/22  0604   SODIUM mmol/L 138   < > 139   POTASSIUM mmol/L 3 8   < > 3 4*   CHLORIDE mmol/L 106   < > 106   CO2 mmol/L 27   < > 27   BUN mg/dL 2*   < > 2*   CREATININE mg/dL 0 52*   < > 0 51*   ANION GAP mmol/L 5   < > 6   CALCIUM mg/dL 9 5   < > 8 6   ALBUMIN g/dL  --   --  2 1*   TOTAL BILIRUBIN mg/dL  --   --  0 59   ALK PHOS U/L  --   --  39*   ALT U/L  --   --  10*   AST U/L  --   --  11   GLUCOSE RANDOM mg/dL 121   < > 148*    < > = values in this interval not displayed  Results from last 7 days   Lab Units 03/15/22  0813 03/14/22  2134 03/14/22  1745 03/14/22  1034 03/14/22  0524 03/14/22  0002 03/13/22  1820 03/13/22  1202 03/13/22  0603 03/12/22  2356 03/12/22  1807 03/12/22  1206   POC GLUCOSE mg/dl 139 91 106 108 137 172* 97 90 152* 136 115 112     Results from last 7 days   Lab Units 03/11/22  0456   HEMOGLOBIN A1C % 5 1     Results from last 7 days   Lab Units 03/13/22  2253 03/13/22  0756 03/12/22  0516 03/12/22  0012 03/11/22  2047   LACTIC ACID mmol/L  --   --   --   --  1 3   PROCALCITONIN ng/ml 0 22 0 26* 0 38* 0 18  --            Recent Cultures (last 7 days):     Results from last 7 days   Lab Units 03/13/22  0802 03/13/22  0759 03/11/22  2049 03/11/22  2046   BLOOD CULTURE  No Growth at 24 hrs  No Growth at 24 hrs  No Growth at 72 hrs  No Growth at 72 hrs         Lines/Drains:  Invasive Devices  Report    Peripheral Intravenous Line            Peripheral IV 03/14/22 Left;Proximal;Ventral (anterior) Forearm <1 day                Telemetry:        Last 24 Hours Medication List:   Current Facility-Administered Medications   Medication Dose Route Frequency Provider Last Rate    acetaminophen  650 mg Oral Q6H PRN Hetul Llanes, DO      albuterol  2 puff Inhalation Q4H PRN Bambi Villalobos MD      atorvastatin  40 mg Oral Daily With [employer], DO      budesonide-formoterol  2 puff Inhalation BID Hetul Azalia Cruz, DO      dextrose 5% lactated ringer's  200 mL/hr Intravenous Continuous [place], PA-C 200 mL/hr (03/15/22 5245)    fenofibrate  145 mg Oral Daily Mac Arreaga MD      HYDROmorphone  0 5 mg Intravenous Q4H PRN Jeramy Macario, PA-C      iron sucrose  100 mg Intravenous Daily Marty Rivera MD      montelukast  10 mg Oral HS Hetul Llanes, DO      ondansetron  4 mg Intravenous Q6H PRN Hetul Llanes, DO      oxyCODONE  10 mg Oral Q4H PRN Jeramy Macario, PA-C      oxyCODONE  5 mg Oral Q4H PRN Jeramy Macario, PA-C      polyethylene glycol  17 g Oral Daily PRN Yaw Tamez MD      senna-docusate sodium  1 tablet Oral BID PRN Yaw Tamez MD          Today, Patient Was Seen By: Yaw Tamez MD    ** Please Note: This note has been constructed using a voice recognition system   **

## 2022-03-15 NOTE — PROGRESS NOTES
Progress Note - General Surgery   Juan David Willoughby 46 y o  female MRN: [id number]  Unit/Bed#: [facility] 915-01 Encounter: [id number]    Assessment:  46 y o  F who presents with severe acute pancreatitis 2/2 hypertriglyceridemia     Afebrile  VSS, on room air    Patient still with persistent abdominal pain and tenderness  States she is now having pain in her RLQ, which is new over the last 24 hrs    Plan:  Do not recommend advancing her diet given level of abdominal pain and tenderness this morning  Continue [email or web address]  Strict I/Os  PCA-D for pain control   Continue hypertriglyceridemia management   GI and endocrine following   No evidence of gallstones on prior imaging studies  Do not recommend laparoscopic cholecystectomy at this point   Remainder of care per primary team      Subjective/Objective     Subjective: Patient complains of persistent abdominal discomfort this morning  She has been using her PCA, which has helped  She did tolerate clear liquid diet yesterday without nausea or vomiting  Objective:     Blood pressure 121/82, pulse 101, temperature 98 6 °F (37 °C), resp  rate 17, SpO2 96 %, not currently breastfeeding  ,There is no height or weight on file to calculate BMI  Intake/Output Summary (Last 24 hours) at 3/15/2022 0531  Last data filed at 3/14/2022 1900  Gross per 24 hour   Intake 2105 1 ml   Output --   Net 2105 1 ml       Invasive Devices  Report    Peripheral Intravenous Line            Peripheral IV 03/14/22 Left;Proximal;Ventral (anterior) Forearm <1 day                Physical Exam:  NAD, alert and oriented x3  Normocephalic, atraumatic  MMM  Norm resp effort on room air   Regular rate  Abd soft, nondistended, tenderness in the epigastric region and right lower quadrant    No calf tenderness or peripheral edema  -rash/lesions      Lab, Imaging and other studies:  CBC:   Lab Results   Component Value Date    WBC 4 02 (L) 03/14/2022    HGB 8 3 (L) 03/14/2022    HCT 25 6 (L) 03/14/2022    MCV 81 (L) 03/14/2022     03/14/2022    MCH 26 1 (L) 03/14/2022    MCHC 32 4 03/14/2022    RDW 14 4 03/14/2022    MPV 10 9 03/14/2022    NRBC 0 03/14/2022   , CMP:   Lab Results   Component Value Date    SODIUM 138 03/14/2022    K 3 4 (L) 03/14/2022     03/14/2022    CO2 27 03/14/2022    BUN 4 (L) 03/14/2022    CREATININE 0 39 (L) 03/14/2022    CALCIUM 8 8 03/14/2022    EGFR 132 03/14/2022     VTE Pharmacologic Prophylaxis: Sequential compression device (Venodyne)   VTE Mechanical Prophylaxis: sequential compression device

## 2022-03-15 NOTE — ASSESSMENT & PLAN NOTE
Patient noticed to have drop in hemoglobin, baseline around 12, today a m -9 9  Patient does mention of black stools on 3/14/22  No further episodes of any black stools  CT scan repeated on 03/14-no evidence of intra-abdominal or pelvic hemorrhage

## 2022-03-15 NOTE — ASSESSMENT & PLAN NOTE
Acute pancreatitis as evident by pain, lipase elevation and CT scan findings  Etiology believed to be secondary to hypertriglyceridemia given levels were above 1400 on presentation  Patient has been started on fibrate therapy  Initially on insulin drip discontinued due to hypoglycemia as per endocrinology  Continuing IV fluids with dextrose, which should prompt self insulin release  On a repeat CT scan on 03/14/2022- stable pancreatitis  No evidence for intra-abdominal pelvic hemorrhage, small right pleural effusion  Triglycerides are down-trending a m  labs-375  Patient reports her pain is much better and she would like to try out solid food  She has been switched to regular diet-low fat by General surgery  Patient is off Dilaudid pump, currently pain management with p o  Oxycodone  On IV hydration  Discussed with the patient and family at bedside regarding outpatient follow-up with Endocrinology for hypertriglyceridemia

## 2022-03-15 NOTE — PROGRESS NOTES
[facility]  Progress Note - Bryant Abraham 1980, 46 y o  female MRN: [id number]  Unit/Bed#: [facility] 915-01 Encounter: [id number]  Primary Care Provider: Rena Myers MD   Date and time admitted to hospital: 3/10/2022  6:33 AM    Abdominal pain  Assessment & Plan  · Likely secondary to acute pancreatitis  · Pain has improved over past several days  · No history of chronic pain or chronic opioid use  · Continue Tylenol 650 mg p o  q 6 hours p r n  mild pain  · Continue oxycodone 5 mg p o  q 4 hours p r n  moderate pain  · Continue oxycodone 10 mg p o  q 4 hours p r n  severe pain  · Continue Dilaudid 0 5 mg IV q 4 hours p r n  breakthrough pain  Would discontinue this tomorrow morning  · Heating pad to abdomen, 20 minutes/hour as needed  · Bowel regimen at discretion of primary service and Gastroenterology, however recommended while patient on opioid pain medication  Acute pain due to pancreatitis  APS will sign off at this time  Thank you for the consult  All opioids and other analgesics to be written at discretion of primary team  Please contact Acute Pain Service - [facility] via [employer] from [phone] with additional questions or concerns  See [employer] or [employer] for additional contacts and after hours information  Pain History  Current pain location(s):  Abdomen  Pain Scale:   5/10  Quality:  Aching  24 hour history:  Patient states her pain is improved significantly since yesterday  No new complaints      Opioid requirement previous 24 hours:  Dilaudid 4 5 mg IV via PCA    Meds/Allergies   all current active meds have been reviewed, current meds:   Current Facility-Administered Medications   Medication Dose Route Frequency    acetaminophen (TYLENOL) tablet 650 mg  650 mg Oral Q6H PRN    albuterol (PROVENTIL HFA,VENTOLIN HFA) inhaler 2 puff  2 puff Inhalation Q4H PRN    atorvastatin (LIPITOR) tablet 40 mg  40 mg Oral Daily With [employer] budesonide-formoterol (SYMBICORT) 80-4 5 MCG/ACT inhaler 2 puff  2 puff Inhalation BID    dextrose 5 % in lactated Ringer's infusion  200 mL/hr Intravenous Continuous    fenofibrate (TRICOR) tablet 145 mg  145 mg Oral Daily    HYDROmorphone (DILAUDID) injection 0 5 mg  0 5 mg Intravenous Q4H PRN    iron sucrose (VENOFER) 100 mg in sodium chloride 0 9 % 100 mL IVPB  100 mg Intravenous Daily    montelukast (SINGULAIR) tablet 10 mg  10 mg Oral HS    ondansetron (ZOFRAN) injection 4 mg  4 mg Intravenous Q6H PRN    oxyCODONE (ROXICODONE) immediate release tablet 10 mg  10 mg Oral Q4H PRN    oxyCODONE (ROXICODONE) IR tablet 5 mg  5 mg Oral Q4H PRN    polyethylene glycol (MIRALAX) packet 17 g  17 g Oral Daily PRN    senna-docusate sodium (SENOKOT S) 8 6-50 mg per tablet 1 tablet  1 tablet Oral BID PRN    and PTA meds:   Prior to Admission Medications   Prescriptions Last Dose Informant Patient Reported? Taking? Icosapent Ethyl 1 g CAPS 3/9/2022 at Unknown time Self No Yes   Sig: Take 2 capsules (2 g total) by mouth 2 (two) times a day   budesonide-formoterol (SYMBICORT) 80-4 5 MCG/ACT inhaler 3/9/2022 at Unknown time Self No Yes   Sig: Inhale 2 puffs 2 (two) times a day Rinse mouth after use     clobetasol (TEMOVATE) 0 05 % ointment Past Week at Unknown time  No Yes   Sig: Apply topically 2 (two) times a day   ergocalciferol (ERGOCALCIFEROL) 1 25 MG (01501 UT) capsule 3/9/2022 at Unknown time Self Yes Yes   Sig: Take 50,000 Units by mouth once a week    fenofibrate micronized (LOFIBRA) 200 MG capsule 3/9/2022 at Unknown time Self Yes Yes   Sig: Take 200 mg by mouth   ketoconazole (NIZORAL) 2 % shampoo Past Week at Unknown time  No Yes   Sig: APPLY 1 APPLICATION TOPICALLY 2 (TWO) TIMES A WEEK   miconazole (MONISTAT-7) 2 % vaginal cream Past Week at Unknown time  No Yes   Sig: Insert 1 applicator into the vagina daily at bedtime   montelukast (SINGULAIR) 10 mg tablet 3/9/2022 at Unknown time Self No Yes   Sig: Take 1 tablet (10 mg total) by mouth daily at bedtime   nitrofurantoin (MACROBID) 100 mg capsule   No No   Sig: Take 1 capsule (100 mg total) by mouth 2 (two) times a day for 5 days   norgestimate-ethinyl estradiol (Sprintec 28) 0 25-35 MG-MCG per tablet   No No   Sig: Take 1 tablet by mouth daily for 28 days   pantoprazole (PROTONIX) 40 mg tablet 3/9/2022 at Unknown time  No Yes   Sig: Take 1 tablet (40 mg total) by mouth daily before breakfast   rosuvastatin (CRESTOR) 20 MG tablet 3/9/2022 at Unknown time Self Yes Yes   Sig: Take 20 mg by mouth daily       Facility-Administered Medications: None       No Known Allergies    Objective     Temp:  [97 8 °F (36 6 °C)-100 °F (37 8 °C)] 99 2 °F (37 3 °C)  Resp:  [17-18] 17  BP: (110-129)/(68-83) 127/83    Physical Exam  Gen: Awake and alert, NAD, Does not appear ill  Sitting in bedside chair  Vital signs reviewed  Nursing notes reviewed  Eyes: PERRL, sclera/conjunctiva normal   ENT: No JVD, trachea midline, moist mucus membranes  CV: Heart normal rhythm and normal rate  No extra systoles  Skin: Warm, dry  Cap refill <2 seconds  No diaphoresis  Neuro: A&O x 3, GCS 15 (E4, V5, M6)  No obvious focal motor deficit  Psych: Normal speech, normal attention, normal behavior  Lab Results:   Results from last 7 days   Lab Units 03/15/22  0533   WBC Thousand/uL 3 63*   HEMOGLOBIN g/dL 9 3*   HEMATOCRIT % 29 1*   PLATELETS Thousands/uL 307      Results from last 7 days   Lab Units 03/15/22  0533 03/14/22  0552 03/13/22  0604   POTASSIUM mmol/L 3 8   < > 3 4*   CHLORIDE mmol/L 106   < > 106   CO2 mmol/L 27   < > 27   BUN mg/dL 2*   < > 2*   CREATININE mg/dL 0 52*   < > 0 51*   CALCIUM mg/dL 9 5   < > 8 6   ALK PHOS U/L  --   --  39*   ALT U/L  --   --  10*   AST U/L  --   --  11    < > = values in this interval not displayed  Estimated Creatinine Clearance: 119 5 mL/min (A) (by C-G formula based on SCr of 0 52 mg/dL (L))      Imaging Studies: I have personally reviewed pertinent reports  Counseling / Coordination of Care  Greater than 50% of total time was spent with the patient and / or family counseling and / or coordination of care  A description of the counseling / coordination of care:  Patient interview, physical examination, review of medical record, review of imaging and laboratory data, development of pain management plan, discussion of pain management plan with patient and primary service  Explanation of risks and benefits of suggested pain medications  Please note that the APS provides consultative services regarding pain management only  With the exception of ketamine and epidural infusions and except when indicated, final decisions regarding starting or changing doses of analgesic medications are at the discretion of the consulting service  Off hours consultation and/or medication management is generally not available  Portions of the record may have been created with voice recognition software  Occasional wrong-word or 'sound-a-like' substitutions may have occurred due to the inherent limitations of voice recognition software       Guerline Orlando PA-C  Acute Pain Service

## 2022-03-16 LAB
ALBUMIN SERPL BCP-MCNC: 2.6 G/DL (ref 3.5–5)
ALP SERPL-CCNC: 48 U/L (ref 46–116)
ALT SERPL W P-5'-P-CCNC: 15 U/L (ref 12–78)
ANION GAP SERPL CALCULATED.3IONS-SCNC: 6 MMOL/L (ref 4–13)
AST SERPL W P-5'-P-CCNC: 13 U/L (ref 5–45)
BASOPHILS # BLD AUTO: 0.04 THOUSANDS/ΜL (ref 0–0.1)
BASOPHILS NFR BLD AUTO: 1 % (ref 0–1)
BILIRUB SERPL-MCNC: 0.46 MG/DL (ref 0.2–1)
BUN SERPL-MCNC: 3 MG/DL (ref 5–25)
CALCIUM ALBUM COR SERPL-MCNC: 11 MG/DL (ref 8.3–10.1)
CALCIUM SERPL-MCNC: 9.9 MG/DL (ref 8.3–10.1)
CHLORIDE SERPL-SCNC: 104 MMOL/L (ref 100–108)
CO2 SERPL-SCNC: 27 MMOL/L (ref 21–32)
CREAT SERPL-MCNC: 0.54 MG/DL (ref 0.6–1.3)
EOSINOPHIL # BLD AUTO: 0.1 THOUSAND/ΜL (ref 0–0.61)
EOSINOPHIL NFR BLD AUTO: 2 % (ref 0–6)
ERYTHROCYTE [DISTWIDTH] IN BLOOD BY AUTOMATED COUNT: 14 % (ref 11.6–15.1)
GFR SERPL CREATININE-BSD FRML MDRD: 119 ML/MIN/1.73SQ M
GLUCOSE SERPL-MCNC: 100 MG/DL (ref 65–140)
GLUCOSE SERPL-MCNC: 136 MG/DL (ref 65–140)
GLUCOSE SERPL-MCNC: 153 MG/DL (ref 65–140)
GLUCOSE SERPL-MCNC: 176 MG/DL (ref 65–140)
GLUCOSE SERPL-MCNC: 82 MG/DL (ref 65–140)
GLUCOSE SERPL-MCNC: 94 MG/DL (ref 65–140)
HCT VFR BLD AUTO: 30.3 % (ref 34.8–46.1)
HGB BLD-MCNC: 9.9 G/DL (ref 11.5–15.4)
IMM GRANULOCYTES # BLD AUTO: 0.06 THOUSAND/UL (ref 0–0.2)
IMM GRANULOCYTES NFR BLD AUTO: 1 % (ref 0–2)
LYMPHOCYTES # BLD AUTO: 1.25 THOUSANDS/ΜL (ref 0.6–4.47)
LYMPHOCYTES NFR BLD AUTO: 30 % (ref 14–44)
MCH RBC QN AUTO: 26 PG (ref 26.8–34.3)
MCHC RBC AUTO-ENTMCNC: 32.7 G/DL (ref 31.4–37.4)
MCV RBC AUTO: 80 FL (ref 82–98)
MONOCYTES # BLD AUTO: 0.64 THOUSAND/ΜL (ref 0.17–1.22)
MONOCYTES NFR BLD AUTO: 15 % (ref 4–12)
NEUTROPHILS # BLD AUTO: 2.08 THOUSANDS/ΜL (ref 1.85–7.62)
NEUTS SEG NFR BLD AUTO: 51 % (ref 43–75)
NRBC BLD AUTO-RTO: 0 /100 WBCS
PLATELET # BLD AUTO: 340 THOUSANDS/UL (ref 149–390)
PMV BLD AUTO: 10.4 FL (ref 8.9–12.7)
POTASSIUM SERPL-SCNC: 3.7 MMOL/L (ref 3.5–5.3)
PROT SERPL-MCNC: 7 G/DL (ref 6.4–8.2)
RBC # BLD AUTO: 3.81 MILLION/UL (ref 3.81–5.12)
SODIUM SERPL-SCNC: 137 MMOL/L (ref 136–145)
TRIGL SERPL-MCNC: 357 MG/DL
WBC # BLD AUTO: 4.17 THOUSAND/UL (ref 4.31–10.16)

## 2022-03-16 PROCEDURE — 99232 SBSQ HOSP IP/OBS MODERATE 35: CPT | Performed by: INTERNAL MEDICINE

## 2022-03-16 PROCEDURE — 80053 COMPREHEN METABOLIC PANEL: CPT | Performed by: INTERNAL MEDICINE

## 2022-03-16 PROCEDURE — 82948 REAGENT STRIP/BLOOD GLUCOSE: CPT

## 2022-03-16 PROCEDURE — 85025 COMPLETE CBC W/AUTO DIFF WBC: CPT | Performed by: INTERNAL MEDICINE

## 2022-03-16 PROCEDURE — 84478 ASSAY OF TRIGLYCERIDES: CPT | Performed by: INTERNAL MEDICINE

## 2022-03-16 PROCEDURE — 99231 SBSQ HOSP IP/OBS SF/LOW 25: CPT | Performed by: SURGERY

## 2022-03-16 RX ORDER — POTASSIUM CHLORIDE 20 MEQ/1
20 TABLET, EXTENDED RELEASE ORAL ONCE
Status: COMPLETED | OUTPATIENT
Start: 2022-03-16 | End: 2022-03-16

## 2022-03-16 RX ADMIN — DEXTROSE, SODIUM CHLORIDE, SODIUM LACTATE, POTASSIUM CHLORIDE, AND CALCIUM CHLORIDE 125 ML/HR: 5; .6; .31; .03; .02 INJECTION, SOLUTION INTRAVENOUS at 11:58

## 2022-03-16 RX ADMIN — OXYCODONE HYDROCHLORIDE 10 MG: 10 TABLET ORAL at 21:35

## 2022-03-16 RX ADMIN — ATORVASTATIN CALCIUM 40 MG: 40 TABLET, FILM COATED ORAL at 18:12

## 2022-03-16 RX ADMIN — OXYCODONE HYDROCHLORIDE 10 MG: 10 TABLET ORAL at 05:49

## 2022-03-16 RX ADMIN — BUDESONIDE AND FORMOTEROL FUMARATE DIHYDRATE 2 PUFF: 80; 4.5 AEROSOL RESPIRATORY (INHALATION) at 09:30

## 2022-03-16 RX ADMIN — ONDANSETRON 4 MG: 2 INJECTION INTRAMUSCULAR; INTRAVENOUS at 11:59

## 2022-03-16 RX ADMIN — SENNOSIDES AND DOCUSATE SODIUM 1 TABLET: 50; 8.6 TABLET ORAL at 11:59

## 2022-03-16 RX ADMIN — FENOFIBRATE 145 MG: 145 TABLET, FILM COATED ORAL at 09:30

## 2022-03-16 RX ADMIN — BUDESONIDE AND FORMOTEROL FUMARATE DIHYDRATE 2 PUFF: 80; 4.5 AEROSOL RESPIRATORY (INHALATION) at 18:12

## 2022-03-16 RX ADMIN — IRON SUCROSE 100 MG: 20 INJECTION, SOLUTION INTRAVENOUS at 09:27

## 2022-03-16 RX ADMIN — POTASSIUM CHLORIDE 20 MEQ: 1500 TABLET, EXTENDED RELEASE ORAL at 10:38

## 2022-03-16 RX ADMIN — OXYCODONE HYDROCHLORIDE 10 MG: 10 TABLET ORAL at 09:30

## 2022-03-16 RX ADMIN — OXYCODONE HYDROCHLORIDE 10 MG: 10 TABLET ORAL at 15:52

## 2022-03-16 RX ADMIN — MONTELUKAST 10 MG: 10 TABLET, FILM COATED ORAL at 21:34

## 2022-03-16 NOTE — PROGRESS NOTES
[address] [facility]  Progress Note - Juan David Willoughby 1980, 46 y o  female MRN: [id number]  Unit/Bed#: [facility] 915-01 Encounter: [id number]  Primary Care Provider: Milagros Oliveira MD   Date and time admitted to hospital: 3/10/2022  6:33 AM    * Acute pancreatitis  Assessment & Plan  Acute pancreatitis as evident by pain, lipase elevation and CT scan findings  Etiology believed to be secondary to hypertriglyceridemia given levels were above 1400 on presentation  Patient has been started on fibrate therapy  Initially on insulin drip discontinued due to hypoglycemia as per endocrinology  Continuing IV fluids with dextrose, which should prompt self insulin release  On a repeat CT scan on 03/14/2022- stable pancreatitis  No evidence for intra-abdominal pelvic hemorrhage, small right pleural effusion  Triglycerides are down-trending a m  labs-375  Patient reports her pain is much better and she would like to try out solid food  She has been switched to regular diet-low fat by General surgery  Patient is off Dilaudid pump, currently pain management with p o  Oxycodone  On IV hydration  Discussed with the patient and family at bedside regarding outpatient follow-up with Endocrinology for hypertriglyceridemia  Anemia  Assessment & Plan  Patient noticed to have drop in hemoglobin, baseline around 12, today a m -9 9  Patient does mention of black stools on 3/14/22  No further episodes of any black stools  CT scan repeated on 03/14-no evidence of intra-abdominal or pelvic hemorrhage  We will sign off please call for any questions    Subjective:   Patient seen at bedside today morning  She appears much better as compared to yesterday  She reports that her pain is virtually absent after she takes pain pills  Reports before the pain pills the pain is like 7 on the pain scale  She did not have any further episodes of diarrhea or any vomiting     Patient is off Dilaudid pump, currently on p r n  Oxycodone  Objective:     Vitals:   Temp (24hrs), Av 8 °F (37 1 °C), Min:98 5 °F (36 9 °C), Max:99 3 °F (37 4 °C)    Temp:  [98 5 °F (36 9 °C)-99 3 °F (37 4 °C)] 98 5 °F (36 9 °C)  Resp:  [17-18] 17  BP: (121-139)/(83-98) 121/83  SpO2:  [96 %] 96 %  Body mass index is 26 76 kg/m²  Input and Output Summary (last 24 hours): Intake/Output Summary (Last 24 hours) at 3/16/2022 1329  Last data filed at 3/16/2022 1101  Gross per 24 hour   Intake --   Output 650 ml   Net -650 ml       Physical Exam:     Physical Exam  Vitals and nursing note reviewed  Constitutional:       General: She is not in acute distress  Appearance: She is well-developed  HENT:      Head: Normocephalic and atraumatic  Eyes:      Conjunctiva/sclera: Conjunctivae normal    Cardiovascular:      Rate and Rhythm: Normal rate and regular rhythm  Heart sounds: No murmur heard  Pulmonary:      Effort: Pulmonary effort is normal  No respiratory distress  Breath sounds: Normal breath sounds  Abdominal:      Palpations: Abdomen is soft  Tenderness: There is abdominal tenderness  Comments: Slight tenderness in right lower quadrant   Musculoskeletal:      Cervical back: Neck supple  Skin:     General: Skin is warm and dry  Neurological:      Mental Status: She is alert  Additional Data:     Labs:  Results from last 7 days   Lab Units 22  0542 22  2046 22  0456   WBC Thousand/uL 4 17*   < > 6 56   HEMOGLOBIN g/dL 9 9*   < > 10 7*   HEMATOCRIT % 30 3*   < > 31 7*   PLATELETS Thousands/uL 340   < > 252   BANDS PCT %  --   --  2   NEUTROS PCT % 51   < >  --    LYMPHS PCT % 30   < >  --    LYMPHO PCT %  --   --  20   MONOS PCT % 15*   < >  --    MONO PCT %  --   --  4   EOS PCT % 2   < > 0    < > = values in this interval not displayed       Results from last 7 days   Lab Units 22  0542   SODIUM mmol/L 137   POTASSIUM mmol/L 3 7   CHLORIDE mmol/L 104   CO2 mmol/L 27   BUN mg/dL 3*   CREATININE mg/dL 0 54*   ANION GAP mmol/L 6   CALCIUM mg/dL 9 9   ALBUMIN g/dL 2 6*   TOTAL BILIRUBIN mg/dL 0 46   ALK PHOS U/L 48   ALT U/L 15   AST U/L 13   GLUCOSE RANDOM mg/dL 176*         Results from last 7 days   Lab Units 03/16/22  1144 03/16/22  0820 03/15/22  2358 03/15/22  1809 03/15/22  1138 03/15/22  0813 03/14/22  2134 03/14/22  1745 03/14/22  1034 03/14/22  0524 03/14/22  0002 03/13/22  1820   POC GLUCOSE mg/dl 153* 136 100 81 173* 139 91 106 108 137 172* 97     Results from last 7 days   Lab Units 03/11/22  0456   HEMOGLOBIN A1C % 5 1     Results from last 7 days   Lab Units 03/13/22  2253 03/13/22  0756 03/12/22  0516 03/12/22  0012 03/11/22  2047   LACTIC ACID mmol/L  --   --   --   --  1 3   PROCALCITONIN ng/ml 0 22 0 26* 0 38* 0 18  --        Imaging: No pertinent imaging reviewed  Recent Cultures (last 7 days):     Results from last 7 days   Lab Units 03/13/22  0802 03/13/22  0759 03/11/22  2049 03/11/22  2046   BLOOD CULTURE  No Growth at 72 hrs  No Growth at 72 hrs  No Growth After 4 Days  No Growth After 4 Days  Lines/Drains:  Invasive Devices  Report    Peripheral Intravenous Line            Peripheral IV 03/15/22 Dorsal (posterior); Left Forearm <1 day                Telemetry:        Last 24 Hours Medication List:   Current Facility-Administered Medications   Medication Dose Route Frequency Provider Last Rate    acetaminophen  650 mg Oral Q6H PRN Hetul Llanes, DO      albuterol  2 puff Inhalation Q4H PRN Jason Pickett MD      atorvastatin  40 mg Oral Daily With [employer], DO      budesonide-formoterol  2 puff Inhalation BID Hetul Llanes, DO      dextrose 5% lactated ringer's  125 mL/hr Intravenous Continuous Norrine MD Julian 125 mL/hr (03/16/22 1158)    fenofibrate  145 mg Oral Daily Brant Taylor MD      montelukast  10 mg Oral HS Hetul Llanes, DO      ondansetron  4 mg Intravenous Q6H PRN Hetul Llanes, DO      oxyCODONE  10 mg Oral Q4H PRN Odin Davalos PA-C      oxyCODONE  5 mg Oral Q4H PRN Odin Davalos PA-C      polyethylene glycol  17 g Oral Daily PRN Milad Mehta MD      senna-docusate sodium  1 tablet Oral BID PRN Milad Mehta MD          Today, Patient Was Seen By: Milad Mehta MD    ** Please Note: This note has been constructed using a voice recognition system   **

## 2022-03-16 NOTE — CASE MANAGEMENT
Case Management Discharge Planning Note    Patient name Janell Place  Location [facility] 915/[facility] 420-26 MRN [id number]  : 1980 Date 3/16/2022       Current Admission Date: 3/10/2022  Current Admission Diagnosis:Acute pancreatitis   Patient Active Problem List    Diagnosis Date Noted    Hypokalemia 2022    Hypophosphatemia 2022    Acute pancreatitis 2022    Fever 2022    Bruising 2022    Tinnitus of both ears 2022    Tinea capitis 2022    SOB (shortness of breath) 2021    History of asthma 2021    Herpes labialis 2021    Nasal congestion 2021    Essential hypertension 2021    Hypercalcemia 2021    Eczema 2020    COVID-19 2020    Chest pain 2019    Gastroesophageal reflux disease without esophagitis 2018    Hyperlipidemia 06/15/2018    PCOS (polycystic ovarian syndrome) 2018    Ovarian cyst 10/14/2016    Granuloma annulare 2016    Heart palpitations 2016    Right ovarian cyst 2015    Hypertriglyceridemia 2015    Psoriasis 2015    Vitamin D deficiency 2015    Anemia 2015    Abnormal uterine bleeding (AUB) 10/20/2014    Fatty liver 10/20/2014      LOS (days): 5  Geometric Mean LOS (GMLOS) (days):   Days to GMLOS:     OBJECTIVE:  Risk of Unplanned Readmission Score: 14         Current admission status: Inpatient   Preferred Pharmacy:   [facility]/pharmacy #[id number] [address] [facility] [address] [facility]  [address] [address]  Phone: [phone] Fax: [phone]    Primary Care Provider: Eddi Bowling MD    Primary Insurance: BLUE CROSS  Secondary Insurance:     DISCHARGE DETAILS:         Other Referral/Resources/Interventions Provided:  Referral Comments: no therapy needs at time of d/c at this time             Additional Comments: patient is no longer on Dilaudid PCA pump and was able to begin to have her diet advanced  At this time hopeful for d/c in the next 48 hours

## 2022-03-16 NOTE — PLAN OF CARE
Problem: PAIN - ADULT  Goal: Verbalizes/displays adequate comfort level or baseline comfort level  Description: Interventions:  - Encourage patient to monitor pain and request assistance  - Assess pain using appropriate pain scale  - Administer analgesics based on type and severity of pain and evaluate response  - Implement non-pharmacological measures as appropriate and evaluate response  - Consider cultural and social influences on pain and pain management  - Notify physician/advanced practitioner if interventions unsuccessful or patient reports new pain  Outcome: Progressing     Problem: GASTROINTESTINAL - ADULT  Goal: Minimal or absence of nausea and/or vomiting  Description: INTERVENTIONS:  - Administer IV fluids if ordered to ensure adequate hydration  - Maintain NPO status until nausea and vomiting are resolved  - Nasogastric tube if ordered  - Administer ordered antiemetic medications as needed  - Provide nonpharmacologic comfort measures as appropriate  - Advance diet as tolerated, if ordered  - Consider nutrition services referral to assist patient with adequate nutrition and appropriate food choices  Outcome: Progressing  Goal: Maintains or returns to baseline bowel function  Description: INTERVENTIONS:  - Assess bowel function  - Encourage oral fluids to ensure adequate hydration  - Administer IV fluids if ordered to ensure adequate hydration  - Administer ordered medications as needed  - Encourage mobilization and activity  - Consider nutritional services referral to assist patient with adequate nutrition and appropriate food choices  Outcome: Progressing  Goal: Maintains adequate nutritional intake  Description: INTERVENTIONS:  - Monitor percentage of each meal consumed  - Identify factors contributing to decreased intake, treat as appropriate  - Assist with meals as needed  - Monitor I&O, weight, and lab values if indicated  - Obtain nutrition services referral as needed  Outcome: Progressing  Goal: Oral mucous membranes remain intact  Description: INTERVENTIONS  - Assess oral mucosa and hygiene practices  - Implement preventative oral hygiene regimen  - Implement oral medicated treatments as ordered  - Initiate Nutrition services referral as needed  Outcome: Progressing     Problem: Nutrition/Hydration-ADULT  Goal: Nutrient/Hydration intake appropriate for improving, restoring or maintaining nutritional needs  Description: Monitor and assess patient's nutrition/hydration status for malnutrition  Collaborate with interdisciplinary team and initiate plan and interventions as ordered  Monitor patient's weight and dietary intake as ordered or per policy  Utilize nutrition screening tool and intervene as necessary  Determine patient's food preferences and provide high-protein, high-caloric foods as appropriate       INTERVENTIONS:  - Monitor oral intake, urinary output, labs, and treatment plans  - Assess nutrition and hydration status and recommend course of action  - Evaluate amount of meals eaten  - Assist patient with eating if necessary   - Allow adequate time for meals  - Recommend/ encourage appropriate diets, oral nutritional supplements, and vitamin/mineral supplements  - Order, calculate, and assess calorie counts as needed  - Recommend, monitor, and adjust tube feedings and TPN/PPN based on assessed needs  - Assess need for intravenous fluids  - Provide specific nutrition/hydration education as appropriate  - Include patient/family/caregiver in decisions related to nutrition  Outcome: Progressing

## 2022-03-16 NOTE — PROGRESS NOTES
Progress Note - General Surgery   Vicki Shin 46 y o  female MRN: [id number]  Unit/Bed#: [facility] 915-01 Encounter: [id number]    Assessment:  46 y o  F who presents with severe acute pancreatitis 2/2 hypertriglyceridemia      Afebrile  VSS, on room air     Complains RLQ pain but better     Plan:  Consider advancing diet  Continue IVFs  Strict I/Os  Continue hypertriglyceridemia management   GI and endocrine following   No evidence of gallstones on prior imaging studies  Do not recommend laparoscopic cholecystectomy at this point   Remainder of care per primary team    Subjective/Objective     Subjective:   Pain is better, no nausea or vomiting, tolerating CLD    Objective:    Blood pressure 139/98, pulse 101, temperature 99 3 °F (37 4 °C), resp  rate 18, height 5' (1 524 m), SpO2 96 %, not currently breastfeeding  ,Body mass index is 26 76 kg/m²  Intake/Output Summary (Last 24 hours) at 3/16/2022 0627  Last data filed at 3/15/2022 1044  Gross per 24 hour   Intake 122 7 ml   Output 0 ml   Net 122 7 ml       Invasive Devices  Report    Peripheral Intravenous Line            Peripheral IV 03/15/22 Dorsal (posterior); Left Forearm <1 day                Physical Exam:   Gen:  NAD  CV:  warm, well-perfused  Lungs: nl effort  Abd:  soft, mild RLQ pain, no rebound  Ext:  no CCE  Neuro: A&Ox3     Results from last 7 days   Lab Units 03/15/22  0533 03/14/22  0552 03/13/22  0604   WBC Thousand/uL 3 63* 4 02* 4 91   HEMOGLOBIN g/dL 9 3* 8 3* 8 4*   HEMATOCRIT % 29 1* 25 6* 26 1*   PLATELETS Thousands/uL 307 255 207     Results from last 7 days   Lab Units 03/15/22  0533 03/14/22  0552 03/13/22  0604   POTASSIUM mmol/L 3 8 3 4* 3 4*   CHLORIDE mmol/L 106 106 106   CO2 mmol/L 27 27 27   BUN mg/dL 2* 4* 2*   CREATININE mg/dL 0 52* 0 39* 0 51*   CALCIUM mg/dL 9 5 8 8 8 6

## 2022-03-16 NOTE — PROGRESS NOTES
[address] [facility]  Progress Note - Aniket Florez 1980, 46 y o  female MRN: [id number]  Unit/Bed#: [facility] 915-01 Encounter: [id number]  Primary Care Provider: Kayley Powell MD   Date and time admitted to hospital: 3/10/2022  6:33 AM      * Acute pancreatitis  Assessment & Plan  Symptoms including pain/nausea improving  Follow-up imaging on 3/14 noting "Stable pancreatitis with stable appearance of the fluid extending from the pancreas into the root of the mesentery and retroperitoneum in addition to a small amount of fluid within the pelvis   No evidence for intra-abdominal or pelvic hemorrhage "  Likely secondary to hypertriglyceridemia  Continue IV fluids  Appreciate pain management input -> Dilaudid PCA transitioned to an oral/IV analgesic regimen  Remains on clear liquids - surgery recommending not advancing diet yet until further improvement in pain  Appreciate gastroenterology input  PRN emesis control    Hyperlipidemia  Assessment & Plan  Continue statin (Lipitor while hospitalized)    Hypertriglyceridemia  Assessment & Plan  Chronic and progressively improving - likely etiology of pancreatitis  Noted to have elevated triglycerides since 2013  TF on admission measured >1400 -> 419 currently  Appreciate prior endocrinology input -> outpatient follow-up  Continue Lipitor/Tricor  Encourage diet/exercise    Hypokalemia  Assessment & Plan  Monitor/replete serum potassium and magnesium    History of asthma  Assessment & Plan  Not in acute exacerbation  Continue Symbicort and Singular - PRN Albuterol on board    Anemia  Assessment & Plan  Acute  Possible etiology: hemorrhagic pancreatitis per GI vs dilutional anemia due to IV fluids  Iron panel suggestive of iron deficiency  Ordered 3-day course IV Venofer  Continue hemoglobin monitoring - currently stable @ 9 3    Ovarian cyst  Assessment & Plan  Incidental finding on CT imaging  Outpatient follow-up       DVT Prophylaxis:  SCDs - Ambulatory      Patient Centered Rounds:  I have performed bedside rounds and discussed plan of care with nursing today  Discussions with Specialists or Other Care Team Provider:  see above assessments if applicable    Education and Discussions with Family / Patient:  Patient at bedside, along with son, over the phone today    Time Spent for Care:  32 minutes  More than 50% of total time spent on counseling and coordination of care as described above  Current Length of Stay: 4 day(s)    Current Patient Status: Inpatient   Certification Statement:  Patient will continue to require additional hospital stay due to assessments as noted above  Code Status: Level 1 - Full Code        Subjective:     Seen/examined earlier today  States abdominal discomfort/pain is waxing/waning but slowly improving  Tolerating clear liquids at this time  Denies fever/chills  Denies worsening of nausea/vomiting  Objective:     Vitals:   Temp (24hrs), Av 4 °F (36 9 °C), Min:97 8 °F (36 6 °C), Max:99 2 °F (37 3 °C)    Temp:  [97 8 °F (36 6 °C)-99 2 °F (37 3 °C)] 99 2 °F (37 3 °C)  Resp:  [17] 17  BP: (110-127)/(68-83) 127/83  Body mass index is 26 76 kg/m²  Input and Output Summary (last 24 hours):        Intake/Output Summary (Last 24 hours) at 3/15/2022 2002  Last data filed at 3/15/2022 1044  Gross per 24 hour   Intake 122 7 ml   Output 0 ml   Net 122 7 ml       Physical Exam:     GENERAL:  Waxing/waning but slowly improving distress from pain  HEAD:  Normocephalic - atraumatic  EYES: PERRL - EOMI   MOUTH:  Mucosa moist  NECK:  Supple - full range of motion  CARDIAC:  Rate controlled - S1/S2 positive  PULMONARY:  Clear breath sounds bilaterally - nonlabored respirations  ABDOMEN:  Soft - epigastric/right-sided tenderness to palpation - nondistended - active bowel sounds  MUSCULOSKELETAL:  Motor strength/range of motion intact  NEUROLOGIC:  Alert/oriented at baseline  SKIN:  Chronic wrinkles/blemishes PSYCHIATRIC:  Mood/affect stable      Additional Data:     Labs & Recent Cultures:    Results from last 7 days   Lab Units 03/15/22  0533 03/11/22  2046 03/11/22  0456   WBC Thousand/uL 3 63*   < > 6 56   HEMOGLOBIN g/dL 9 3*   < > 10 7*   HEMATOCRIT % 29 1*   < > 31 7*   PLATELETS Thousands/uL 307   < > 252   BANDS PCT %  --   --  2   NEUTROS PCT % 57   < >  --    LYMPHS PCT % 26   < >  --    LYMPHO PCT %  --   --  20   MONOS PCT % 15*   < >  --    MONO PCT %  --   --  4   EOS PCT % 2   < > 0    < > = values in this interval not displayed  Results from last 7 days   Lab Units 03/15/22  0533 03/14/22  0552 03/13/22  0604   POTASSIUM mmol/L 3 8   < > 3 4*   CHLORIDE mmol/L 106   < > 106   CO2 mmol/L 27   < > 27   BUN mg/dL 2*   < > 2*   CREATININE mg/dL 0 52*   < > 0 51*   CALCIUM mg/dL 9 5   < > 8 6   ALK PHOS U/L  --   --  39*   ALT U/L  --   --  10*   AST U/L  --   --  11    < > = values in this interval not displayed  Results from last 7 days   Lab Units 03/15/22  1809 03/15/22  1138 03/15/22  0813 03/14/22  2134 03/14/22  1745 03/14/22  1034 03/14/22  0524 03/14/22  0002 03/13/22  1820 03/13/22  1202 03/13/22  0603 03/12/22  2356   POC GLUCOSE mg/dl 81 173* 139 91 106 108 137 172* 97 90 152* 136     Results from last 7 days   Lab Units 03/11/22  0456   HEMOGLOBIN A1C % 5 1     Results from last 7 days   Lab Units 03/13/22  2253 03/13/22  0756 03/12/22  0516 03/12/22  0012 03/11/22  2047   LACTIC ACID mmol/L  --   --   --   --  1 3   PROCALCITONIN ng/ml 0 22 0 26* 0 38* 0 18  --          Results from last 7 days   Lab Units 03/13/22  0802 03/13/22  0759 03/11/22 2049 03/11/22 2046   BLOOD CULTURE  No Growth at 48 hrs  No Growth at 48 hrs  No Growth at 72 hrs  No Growth at 72 hrs           Last 24 Hours Medication List:   Current Facility-Administered Medications   Medication Dose Route Frequency Provider Last Rate    acetaminophen  650 mg Oral Q6H PRN Hetul Llanes, DO      albuterol  2 puff Inhalation Q4H PRN Jaz Snow MD      atorvastatin  40 mg Oral Daily With [employer], DO      budesonide-formoterol  2 puff Inhalation BID Hetul Llanes, DO      dextrose 5% lactated ringer's  125 mL/hr Intravenous Continuous Ariadna Bone  mL/hr (03/15/22 1831)    fenofibrate  145 mg Oral Daily Christina Morrow MD      HYDROmorphone  0 5 mg Intravenous Q4H PRN Yuridia Potter PA-C      iron sucrose  100 mg Intravenous Daily Jaz Snow MD      montelukast  10 mg Oral HS Hetul Llanes, DO      ondansetron  4 mg Intravenous Q6H PRN Hetul Llanes, DO      oxyCODONE  10 mg Oral Q4H PRN Yuridia Potter PA-C      oxyCODONE  5 mg Oral Q4H PRN Yuridia Potter PA-C      polyethylene glycol  17 g Oral Daily PRN Andrea Paulson MD      senna-docusate sodium  1 tablet Oral BID PRN Andrea Paulson MD                    ** Please Note: This note is constructed using a voice recognition dictation system  An occasional wrong word/phrase or sound-a-like substitution may have been picked up by dictation device due to the inherent limitations of voice recognition software  Read the chart carefully and recognize, using reasonable context, where substitutions may have occurred  **

## 2022-03-16 NOTE — PROGRESS NOTES
[address] [facility]  Progress Note - Adam Valencia 1980, 46 y o  female MRN: [id number]  Unit/Bed#: [facility] 915-01 Encounter: [id number]  Primary Care Provider: Brando Davenport MD   Date and time admitted to hospital: 3/10/2022  6:33 AM      * Acute pancreatitis  Assessment & Plan  Symptoms including pain/nausea improving  Follow-up imaging on 3/14 noting "Stable pancreatitis with stable appearance of the fluid extending from the pancreas into the root of the mesentery and retroperitoneum in addition to a small amount of fluid within the pelvis   No evidence for intra-abdominal or pelvic hemorrhage "  Likely secondary to hypertriglyceridemia  Continue IV fluids until better oral intake evidence  Appreciate pain management input -> Dilaudid PCA transitioned to an oral regimen currently  Analgesic clear liquid diet advanced to a low-fat solid consistency today per general surgery as pain is starting to improve - assess for toleration  Appreciate gastroenterology input  PRN emesis control    Hyperlipidemia  Assessment & Plan  Continue statin (Lipitor while hospitalized)    Hypertriglyceridemia  Assessment & Plan  Chronic and progressively improving - likely etiology of pancreatitis  Noted to have elevated triglycerides since 2013  TF on admission measured >1400 -> 419 -> 357 currently  Appreciate prior endocrinology input -> outpatient follow-up  Continue Lipitor/Tricor  Encourage diet/exercise    Hypokalemia  Assessment & Plan  Monitor/replete serum potassium and magnesium    History of asthma  Assessment & Plan  Not in acute exacerbation  Continue Symbicort and Singular - PRN Albuterol on board    Anemia  Assessment & Plan  Acute  Possible etiology: hemorrhagic pancreatitis per GI vs dilutional anemia due to IV fluids  Iron panel suggestive of iron deficiency  Ordered 3-day course IV Venofer  Continue hemoglobin monitoring - currently stable @ 9 9    Ovarian cyst  Assessment & Plan  Incidental finding on CT imaging  Outpatient follow-up       DVT Prophylaxis:  SCDs - Ambulatory      Patient Centered Rounds:  I have performed bedside rounds and discussed plan of care with nursing today  Discussions with Specialists or Other Care Team Provider:  see above assessments if applicable    Education and Discussions with Family / Patient:  Patient at bedside, along with son, over the phone today    Time Spent for Care:  32 minutes  More than 50% of total time spent on counseling and coordination of care as described above  Current Length of Stay: 5 day(s)    Current Patient Status: Inpatient   Certification Statement:  Patient will continue to require additional hospital stay due to assessments as noted above  Code Status: Level 1 - Full Code        Subjective:     Seen/examined earlier this morning  Sitting upright in a chair at the time my encounter  States that her pain is still persistent, however, has generally improved when compared to yesterday  She is content that her diet has been advanced  Denying fever/chills or nausea/vomiting at this time  Objective:     Vitals:   Temp (24hrs), Av 8 °F (37 1 °C), Min:98 5 °F (36 9 °C), Max:99 3 °F (37 4 °C)    Temp:  [98 5 °F (36 9 °C)-99 3 °F (37 4 °C)] 99 °F (37 2 °C)  Resp:  [17-18] 17  BP: (121-139)/(75-98) 121/75  SpO2:  [96 %] 96 %  Body mass index is 26 76 kg/m²  Input and Output Summary (last 24 hours):        Intake/Output Summary (Last 24 hours) at 3/16/2022 1701  Last data filed at 3/16/2022 1101  Gross per 24 hour   Intake --   Output 650 ml   Net -650 ml       Physical Exam:     GENERAL:  Waxing/waning but improving distress from pain  HEAD:  Normocephalic - atraumatic  EYES: PERRL - EOMI   MOUTH:  Mucosa moist  NECK:  Supple - full range of motion  CARDIAC:  Rate controlled - S1/S2 positive  PULMONARY:  Fairly clear to auscultation - nonlabored respirations  ABDOMEN:  Soft - epigastric/right-sided tenderness to palpation - nondistended - active bowel sounds  MUSCULOSKELETAL:  Motor strength/range of motion intact  NEUROLOGIC:  Alert/oriented at baseline  SKIN:  Chronic wrinkles/blemishes   PSYCHIATRIC:  Mood/affect stable      Additional Data:     Labs & Recent Cultures:    Results from last 7 days   Lab Units 03/16/22  0542 03/11/22 2046 03/11/22  0456   WBC Thousand/uL 4 17*   < > 6 56   HEMOGLOBIN g/dL 9 9*   < > 10 7*   HEMATOCRIT % 30 3*   < > 31 7*   PLATELETS Thousands/uL 340   < > 252   BANDS PCT %  --   --  2   NEUTROS PCT % 51   < >  --    LYMPHS PCT % 30   < >  --    LYMPHO PCT %  --   --  20   MONOS PCT % 15*   < >  --    MONO PCT %  --   --  4   EOS PCT % 2   < > 0    < > = values in this interval not displayed  Results from last 7 days   Lab Units 03/16/22  0542   POTASSIUM mmol/L 3 7   CHLORIDE mmol/L 104   CO2 mmol/L 27   BUN mg/dL 3*   CREATININE mg/dL 0 54*   CALCIUM mg/dL 9 9   ALK PHOS U/L 48   ALT U/L 15   AST U/L 13         Results from last 7 days   Lab Units 03/16/22  1144 03/16/22  0820 03/15/22  2358 03/15/22  1809 03/15/22  1138 03/15/22  0813 03/14/22  2134 03/14/22  1745 03/14/22  1034 03/14/22  0524 03/14/22  0002 03/13/22  1820   POC GLUCOSE mg/dl 153* 136 100 81 173* 139 91 106 108 137 172* 97     Results from last 7 days   Lab Units 03/11/22  0456   HEMOGLOBIN A1C % 5 1     Results from last 7 days   Lab Units 03/13/22  2253 03/13/22  0756 03/12/22  0516 03/12/22  0012 03/11/22 2047   LACTIC ACID mmol/L  --   --   --   --  1 3   PROCALCITONIN ng/ml 0 22 0 26* 0 38* 0 18  --          Results from last 7 days   Lab Units 03/13/22  0802 03/13/22  0759 03/11/22  2049 03/11/22  2046   BLOOD CULTURE  No Growth at 72 hrs  No Growth at 72 hrs  No Growth After 4 Days  No Growth After 4 Days           Last 24 Hours Medication List:   Current Facility-Administered Medications   Medication Dose Route Frequency Provider Last Rate    acetaminophen  650 mg Oral Q6H PRN Bernie Llanes DO      albuterol  2 puff Inhalation Q4H PRN Elliot Cabrera MD      atorvastatin  40 mg Oral Daily With [employer], DO      budesonide-formoterol  2 puff Inhalation BID Hetul Llanes, DO      dextrose 5% lactated ringer's  125 mL/hr Intravenous Continuous Annemarie Bhandari  mL/hr (03/16/22 5738)    fenofibrate  145 mg Oral Daily Augustus Parikh MD      montelukast  10 mg Oral HS Hetul Llanes, DO      ondansetron  4 mg Intravenous Q6H PRN Hetul Llanes, DO      oxyCODONE  10 mg Oral Q4H PRN Lizet Serum, PA-C      oxyCODONE  5 mg Oral Q4H PRN Lizet Serum, PA-C      polyethylene glycol  17 g Oral Daily PRN Glenis Keen MD      senna-docusate sodium  1 tablet Oral BID PRN Glenis Keen MD                    ** Please Note: This note is constructed using a voice recognition dictation system  An occasional wrong word/phrase or sound-a-like substitution may have been picked up by dictation device due to the inherent limitations of voice recognition software  Read the chart carefully and recognize, using reasonable context, where substitutions may have occurred  **

## 2022-03-17 LAB
ALBUMIN SERPL BCP-MCNC: 2.9 G/DL (ref 3.5–5)
ALP SERPL-CCNC: 53 U/L (ref 46–116)
ALT SERPL W P-5'-P-CCNC: 14 U/L (ref 12–78)
ANION GAP SERPL CALCULATED.3IONS-SCNC: 6 MMOL/L (ref 4–13)
ARTIFACT: PRESENT
AST SERPL W P-5'-P-CCNC: 16 U/L (ref 5–45)
BACTERIA BLD CULT: NORMAL
BACTERIA BLD CULT: NORMAL
BASOPHILS # BLD MANUAL: 0.05 THOUSAND/UL (ref 0–0.1)
BASOPHILS NFR MAR MANUAL: 1 % (ref 0–1)
BILIRUB SERPL-MCNC: 0.6 MG/DL (ref 0.2–1)
BUN SERPL-MCNC: 7 MG/DL (ref 5–25)
CALCIUM ALBUM COR SERPL-MCNC: 11.2 MG/DL (ref 8.3–10.1)
CALCIUM SERPL-MCNC: 10.3 MG/DL (ref 8.3–10.1)
CHLORIDE SERPL-SCNC: 102 MMOL/L (ref 100–108)
CO2 SERPL-SCNC: 27 MMOL/L (ref 21–32)
CREAT SERPL-MCNC: 0.69 MG/DL (ref 0.6–1.3)
EOSINOPHIL # BLD MANUAL: 0.05 THOUSAND/UL (ref 0–0.4)
EOSINOPHIL NFR BLD MANUAL: 1 % (ref 0–6)
ERYTHROCYTE [DISTWIDTH] IN BLOOD BY AUTOMATED COUNT: 13.9 % (ref 11.6–15.1)
GFR SERPL CREATININE-BSD FRML MDRD: 110 ML/MIN/1.73SQ M
GLUCOSE SERPL-MCNC: 104 MG/DL (ref 65–140)
GLUCOSE SERPL-MCNC: 185 MG/DL (ref 65–140)
GLUCOSE SERPL-MCNC: 85 MG/DL (ref 65–140)
GLUCOSE SERPL-MCNC: 92 MG/DL (ref 65–140)
GLUCOSE SERPL-MCNC: 94 MG/DL (ref 65–140)
GLUCOSE SERPL-MCNC: 96 MG/DL (ref 65–140)
HCT VFR BLD AUTO: 35.8 % (ref 34.8–46.1)
HGB BLD-MCNC: 11 G/DL (ref 11.5–15.4)
HYPERCHROMIA BLD QL SMEAR: PRESENT
LIPASE SERPL-CCNC: 353 U/L (ref 73–393)
LYMPHOCYTES # BLD AUTO: 1.13 THOUSAND/UL (ref 0.6–4.47)
LYMPHOCYTES # BLD AUTO: 23 % (ref 14–44)
MCH RBC QN AUTO: 25.6 PG (ref 26.8–34.3)
MCHC RBC AUTO-ENTMCNC: 30.7 G/DL (ref 31.4–37.4)
MCV RBC AUTO: 83 FL (ref 82–98)
MONOCYTES # BLD AUTO: 0.54 THOUSAND/UL (ref 0–1.22)
MONOCYTES NFR BLD: 11 % (ref 4–12)
NEUTROPHILS # BLD MANUAL: 2.9 THOUSAND/UL (ref 1.85–7.62)
NEUTS BAND NFR BLD MANUAL: 3 % (ref 0–8)
NEUTS SEG NFR BLD AUTO: 56 % (ref 43–75)
PLATELET # BLD AUTO: 404 THOUSANDS/UL (ref 149–390)
PLATELET BLD QL SMEAR: ADEQUATE
PMV BLD AUTO: 9.9 FL (ref 8.9–12.7)
POLYCHROMASIA BLD QL SMEAR: PRESENT
POTASSIUM SERPL-SCNC: 4 MMOL/L (ref 3.5–5.3)
PROT SERPL-MCNC: 7.8 G/DL (ref 6.4–8.2)
RBC # BLD AUTO: 4.3 MILLION/UL (ref 3.81–5.12)
RBC MORPH BLD: PRESENT
SODIUM SERPL-SCNC: 135 MMOL/L (ref 136–145)
TRIGL SERPL-MCNC: 419 MG/DL
VARIANT LYMPHS # BLD AUTO: 5 %
WBC # BLD AUTO: 4.91 THOUSAND/UL (ref 4.31–10.16)

## 2022-03-17 PROCEDURE — 84478 ASSAY OF TRIGLYCERIDES: CPT | Performed by: INTERNAL MEDICINE

## 2022-03-17 PROCEDURE — 85027 COMPLETE CBC AUTOMATED: CPT | Performed by: INTERNAL MEDICINE

## 2022-03-17 PROCEDURE — 99232 SBSQ HOSP IP/OBS MODERATE 35: CPT | Performed by: SURGERY

## 2022-03-17 PROCEDURE — 99232 SBSQ HOSP IP/OBS MODERATE 35: CPT | Performed by: INTERNAL MEDICINE

## 2022-03-17 PROCEDURE — 83690 ASSAY OF LIPASE: CPT | Performed by: INTERNAL MEDICINE

## 2022-03-17 PROCEDURE — 80053 COMPREHEN METABOLIC PANEL: CPT | Performed by: INTERNAL MEDICINE

## 2022-03-17 PROCEDURE — 85007 BL SMEAR W/DIFF WBC COUNT: CPT | Performed by: INTERNAL MEDICINE

## 2022-03-17 PROCEDURE — 82948 REAGENT STRIP/BLOOD GLUCOSE: CPT

## 2022-03-17 RX ADMIN — BUDESONIDE AND FORMOTEROL FUMARATE DIHYDRATE 2 PUFF: 80; 4.5 AEROSOL RESPIRATORY (INHALATION) at 16:30

## 2022-03-17 RX ADMIN — BUDESONIDE AND FORMOTEROL FUMARATE DIHYDRATE 2 PUFF: 80; 4.5 AEROSOL RESPIRATORY (INHALATION) at 08:23

## 2022-03-17 RX ADMIN — OXYCODONE HYDROCHLORIDE 10 MG: 10 TABLET ORAL at 19:18

## 2022-03-17 RX ADMIN — OXYCODONE HYDROCHLORIDE 10 MG: 10 TABLET ORAL at 10:35

## 2022-03-17 RX ADMIN — OXYCODONE HYDROCHLORIDE 10 MG: 10 TABLET ORAL at 23:17

## 2022-03-17 RX ADMIN — MONTELUKAST 10 MG: 10 TABLET, FILM COATED ORAL at 23:17

## 2022-03-17 RX ADMIN — ATORVASTATIN CALCIUM 40 MG: 40 TABLET, FILM COATED ORAL at 16:30

## 2022-03-17 RX ADMIN — FENOFIBRATE 145 MG: 145 TABLET, FILM COATED ORAL at 08:23

## 2022-03-17 NOTE — PROGRESS NOTES
[address] [facility]  Progress Note - Trini Cha 1980, 46 y o  female MRN: [id number]  Unit/Bed#: [facility] 915-01 Encounter: [id number]  Primary Care Provider: Sruthi Anhtony MD   Date and time admitted to hospital: 3/10/2022  6:33 AM      * Acute pancreatitis  Assessment & Plan  Symptoms including pain/nausea improving  Follow-up imaging on 3/14 noting "Stable pancreatitis with stable appearance of the fluid extending from the pancreas into the root of the mesentery and retroperitoneum in addition to a small amount of fluid within the pelvis   No evidence for intra-abdominal or pelvic hemorrhage "  Likely secondary to hypertriglyceridemia  Continue IV fluids until better/consistent oral intake evidenced  Appreciate pain management input -> Dilaudid PCA transitioned to an oral regimen currently  Clear liquid diet advanced to a low-fat solid consistency on 3/16 per general surgery as pain has been improving - assess for toleration  Appreciate gastroenterology input  PRN emesis control  With continued improvement in symptoms, anticipate discharge in 24-48 hours if cleared by surgery    Hyperlipidemia  Assessment & Plan  Continue statin (Lipitor while hospitalized)    Hypertriglyceridemia  Assessment & Plan  Chronic and progressively improving - likely etiology of pancreatitis  Noted to have elevated triglycerides since 2013  TF on admission measured >1400 -> 419 -> 357 -> 419 currently (slight increased after resuming oral diet expected)  Appreciate prior endocrinology input -> outpatient follow-up  Continue Lipitor/Tricor  Encourage diet/exercise    Hypokalemia  Assessment & Plan  Monitor/replete serum potassium and magnesium    History of asthma  Assessment & Plan  Not in acute exacerbation  Continue Symbicort and Singular - PRN Albuterol on board    Anemia  Assessment & Plan  Possible etiology: hemorrhagic pancreatitis per GI vs dilutional anemia due to IV fluids  Iron panel suggestive of iron deficiency  Ordered 3-day course IV Venofer  Continue hemoglobin monitoring - currently stable @ 11 0    Ovarian cyst  Assessment & Plan  Incidental finding on CT imaging  Outpatient follow-up       DVT Prophylaxis:  SCDs - Ambulatory      Patient Centered Rounds:  I have performed bedside rounds and discussed plan of care with nursing today  Discussions with Specialists or Other Care Team Provider:  see above assessments if applicable    Education and Discussions with Family / Patient:  Patient at bedside, along with son, at bedside today    Time Spent for Care:  32 minutes  More than 50% of total time spent on counseling and coordination of care as described above  Current Length of Stay: 6 day(s)    Current Patient Status: Inpatient   Certification Statement:  Patient will continue to require additional hospital stay due to assessments as noted above  Code Status: Level 1 - Full Code        Subjective:     Seen/examined earlier today with son present bedside  States pain is waxing/waning but continues to improve over the last few days  She has been able to tolerate a solid oral diet thus far  Weakness/fatigue also is starting to improve  Objective:     Vitals:   Temp (24hrs), Av 9 °F (37 2 °C), Min:98 6 °F (37 °C), Max:99 2 °F (37 3 °C)    Temp:  [98 6 °F (37 °C)-99 2 °F (37 3 °C)] 99 2 °F (37 3 °C)  HR:  [97] 97  Resp:  [16-18] 18  BP: (115-120)/(77) 115/77  SpO2:  [96 %] 96 %  Body mass index is 26 76 kg/m²  Input and Output Summary (last 24 hours):        Intake/Output Summary (Last 24 hours) at 3/17/2022 1646  Last data filed at 3/17/2022 1130  Gross per 24 hour   Intake 2941 67 ml   Output 1241 ml   Net 1700 67 ml       Physical Exam:     GENERAL:  Waxing/waning but generally improved distress from pain  HEAD:  Normocephalic - atraumatic  EYES: PERRL - EOMI   MOUTH:  Mucosa moist  NECK:  Supple - full range of motion  CARDIAC:  Rate controlled - S1/S2 positive  PULMONARY:  Clear to auscultation - nonlabored respirations  ABDOMEN:  Soft - epigastric/right-sided tenderness to palpation - nondistended - active bowel sounds  MUSCULOSKELETAL:  Motor strength/range of motion intact  NEUROLOGIC:  Alert/oriented at baseline  SKIN:  Chronic wrinkles/blemishes   PSYCHIATRIC:  Mood/affect stable      Additional Data:     Labs & Recent Cultures:    Results from last 7 days   Lab Units 03/17/22  0609 03/16/22  0542 03/11/22  0456   WBC Thousand/uL 4 91 4 17*   < >   HEMOGLOBIN g/dL 11 0* 9 9*   < >   HEMATOCRIT % 35 8 30 3*   < >   PLATELETS Thousands/uL 404* 340   < >   BANDS PCT % 3  --    < >   NEUTROS PCT %  --  51  --    LYMPHS PCT %  --  30  --    LYMPHO PCT % 23  --    < >   MONOS PCT %  --  15*  --    MONO PCT % 11  --    < >   EOS PCT % 1 2   < >    < > = values in this interval not displayed  Results from last 7 days   Lab Units 03/17/22  0609   POTASSIUM mmol/L 4 0   CHLORIDE mmol/L 102   CO2 mmol/L 27   BUN mg/dL 7   CREATININE mg/dL 0 69   CALCIUM mg/dL 10 3*   ALK PHOS U/L 53   ALT U/L 14   AST U/L 16         Results from last 7 days   Lab Units 03/17/22  1624 03/17/22  1053 03/17/22  0732 03/17/22  0636 03/17/22  0000 03/16/22  2111 03/16/22  1706 03/16/22  1144 03/16/22  0820 03/15/22  2358 03/15/22  1809 03/15/22  1138   POC GLUCOSE mg/dl 94 185* 104 85 92 94 82 153* 136 100 81 173*     Results from last 7 days   Lab Units 03/11/22  0456   HEMOGLOBIN A1C % 5 1     Results from last 7 days   Lab Units 03/13/22  2253 03/13/22  0756 03/12/22  0516 03/12/22  0012 03/11/22  2047   LACTIC ACID mmol/L  --   --   --   --  1 3   PROCALCITONIN ng/ml 0 22 0 26* 0 38* 0 18  --          Results from last 7 days   Lab Units 03/13/22  0802 03/13/22  0759 03/11/22 2049 03/11/22 2046   BLOOD CULTURE  No Growth After 4 Days  No Growth After 4 Days  No Growth After 5 Days  No Growth After 5 Days           Last 24 Hours Medication List:   Current Facility-Administered Medications   Medication Dose Route Frequency Provider Last Rate    acetaminophen  650 mg Oral Q6H PRN Hetul Llanes, DO      albuterol  2 puff Inhalation Q4H PRN Shweta Jimenez MD      atorvastatin  40 mg Oral Daily With [employer], DO      budesonide-formoterol  2 puff Inhalation BID Hetul Llanes, DO      dextrose 5% lactated ringer's  125 mL/hr Intravenous Continuous Mary Dhillon  mL/hr (03/17/22 1431)    fenofibrate  145 mg Oral Daily Sulema Garcia MD      montelukast  10 mg Oral HS Hetul Llanes, DO      ondansetron  4 mg Intravenous Q6H PRN Hetul Llanes, DO      oxyCODONE  10 mg Oral Q4H PRN Sobia Lipoma, PA-C      oxyCODONE  5 mg Oral Q4H PRN Sobia Lipoma, PA-C      polyethylene glycol  17 g Oral Daily PRN Aissatou New MD      senna-docusate sodium  1 tablet Oral BID PRN Aissatou New MD                    ** Please Note: This note is constructed using a voice recognition dictation system  An occasional wrong word/phrase or sound-a-like substitution may have been picked up by dictation device due to the inherent limitations of voice recognition software  Read the chart carefully and recognize, using reasonable context, where substitutions may have occurred  **

## 2022-03-17 NOTE — PROGRESS NOTES
Progress Note - General Surgery   Pam Wray 46 y o  female MRN: [id number]  Unit/Bed#: [facility] 915-01 Encounter: [id number]    Assessment:  46 y o  F who presents with severe acute pancreatitis 2/2 hypertriglyceridemia     Afebrile  VSS, on room air    Overall improvement  Her pain is improving  Plan:  Diet as tolerated   [email or web address]  Strict I/Os  Prn analgesia   Continue hypertriglyceridemia management   GI and endocrine following   No evidence of gallstones on prior imaging studies  Do not recommend laparoscopic cholecystectomy at this point   Remainder of care per primary team      Subjective/Objective     Subjective: No acute events overnight  States her abdominal pain is improving  Her appetite has been suppressed, but she has been tolerating a diet  She ate mashed potatoes and cheese yesterday  No fevers or chills  Objective:     Blood pressure 120/77, pulse 101, temperature 98 6 °F (37 °C), resp  rate 16, height 5' (1 524 m), SpO2 96 %, not currently breastfeeding  ,Body mass index is 26 76 kg/m²  Intake/Output Summary (Last 24 hours) at 3/17/2022 0627  Last data filed at 3/16/2022 1101  Gross per 24 hour   Intake --   Output 650 ml   Net -650 ml       Invasive Devices  Report    Peripheral Intravenous Line            Peripheral IV 03/15/22 Dorsal (posterior); Left Forearm 1 day                Physical Exam:  NAD, alert and oriented x3  Normocephalic, atraumatic  MMM  Norm resp effort on room air   Regular rate  Abd soft, nondistended, tenderness on R abdomen    No calf tenderness or peripheral edema  -rash/lesions      Lab, Imaging and other studies:  CBC:   No results found for: WBC, HGB, HCT, MCV, PLT, ADJUSTEDWBC, MCH, MCHC, RDW, MPV, NRBC, CMP:   No results found for: SODIUM, K, CL, CO2, ANIONGAP, BUN, CREATININE, GLUCOSE, CALCIUM, AST, ALT, ALKPHOS, PROT, BILITOT, EGFR  VTE Pharmacologic Prophylaxis: Sequential compression device (Venodyne)   VTE Mechanical Prophylaxis: sequential compression device

## 2022-03-18 ENCOUNTER — TRANSITIONAL CARE MANAGEMENT (OUTPATIENT)
Dept: FAMILY MEDICINE CLINIC | Facility: CLINIC | Age: 42
End: 2022-03-18

## 2022-03-18 VITALS
HEIGHT: 60 IN | RESPIRATION RATE: 16 BRPM | BODY MASS INDEX: 26.76 KG/M2 | HEART RATE: 97 BPM | TEMPERATURE: 98.5 F | SYSTOLIC BLOOD PRESSURE: 93 MMHG | DIASTOLIC BLOOD PRESSURE: 64 MMHG | OXYGEN SATURATION: 97 %

## 2022-03-18 LAB
ALBUMIN SERPL BCP-MCNC: 2.9 G/DL (ref 3.5–5)
ALP SERPL-CCNC: 49 U/L (ref 46–116)
ALT SERPL W P-5'-P-CCNC: 14 U/L (ref 12–78)
ANION GAP SERPL CALCULATED.3IONS-SCNC: 5 MMOL/L (ref 4–13)
AST SERPL W P-5'-P-CCNC: 11 U/L (ref 5–45)
BACTERIA BLD CULT: NORMAL
BACTERIA BLD CULT: NORMAL
BILIRUB SERPL-MCNC: 0.42 MG/DL (ref 0.2–1)
BUN SERPL-MCNC: 8 MG/DL (ref 5–25)
CALCIUM ALBUM COR SERPL-MCNC: 10.7 MG/DL (ref 8.3–10.1)
CALCIUM SERPL-MCNC: 9.8 MG/DL (ref 8.3–10.1)
CHLORIDE SERPL-SCNC: 106 MMOL/L (ref 100–108)
CO2 SERPL-SCNC: 26 MMOL/L (ref 21–32)
CREAT SERPL-MCNC: 0.62 MG/DL (ref 0.6–1.3)
ERYTHROCYTE [DISTWIDTH] IN BLOOD BY AUTOMATED COUNT: 13.8 % (ref 11.6–15.1)
GFR SERPL CREATININE-BSD FRML MDRD: 113 ML/MIN/1.73SQ M
GLUCOSE SERPL-MCNC: 111 MG/DL (ref 65–140)
GLUCOSE SERPL-MCNC: 117 MG/DL (ref 65–140)
HCT VFR BLD AUTO: 32.9 % (ref 34.8–46.1)
HGB BLD-MCNC: 10.1 G/DL (ref 11.5–15.4)
MCH RBC QN AUTO: 25.4 PG (ref 26.8–34.3)
MCHC RBC AUTO-ENTMCNC: 30.7 G/DL (ref 31.4–37.4)
MCV RBC AUTO: 83 FL (ref 82–98)
PLATELET # BLD AUTO: 391 THOUSANDS/UL (ref 149–390)
PMV BLD AUTO: 9.8 FL (ref 8.9–12.7)
POTASSIUM SERPL-SCNC: 3.9 MMOL/L (ref 3.5–5.3)
PROT SERPL-MCNC: 7.5 G/DL (ref 6.4–8.2)
RBC # BLD AUTO: 3.97 MILLION/UL (ref 3.81–5.12)
SODIUM SERPL-SCNC: 137 MMOL/L (ref 136–145)
TRIGL SERPL-MCNC: 294 MG/DL
WBC # BLD AUTO: 4.32 THOUSAND/UL (ref 4.31–10.16)

## 2022-03-18 PROCEDURE — 80053 COMPREHEN METABOLIC PANEL: CPT | Performed by: INTERNAL MEDICINE

## 2022-03-18 PROCEDURE — 82948 REAGENT STRIP/BLOOD GLUCOSE: CPT

## 2022-03-18 PROCEDURE — 84478 ASSAY OF TRIGLYCERIDES: CPT | Performed by: INTERNAL MEDICINE

## 2022-03-18 PROCEDURE — 85027 COMPLETE CBC AUTOMATED: CPT | Performed by: INTERNAL MEDICINE

## 2022-03-18 PROCEDURE — 99239 HOSP IP/OBS DSCHRG MGMT >30: CPT | Performed by: INTERNAL MEDICINE

## 2022-03-18 RX ORDER — OXYCODONE HYDROCHLORIDE 5 MG/1
5 TABLET ORAL EVERY 4 HOURS PRN
Qty: 15 TABLET | Refills: 0 | Status: SHIPPED | OUTPATIENT
Start: 2022-03-18 | End: 2022-03-23 | Stop reason: ALTCHOICE

## 2022-03-18 RX ORDER — FENOFIBRATE 145 MG/1
145 TABLET, COATED ORAL DAILY
Qty: 30 TABLET | Refills: 0 | Status: SHIPPED | OUTPATIENT
Start: 2022-03-19

## 2022-03-18 RX ADMIN — BUDESONIDE AND FORMOTEROL FUMARATE DIHYDRATE 2 PUFF: 80; 4.5 AEROSOL RESPIRATORY (INHALATION) at 10:10

## 2022-03-18 RX ADMIN — DEXTROSE, SODIUM CHLORIDE, SODIUM LACTATE, POTASSIUM CHLORIDE, AND CALCIUM CHLORIDE 125 ML/HR: 5; .6; .31; .03; .02 INJECTION, SOLUTION INTRAVENOUS at 01:38

## 2022-03-18 RX ADMIN — FENOFIBRATE 145 MG: 145 TABLET, FILM COATED ORAL at 10:09

## 2022-03-18 RX ADMIN — OXYCODONE HYDROCHLORIDE 5 MG: 5 TABLET ORAL at 10:14

## 2022-03-18 NOTE — CASE MANAGEMENT
Case Management Discharge Planning Note    Patient name Kylie Flores  Location [facility] 915/[facility] 674-45 MRN [id number]  : 1980 Date 3/18/2022       Current Admission Date: 3/10/2022  Current Admission Diagnosis:Acute pancreatitis   Patient Active Problem List    Diagnosis Date Noted    Hypokalemia 2022    Hypophosphatemia 2022    Acute pancreatitis 2022    Fever 2022    Bruising 2022    Tinnitus of both ears 2022    Tinea capitis 2022    SOB (shortness of breath) 2021    History of asthma 2021    Herpes labialis 2021    Nasal congestion 2021    Essential hypertension 2021    Hypercalcemia 2021    Eczema 2020    COVID-19 2020    Chest pain 2019    Gastroesophageal reflux disease without esophagitis 2018    Hyperlipidemia 06/15/2018    PCOS (polycystic ovarian syndrome) 2018    Ovarian cyst 10/14/2016    Granuloma annulare 2016    Heart palpitations 2016    Right ovarian cyst 2015    Hypertriglyceridemia 2015    Psoriasis 2015    Vitamin D deficiency 2015    Anemia 2015    Abnormal uterine bleeding (AUB) 10/20/2014    Fatty liver 10/20/2014      LOS (days): 7  Geometric Mean LOS (GMLOS) (days):   Days to GMLOS:     OBJECTIVE:  Risk of Unplanned Readmission Score: 13         Current admission status: Inpatient   Preferred Pharmacy:   [facility]/pharmacy #[id number] Krystal, [address] [facility]  [address]  [address] [place] [address]  Phone: [phone] Fax: [phone]    Primary Care Provider: Faraz Ruiz MD    Primary Insurance: BLUE CROSS  Secondary Insurance:     DISCHARGE DETAILS:      Other Referral/Resources/Interventions Provided:  Referral Comments: patient is medically cleared for d/c today with no needs from CM for time of discharge

## 2022-03-18 NOTE — PLAN OF CARE
Problem: PAIN - ADULT  Goal: Verbalizes/displays adequate comfort level or baseline comfort level  Description: Interventions:  - Encourage patient to monitor pain and request assistance  - Assess pain using appropriate pain scale  - Administer analgesics based on type and severity of pain and evaluate response  - Implement non-pharmacological measures as appropriate and evaluate response  - Consider cultural and social influences on pain and pain management  - Notify physician/advanced practitioner if interventions unsuccessful or patient reports new pain  Outcome: Adequate for Discharge     Problem: GASTROINTESTINAL - ADULT  Goal: Minimal or absence of nausea and/or vomiting  Description: INTERVENTIONS:  - Administer IV fluids if ordered to ensure adequate hydration  - Maintain NPO status until nausea and vomiting are resolved  - Nasogastric tube if ordered  - Administer ordered antiemetic medications as needed  - Provide nonpharmacologic comfort measures as appropriate  - Advance diet as tolerated, if ordered  - Consider nutrition services referral to assist patient with adequate nutrition and appropriate food choices  Outcome: Adequate for Discharge  Goal: Maintains or returns to baseline bowel function  Description: INTERVENTIONS:  - Assess bowel function  - Encourage oral fluids to ensure adequate hydration  - Administer IV fluids if ordered to ensure adequate hydration  - Administer ordered medications as needed  - Encourage mobilization and activity  - Consider nutritional services referral to assist patient with adequate nutrition and appropriate food choices  Outcome: Adequate for Discharge  Goal: Maintains adequate nutritional intake  Description: INTERVENTIONS:  - Monitor percentage of each meal consumed  - Identify factors contributing to decreased intake, treat as appropriate  - Assist with meals as needed  - Monitor I&O, weight, and lab values if indicated  - Obtain nutrition services referral as needed  Outcome: Adequate for Discharge  Goal: Oral mucous membranes remain intact  Description: INTERVENTIONS  - Assess oral mucosa and hygiene practices  - Implement preventative oral hygiene regimen  - Implement oral medicated treatments as ordered  - Initiate Nutrition services referral as needed  Outcome: Adequate for Discharge     Problem: Nutrition/Hydration-ADULT  Goal: Nutrient/Hydration intake appropriate for improving, restoring or maintaining nutritional needs  Description: Monitor and assess patient's nutrition/hydration status for malnutrition  Collaborate with interdisciplinary team and initiate plan and interventions as ordered  Monitor patient's weight and dietary intake as ordered or per policy  Utilize nutrition screening tool and intervene as necessary  Determine patient's food preferences and provide high-protein, high-caloric foods as appropriate       INTERVENTIONS:  - Monitor oral intake, urinary output, labs, and treatment plans  - Assess nutrition and hydration status and recommend course of action  - Evaluate amount of meals eaten  - Assist patient with eating if necessary   - Allow adequate time for meals  - Recommend/ encourage appropriate diets, oral nutritional supplements, and vitamin/mineral supplements  - Order, calculate, and assess calorie counts as needed  - Recommend, monitor, and adjust tube feedings and TPN/PPN based on assessed needs  - Assess need for intravenous fluids  - Provide specific nutrition/hydration education as appropriate  - Include patient/family/caregiver in decisions related to nutrition  Outcome: Adequate for Discharge

## 2022-03-18 NOTE — DISCHARGE SUMMARY
Discharge Summary - [place] [address] Internal Medicine  Patient: Canelo Meléndez 46 y o  female   MRN: [id number]  PCP: Patrick Peña MD  Unit/Bed#: [facility] 915-01 Encounter: [id number]            Discharging Physician / Practitioner: Judith Jordan MD  PCP: Patrick Peña MD  Admission Date:   Admission Orders (From admission, onward)     Ordered        03/11/22 1956  Inpatient Admission  Once            03/10/22 1218  Place in Observation  Once                      Discharge Date: 03/18/22      Reason for Admission:  Abdominal pain       Discharge Diagnoses:     Principal Problem:    Acute pancreatitis    Active Problems:    Hyperlipidemia    Hypertriglyceridemia    Hypokalemia (resolved)    History of asthma    Anemia    Ovarian cyst      Consultations During Hospital Stay:  · Gastroenterology  · General surgery  · Endocrinology      Hospital Course:     * Acute pancreatitis  Assessment & Plan  Symptoms including pain/nausea improving  Follow-up imaging on 3/14 noting "Stable pancreatitis with stable appearance of the fluid extending from the pancreas into the root of the mesentery and retroperitoneum in addition to a small amount of fluid within the pelvis   No evidence for intra-abdominal or pelvic hemorrhage "  Likely secondary to hypertriglyceridemia  Continue IV fluids until better/consistent oral intake evidenced  Appreciate pain management input -> Dilaudid PCA transitioned to an oral regimen currently  Clear liquid diet advanced to a low-fat solid consistency on 3/16 per general surgery as pain has been improving - continues to tolerate diet  Appreciated gastroenterology input  PRN emesis control while hospitalized  Plan for discharge home today     Hyperlipidemia  Assessment & Plan  Continue statin therapy     Hypertriglyceridemia  Assessment & Plan  Chronic and progressively improving - likely etiology of pancreatitis  Noted to have elevated triglycerides since 2013  TF on admission measured >1400 -> 419 -> 357 -> 419 -> 294 currently   Appreciate prior endocrinology input -> outpatient follow-up  Continue Lipitor/Tricor  Encourage diet/exercise     Hypokalemia  Assessment & Plan  Monitored/repleted serum potassium and magnesium     History of asthma  Assessment & Plan  Not in acute exacerbation  Continue Symbicort and Singular - PRN Albuterol was  on board     Anemia  Assessment & Plan  Possible etiology: hemorrhagic pancreatitis per GI vs dilutional anemia due to IV fluids  Completed a 3-day course of IV Venofer  Continue hemoglobin monitoring - currently stable @ 10 1 on day of discharge     Ovarian cyst  Assessment & Plan  Incidental finding on CT imaging  Outpatient follow-up       Condition at Discharge: fair       Discharge Day Visit / Exam:     Vitals: Blood Pressure: 93/64 (03/18/22 0739)  Pulse: 97 (03/17/22 1503)  Temperature: 98 5 °F (36 9 °C) (03/18/22 0739)  Temp Source: Oral (03/17/22 1503)  Respirations: 16 (03/18/22 0739)  Height: 5' (152 4 cm) (per records) (03/15/22 1444)  SpO2: 97 % (03/17/22 2000)      Physical exam - I had a face-to-face encounter with the patient on day of discharge  Discussion with Patient and/or Family:  The patient has been advised to return to the ER immediately if any symptoms recur or worsen  Discharge instructions/Information to Patient and/or Family:   See after visit summary for information provided to patient and/or family  Provisions for Follow-Up Care:  See after visit summary for information related to follow-up care and any pertinent home health orders  Disposition:   Home      Discharge Medications:  See after visit summary for reconciled discharge medications provided to patient and/or family  Discharge Statement:  I spent 38 minutes discharging the patient  This time was spent on the day of discharge  I had direct contact with the patient on the day of discharge   Greater than 50% of the total time was spent examining patient, answering all patient questions, arranging and discussing plan of care with patient as well as directly providing post-discharge instructions  Additional time then spent on discharge activities  ** Please Note: This note is constructed using a voice recognition dictation system  An occasional wrong word/phrase or sound-a-like substitution may have been picked up by dictation device due to the inherent limitations of voice recognition software  Read the chart carefully and recognize, using reasonable context, where substitutions may have occurred  **

## 2022-03-18 NOTE — DISCHARGE INSTRUCTIONS
Anemia   LO QUE NECESITA SABER:   La anemia es cuando el número de glóbulos rojos o la cantidad de [employer] glóbulos rojos es baja  La hemoglobina es dawson proteína que ayuda a transportar el oxígeno a través de dunaway cuerpo  Los glóbulos rojos usan el jo para crear hemoglobina  La anemia podría desarrollarse si dunaway cuerpo no tiene suficiente jo  También podría desarrollarse si dunaway cuerpo no produce suficientes glóbulos rojos o si ellos mueren antes de que dunwaay cuerpo pueda producirlos  INSTRUCCIONES SOBRE EL SUSANNE HOSPITALARIA:   Llame al [employer] de emergencias (911 en los [place]), o pídale a alguien que llame si:  · Usted pierde el conocimiento  · Usted tiene un dolor intenso en el pecho  Regrese a la kelley de emergencias si:  · Usted tiene evacuaciones intestinales oscuras o con marc  Llame a dunaway médico si:  · [place] síntomas empeoran, aún después del [place]  · Usted tiene preguntas o inquietudes acerca de dunaway condición o cuidado  Medicamentos:  · Suplementos de jo o ácido fólico ayudan a aumentar [place] glóbulos rojos y los niveles de hemoglobina  · Inyecciones de vitamina B12 podrían ayudar a aumentar el conteo de [place] glóbulos rojos y a disminuir [place] síntomas  Pregunte a dunaway médico cómo se inyecta la B12  · [place] medicamentos alberto se le haya indicado  Consulte con dunaway médico si usted tevin que dunaway medicamento no le está ayudando o si presenta efectos secundarios  Infórmele si es alérgico a algún medicamento  Mantenga dawson lista actualizada de los [place], las vitaminas y los productos herbales que selwyn  Incluya los siguientes datos de los medicamentos: cantidad, frecuencia y motivo de administración  Traiga con usted la lista o los envases de las píldoras a [place] citas de seguimiento  Lleve la lista de los medicamentos con usted en tarik de dawson emergencia  Evite la anemia: Consuma alimentos sanos altos en jo y vitamina C    Las nueces, carne, vegetales de hojas yordan oscuro y frijoles son altos en jo y proteína  La vitamina C ayuda a dunaway cuerpo a absorber el jo  Los [employer] en vitamina C incluyen naranjas y otros cítricos  Pídale a dunaway médico dawson lista de otros alimentos altos en jo y vitamina C  Pregunte si usted necesita llevar dawson dieta especial           Acuda a la consulta de control con dunaway médico según las indicaciones: Anote colton preguntas para que se acuerde de hacerlas giuliano colton visitas  © Copyright [employer] 2022 Information is for End User's use only and may not be sold, redistributed or otherwise used for commercial purposes  All illustrations and images included in CareNotes® are the copyrighted property of [employer]  or [address] [place] [address] es sólo para uso en educación  Dunaway intención no es darle un consejo médico sobre enfermedades o tratamientos  Colsulte con dunaway [employer] farmacéutico antes de seguir cualquier régimen médico para saber si es seguro y efectivo para usted

## 2022-03-19 NOTE — UTILIZATION REVIEW
Notification of Discharge   This is a Notification of Discharge from our facility [address]  Please be advised that this patient has been discharge from our facility  Below you will find the admission and discharge date and time including the patients disposition  UTILIZATION REVIEW CONTACT:  Jomar Frausto  Utilization   Network Utilization Review Department  Phone: [phone] x carefully listen to the prompts  All voicemails are confidential   Email: [email or web address]     PHYSICIAN ADVISORY SERVICES:  FOR ESBJ-RJ-CITZ REVIEW - MEDICAL NECESSITY DENIAL  Phone: [phone]  Fax: [phone]  Email: [email or web address]     PRESENTATION DATE: 3/10/2022  6:33 AM  OBERVATION ADMISSION DATE:   INPATIENT ADMISSION DATE: 3/11/22  7:56 PM   DISCHARGE DATE: 3/18/2022 12:20 PM  DISPOSITION: Home/Self Care Home/Self Care      IMPORTANT INFORMATION:  Send all requests for admission clinical reviews, approved or denied determinations and any other requests to dedicated fax number below belonging to the campus where the patient is receiving treatment   List of dedicated fax numbers:  [address] DENIALS (Administrative/Medical Necessity) [phone]   1000  [facility] (Maternity/NICU/Pediatrics) [phone]   Nehemiah Boots [phone]   [address] [phone]   [address] [place] [address] [phone]   [address] [place] [address] [address] [place] [address],[address] [phone]   [facility]  [phone]   [address] [facility] [address]  [address] [facility] [address] [facility] [phone]

## 2022-03-23 ENCOUNTER — OFFICE VISIT (OUTPATIENT)
Dept: FAMILY MEDICINE CLINIC | Facility: CLINIC | Age: 42
End: 2022-03-23
Payer: COMMERCIAL

## 2022-03-23 VITALS
HEART RATE: 88 BPM | BODY MASS INDEX: 25.32 KG/M2 | RESPIRATION RATE: 16 BRPM | DIASTOLIC BLOOD PRESSURE: 80 MMHG | HEIGHT: 60 IN | TEMPERATURE: 97.9 F | WEIGHT: 129 LBS | OXYGEN SATURATION: 98 % | SYSTOLIC BLOOD PRESSURE: 138 MMHG

## 2022-03-23 DIAGNOSIS — F11.20 CONTINUOUS OPIOID DEPENDENCE (HCC): ICD-10-CM

## 2022-03-23 DIAGNOSIS — D50.0 IRON DEFICIENCY ANEMIA DUE TO CHRONIC BLOOD LOSS: ICD-10-CM

## 2022-03-23 DIAGNOSIS — K85.80 OTHER ACUTE PANCREATITIS, UNSPECIFIED COMPLICATION STATUS: ICD-10-CM

## 2022-03-23 DIAGNOSIS — N17.9 AKI (ACUTE KIDNEY INJURY) (HCC): ICD-10-CM

## 2022-03-23 DIAGNOSIS — E78.1 HYPERTRIGLYCERIDEMIA: Primary | ICD-10-CM

## 2022-03-23 PROCEDURE — 3008F BODY MASS INDEX DOCD: CPT | Performed by: NURSE PRACTITIONER

## 2022-03-23 PROCEDURE — 99496 TRANSJ CARE MGMT HIGH F2F 7D: CPT | Performed by: FAMILY MEDICINE

## 2022-03-23 PROCEDURE — 1111F DSCHRG MED/CURRENT MED MERGE: CPT | Performed by: FAMILY MEDICINE

## 2022-03-23 NOTE — PROGRESS NOTES
Assessment/Plan:  Encounter for support and coordination of transition of care  Improved symptoms, no pain  Improved Triglycerides  Continue same treatment as before  recheck lipids more often    No problem-specific Assessment & Plan notes found for this encounter  Diagnoses and all orders for this visit:    Hypertriglyceridemia  -     Lipid Panel with Direct LDL reflex; Standing  -     Lipid Panel with Direct LDL reflex    Iron deficiency anemia due to chronic blood loss  -     CBC and differential; Future    SHANTELLE (acute kidney injury) ([facility] [address] )  -     Comprehensive metabolic panel; Future    Continuous opioid dependence ([facility] )        Encounter for support and coordination of transition of care [id number]    Subjective:      Patient ID: Zaid Victor is a 46 y o  female  HPI  Post hospital discharge  Pancreatitis likely related to hypertriglyceridemia  Patient never stopped mediation she is currently taken  No further treatment was offered after discharge from Hospital   The following portions of the patient's history were reviewed and updated as appropriate: allergies, current medications, past family history, past medical history, past social history, past surgical history and problem list     Review of Systems   Constitutional: Negative for diaphoresis, fatigue, fever and unexpected weight change  Respiratory: Negative for cough, chest tightness, shortness of breath and wheezing  Cardiovascular: Negative for chest pain, palpitations and leg swelling  Gastrointestinal: Positive for abdominal pain  Negative for blood in stool and constipation  Neurological: Negative for dizziness, syncope, light-headedness and headaches  Hematological: Does not bruise/bleed easily  Psychiatric/Behavioral: Negative for behavioral problems, self-injury and sleep disturbance  The patient is not nervous/anxious  Objective: There were no vitals taken for this visit        TCM Call (since 2/20/2022)     Date and time call was made  3/18/2022  3:12 PM    Patient was hospitialized at  [facility]        Date of Admission  03/10/22    Date of discharge  03/18/22    Diagnosis  Acute Pancreatitis    Disposition  Home    Current Symptoms  None      TCM Call (since 2/20/2022)     Post hospital issues  None    Scheduled for follow up? Yes    I have advised the patient to call PCP with any new or worsening symptoms  [facility] [facility]    Living Arrangements  Family members    Current waiver services  No    Have you fallen in the last 12 months  No    Interperter language line needed  No         Physical Exam  Cardiovascular:      Rate and Rhythm: Normal rate and regular rhythm  Pulmonary:      Effort: Pulmonary effort is normal       Breath sounds: Normal breath sounds  Musculoskeletal:         General: Normal range of motion  Cervical back: Neck supple  Neurological:      General: No focal deficit present  Mental Status: She is alert and oriented to person, place, and time     Psychiatric:         Behavior: Behavior normal

## 2022-03-23 NOTE — LETTER
March 23, 2022     Patient: Loraine Preston   YOB: 1980   Date of Visit: 3/23/2022       To Whom it May Concern:    Loraine Preston is under my professional care  She was seen in my office on 3/23/2022 as follow up from hospital discharge  She is unable to return to work until April 11 2022  Please excuse patient for the period March 10, 2022 through April 11, 2022  If you have any questions or concerns, please don't hesitate to call           Sincerely,          Romulo Castillo MD        CC: No Recipients

## 2022-03-25 ENCOUNTER — APPOINTMENT (OUTPATIENT)
Dept: LAB | Facility: HOSPITAL | Age: 42
End: 2022-03-25
Payer: COMMERCIAL

## 2022-03-25 DIAGNOSIS — D50.0 IRON DEFICIENCY ANEMIA DUE TO CHRONIC BLOOD LOSS: ICD-10-CM

## 2022-03-25 DIAGNOSIS — N17.9 AKI (ACUTE KIDNEY INJURY) (HCC): ICD-10-CM

## 2022-03-25 LAB
ALBUMIN SERPL BCP-MCNC: 4.1 G/DL (ref 3.5–5)
ALP SERPL-CCNC: 55 U/L (ref 46–116)
ALT SERPL W P-5'-P-CCNC: 21 U/L (ref 12–78)
ANION GAP SERPL CALCULATED.3IONS-SCNC: 5 MMOL/L (ref 4–13)
AST SERPL W P-5'-P-CCNC: 11 U/L (ref 5–45)
BASOPHILS # BLD AUTO: 0.04 THOUSANDS/ΜL (ref 0–0.1)
BASOPHILS NFR BLD AUTO: 1 % (ref 0–1)
BILIRUB SERPL-MCNC: 0.59 MG/DL (ref 0.2–1)
BUN SERPL-MCNC: 10 MG/DL (ref 5–25)
CALCIUM SERPL-MCNC: 10.1 MG/DL (ref 8.3–10.1)
CHLORIDE SERPL-SCNC: 106 MMOL/L (ref 100–108)
CHOLEST SERPL-MCNC: 130 MG/DL
CO2 SERPL-SCNC: 26 MMOL/L (ref 21–32)
CREAT SERPL-MCNC: 0.64 MG/DL (ref 0.6–1.3)
EOSINOPHIL # BLD AUTO: 0.03 THOUSAND/ΜL (ref 0–0.61)
EOSINOPHIL NFR BLD AUTO: 1 % (ref 0–6)
ERYTHROCYTE [DISTWIDTH] IN BLOOD BY AUTOMATED COUNT: 13.6 % (ref 11.6–15.1)
GFR SERPL CREATININE-BSD FRML MDRD: 112 ML/MIN/1.73SQ M
GLUCOSE P FAST SERPL-MCNC: 103 MG/DL (ref 65–99)
HCT VFR BLD AUTO: 34.3 % (ref 34.8–46.1)
HDLC SERPL-MCNC: 26 MG/DL
HGB BLD-MCNC: 10.8 G/DL (ref 11.5–15.4)
IMM GRANULOCYTES # BLD AUTO: 0.02 THOUSAND/UL (ref 0–0.2)
IMM GRANULOCYTES NFR BLD AUTO: 1 % (ref 0–2)
LDLC SERPL CALC-MCNC: 68 MG/DL (ref 0–100)
LYMPHOCYTES # BLD AUTO: 1.59 THOUSANDS/ΜL (ref 0.6–4.47)
LYMPHOCYTES NFR BLD AUTO: 44 % (ref 14–44)
MCH RBC QN AUTO: 25.1 PG (ref 26.8–34.3)
MCHC RBC AUTO-ENTMCNC: 31.5 G/DL (ref 31.4–37.4)
MCV RBC AUTO: 80 FL (ref 82–98)
MONOCYTES # BLD AUTO: 0.32 THOUSAND/ΜL (ref 0.17–1.22)
MONOCYTES NFR BLD AUTO: 9 % (ref 4–12)
NEUTROPHILS # BLD AUTO: 1.64 THOUSANDS/ΜL (ref 1.85–7.62)
NEUTS SEG NFR BLD AUTO: 44 % (ref 43–75)
NRBC BLD AUTO-RTO: 0 /100 WBCS
PLATELET # BLD AUTO: 425 THOUSANDS/UL (ref 149–390)
PMV BLD AUTO: 10.5 FL (ref 8.9–12.7)
POTASSIUM SERPL-SCNC: 3.9 MMOL/L (ref 3.5–5.3)
PROT SERPL-MCNC: 8.3 G/DL (ref 6.4–8.2)
RBC # BLD AUTO: 4.31 MILLION/UL (ref 3.81–5.12)
SODIUM SERPL-SCNC: 137 MMOL/L (ref 136–145)
TRIGL SERPL-MCNC: 178 MG/DL
WBC # BLD AUTO: 3.64 THOUSAND/UL (ref 4.31–10.16)

## 2022-03-25 PROCEDURE — 85025 COMPLETE CBC W/AUTO DIFF WBC: CPT

## 2022-03-25 PROCEDURE — 80061 LIPID PANEL: CPT | Performed by: FAMILY MEDICINE

## 2022-03-25 PROCEDURE — 36415 COLL VENOUS BLD VENIPUNCTURE: CPT

## 2022-03-25 PROCEDURE — 80053 COMPREHEN METABOLIC PANEL: CPT

## 2022-03-27 DIAGNOSIS — J45.41 MODERATE PERSISTENT ASTHMA WITH ACUTE EXACERBATION: ICD-10-CM

## 2022-03-28 RX ORDER — MONTELUKAST SODIUM 10 MG/1
TABLET ORAL
Qty: 30 TABLET | Refills: 5 | Status: SHIPPED | OUTPATIENT
Start: 2022-03-28

## 2022-03-30 DIAGNOSIS — K21.9 GASTROESOPHAGEAL REFLUX DISEASE WITHOUT ESOPHAGITIS: ICD-10-CM

## 2022-03-30 RX ORDER — PANTOPRAZOLE SODIUM 40 MG/1
TABLET, DELAYED RELEASE ORAL
Qty: 30 TABLET | Refills: 5 | Status: SHIPPED | OUTPATIENT
Start: 2022-03-30

## 2022-05-06 ENCOUNTER — OFFICE VISIT (OUTPATIENT)
Dept: FAMILY MEDICINE CLINIC | Facility: CLINIC | Age: 42
End: 2022-05-06
Payer: COMMERCIAL

## 2022-05-06 VITALS
RESPIRATION RATE: 16 BRPM | BODY MASS INDEX: 26.11 KG/M2 | DIASTOLIC BLOOD PRESSURE: 80 MMHG | HEIGHT: 60 IN | WEIGHT: 133 LBS | SYSTOLIC BLOOD PRESSURE: 130 MMHG | HEART RATE: 88 BPM | TEMPERATURE: 98 F | OXYGEN SATURATION: 98 %

## 2022-05-06 DIAGNOSIS — Z00.01 ENCOUNTER FOR GENERAL ADULT MEDICAL EXAMINATION WITH ABNORMAL FINDINGS: Primary | ICD-10-CM

## 2022-05-06 DIAGNOSIS — L40.8 PSORIASIS ANNULARIS: ICD-10-CM

## 2022-05-06 DIAGNOSIS — R10.13 EPIGASTRIC PAIN: ICD-10-CM

## 2022-05-06 PROCEDURE — 99395 PREV VISIT EST AGE 18-39: CPT | Performed by: FAMILY MEDICINE

## 2022-05-06 PROCEDURE — 3008F BODY MASS INDEX DOCD: CPT | Performed by: FAMILY MEDICINE

## 2022-05-06 PROCEDURE — 1036F TOBACCO NON-USER: CPT | Performed by: FAMILY MEDICINE

## 2022-05-06 RX ORDER — BETAMETHASONE DIPROPIONATE 0.05 %
GEL (GRAM) TOPICAL 2 TIMES DAILY
Qty: 30 G | Refills: 0 | Status: SHIPPED | OUTPATIENT
Start: 2022-05-06 | End: 2022-05-31

## 2022-05-06 RX ORDER — FENOFIBRATE 200 MG/1
CAPSULE ORAL
COMMUNITY
Start: 2022-04-27

## 2022-05-06 NOTE — PROGRESS NOTES
Assessment/Plan:  US of abdomen, to rule out cyst or pseudocyst after pancreatitis  No problem-specific Assessment & Plan notes found for this encounter  Diagnoses and all orders for this visit:    Encounter for general adult medical examination with abnormal findings    BMI 25 0-25 9,adult    Epigastric pain  -     US abdomen complete; Future    Psoriasis annularis  -     betamethasone, augmented, (DIPROLENE) 0 05 % gel; Apply topically 2 (two) times a day    Other orders  -     fenofibrate micronized (LOFIBRA) 200 MG capsule; TAKE ONE CAPSULE (200 MG TOTAL) BY MOUTH EVERY MORNING BEFORE BREAKFAST  Subjective:      Patient ID: Marie Stubbs is a 46 y o  female  HPI   Patient is here for PE, not having any acute distress  Recent had pancreatitis related to hyperlipidemia  Now has different type of pain in epigastric area  The following portions of the patient's history were reviewed and updated as appropriate: allergies, current medications, past family history, past medical history, past social history, past surgical history and problem list     Review of Systems   Constitutional: Negative for diaphoresis, fatigue, fever and unexpected weight change  Respiratory: Negative for cough, chest tightness, shortness of breath and wheezing  Cardiovascular: Negative for chest pain, palpitations and leg swelling  Gastrointestinal: Positive for abdominal distention and abdominal pain (Epigastric area)  Negative for blood in stool and constipation  Neurological: Negative for dizziness, syncope, light-headedness and headaches  Hematological: Does not bruise/bleed easily  Psychiatric/Behavioral: Negative for behavioral problems, self-injury and sleep disturbance  The patient is not nervous/anxious            Objective:      /80 (BP Location: Left arm, Patient Position: Sitting, Cuff Size: Standard)   Pulse 88   Temp 98 °F (36 7 °C) (Temporal)   Resp 16   Ht 5' (1 524 m)   Wt 60 3 kg (133 lb)   SpO2 98%   Breastfeeding No   BMI 25 97 kg/m²          Physical Exam  Cardiovascular:      Rate and Rhythm: Normal rate and regular rhythm  Pulmonary:      Effort: Pulmonary effort is normal       Breath sounds: Normal breath sounds  Abdominal:      Tenderness: There is abdominal tenderness (Mi in the epigastric area, no rebounds)  Musculoskeletal:         General: Normal range of motion  Cervical back: Neck supple  Neurological:      General: No focal deficit present  Mental Status: She is alert and oriented to person, place, and time  Psychiatric:         Behavior: Behavior normal          BMI Counseling: Body mass index is 25 97 kg/m²  The BMI is above normal  Nutrition recommendations include decreasing soda and/or juice intake and moderation in carbohydrate intake  Exercise recommendations include exercising 3-5 times per week

## 2022-05-06 NOTE — LETTER
May 6, 2022     Patient: Callie Enriquez  YOB: 1980  Date of Visit: 5/6/2022    Employer  [employer]  To Whom it May Concern:    Callie Enriquez is under my professional care  Please allow Kalie to have brake at 10:30 AM for 20 minutes and 2:30 PM for 25 minutes for her medications  If you have any questions or concerns, please don't hesitate to call            Sincerely,          Estiven Weinberg MD

## 2022-05-10 ENCOUNTER — TELEPHONE (OUTPATIENT)
Dept: FAMILY MEDICINE CLINIC | Facility: CLINIC | Age: 42
End: 2022-05-10

## 2022-05-10 NOTE — TELEPHONE ENCOUNTER
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

## 2022-05-12 ENCOUNTER — APPOINTMENT (OUTPATIENT)
Dept: LAB | Facility: HOSPITAL | Age: 42
End: 2022-05-12
Payer: COMMERCIAL

## 2022-05-20 ENCOUNTER — HOSPITAL ENCOUNTER (OUTPATIENT)
Dept: RADIOLOGY | Facility: HOSPITAL | Age: 42
Discharge: HOME | End: 2022-05-20
Payer: COMMERCIAL

## 2022-05-20 DIAGNOSIS — R10.13 EPIGASTRIC PAIN: ICD-10-CM

## 2022-05-20 PROCEDURE — 76700 US EXAM ABDOM COMPLETE: CPT

## 2022-05-31 DIAGNOSIS — E78.1 HYPERTRIGLYCERIDEMIA: Primary | ICD-10-CM

## 2022-05-31 DIAGNOSIS — L40.8 PSORIASIS ANNULARIS: ICD-10-CM

## 2022-05-31 RX ORDER — BETAMETHASONE DIPROPIONATE 0.05 %
GEL (GRAM) TOPICAL 2 TIMES DAILY
Qty: 30 G | Refills: 0 | Status: SHIPPED | OUTPATIENT
Start: 2022-05-31

## 2022-05-31 NOTE — RESULT ENCOUNTER NOTE
Dear Jerome Islas showed that liver is increased in size likely related to fatty liver secondary to your Triglycerides problem  Please keep same treatment

## 2022-06-03 ENCOUNTER — RA CDI HCC (OUTPATIENT)
Dept: OTHER | Facility: HOSPITAL | Age: 42
End: 2022-06-03

## 2022-06-03 NOTE — PROGRESS NOTES
[facility]       Chart reviewed, no opportunity found: CHART REVIEWED, NO OPPORTUNITY FOUND        Patients Insurance        Commercial Insurance: [address] [place] [address]

## 2022-06-30 DIAGNOSIS — J45.41 MODERATE PERSISTENT ASTHMA WITH ACUTE EXACERBATION: ICD-10-CM

## 2022-06-30 RX ORDER — DILTIAZEM HYDROCHLORIDE 60 MG/1
TABLET, FILM COATED ORAL
Qty: 1 G | Refills: 5 | Status: SHIPPED | OUTPATIENT
Start: 2022-06-30

## 2022-07-18 ENCOUNTER — OFFICE VISIT (OUTPATIENT)
Dept: DENTISTRY | Facility: CLINIC | Age: 42
End: 2022-07-18

## 2022-07-18 VITALS — HEART RATE: 74 BPM | SYSTOLIC BLOOD PRESSURE: 145 MMHG | TEMPERATURE: 98.7 F | DIASTOLIC BLOOD PRESSURE: 81 MMHG

## 2022-07-18 DIAGNOSIS — Z01.20 ENCOUNTER FOR DENTAL EXAMINATION: Primary | ICD-10-CM

## 2022-07-18 PROCEDURE — D0274 BITEWINGS - 4 RADIOGRAPHIC IMAGES: HCPCS

## 2022-07-18 PROCEDURE — D0150 COMPREHENSIVE ORAL EVALUATION - NEW OR ESTABLISHED PATIENT: HCPCS

## 2022-07-18 PROCEDURE — D0220 INTRAORAL - PERIAPICAL FIRST RADIOGRAPHIC IMAGE: HCPCS

## 2022-07-18 PROCEDURE — D0330 PANORAMIC RADIOGRAPHIC IMAGE: HCPCS

## 2022-07-18 PROCEDURE — D1330 ORAL HYGIENE INSTRUCTIONS: HCPCS

## 2022-07-18 NOTE — PROGRESS NOTES
New Patient Exam    ASA  II  Pain:  Slight pain on upper left posterior to cold  Reviewed M/DH     Exams:  Comprehensive exam and perio charted  Xrays:    PAN/ 4BWX and PA of #14 and 15  Type of Treatment:     Reviewed [facility]  Brush:  2X/day and Floss 1X/day  EO/OCS Exams:  No significant findings  IO: No significant findings - some occlusal wear - pt not aware of grinding teeth at night  Crowding on both arches  Pt does claim to have some breathing issues at night due to Asthma  Oral Hygiene: Fair   Gingiva:  Pink and Firm; slight inflammation in 3rd molar regions  Stain:  Light   Perio Charting:  Periocharting was completed and evaluated     1-4  mm w/ slight BUP  Perio Findings:  Slight generalized chronic perio  Caries Findings:  None  Treatment Plan:  Updated    Dr  Exam:  Dr Bubba Wilkerson  Referral: [facility] for extractions of #1, 16, 17 and 32  NV:  Adult Prophy (2-3 yrs since last prophy)

## 2022-09-09 ENCOUNTER — OFFICE VISIT (OUTPATIENT)
Dept: DENTISTRY | Facility: CLINIC | Age: 42
End: 2022-09-09

## 2022-09-09 VITALS — SYSTOLIC BLOOD PRESSURE: 136 MMHG | TEMPERATURE: 97.9 F | DIASTOLIC BLOOD PRESSURE: 84 MMHG

## 2022-09-09 DIAGNOSIS — Z00.00 ENCOUNTER FOR SCREENING AND PREVENTATIVE CARE: Primary | ICD-10-CM

## 2022-09-09 PROCEDURE — D1110 PROPHYLAXIS - ADULT: HCPCS

## 2022-09-09 NOTE — PROGRESS NOTES
Reviewed Medical History with pt  Via [place] translation line [id number]  ASA:II  Chief Complaint:cold sensitive UR & #13 f  Pt states she tried calling [facility] office to schedule ext  Of thirds and no one answered  Adult Prophy, [facility]  Intraoral exam:nf  Oral Hygiene:moderate gen  Plaque, stain, calc, bldg  Hand & ultrasonic scaled, Flossed, polished  Patient tolerated well  Sensodyne tpaste recommended  Explained to pt  She had exam done last visit and no decay was noted  Elect  tb also recommended  Needs:6mos per ex pro  Referral:told pt  She should call to see who participates with her insurance and schedule consultation there    Michelle Lara [facility] , PHDHP

## 2022-09-16 ENCOUNTER — APPOINTMENT (OUTPATIENT)
Dept: LAB | Facility: CLINIC | Age: 42
End: 2022-09-16
Payer: COMMERCIAL

## 2022-09-16 DIAGNOSIS — E55.9 MILD VITAMIN D DEFICIENCY: ICD-10-CM

## 2022-09-16 DIAGNOSIS — E78.1 HIGH TRIGLYCERIDES: ICD-10-CM

## 2022-09-16 DIAGNOSIS — R94.6 ABNORMAL THYROID FUNCTION TEST: ICD-10-CM

## 2022-09-16 LAB
25(OH)D3 SERPL-MCNC: 57.4 NG/ML (ref 30–100)
ALBUMIN SERPL BCP-MCNC: 4.1 G/DL (ref 3.5–5)
ALP SERPL-CCNC: 62 U/L (ref 46–116)
ALT SERPL W P-5'-P-CCNC: 30 U/L (ref 12–78)
ANION GAP SERPL CALCULATED.3IONS-SCNC: 4 MMOL/L (ref 4–13)
AST SERPL W P-5'-P-CCNC: 18 U/L (ref 5–45)
BASOPHILS # BLD AUTO: 0.03 THOUSANDS/ΜL (ref 0–0.1)
BASOPHILS NFR BLD AUTO: 1 % (ref 0–1)
BILIRUB SERPL-MCNC: 0.57 MG/DL (ref 0.2–1)
BUN SERPL-MCNC: 10 MG/DL (ref 5–25)
CALCIUM SERPL-MCNC: 9.7 MG/DL (ref 8.3–10.1)
CHLORIDE SERPL-SCNC: 105 MMOL/L (ref 96–108)
CO2 SERPL-SCNC: 26 MMOL/L (ref 21–32)
CREAT SERPL-MCNC: 0.76 MG/DL (ref 0.6–1.3)
EOSINOPHIL # BLD AUTO: 0.03 THOUSAND/ΜL (ref 0–0.61)
EOSINOPHIL NFR BLD AUTO: 1 % (ref 0–6)
ERYTHROCYTE [DISTWIDTH] IN BLOOD BY AUTOMATED COUNT: 12.4 % (ref 11.6–15.1)
GFR SERPL CREATININE-BSD FRML MDRD: 99 ML/MIN/1.73SQ M
GLUCOSE P FAST SERPL-MCNC: 98 MG/DL (ref 65–99)
HCT VFR BLD AUTO: 38 % (ref 34.8–46.1)
HGB BLD-MCNC: 12.7 G/DL (ref 11.5–15.4)
IMM GRANULOCYTES # BLD AUTO: 0.01 THOUSAND/UL (ref 0–0.2)
IMM GRANULOCYTES NFR BLD AUTO: 0 % (ref 0–2)
LYMPHOCYTES # BLD AUTO: 1.34 THOUSANDS/ΜL (ref 0.6–4.47)
LYMPHOCYTES NFR BLD AUTO: 45 % (ref 14–44)
MCH RBC QN AUTO: 27.9 PG (ref 26.8–34.3)
MCHC RBC AUTO-ENTMCNC: 33.4 G/DL (ref 31.4–37.4)
MCV RBC AUTO: 84 FL (ref 82–98)
MONOCYTES # BLD AUTO: 0.34 THOUSAND/ΜL (ref 0.17–1.22)
MONOCYTES NFR BLD AUTO: 11 % (ref 4–12)
NEUTROPHILS # BLD AUTO: 1.27 THOUSANDS/ΜL (ref 1.85–7.62)
NEUTS SEG NFR BLD AUTO: 42 % (ref 43–75)
NRBC BLD AUTO-RTO: 0 /100 WBCS
PLATELET # BLD AUTO: 259 THOUSANDS/UL (ref 149–390)
PMV BLD AUTO: 10.4 FL (ref 8.9–12.7)
POTASSIUM SERPL-SCNC: 4.2 MMOL/L (ref 3.5–5.3)
PROT SERPL-MCNC: 8.4 G/DL (ref 6.4–8.4)
RBC # BLD AUTO: 4.55 MILLION/UL (ref 3.81–5.12)
SODIUM SERPL-SCNC: 135 MMOL/L (ref 135–147)
TSH SERPL DL<=0.05 MIU/L-ACNC: 0.85 UIU/ML (ref 0.45–4.5)
WBC # BLD AUTO: 3.02 THOUSAND/UL (ref 4.31–10.16)

## 2022-09-16 PROCEDURE — 82306 VITAMIN D 25 HYDROXY: CPT

## 2022-09-16 PROCEDURE — 84443 ASSAY THYROID STIM HORMONE: CPT

## 2022-09-16 PROCEDURE — 80053 COMPREHEN METABOLIC PANEL: CPT

## 2022-09-16 PROCEDURE — 36415 COLL VENOUS BLD VENIPUNCTURE: CPT

## 2022-09-16 PROCEDURE — 85025 COMPLETE CBC W/AUTO DIFF WBC: CPT

## 2022-09-19 DIAGNOSIS — J45.41 MODERATE PERSISTENT ASTHMA WITH ACUTE EXACERBATION: ICD-10-CM

## 2022-09-19 RX ORDER — MONTELUKAST SODIUM 10 MG/1
TABLET ORAL
Qty: 30 TABLET | Refills: 5 | Status: SHIPPED | OUTPATIENT
Start: 2022-09-19 | End: 2022-10-04

## 2022-09-28 ENCOUNTER — VBI (OUTPATIENT)
Dept: ADMINISTRATIVE | Facility: OTHER | Age: 42
End: 2022-09-28

## 2022-10-03 DIAGNOSIS — J45.41 MODERATE PERSISTENT ASTHMA WITH ACUTE EXACERBATION: ICD-10-CM

## 2022-10-04 RX ORDER — MONTELUKAST SODIUM 10 MG/1
TABLET ORAL
Qty: 90 TABLET | Refills: 2 | Status: SHIPPED | OUTPATIENT
Start: 2022-10-04

## 2022-10-07 DIAGNOSIS — K21.9 GASTROESOPHAGEAL REFLUX DISEASE WITHOUT ESOPHAGITIS: ICD-10-CM

## 2022-10-07 RX ORDER — PANTOPRAZOLE SODIUM 40 MG/1
TABLET, DELAYED RELEASE ORAL
Qty: 30 TABLET | Refills: 5 | Status: SHIPPED | OUTPATIENT
Start: 2022-10-07 | End: 2022-10-10 | Stop reason: ALTCHOICE

## 2022-10-08 ENCOUNTER — APPOINTMENT (EMERGENCY)
Dept: RADIOLOGY | Facility: HOSPITAL | Age: 42
End: 2022-10-08
Payer: COMMERCIAL

## 2022-10-08 ENCOUNTER — HOSPITAL ENCOUNTER (EMERGENCY)
Facility: HOSPITAL | Age: 42
Discharge: HOME | End: 2022-10-08
Attending: EMERGENCY MEDICINE
Payer: COMMERCIAL

## 2022-10-08 VITALS
DIASTOLIC BLOOD PRESSURE: 79 MMHG | RESPIRATION RATE: 16 BRPM | HEART RATE: 85 BPM | OXYGEN SATURATION: 95 % | SYSTOLIC BLOOD PRESSURE: 121 MMHG | TEMPERATURE: 98 F

## 2022-10-08 DIAGNOSIS — K63.89 EPIPLOIC APPENDAGITIS: Primary | ICD-10-CM

## 2022-10-08 DIAGNOSIS — R10.12 LEFT UPPER QUADRANT ABDOMINAL PAIN: ICD-10-CM

## 2022-10-08 LAB
ALBUMIN SERPL BCP-MCNC: 4.2 G/DL (ref 3.5–5)
ALP SERPL-CCNC: 64 U/L (ref 46–116)
ALT SERPL W P-5'-P-CCNC: 34 U/L (ref 12–78)
ANION GAP SERPL CALCULATED.3IONS-SCNC: 8 MMOL/L (ref 4–13)
AST SERPL W P-5'-P-CCNC: 18 U/L (ref 5–45)
BASOPHILS # BLD AUTO: 0.02 THOUSANDS/ΜL (ref 0–0.1)
BASOPHILS NFR BLD AUTO: 0 % (ref 0–1)
BILIRUB SERPL-MCNC: 0.61 MG/DL (ref 0.2–1)
BILIRUB UR QL STRIP: NEGATIVE
BUN SERPL-MCNC: 9 MG/DL (ref 5–25)
CALCIUM SERPL-MCNC: 9.9 MG/DL (ref 8.3–10.1)
CHLORIDE SERPL-SCNC: 104 MMOL/L (ref 96–108)
CLARITY UR: NORMAL
CO2 SERPL-SCNC: 24 MMOL/L (ref 21–32)
COLOR UR: YELLOW
CREAT SERPL-MCNC: 0.81 MG/DL (ref 0.6–1.3)
EOSINOPHIL # BLD AUTO: 0.05 THOUSAND/ΜL (ref 0–0.61)
EOSINOPHIL NFR BLD AUTO: 1 % (ref 0–6)
ERYTHROCYTE [DISTWIDTH] IN BLOOD BY AUTOMATED COUNT: 12.6 % (ref 11.6–15.1)
EXT PREG TEST URINE: NEGATIVE
EXT. CONTROL ED NAV: NORMAL
GFR SERPL CREATININE-BSD FRML MDRD: 91 ML/MIN/1.73SQ M
GLUCOSE SERPL-MCNC: 114 MG/DL (ref 65–140)
GLUCOSE UR STRIP-MCNC: NEGATIVE MG/DL
HCT VFR BLD AUTO: 37.1 % (ref 34.8–46.1)
HGB BLD-MCNC: 12.4 G/DL (ref 11.5–15.4)
HGB UR QL STRIP.AUTO: NEGATIVE
IMM GRANULOCYTES # BLD AUTO: 0.01 THOUSAND/UL (ref 0–0.2)
IMM GRANULOCYTES NFR BLD AUTO: 0 % (ref 0–2)
KETONES UR STRIP-MCNC: NEGATIVE MG/DL
LEUKOCYTE ESTERASE UR QL STRIP: NEGATIVE
LIPASE SERPL-CCNC: 119 U/L (ref 73–393)
LYMPHOCYTES # BLD AUTO: 1.59 THOUSANDS/ΜL (ref 0.6–4.47)
LYMPHOCYTES NFR BLD AUTO: 36 % (ref 14–44)
MCH RBC QN AUTO: 27.5 PG (ref 26.8–34.3)
MCHC RBC AUTO-ENTMCNC: 33.4 G/DL (ref 31.4–37.4)
MCV RBC AUTO: 82 FL (ref 82–98)
MONOCYTES # BLD AUTO: 0.45 THOUSAND/ΜL (ref 0.17–1.22)
MONOCYTES NFR BLD AUTO: 10 % (ref 4–12)
NEUTROPHILS # BLD AUTO: 2.33 THOUSANDS/ΜL (ref 1.85–7.62)
NEUTS SEG NFR BLD AUTO: 53 % (ref 43–75)
NITRITE UR QL STRIP: NEGATIVE
NRBC BLD AUTO-RTO: 0 /100 WBCS
PH UR STRIP.AUTO: 6 [PH] (ref 4.5–8)
PLATELET # BLD AUTO: 286 THOUSANDS/UL (ref 149–390)
PMV BLD AUTO: 10.5 FL (ref 8.9–12.7)
POTASSIUM SERPL-SCNC: 3.6 MMOL/L (ref 3.5–5.3)
PROT SERPL-MCNC: 8.8 G/DL (ref 6.4–8.4)
PROT UR STRIP-MCNC: NEGATIVE MG/DL
RBC # BLD AUTO: 4.51 MILLION/UL (ref 3.81–5.12)
SODIUM SERPL-SCNC: 136 MMOL/L (ref 135–147)
SP GR UR STRIP.AUTO: >=1.03 (ref 1–1.03)
UROBILINOGEN UR QL STRIP.AUTO: 0.2 E.U./DL
WBC # BLD AUTO: 4.45 THOUSAND/UL (ref 4.31–10.16)

## 2022-10-08 PROCEDURE — 36415 COLL VENOUS BLD VENIPUNCTURE: CPT

## 2022-10-08 PROCEDURE — 83690 ASSAY OF LIPASE: CPT

## 2022-10-08 PROCEDURE — 85025 COMPLETE CBC W/AUTO DIFF WBC: CPT

## 2022-10-08 PROCEDURE — 81025 URINE PREGNANCY TEST: CPT

## 2022-10-08 PROCEDURE — 80053 COMPREHEN METABOLIC PANEL: CPT

## 2022-10-08 PROCEDURE — 96374 THER/PROPH/DIAG INJ IV PUSH: CPT

## 2022-10-08 PROCEDURE — 99284 EMERGENCY DEPT VISIT MOD MDM: CPT

## 2022-10-08 PROCEDURE — 74177 CT ABD & PELVIS W/CONTRAST: CPT

## 2022-10-08 PROCEDURE — G1004 CDSM NDSC: HCPCS

## 2022-10-08 PROCEDURE — 96376 TX/PRO/DX INJ SAME DRUG ADON: CPT

## 2022-10-08 PROCEDURE — 99285 EMERGENCY DEPT VISIT HI MDM: CPT | Performed by: EMERGENCY MEDICINE

## 2022-10-08 PROCEDURE — 96375 TX/PRO/DX INJ NEW DRUG ADDON: CPT

## 2022-10-08 PROCEDURE — 81003 URINALYSIS AUTO W/O SCOPE: CPT

## 2022-10-08 RX ORDER — OXYCODONE HYDROCHLORIDE 5 MG/1
5 TABLET ORAL EVERY 4 HOURS PRN
Qty: 6 TABLET | Refills: 0 | Status: SHIPPED | OUTPATIENT
Start: 2022-10-08 | End: 2022-10-10 | Stop reason: ALTCHOICE

## 2022-10-08 RX ORDER — ONDANSETRON 4 MG/1
4 TABLET, ORALLY DISINTEGRATING ORAL EVERY 6 HOURS PRN
Qty: 20 TABLET | Refills: 0 | Status: SHIPPED | OUTPATIENT
Start: 2022-10-08 | End: 2022-10-10 | Stop reason: ALTCHOICE

## 2022-10-08 RX ORDER — HYDROMORPHONE HCL/PF 1 MG/ML
0.5 SYRINGE (ML) INJECTION ONCE
Status: COMPLETED | OUTPATIENT
Start: 2022-10-08 | End: 2022-10-08

## 2022-10-08 RX ORDER — ONDANSETRON 2 MG/ML
4 INJECTION INTRAMUSCULAR; INTRAVENOUS ONCE
Status: COMPLETED | OUTPATIENT
Start: 2022-10-08 | End: 2022-10-08

## 2022-10-08 RX ADMIN — IOHEXOL 100 ML: 350 INJECTION, SOLUTION INTRAVENOUS at 11:26

## 2022-10-08 RX ADMIN — HYDROMORPHONE HYDROCHLORIDE 0.5 MG: 1 INJECTION, SOLUTION INTRAMUSCULAR; INTRAVENOUS; SUBCUTANEOUS at 11:16

## 2022-10-08 RX ADMIN — ONDANSETRON 4 MG: 2 INJECTION INTRAMUSCULAR; INTRAVENOUS at 10:04

## 2022-10-08 RX ADMIN — HYDROMORPHONE HYDROCHLORIDE 0.5 MG: 1 INJECTION, SOLUTION INTRAMUSCULAR; INTRAVENOUS; SUBCUTANEOUS at 10:05

## 2022-10-08 NOTE — ED PROVIDER NOTES
History  Chief Complaint   Patient presents with   • Abdominal Pain     Left sided abdominal pain for 3 days along with nausea, worsening pain in left lower abdomen while urinating  States she was here earlier this year for similar pain  81-year-old female presenting with 48 hours of progressively worsening left lower quadrant abdominal pain  Patient states that the pain is pressure-like in quality with radiation through her back and localized mainly in the left lower quadrant that with does have a diffuse quality to it  Associated symptoms include nausea and pain with urination and a subjective fever though her temperature here was 99 0  Antonio Hernandez Patient does have a past medical history significant for recent acute pancreatitis and she does say that the pain feels somewhat similar to her pancreatitis pain  Additionally, she has been seeing Gastroenterology for workup of an enlarged liver and duodenitis  She denies ever having a colonoscopy before  Patient has a history of ovarian cyst on the right side  Prior to Admission Medications   Prescriptions Last Dose Informant Patient Reported? Taking?    Icosapent Ethyl 1 g CAPS  Self No No   Sig: Take 2 capsules (2 g total) by mouth 2 (two) times a day   Symbicort 80-4 5 MCG/ACT inhaler   No No   Sig: INHALE 2 PUFFS 2 (TWO) TIMES A DAY RINSE MOUTH AFTER USE    betamethasone, augmented, (DIPROLENE) 0 05 % gel   No No   Sig: APPLY TOPICALLY 2 (TWO) TIMES A DAY   clobetasol (TEMOVATE) 0 05 % ointment   No No   Sig: Apply topically 2 (two) times a day   ergocalciferol (ERGOCALCIFEROL) 1 25 MG (54517 UT) capsule  Self Yes No   Sig: Take 50,000 Units by mouth once a week    fenofibrate (TRICOR) 145 mg tablet   No No   Sig: Take 1 tablet (145 mg total) by mouth daily   fenofibrate micronized (LOFIBRA) 200 MG capsule   Yes No   Sig: TAKE ONE CAPSULE (200 MG TOTAL) BY MOUTH EVERY MORNING BEFORE BREAKFAST    ketoconazole (NIZORAL) 2 % shampoo   No No   Sig: APPLY 1 APPLICATION TOPICALLY 2 (TWO) TIMES A WEEK   miconazole (MONISTAT-7) 2 % vaginal cream   No No   Sig: Insert 1 applicator into the vagina daily at bedtime   montelukast (SINGULAIR) 10 mg tablet   No No   Sig: TOME CAMILLE TABLETA POR VIA ORAL A DIARIO AL ACOSTARSE   pantoprazole (PROTONIX) 40 mg tablet   No No   Sig: TOME CAMILLE TABLETA POR VIA ORAL A DIARIO BEFORE BREAKFAST   rosuvastatin (CRESTOR) 20 MG tablet  Self Yes No   Sig: Take 20 mg by mouth daily       Facility-Administered Medications: None       Past Medical History:   Diagnosis Date   • COVID-19 2020   • Esophagitis    • H  pylori infection 2018   • Hepatic steatosis    • Hiatal hernia    • Hiatal hernia    • High triglycerides    • Hyperlipidemia    • Hypertension    • Pancreatitis    • Ventral hernia        Past Surgical History:   Procedure Laterality Date   •  SECTION      3X--,,    • COLONOSCOPY N/A 2018    Procedure: COLONOSCOPY;  Surgeon: Juliet Ramirez MD;  Location: [facility]; Service: Gastroenterology   • EGD  2021   • ESOPHAGOGASTRODUODENOSCOPY N/A 2018    Procedure: ESOPHAGOGASTRODUODENOSCOPY (EGD); Surgeon: Juliet Ramirez MD;  Location: [facility]; Service: Gastroenterology   • HERNIA REPAIR     • CT REPAIR INCISIONAL HERNIA,FLAVIO N/A 2019    Procedure: REPAIR HERNIA VENTRAL;  Surgeon: Abby Rojas DO;  Location: [address] [place] [address] OR;  Service: General   • TUBAL LIGATION     • UPPER GASTROINTESTINAL ENDOSCOPY         Family History   Problem Relation Age of Onset   • Heart disease Mother    • Hypertension Mother    • Cervical cancer Mother    • Stomach cancer Father    • Diabetes Brother    • Hyperlipidemia Brother    • Asthma Son    • Colon cancer Maternal Uncle    • Lung cancer Maternal Uncle      I have reviewed and agree with the history as documented      E-Cigarette/Vaping   • E-Cigarette Use Never User      E-Cigarette/Vaping Substances   • Nicotine No    • THC No    • CBD No    • Flavoring No • Other No    • Unknown No      Social History     Tobacco Use   • Smoking status: Never Smoker   • Smokeless tobacco: Never Used   Vaping Use   • Vaping Use: Never used   Substance Use Topics   • Alcohol use: No   • Drug use: No        Review of Systems   Constitutional: Positive for fever  Negative for chills  HENT: Negative for ear pain and sore throat  Eyes: Negative for pain and visual disturbance  Respiratory: Negative for cough and shortness of breath  Cardiovascular: Negative for chest pain and palpitations  Gastrointestinal: Positive for abdominal pain and nausea  Negative for vomiting  Genitourinary: Negative for dysuria and hematuria  Musculoskeletal: Negative for arthralgias and back pain  Skin: Negative for color change and rash  Neurological: Negative for seizures and syncope  All other systems reviewed and are negative  Physical Exam  ED Triage Vitals   Temperature Pulse Respirations Blood Pressure SpO2   10/08/22 1142 10/08/22 0920 10/08/22 0920 10/08/22 0920 10/08/22 0920   98 °F (36 7 °C) 102 18 131/79 99 %      Temp Source Heart Rate Source Patient Position - Orthostatic VS BP Location FiO2 (%)   10/08/22 1142 10/08/22 0920 10/08/22 0920 10/08/22 0920 --   Oral Monitor Lying Left arm       Pain Score       10/08/22 0920       9             Orthostatic Vital Signs  Vitals:    10/08/22 0920 10/08/22 1315   BP: 131/79 121/79   Pulse: 102 85   Patient Position - Orthostatic VS: Lying        Physical Exam  Vitals and nursing note reviewed  Constitutional:       General: She is not in acute distress  Appearance: She is ill-appearing  She is not toxic-appearing or diaphoretic  HENT:      Head: Normocephalic and atraumatic  Right Ear: External ear normal       Left Ear: External ear normal       Nose: Nose normal  No congestion or rhinorrhea  Mouth/Throat:      Mouth: Mucous membranes are moist       Pharynx: Oropharynx is clear   No oropharyngeal exudate or posterior oropharyngeal erythema  Eyes:      General: No scleral icterus  Extraocular Movements: Extraocular movements intact  Conjunctiva/sclera: Conjunctivae normal       Pupils: Pupils are equal, round, and reactive to light  Cardiovascular:      Rate and Rhythm: Normal rate and regular rhythm  Pulses: Normal pulses  Heart sounds: Normal heart sounds  No murmur heard  Pulmonary:      Effort: Pulmonary effort is normal  No respiratory distress  Breath sounds: Normal breath sounds  No wheezing or rhonchi  Abdominal:      General: Abdomen is flat  There is no distension  Palpations: Abdomen is soft  Tenderness: There is generalized abdominal tenderness (Generalized, diffuse, worst in left lower quadrant  )  There is guarding  There is no right CVA tenderness, left CVA tenderness or rebound  Negative signs include Lock's sign and McBurney's sign  Hernia: No hernia is present  Musculoskeletal:         General: No swelling  Cervical back: Neck supple  No rigidity  Right lower leg: No edema  Left lower leg: No edema  Lymphadenopathy:      Cervical: No cervical adenopathy  Skin:     General: Skin is warm and dry  Capillary Refill: Capillary refill takes less than 2 seconds  Coloration: Skin is not jaundiced  Findings: No rash  Neurological:      General: No focal deficit present  Mental Status: She is alert and oriented to person, place, and time  Mental status is at baseline     Psychiatric:         Mood and Affect: Mood normal          Behavior: Behavior normal          ED Medications  Medications   ondansetron (ZOFRAN) injection 4 mg (4 mg Intravenous Given 10/8/22 1004)   HYDROmorphone (DILAUDID) injection 0 5 mg (0 5 mg Intravenous Given 10/8/22 1005)   HYDROmorphone (DILAUDID) injection 0 5 mg (0 5 mg Intravenous Given 10/8/22 1116)   iohexol (OMNIPAQUE) 350 MG/ML injection (MULTI-DOSE) 100 mL (100 mL Intravenous Given 10/8/22 1126)       Diagnostic Studies  Results Reviewed     Procedure Component Value Units Date/Time    Lipase [[id number]]  (Normal) Collected: 10/08/22 1011    Lab Status: Final result Specimen: Blood from Arm, Left Updated: 10/08/22 1041     Lipase 119 u/L     Comprehensive metabolic panel [[id number]]  (Abnormal) Collected: 10/08/22 1011    Lab Status: Final result Specimen: Blood from Arm, Left Updated: 10/08/22 1041     Sodium 136 mmol/L      Potassium 3 6 mmol/L      Chloride 104 mmol/L      CO2 24 mmol/L      ANION GAP 8 mmol/L      BUN 9 mg/dL      Creatinine 0 81 mg/dL      Glucose 114 mg/dL      Calcium 9 9 mg/dL      AST 18 U/L      ALT 34 U/L      Alkaline Phosphatase 64 U/L      Total Protein 8 8 g/dL      Albumin 4 2 g/dL      Total Bilirubin 0 61 mg/dL      eGFR 91 ml/min/1 73sq m     Narrative:      National Kidney Disease Foundation guidelines for Chronic Kidney Disease (CKD):   •  Stage 1 with normal or high GFR (GFR > 90 mL/min/1 73 square meters)  •  Stage 2 Mild CKD (GFR = 60-89 mL/min/1 73 square meters)  •  Stage 3A Moderate CKD (GFR = 45-59 mL/min/1 73 square meters)  •  Stage 3B Moderate CKD (GFR = 30-44 mL/min/1 73 square meters)  •  Stage 4 Severe CKD (GFR = 15-29 mL/min/1 73 square meters)  •  Stage 5 End Stage CKD (GFR <15 mL/min/1 73 square meters)  Note: GFR calculation is accurate only with a steady state creatinine    CBC and differential [[id number]] Collected: 10/08/22 1011    Lab Status: Final result Specimen: Blood from Arm, Left Updated: 10/08/22 1022     WBC 4 45 Thousand/uL      RBC 4 51 Million/uL      Hemoglobin 12 4 g/dL      Hematocrit 37 1 %      MCV 82 fL      MCH 27 5 pg      MCHC 33 4 g/dL      RDW 12 6 %      MPV 10 5 fL      Platelets 558 Thousands/uL      nRBC 0 /100 WBCs      Neutrophils Relative 53 %      Immat GRANS % 0 %      Lymphocytes Relative 36 %      Monocytes Relative 10 %      Eosinophils Relative 1 %      Basophils Relative 0 %      Neutrophils Absolute 2 33 Thousands/µL      Immature Grans Absolute 0 01 Thousand/uL      Lymphocytes Absolute 1 59 Thousands/µL      Monocytes Absolute 0 45 Thousand/µL      Eosinophils Absolute 0 05 Thousand/µL      Basophils Absolute 0 02 Thousands/µL     POCT pregnancy, urine [[id number]]  (Normal) Resulted: 10/08/22 1004    Lab Status: Final result Updated: 10/08/22 1004     EXT PREG TEST UR (Ref: Negative) negative     Control valid    Urine Macroscopic, POC [[id number]] Collected: 10/08/22 1000    Lab Status: Final result Specimen: Urine Updated: 10/08/22 1002     Color, UA Yellow     Clarity, UA Cloudy     pH, UA 6 0     Leukocytes, UA Negative     Nitrite, UA Negative     Protein, UA Negative mg/dl      Glucose, UA Negative mg/dl      Ketones, UA Negative mg/dl      Urobilinogen, UA 0 2 E U /dl      Bilirubin, UA Negative     Occult Blood, UA Negative     Specific Gravity, UA >=1 030    Narrative:      CLINITEK RESULT                 CT abdomen pelvis with contrast   Final Result by Heide Hackett MD (10/08 1152)   Descending colon epiploic appendagitis  Workstation performed: [id number]               Procedures  Procedures      ED Course                             SBIRT 20yo+    Flowsheet Row Most Recent Value   SBIRT (23 yo +)    In order to provide better care to our patients, we are screening all of our patients for alcohol and drug use  Would it be okay to ask you these screening questions? No Filed at: 10/08/2022 1320                [facility]  Number of Diagnoses or Management Options  Epiploic appendagitis  Diagnosis management comments:     Assessment:  Undifferentiated abdominal pain, patient has some tachycardia, on exam has diffuse tenderness with worse pain in the left lower quadrant  States it feels similar to prior pancreatitis though in an odd location  Plan:  Abdominal labs including CMP, lipase, CBC, urine pregnancy, urinalysis  Symptom control with Dilaudid and Zofran    CT of the abdomen and pelvis  Reassessment/disposition:  On CT scan, patient has epiploic appendagitis  Treatment is supportive with pain control with NSAID and breakthrough pain control with narcotic as needed only  Patient was sent prescription for oxycodone to  from her pharmacy  Additionally patient will be given Zofran prescription for nausea relief  Patient was given strict return precautions for severe worsening of her abdominal pain, or other symptoms of spreading infection including fevers, or confusion  Patient agrees to follow-up with her PCP if her symptoms are improving next week  Disposition  Final diagnoses:   Epiploic appendagitis     Time reflects when diagnosis was documented in both MDM as applicable and the Disposition within this note     Time User Action Codes Description Comment    10/8/2022 12:38 PM Meg Majano Florina [J41 24] Epiploic appendagitis       ED Disposition     ED Disposition   Discharge    Condition   Stable    Date/Time   Sat Oct 8, 2022 12:49 PM    Comment   Janell Place discharge to home/self care                 Follow-up Information     Follow up With Specialties Details Why Contact Info Additional Information    Eddi Bowling MD Family Medicine Schedule an appointment as soon as possible for a visit in 1 week  [address] [facility] [address]  [address] [facility] Emergency Department Emergency Medicine Go to  If symptoms worsen, As needed [address] Emergency Department, [address] [facility], [address], [place], [address]          Discharge Medication List as of 10/8/2022  1:14 PM      START taking these medications    Details   ondansetron (Zofran ODT) 4 mg disintegrating tablet Take 1 tablet (4 mg total) by mouth every 6 (six) hours as needed for nausea or vomiting, Starting Sat 10/8/2022, Normal      oxyCODONE (Roxicodone) 5 immediate release tablet Take 1 tablet (5 mg total) by mouth every 4 (four) hours as needed for moderate pain for up to 10 days Max Daily Amount: 30 mg, Starting Sat 10/8/2022, Until Tue 10/18/2022 at 2359, Normal         CONTINUE these medications which have NOT CHANGED    Details   betamethasone, augmented, (DIPROLENE) 0 05 % gel APPLY TOPICALLY 2 (TWO) TIMES A DAY, Starting Tue 5/31/2022, Normal      clobetasol (TEMOVATE) 0 05 % ointment Apply topically 2 (two) times a day, Starting Fri 12/3/2021, Normal      ergocalciferol (ERGOCALCIFEROL) 1 25 MG (25047 UT) capsule Take 50,000 Units by mouth once a week , Starting Sat 8/7/2021, Historical Med      fenofibrate (TRICOR) 145 mg tablet Take 1 tablet (145 mg total) by mouth daily, Starting Sat 3/19/2022, Normal      fenofibrate micronized (LOFIBRA) 200 MG capsule TAKE ONE CAPSULE (200 MG TOTAL) BY MOUTH EVERY MORNING BEFORE BREAKFAST , Historical Med      Icosapent Ethyl 1 g CAPS Take 2 capsules (2 g total) by mouth 2 (two) times a day, Starting Fri 3/26/2021, Normal      ketoconazole (NIZORAL) 2 % shampoo APPLY 1 APPLICATION TOPICALLY 2 (TWO) TIMES A WEEK, Starting Mon 1/31/2022, Normal      miconazole (MONISTAT-7) 2 % vaginal cream Insert 1 applicator into the vagina daily at bedtime, Starting Wed 10/20/2021, Normal      montelukast (SINGULAIR) 10 mg tablet TOME CAMILLE TABLETA POR VIA ORAL A DIARIO AL ACOSTARSE, Normal      pantoprazole (PROTONIX) 40 mg tablet TOME CAMILLE TABLETA POR VIA ORAL A DIARIO BEFORE BREAKFAST, Normal      rosuvastatin (CRESTOR) 20 MG tablet Take 20 mg by mouth daily , Starting Mon 8/16/2021, Historical Med      Symbicort 80-4 5 MCG/ACT inhaler INHALE 2 PUFFS 2 (TWO) TIMES A DAY RINSE MOUTH AFTER USE , Normal           No discharge procedures on file  PDMP Review       Value Time User    PDMP Reviewed  Yes 3/11/2022  2:37 PM Hyacinth Spann PA-C           ED Provider  Attending physically available and evaluated Adam Valencia I managed the patient along with the ED Attending      Electronically Signed by         Kary Saini MD  10/08/22 6751

## 2022-10-08 NOTE — Clinical Note
Olive Mojica was seen and treated in our emergency department on 10/8/2022  Diagnosis: Epiploic Appendigitis    Nuria Cabezas  may return to work on return date  She may return on this date: 10/14/2022         If you have any questions or concerns, please don't hesitate to call        Harleen Lockett MD    ______________________________           _______________          _______________  Hospital Representative                              Date                                Time

## 2022-10-08 NOTE — ED ATTENDING ATTESTATION
Garcia Craven MD, saw and evaluated the patient  I have discussed the patient with the resident and agree with the resident's findings, Plan of Care, and MDM as documented in the resident's note, except where noted  All available labs and Radiology studies were reviewed  I was present for key portions of any procedure(s) performed by the resident and I was immediately available to provide assistance  At this point I agree with the current assessment done in the Emergency Department  I have conducted an independent evaluation of this patient a history and physical is as follows:    47 yo female with a history of hyperlipidemia, asthma, eczema, ovarian cysts, HTN, and opioid dependence presents to the ED complaining of abdominal pain and nausea x 3 days  The patient reports a progressively worsening "cramping" pain in her left lower abdomen  Pain is worsened with urination and occasionally radiates into the back  (+) Subjective fevers at home  (+) Nausea but no vomiting  No vaginal bleeding or discharge  No right sided abdominal pain  She denies chest pain and shortness of breath  No flank pain  No dysuria or hematuria  Symptoms are somewhat similar to past diagnoses of pancreatitis  No other specific complaints  ROS: per resident physician note    Gen: uncomfortable appearing, AA&Ox3  HEENT: PERRL, EOMI  Neck: supple  CV: RRR  Lungs: CTA B/L  Abdomen: soft, (+) significant tenderness to palpation in the right lower abdomen and epigastrium, (+) voluntary guarding  Back: no CVA tenderness  Ext: no swelling or deformity  Neuro: 5/5 strength all extremities, sensation grossly intact  Skin: no rash    ED Course  The patient is uncomfortable appearing with significant RLQ and epigastric tenderness on exam  Pancreatitis vs diverticulitis vs UTI vs ovarian process? Will check basic labs, lipase, UA, hCG, and CT A/P  Dilaudid and Zofran administered, will continue to monitor in the ED   Disposition per results and reassessment        Critical Care Time  Procedures

## 2022-10-08 NOTE — DISCHARGE INSTRUCTIONS
You have Epiploic Appendigitis  The best pain medicine for this is Ibuprofen  You can take 600mg every 6 hours  If that's not enough, then please take one of your Oxycodone which you need to  from your pharmacy  I also sent in a prescription for zofran for nausea in case you need it  Please eat and drink as able and followup with your family doctor  It usually takes about a week to start getting better

## 2022-10-10 ENCOUNTER — OFFICE VISIT (OUTPATIENT)
Dept: FAMILY MEDICINE CLINIC | Facility: CLINIC | Age: 42
End: 2022-10-10
Payer: COMMERCIAL

## 2022-10-10 VITALS
WEIGHT: 133 LBS | OXYGEN SATURATION: 95 % | HEIGHT: 60 IN | DIASTOLIC BLOOD PRESSURE: 80 MMHG | HEART RATE: 88 BPM | RESPIRATION RATE: 16 BRPM | BODY MASS INDEX: 26.11 KG/M2 | TEMPERATURE: 97.8 F | SYSTOLIC BLOOD PRESSURE: 130 MMHG

## 2022-10-10 DIAGNOSIS — Z23 NEEDS FLU SHOT: ICD-10-CM

## 2022-10-10 DIAGNOSIS — R10.12 LEFT UPPER QUADRANT ABDOMINAL PAIN: Primary | ICD-10-CM

## 2022-10-10 PROBLEM — Z87.19 HISTORY OF PANCREATITIS: Status: ACTIVE | Noted: 2022-06-03

## 2022-10-10 PROBLEM — Z83.3 FAMILY HISTORY OF DIABETES MELLITUS TYPE II: Status: ACTIVE | Noted: 2022-06-03

## 2022-10-10 PROCEDURE — 90471 IMMUNIZATION ADMIN: CPT | Performed by: FAMILY MEDICINE

## 2022-10-10 PROCEDURE — 90686 IIV4 VACC NO PRSV 0.5 ML IM: CPT | Performed by: FAMILY MEDICINE

## 2022-10-10 PROCEDURE — 99214 OFFICE O/P EST MOD 30 MIN: CPT | Performed by: FAMILY MEDICINE

## 2022-10-10 NOTE — LETTER
October 10, 2022     Patient: Elaine Lopez  YOB: 1980  Date of Visit: 10/10/2022      To Whom it May Concern:    Elaine Lopez is under my professional care  Yolychristofer Cunningham was seen in my office on 10/10/2022  She is currently ill and she is unable to return to work at this time  Expected return to work is October 18, 2022  If you have any questions or concerns, please don't hesitate to call            Sincerely,          Morales Miller MD

## 2022-10-10 NOTE — PROGRESS NOTES
Name: Marjan Bhandari      : 1980      MRN: [id number]  Encounter Provider: Marsha Verde MD  Encounter Date: 10/10/2022   Encounter department: [facility]   I am concerned the patient is suffering of IBD  Recommended to have MRI enterography  I requested CRP adding to previous labs  If she is currently having inflammations CRP with high  Her CBC did not show any signs of leukocytosis or anemia  Requesting patient to make appointment with the to [facility]  Differential diagnosis will be possible with  diverticulitis but not present and previous CT scan  No typical picture for kidney stones, her urine is normal     Pancreatitis: negative lipase and no inflammation and CT scan  1  Left upper quadrant abdominal pain  -     C-reactive protein; Future  -     MRI enterography w wo; Future; Expected date: 10/10/2022  -     Ambulatory referral to Gastroenterology; Future    2  Needs flu shot  -     influenza vaccine, quadrivalent, 0 5 mL, preservative-free, for adult and pediatric patients 6 mos+ (AFLURIA, FLUARIX, FLULAVAL, FLUZONE)           Subjective      The rate that is a 37-year-old female who has history of hypertriglyceridemia and pancreatitis with previous admission  She reports that on 2022 start abdominal pain in the left side of her abdomen  She had nauseous and 2 times vomiting  The abdominal pain is started at work  She finish her regular duty  Friday the  she continued with abdominal pain, went to emergency room on   scan done in emergency room did not show cause of her abdominal pain  Additional she is reporting having several ulcers in her mouth mucosa  She denies constipation, she denies urinary symptoms but states urine is darker, urinalysis in the emergency room was normal limits  Lipase this time was normal   Comprehensive  panel was normal limits    In previous note forearm gastroenterologist January 2021 is reported that she had inflammation of the terminal sigmoid colon       Review of Systems   Constitutional: Negative for diaphoresis, fatigue, fever and unexpected weight change  Respiratory: Negative for cough, chest tightness, shortness of breath and wheezing  Cardiovascular: Negative for chest pain, palpitations and leg swelling  Gastrointestinal: Positive for abdominal pain, nausea and vomiting  Negative for blood in stool and constipation  Neurological: Negative for dizziness, syncope, light-headedness and headaches  Hematological: Does not bruise/bleed easily  Psychiatric/Behavioral: Negative for behavioral problems, self-injury and sleep disturbance  The patient is not nervous/anxious  Current Outpatient Medications on File Prior to Visit   Medication Sig   • betamethasone, augmented, (DIPROLENE) 0 05 % gel APPLY TOPICALLY 2 (TWO) TIMES A DAY   • ergocalciferol (ERGOCALCIFEROL) 1 25 MG (15375 UT) capsule Take 50,000 Units by mouth once a week    • fenofibrate micronized (LOFIBRA) 200 MG capsule TAKE ONE CAPSULE (200 MG TOTAL) BY MOUTH EVERY MORNING BEFORE BREAKFAST  • Icosapent Ethyl 1 g CAPS Take 2 capsules (2 g total) by mouth 2 (two) times a day   • montelukast (SINGULAIR) 10 mg tablet TOME CAMILLE TABLETA POR VIA ORAL A DIARIO AL ACOSTARSE   • rosuvastatin (CRESTOR) 20 MG tablet Take 20 mg by mouth daily    • Symbicort 80-4 5 MCG/ACT inhaler INHALE 2 PUFFS 2 (TWO) TIMES A DAY RINSE MOUTH AFTER USE     • [DISCONTINUED] clobetasol (TEMOVATE) 0 05 % ointment Apply topically 2 (two) times a day   • [DISCONTINUED] fenofibrate (TRICOR) 145 mg tablet Take 1 tablet (145 mg total) by mouth daily   • [DISCONTINUED] ketoconazole (NIZORAL) 2 % shampoo APPLY 1 APPLICATION TOPICALLY 2 (TWO) TIMES A WEEK   • [DISCONTINUED] miconazole (MONISTAT-7) 2 % vaginal cream Insert 1 applicator into the vagina daily at bedtime   • [DISCONTINUED] ondansetron (Zofran ODT) 4 mg disintegrating tablet Take 1 tablet (4 mg total) by mouth every 6 (six) hours as needed for nausea or vomiting   • [DISCONTINUED] oxyCODONE (Roxicodone) 5 immediate release tablet Take 1 tablet (5 mg total) by mouth every 4 (four) hours as needed for moderate pain for up to 10 days Max Daily Amount: 30 mg   • [DISCONTINUED] pantoprazole (PROTONIX) 40 mg tablet TOME CAMILLE TABLETA POR VIA ORAL A DIARIO BEFORE BREAKFAST       Objective     /80 (BP Location: Left arm, Patient Position: Sitting, Cuff Size: Standard)   Pulse 88   Temp 97 8 °F (36 6 °C) (Temporal)   Resp 16   Ht 5' (1 524 m)   Wt 60 3 kg (133 lb)   SpO2 95%   Breastfeeding No   BMI 25 97 kg/m²     Physical Exam  Constitutional:       Appearance: She is ill-appearing  Cardiovascular:      Rate and Rhythm: Normal rate and regular rhythm  Heart sounds: No murmur heard  Pulmonary:      Effort: Pulmonary effort is normal       Breath sounds: Normal breath sounds     Abdominal:           Ravin Anguiano MD

## 2022-10-11 ENCOUNTER — TELEPHONE (OUTPATIENT)
Dept: GASTROENTEROLOGY | Facility: AMBULARY SURGERY CENTER | Age: 42
End: 2022-10-11

## 2022-10-11 PROBLEM — R50.9 FEVER: Status: RESOLVED | Noted: 2022-03-04 | Resolved: 2022-10-11

## 2022-10-11 NOTE — TELEPHONE ENCOUNTER
Patients GI provider:  Dr Dimitry Mendoza     Number to return call: [phone]    Reason for call: PT would like to switch care to Dr Dimitry Mendoza due to Bahamian language, and would like to understand everything clearly       Scheduled procedure/appointment date if applicable: Apt/procedure 11/11/22

## 2022-10-14 ENCOUNTER — HOSPITAL ENCOUNTER (OUTPATIENT)
Dept: MRI IMAGING | Facility: HOSPITAL | Age: 42
Discharge: HOME | End: 2022-10-14
Payer: COMMERCIAL

## 2022-10-14 DIAGNOSIS — R10.12 LEFT UPPER QUADRANT ABDOMINAL PAIN: ICD-10-CM

## 2022-10-14 PROCEDURE — G1004 CDSM NDSC: HCPCS

## 2022-10-14 PROCEDURE — A9585 GADOBUTROL INJECTION: HCPCS | Performed by: FAMILY MEDICINE

## 2022-10-14 PROCEDURE — 72197 MRI PELVIS W/O & W/DYE: CPT

## 2022-10-14 PROCEDURE — 74183 MRI ABD W/O CNTR FLWD CNTR: CPT

## 2022-10-14 RX ADMIN — GADOBUTROL 6 ML: 604.72 INJECTION INTRAVENOUS at 10:02

## 2022-10-14 RX ADMIN — GLUCAGON HYDROCHLORIDE 1 MG: KIT at 09:38

## 2022-10-18 ENCOUNTER — OFFICE VISIT (OUTPATIENT)
Dept: FAMILY MEDICINE CLINIC | Facility: CLINIC | Age: 42
End: 2022-10-18

## 2022-10-18 VITALS
SYSTOLIC BLOOD PRESSURE: 122 MMHG | WEIGHT: 134.4 LBS | BODY MASS INDEX: 26.39 KG/M2 | HEART RATE: 94 BPM | RESPIRATION RATE: 20 BRPM | HEIGHT: 60 IN | TEMPERATURE: 96.7 F | OXYGEN SATURATION: 98 % | DIASTOLIC BLOOD PRESSURE: 80 MMHG

## 2022-10-18 DIAGNOSIS — K63.89 EPIPLOIC APPENDAGITIS: ICD-10-CM

## 2022-10-18 DIAGNOSIS — N70.11 HYDROSALPINX: ICD-10-CM

## 2022-10-18 DIAGNOSIS — B00.1 HERPES LABIALIS: Primary | ICD-10-CM

## 2022-10-18 DIAGNOSIS — N83.201 RIGHT OVARIAN CYST: ICD-10-CM

## 2022-10-18 PROBLEM — F11.20 CONTINUOUS OPIOID DEPENDENCE (HCC): Status: RESOLVED | Noted: 2022-03-23 | Resolved: 2022-10-18

## 2022-10-18 PROBLEM — K85.90 ACUTE PANCREATITIS: Status: RESOLVED | Noted: 2022-03-11 | Resolved: 2022-10-18

## 2022-10-18 PROBLEM — E87.6 HYPOKALEMIA: Status: RESOLVED | Noted: 2022-03-12 | Resolved: 2022-10-18

## 2022-10-18 PROBLEM — Z83.3 FAMILY HISTORY OF DIABETES MELLITUS TYPE II: Status: RESOLVED | Noted: 2022-06-03 | Resolved: 2022-10-18

## 2022-10-18 PROBLEM — R09.81 NASAL CONGESTION: Status: RESOLVED | Noted: 2021-05-03 | Resolved: 2022-10-18

## 2022-10-18 PROBLEM — T14.8XXA BRUISING: Status: RESOLVED | Noted: 2022-03-04 | Resolved: 2022-10-18

## 2022-10-18 PROBLEM — R07.9 CHEST PAIN: Status: RESOLVED | Noted: 2019-03-29 | Resolved: 2022-10-18

## 2022-10-18 PROBLEM — U07.1 COVID-19: Status: RESOLVED | Noted: 2020-05-28 | Resolved: 2022-10-18

## 2022-10-18 PROBLEM — E83.39 HYPOPHOSPHATEMIA: Status: RESOLVED | Noted: 2022-03-12 | Resolved: 2022-10-18

## 2022-10-18 PROBLEM — R06.02 SOB (SHORTNESS OF BREATH): Status: RESOLVED | Noted: 2021-08-30 | Resolved: 2022-10-18

## 2022-10-18 PROBLEM — B35.0 TINEA CAPITIS: Status: RESOLVED | Noted: 2022-01-03 | Resolved: 2022-10-18

## 2022-10-18 PROBLEM — E83.52 HYPERCALCEMIA: Status: RESOLVED | Noted: 2021-02-19 | Resolved: 2022-10-18

## 2022-10-18 RX ORDER — ACYCLOVIR 50 MG/G
OINTMENT TOPICAL
Qty: 15 G | Refills: 0 | Status: SHIPPED | OUTPATIENT
Start: 2022-10-18

## 2022-10-18 RX ORDER — VALACYCLOVIR HYDROCHLORIDE 1 G/1
1000 TABLET, FILM COATED ORAL DAILY
Qty: 30 TABLET | Refills: 1 | Status: SHIPPED | OUTPATIENT
Start: 2022-10-18 | End: 2022-10-23

## 2022-10-18 NOTE — PROGRESS NOTES
Name: Richard Moon      : 1980      MRN: [id number]  Encounter Provider: Melodie Camarena PA-C  Encounter Date: 10/18/2022   Encounter department: [facility]     1  Herpes labialis  Assessment & Plan:  Lower lip cluster of vesicles x 3 days  History of the similar in the past but less severe  Will start Valtrex 1 gram once daily x5 days  Discussed possible triggers for recurrence and provided refills to be started at onset  Topical therapy was requested by patient in addition  Orders:  -     valACYclovir (VALTREX) 1,000 mg tablet; Take 1 tablet (1,000 mg total) by mouth daily for 5 days and then as needed for any recurrences of lip sores  -     acyclovir (ZOVIRAX) 5 % ointment; Apply topically 5 (five) times a day X 5 days    2  Epiploic appendagitis  Assessment & Plan:  Reviewed MRI 10/2022 resolving inflammatory changes along descending colon  Discussed findings with patient  Pain improved significantly  Follow-up GI        3  Hydrosalpinx  Comments:  Incidental finding on recent MRI enterography  Will order ultrasound as recommended  Asymptomatic  Orders:  -     US pelvis complete non OB; Future; Expected date: 10/18/2022    4  Right ovarian cyst  Assessment & Plan:  Previously followed by Gynecology  Will order US pelvis due to incidental right adnexal cysts present on recent CT and possible hydrosalpinx on MRI in 10/2022  Manuela Wolff is a 46 y o  female with who presents with blisters on the lower lip x3 days  She was seen in the office 8 days ago after recent visit to the ER for left upper quadrant pain which has been gradually improving  She has had blisters on her lips in the past which resolved with Valtrex but reports this episode is worse than previous  She is requesting time off from work at [employer] due to severity of lip blisters        services were required for history: [id number]      Review of Systems   Constitutional: Negative for fever and unexpected weight change  Respiratory: Negative for shortness of breath  Cardiovascular: Negative for chest pain  Gastrointestinal: Positive for abdominal pain  Negative for blood in stool  Skin:        Lower lip blisters       Current Outpatient Medications on File Prior to Visit   Medication Sig   • betamethasone, augmented, (DIPROLENE) 0 05 % gel APPLY TOPICALLY 2 (TWO) TIMES A DAY   • ergocalciferol (ERGOCALCIFEROL) 1 25 MG (04338 UT) capsule Take 50,000 Units by mouth once a week    • fenofibrate micronized (LOFIBRA) 200 MG capsule TAKE ONE CAPSULE (200 MG TOTAL) BY MOUTH EVERY MORNING BEFORE BREAKFAST  • Icosapent Ethyl 1 g CAPS Take 2 capsules (2 g total) by mouth 2 (two) times a day   • montelukast (SINGULAIR) 10 mg tablet TOME CAMILLE TABLETA POR VIA ORAL A DIARIO AL ACOSTARSE   • niacin (NIASPAN) 1000 MG CR tablet TOME DOS TABLETAS POR V A ORAL TODOS LOS D AS   • rosuvastatin (CRESTOR) 20 MG tablet Take 20 mg by mouth daily    • Symbicort 80-4 5 MCG/ACT inhaler INHALE 2 PUFFS 2 (TWO) TIMES A DAY RINSE MOUTH AFTER USE  Objective     /80 (BP Location: Left arm, Patient Position: Sitting, Cuff Size: Standard)   Pulse 94   Temp (!) 96 7 °F (35 9 °C) (Tympanic)   Resp 20   Ht 5' (1 524 m)   Wt 61 kg (134 lb 6 4 oz)   LMP  (LMP Unknown)   SpO2 98%   BMI 26 25 kg/m²     Physical Exam  Vitals reviewed  Constitutional:       General: She is not in acute distress  Appearance: She is obese  She is not ill-appearing or diaphoretic  HENT:      Head: Normocephalic and atraumatic  Mouth/Throat:      Lips: Lesions present  Mouth: Mucous membranes are moist         Comments: No visible mucosal lesions present, normal tongue  Eyes:      General: No scleral icterus  Cardiovascular:      Rate and Rhythm: Normal rate and regular rhythm  Pulses: Normal pulses     Pulmonary:      Effort: Pulmonary effort is normal       Breath sounds: Normal breath sounds  Abdominal:      Palpations: Abdomen is soft  Tenderness: There is abdominal tenderness in the left upper quadrant and left lower quadrant  There is no guarding  Skin:     General: Skin is warm and dry  Neurological:      Mental Status: She is alert and oriented to person, place, and time         Dahiana Chase PA-C

## 2022-10-18 NOTE — LETTER
October 18, 2022     Patient: Bryant Abraham  YOB: 1980  Date of Visit: 10/18/2022      To Whom it May Concern:    Bryant Abraham is under my professional care  Pema Doherty was seen in my office on 10/18/2022  Mcadams Bessy may return to work on 10/19/22 without restrictions  If you have any questions or concerns, please don't hesitate to call           Sincerely,          Earlene Graham PA-C        CC: No Recipients

## 2022-10-18 NOTE — ASSESSMENT & PLAN NOTE
Reviewed MRI 10/2022 resolving inflammatory changes along descending colon  Discussed findings with patient  Pain improved significantly  Follow-up GI

## 2022-10-18 NOTE — LETTER
October 18, 2022     Patient: Oumar Montaño  YOB: 1980  Date of Visit: 10/18/2022      To Whom it May Concern:    Oumar Montaño is under my professional care  Shant Lima was seen in my office on 10/18/2022  Shant Clarence may return to work on November 1, 2022  If you have any questions or concerns, please don't hesitate to call           Sincerely,          Ra Rajan PA-C        CC: No Recipients

## 2022-10-18 NOTE — ASSESSMENT & PLAN NOTE
Previously followed by Gynecology  Will order US pelvis due to incidental right adnexal cysts present on recent CT and possible hydrosalpinx on MRI in 10/2022

## 2022-10-18 NOTE — ASSESSMENT & PLAN NOTE
Lower lip cluster of vesicles x 3 days  History of the similar in the past but less severe  Will start Valtrex 1 gram once daily x5 days  Discussed possible triggers for recurrence and provided refills to be started at onset  Topical therapy was requested by patient in addition

## 2022-10-22 ENCOUNTER — HOSPITAL ENCOUNTER (OUTPATIENT)
Dept: RADIOLOGY | Facility: HOSPITAL | Age: 42
Discharge: HOME | End: 2022-10-22
Payer: COMMERCIAL

## 2022-10-22 DIAGNOSIS — N70.11 HYDROSALPINX: ICD-10-CM

## 2022-10-22 PROCEDURE — 76856 US EXAM PELVIC COMPLETE: CPT

## 2022-10-22 PROCEDURE — 76830 TRANSVAGINAL US NON-OB: CPT

## 2022-11-08 ENCOUNTER — TELEPHONE (OUTPATIENT)
Dept: FAMILY MEDICINE CLINIC | Facility: CLINIC | Age: 42
End: 2022-11-08

## 2022-11-08 NOTE — TELEPHONE ENCOUNTER
Patient sent a message about FMLA form stating the is missing information  I reviewed the form and is complete  Sent back a message about form is fine she needs to speak with employer about what is the problem

## 2022-11-18 ENCOUNTER — OFFICE VISIT (OUTPATIENT)
Dept: GASTROENTEROLOGY | Facility: CLINIC | Age: 42
End: 2022-11-18

## 2022-11-18 VITALS
BODY MASS INDEX: 26.9 KG/M2 | TEMPERATURE: 98.9 F | SYSTOLIC BLOOD PRESSURE: 143 MMHG | WEIGHT: 137 LBS | HEART RATE: 88 BPM | DIASTOLIC BLOOD PRESSURE: 87 MMHG | HEIGHT: 60 IN

## 2022-11-18 DIAGNOSIS — R10.12 LEFT UPPER QUADRANT ABDOMINAL PAIN: ICD-10-CM

## 2022-11-18 DIAGNOSIS — Z87.19 HISTORY OF PANCREATITIS: Primary | ICD-10-CM

## 2022-11-18 RX ORDER — DICYCLOMINE HYDROCHLORIDE 10 MG/1
10 CAPSULE ORAL
Qty: 120 CAPSULE | Refills: 0 | Status: SHIPPED | OUTPATIENT
Start: 2022-11-18 | End: 2022-12-18

## 2022-11-20 NOTE — PROGRESS NOTES
[facility] - Outpatient Follow-up Note  Kylie Flores 46 y o  female MRN: [id number]  Encounter: [id number]          ASSESSMENT AND PLAN:      1  Left upper quadrant abdominal pain  Patient was found to have appendagitis on CT of the abdomen during recent visit to the ER for abdominal pain and nausea back in October  The patient was eventually discharged home and her pain was improving  An MRE was performed for follow-up of the finding, MRE showed appendagitis is resolving and this may have been the cause of her abdominal pain while she visited the ER  Her pain is almost gone by now and she only has minimal discomfort  Will prescribe Bentyl as needed  Follow-up as needed    2  History of pancreatitis  She has history of pancreatitis from hypertriglyceridemia  She is currently on fenofibrate, niacin and rosuvastatin  Most recent triglyceride level was 294  No more episode of pancreatitis  Continue current treatment    ______________________________________________________________________    SUBJECTIVE:  Patient seen and examined, she is a 53-year-old female patient with previous history of pancreatitis from hypertriglyceridemia, she was recently seen in the ER due to abdominal pain, she was found to have appendagitis, eventually she was discharged home, follow-up study showed the appendagitis was improving, currently her abdominal pain is almost gone, otherwise she denies any recent events, currently is tolerating PO route, denies nausea or vomiting, is passing flatus and having daily bowel movements of normal consistency, no recent weight loss      REVIEW OF SYSTEMS IS OTHERWISE NEGATIVE        Historical Information   Past Medical History:   Diagnosis Date   • Acute pancreatitis 3/11/2022   • Anemia    • COVID-19 05/2020   • Esophagitis    • H  pylori infection 12/2018   • Hepatic steatosis    • Hiatal hernia    • Hiatal hernia    • High triglycerides    • Hyperlipidemia    • Hypertension • Pancreatitis    • Ventral hernia      Past Surgical History:   Procedure Laterality Date   •  SECTION      3X--,,    • COLONOSCOPY N/A 2018    Procedure: COLONOSCOPY;  Surgeon: Romero Leslie MD;  Location: [facility]; Service: Gastroenterology   • EGD  2021   • ESOPHAGOGASTRODUODENOSCOPY N/A 2018    Procedure: ESOPHAGOGASTRODUODENOSCOPY (EGD); Surgeon: Romero Leslie MD;  Location: [facility];   Service: Gastroenterology   • HERNIA REPAIR     • MN REPAIR INCISIONAL HERNIA,FLAVIO N/A 2019    Procedure: REPAIR HERNIA VENTRAL;  Surgeon: Leopold Ewings, DO;  Location: [address] [place] [address] OR;  Service: General   • TUBAL LIGATION     • UPPER GASTROINTESTINAL ENDOSCOPY       Social History   Social History     Substance and Sexual Activity   Alcohol Use No     Social History     Substance and Sexual Activity   Drug Use No     Social History     Tobacco Use   Smoking Status Never   Smokeless Tobacco Never     Family History   Problem Relation Age of Onset   • Heart disease Mother    • Hypertension Mother    • Cervical cancer Mother    • Stomach cancer Father    • Diabetes Brother    • Hyperlipidemia Brother    • Asthma Son    • Colon cancer Maternal Uncle    • Lung cancer Maternal Uncle        Meds/Allergies       Current Outpatient Medications:   •  acyclovir (ZOVIRAX) 5 % ointment  •  betamethasone, augmented, (DIPROLENE) 0 05 % gel  •  dicyclomine (BENTYL) 10 mg capsule  •  ergocalciferol (ERGOCALCIFEROL) 1 25 MG (55717 UT) capsule  •  fenofibrate micronized (LOFIBRA) 200 MG capsule  •  Icosapent Ethyl 1 g CAPS  •  montelukast (SINGULAIR) 10 mg tablet  •  niacin (NIASPAN) 1000 MG CR tablet  •  rosuvastatin (CRESTOR) 20 MG tablet  •  Symbicort 80-4 5 MCG/ACT inhaler  •  valACYclovir (VALTREX) 1,000 mg tablet    No Known Allergies        Objective     Blood pressure 143/87, pulse 88, temperature 98 9 °F (37 2 °C), temperature source Tympanic, height 5' (1 524 m), weight 62 1 kg (137 lb), not currently breastfeeding  Body mass index is 26 76 kg/m²  PHYSICAL EXAM:      General Appearance:   Alert, cooperative, no distress   HEENT:   Normocephalic, atraumatic, anicteric      Neck:  Supple, symmetrical, trachea midline   Lungs:   Clear to auscultation bilaterally; no rales, rhonchi or wheezing; respirations unlabored    Heart[de-identified]   Regular rate and rhythm; no murmur, rub, or gallop  Abdomen:   Soft, minimal tenderness in the left side, non-distended; normal bowel sounds; no masses, no organomegaly    Genitalia:   Deferred    Rectal:   Deferred    Extremities:  No cyanosis, clubbing or edema    Pulses:  2+ and symmetric    Skin:  No jaundice, rashes, or lesions    Lymph nodes:  No palpable cervical lymphadenopathy        Lab Results:   No visits with results within 1 Day(s) from this visit     Latest known visit with results is:   Admission on 10/08/2022, Discharged on 10/08/2022   Component Date Value   • Lipase 10/08/2022 119    • Sodium 10/08/2022 136    • Potassium 10/08/2022 3 6    • Chloride 10/08/2022 104    • CO2 10/08/2022 24    • ANION GAP 10/08/2022 8    • BUN 10/08/2022 9    • Creatinine 10/08/2022 0 81    • Glucose 10/08/2022 114    • Calcium 10/08/2022 9 9    • AST 10/08/2022 18    • ALT 10/08/2022 34    • Alkaline Phosphatase 10/08/2022 64    • Total Protein 10/08/2022 8 8 (H)    • Albumin 10/08/2022 4 2    • Total Bilirubin 10/08/2022 0 61    • eGFR 10/08/2022 91    • WBC 10/08/2022 4 45    • RBC 10/08/2022 4 51    • Hemoglobin 10/08/2022 12 4    • Hematocrit 10/08/2022 37 1    • MCV 10/08/2022 82    • MCH 10/08/2022 27 5    • MCHC 10/08/2022 33 4    • RDW 10/08/2022 12 6    • MPV 10/08/2022 10 5    • Platelets 25/38/3424 286    • nRBC 10/08/2022 0    • Neutrophils Relative 10/08/2022 53    • Immat GRANS % 10/08/2022 0    • Lymphocytes Relative 10/08/2022 36    • Monocytes Relative 10/08/2022 10    • Eosinophils Relative 10/08/2022 1    • Basophils Relative 10/08/2022 0    • Neutrophils Absolute 10/08/2022 2 33    • Immature Grans Absolute 10/08/2022 0 01    • Lymphocytes Absolute 10/08/2022 1 59    • Monocytes Absolute 10/08/2022 0 45    • Eosinophils Absolute 10/08/2022 0 05    • Basophils Absolute 10/08/2022 0 02    • EXT PREG TEST UR (Ref: N* 10/08/2022 negative    • Control 10/08/2022 valid    • Color, UA 10/08/2022 Yellow    • Clarity, UA 10/08/2022 Cloudy    • pH, UA 10/08/2022 6 0    • Leukocytes, UA 10/08/2022 Negative    • Nitrite, UA 10/08/2022 Negative    • Protein, UA 10/08/2022 Negative    • Glucose, UA 10/08/2022 Negative    • Ketones, UA 10/08/2022 Negative    • Urobilinogen, UA 10/08/2022 0 2    • Bilirubin, UA 10/08/2022 Negative    • Occult Blood, UA 10/08/2022 Negative    • Specific Gravity, UA 10/08/2022 >=1 030          Radiology Results:   US pelvis complete w transvaginal    Result Date: 10/26/2022  Narrative: PELVIC ULTRASOUND, COMPLETE INDICATION:  The patient is 46years old  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  COMPARISON: MRI from 10/14/2022, CT from 5/20/2022, ultrasound from 10/22/2021 TECHNIQUE:   Transabdominal pelvic ultrasound was performed in sagittal and transverse planes with a curvilinear transducer  Additional transvaginal imaging was performed to better evaluate the endometrium and ovaries  Imaging included volumetric sweeps as well as traditional still imaging technique  FINDINGS: UTERUS: The uterus is anteverted in position, measuring 8 2 x 4 4 x 6 6 cm  The myometrium is heterogeneous with refractory shadowing  There is a junctional zone cyst  The cervix appears within normal limits  ENDOMETRIUM:  The endometrial echo complex has an AP caliber of 9 0 mm  Its appearance is within normal limits for age and cycle and shows no filling defects    OVARIES/ADNEXA: Right ovary:  6 2 x 4 4 x 3 2 cm  45 3 mL There are three cystic lesions in the right ovary including adjacent simple cysts measuring 2 5 x 1 8 x 1 7 cm and 3 1 x 2 0 x 2 0 cm, previously 2 4 x 1 9 x 1 8 cm and 2 7 x 2 0 x 2 1 cm on the prior ultrasound from 2021  These measured 1 3 x 2 0 cm and  1 6 x 2 2 cm on a CT from 2020  These cysts may communicate with each other as suggested on MRI  Suzan Rondon A third cyst measures 2 6 x 1 2 x 1 6 cm and demonstrates areas of internal echogenicity  This likely corresponds to a simple appearing 2 5 cm cyst on the prior MR enterography and is consistent with interval hemorrhage  This was not present on earlier studies consistent with a physiologic lesion  Doppler flow within normal limits  Left ovary:  3 5 x 1 7 x 1 8 cm  5 8 mL No suspicious left ovarian abnormality  Doppler flow within normal limits  No suspicious adnexal mass or loculated collections  There is small amount of simple free fluid in the pelvis, likely physiologic in this premenopausal female  Impression:  1  Adjacent simple cysts in the right ovary corresponding to lesion seen on MRI, CT, and ultrasound dating back to 2020 though demonstrating gradual increase in size  Etiology remains uncertain, possibly chronic hydrosalpinx though definite tubular configuration with fallopian tube features such as fimbria not identified  There are no suspicious features though continued ultrasound follow-up is recommended in 12 months  2   Complex cyst in the right ovary corresponding to a simple cyst on the recent MRI consistent with interval hemorrhage, possibly related to development of a corpus luteum  3   Adenomyosis  The study was marked in EPIC for significant notification   Workstation performed: [id number]

## 2022-11-25 ENCOUNTER — OFFICE VISIT (OUTPATIENT)
Dept: OBGYN CLINIC | Facility: CLINIC | Age: 42
End: 2022-11-25

## 2022-11-25 VITALS
HEART RATE: 97 BPM | HEIGHT: 60 IN | SYSTOLIC BLOOD PRESSURE: 139 MMHG | DIASTOLIC BLOOD PRESSURE: 91 MMHG | BODY MASS INDEX: 26.31 KG/M2 | WEIGHT: 134 LBS

## 2022-11-25 DIAGNOSIS — B96.89 BV (BACTERIAL VAGINOSIS): ICD-10-CM

## 2022-11-25 DIAGNOSIS — N76.0 BV (BACTERIAL VAGINOSIS): ICD-10-CM

## 2022-11-25 DIAGNOSIS — N83.201 RIGHT OVARIAN CYST: Primary | ICD-10-CM

## 2022-11-25 RX ORDER — METRONIDAZOLE 500 MG/1
500 TABLET ORAL EVERY 12 HOURS SCHEDULED
Qty: 14 TABLET | Refills: 0 | Status: SHIPPED | OUTPATIENT
Start: 2022-11-25 | End: 2022-12-02

## 2022-11-25 NOTE — PROGRESS NOTES
Subjective:     Oneyda Godinez is a 46 y o   female who presents for pelvic pain with right ovarian cyst  Pt states that she has had pelvic pain on and off for the last 2 years  She reports that the pain is sharp in nature and occurs suddenly on her right side and will occasionally shoot down her leg  She has had known ovarian cyst since  when first seen on CT imaging  She most recently had an MRI and U/S done in October due to pain and was found to have appendagitis and multiple right simple appearing ovarian cysts were seen as well as possible hydrosalpinx  Pt has previously been on OCP's to try to regulate AUB  She has an EMB performed in  that was benign  Pt reports she cannot remember when her last period was but was sometime this year  She also reports a foul odor with vaginal discharge  Objective:    Vitals: Blood pressure 139/91, pulse 97, height 5' (1 524 m), weight 60 8 kg (134 lb), not currently breastfeeding  Body mass index is 26 17 kg/m²  Physical Exam  Genitourinary:     Labia:         Right: No rash, tenderness or lesion  Left: No rash, tenderness or lesion  Vagina: Vaginal discharge present  No erythema, tenderness or bleeding  Cervix: No cervical motion tenderness, discharge, friability or lesion  Uterus: Normal        Adnexa:         Right: Tenderness present  Left: Tenderness present  Assessment/Plan:  Pelvic Pain with ovarian cyst  Discussed with patient results of imaging which showed multiple 2-3cm simple appearing cyst on the right ovary  Also discussed with patient that MRI read a possible hydrosalpinx but that is not definitive  Discussed with patient that we usually recommend conservative management with pain control and repeat imaging  Pt is uninterested in conservative management as she has been dealing with pelvic pain for on and off for 2 years   She is interested in removal of the cyst and possible tube if hydrosalpinx is identified  I discussed with patient that even if cyst is removed it may not resolve her pain  We discussed the small chance that her cyst is a malignancy and if it were she would have to follow up with gyn oncology and that may require further surgery or treatment    Pt was consented for dx laparoscopy, possible cystectomy, possible salpingectomy, and/or possible oophorectomy  The patient was counseled regarding indications, risks, benefits and alternatives to surgical management  We discussed risks including but not limited to bleeding and potential need for blood transfusions, infection, pain, injury to surrounding organs such as bladder, intestines, ureters and neurovascular structures  Patient understands potential risks associated with stress of surgery and general anesthesia including but not limited to acute cardiac events, venous thromboembolism, etc   All questions answered to patient's satisfaction  Patient agrees and wants to proceed  I discussed with pt that the surgery has to be approved by a surgical committee before we could proceed and she would have to return for a surgical H&P prior to surgery  Bacterial vaginosis  Microscopic wet-mount exam shows clue cells, vaginal pH is 6 0  Flagyl 500mg BID for 7 days was sent to the patient's pharmacy  Urinary Incontinence  Pt will return to office to further discuss          Boo Bustos MD  11/25/2022  10:57 AM

## 2022-11-29 ENCOUNTER — TELEPHONE (OUTPATIENT)
Dept: OBGYN CLINIC | Facility: CLINIC | Age: 42
End: 2022-11-29

## 2022-11-29 NOTE — QUICK NOTE
[facility] Surgical Case Review  Date Reviewed: 11/29/22  Reviewed by: Dr Quintin Orosco  Case request:  Dx laparoscopy, Possible cystectomy, possible salpingectomy, and/or possible oophorectomy  Indication: Pelvic pain, Ovarian cyst possible hydrosalpinx  Surgical Consent: 10/25/2022  MA30/31: NA    Case request approved: Pending tasks    Comments:    Patient need:  1  Surgical clearance form PCP previous surgery  2  UROGYN evaluation to define possibility to have the [address] [place] [address] fellow managing  the case as attending if UROGYN procedure is required       Mayra Casarez MD  PGY-4, OBGYN  11/29/22

## 2022-11-29 NOTE — TELEPHONE ENCOUNTER
Left voicemail for patient in regards to having PCP clearance prior to surgical approval  Given office call back number if needed

## 2024-05-23 NOTE — PROGRESS NOTES
Ryann Castillo   Used for translation today  Subjective      Briggs Estimable is a 30 y o  female who presents for annual well woman exam     Patient is a poor historian  Last pap 3/26/2014  Resulted NILM/ HPV  Negative  Next Pap smear due 3/2019  Patient with complaints today of vaginal discharge and burning  Discharge described as white, thick and chunky  Started approximately 2 weeks ago  Denies alleviating or aggravating factors  Patient also complains today of irregular menstrual cycle  Was recently seen in office 2018 given diagnosis of PCOS started on Sprintec  Patient states she stops Sprintec 2-3 months ago,  As she believed she was instructed to only stay on for 6 months  Reviewed note with patient, past notes reviewed from [facility]  Patient has been treated for many years for abnormal periods treatments included OCPs, Depo-Provera and Provera monthly  Patient states her best  Symptom control has been with the [address] [place] [address] Avenue  She desires to restart  Current contraception: tubal ligation  History of abnormal Pap smear: no  Family history of uterine or ovarian cancer: no  Regular self breast exam: no  History of abnormal mammogram:  Mammograms to start at age 38  Family history of breast cancer: no  History of abnormal lipids: yes   Menstrual History:  OB History      Para Term  AB Living    3 3 3     3    SAB TAB Ectopic Multiple Live Births                      Menarche age:14- patient with a long history of abnormal periods  Diagnosis of PCOS  Most recently menses are every 20-23 days lasting for 3 days  This is been past 2-3 months since stopping Sprintec  Patient's last menstrual period was 2018 (approximate)  Period Cycle (Days):  (monlthy)  Period Duration (Days):  3    The following portions of the patient's history were reviewed and updated as appropriate: allergies, current medications, past family history, past medical history, past social history, past surgical See how she is feeling. Can set her up with a unit of PRBC's.    Patito history and problem list     Review of Systems  Pertinent items are noted in HPI  Objective      /79 (BP Location: Left arm)   Pulse 80   Ht 5' 1" (1 549 m)   Wt 55 4 kg (122 lb 3 2 oz)   LMP 08/03/2018 (Approximate)   BMI 23 09 kg/m²     General:   alert, fatigued, appears stated age and cooperative   Heart: regular rate and rhythm, S1, S2 normal, no murmur, click, rub or gallop   Lungs: clear to auscultation bilaterally   Abdomen: soft, non-tender, without masses or organomegaly   Vulva: normal   Vagina: normal mucosa, curdlike discharge, pH 4 5, wet prep done   Cervix: multiparous appearance, no cervical motion tenderness and no lesions   Uterus: normal size, non-tender, normal shape and consistency   Adnexa: normal adnexa and no mass, fullness, tenderness     Breast:  Nontender, no palpable masses, no nipple discharge, no skin changes bilaterally          Assessment      @well woman  no contraindication to continue hormonal therapy@   Plan      All questions answered  Breast self exam technique reviewed and patient encouraged to perform self-exam monthly  Contraception: OCP (estrogen/progesterone) and tubal ligation  Diagnosis explained in detail, including differential   Dietary diary  Discussed healthy lifestyle modifications  Educational material distributed  Follow up in 4 months  Follow up as needed  Wet prep  Breast awareness review       PCOS    -  Patient desires to restart Sprintec  Patient is not interested in any alternatives at this time  -  Rx  Sprintec 1 tablet daily 1 pack with 11 refills sent electronically reviewed with patient to continue taking pills daily     -  Patient will start pills Sunday after next menses  Reviewed common side effects including nausea, breast tenderness  ACHES   Reviewed    RTO   Four months for follow-up on symptom control     yeast infection     -  Miconazole 7 sent electronically reviewed with patient to take 1 applicator full every night for 7 nights      RTO 4 months for follow up

## (undated) DEVICE — NEEDLE BLUNT 18 G X 1 1/2IN

## (undated) DEVICE — SUT VICRYL 3-0 SH 27 IN J416H

## (undated) DEVICE — PENCIL ELECTROSURG E-Z CLEAN -0035H

## (undated) DEVICE — GARMENT,MEDLINE,DVT,INT,CALF,FOAM,MED: Brand: MEDLINE

## (undated) DEVICE — SPONGE LAP STERILE 4X18

## (undated) DEVICE — TIBURON LAPAROTOMY DRAPE: Brand: CONVERTORS

## (undated) DEVICE — OCCLUSIVE GAUZE STRIP,3% BISMUTH TRIBROMOPHENATE IN PETROLATUM BLEND: Brand: XEROFORM

## (undated) DEVICE — PAD GROUNDING ADULT

## (undated) DEVICE — 3L THIN WALL CAN: Brand: CRD

## (undated) DEVICE — NDL CNTR 20CT FM MAG: Brand: MEDLINE INDUSTRIES, INC.

## (undated) DEVICE — STERILE POLYISOPRENE POWDER-FREE SURGICAL GLOVES: Brand: PROTEXIS

## (undated) DEVICE — SUT PROLENE 1 CTX 30 IN 8455H

## (undated) DEVICE — SUT PDS II 4-0 PS-2 18 IN Z496G

## (undated) DEVICE — POOLE SUCTION INSTRUMENT WITH REMOVABLE SHEATH AND PREATTACHED 6 FT. (1.8M) CLEAR PLASTIC TUBING: Brand: POOLE

## (undated) DEVICE — CHLORAPREP HI-LITE 26ML ORANGE

## (undated) DEVICE — 3M™ STERI-STRIP™ REINFORCED ADHESIVE SKIN CLOSURES, R1547, 1/2 IN X 4 IN (12 MM X 100 MM), 6 STRIPS/ENVELOPE: Brand: 3M™ STERI-STRIP™

## (undated) DEVICE — GAUZE SPONGES,16 PLY: Brand: CURITY

## (undated) DEVICE — SPONGE LAP STERILE 18X18

## (undated) DEVICE — SUT PDS II 0 CT-1 27 IN Z340H

## (undated) DEVICE — SYRINGE 30ML LL

## (undated) DEVICE — BULB SYRINGE,IRRIGATION WITH PROTECTIVE CAP: Brand: DOVER

## (undated) DEVICE — INTENDED FOR TISSUE SEPARATION, AND OTHER PROCEDURES THAT REQUIRE A SHARP SURGICAL BLADE TO PUNCTURE OR CUT.: Brand: BARD-PARKER SAFETY BLADES SIZE 10, STERILE

## (undated) DEVICE — INTENDED FOR TISSUE SEPARATION, AND OTHER PROCEDURES THAT REQUIRE A SHARP SURGICAL BLADE TO PUNCTURE OR CUT.: Brand: BARD-PARKER SAFETY BLADES SIZE 15, STERILE

## (undated) DEVICE — BASIC PACK: Brand: CONVERTORS

## (undated) DEVICE — SKIN MARKER DUAL TIP WITH RULER CAP, FLEXIBLE RULER AND LABELS: Brand: DEVON